# Patient Record
Sex: FEMALE | Race: WHITE | NOT HISPANIC OR LATINO | Employment: FULL TIME | URBAN - METROPOLITAN AREA
[De-identification: names, ages, dates, MRNs, and addresses within clinical notes are randomized per-mention and may not be internally consistent; named-entity substitution may affect disease eponyms.]

---

## 2017-02-14 ENCOUNTER — HOSPITAL ENCOUNTER (EMERGENCY)
Facility: MEDICAL CENTER | Age: 37
End: 2017-02-14
Attending: EMERGENCY MEDICINE
Payer: COMMERCIAL

## 2017-02-14 ENCOUNTER — APPOINTMENT (OUTPATIENT)
Dept: RADIOLOGY | Facility: MEDICAL CENTER | Age: 37
End: 2017-02-14
Attending: EMERGENCY MEDICINE
Payer: COMMERCIAL

## 2017-02-14 VITALS
OXYGEN SATURATION: 95 % | HEIGHT: 68 IN | DIASTOLIC BLOOD PRESSURE: 96 MMHG | RESPIRATION RATE: 18 BRPM | HEART RATE: 79 BPM | SYSTOLIC BLOOD PRESSURE: 149 MMHG | BODY MASS INDEX: 36.05 KG/M2 | WEIGHT: 237.88 LBS | TEMPERATURE: 98.5 F
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
## 2017-02-14 DIAGNOSIS — S16.1XXA CERVICAL STRAIN, INITIAL ENCOUNTER: ICD-10-CM

## 2017-02-14 PROCEDURE — 99284 EMERGENCY DEPT VISIT MOD MDM: CPT

## 2017-02-14 PROCEDURE — 72125 CT NECK SPINE W/O DYE: CPT

## 2017-02-14 RX ORDER — LORATADINE 10 MG/1
10 TABLET ORAL EVERY EVENING
COMMUNITY
End: 2019-10-01

## 2017-02-14 RX ORDER — ACETAMINOPHEN 500 MG
1000 TABLET ORAL EVERY 6 HOURS PRN
Status: SHIPPED | COMMUNITY
End: 2021-11-08

## 2017-02-14 RX ORDER — HYDROCODONE BITARTRATE AND ACETAMINOPHEN 5; 325 MG/1; MG/1
1 TABLET ORAL EVERY 4 HOURS PRN
Qty: 12 TAB | Refills: 0 | Status: SHIPPED | OUTPATIENT
Start: 2017-02-14 | End: 2018-05-22

## 2017-02-14 RX ORDER — IBUPROFEN 800 MG/1
800 TABLET ORAL EVERY 8 HOURS PRN
Qty: 30 TAB | Refills: 0 | Status: SHIPPED | OUTPATIENT
Start: 2017-02-14 | End: 2019-10-01

## 2017-02-14 ASSESSMENT — PAIN SCALES - GENERAL: PAINLEVEL_OUTOF10: 6

## 2017-02-14 NOTE — ED AVS SNAPSHOT
VILOOP Access Code: [email or web address]  Expires: 3/16/2017  4:02 PM    VILOOP  A secure, online tool to manage your health information     [employer] VILOOP® is a secure, online tool that connects you to your personalized health information from the privacy of your home -- day or night - making it very easy for you to manage your healthcare. Once the activation process is completed, you can even access your medical information using the VILOOP toña, which is available for free in the Apple Toña store or Google Play store.     VILOOP provides the following levels of access (as shown below):   My Chart Features   [facility] Primary Care Doctor [facility]  Specialists [facility]  Urgent  Care Non-[facility]  Primary Care  Doctor   Email your healthcare team securely and privately 24/7 X X X X   Manage appointments: schedule your next appointment; view details of past/upcoming appointments X      Request prescription refills. X      View recent personal medical records, including lab and immunizations X X X X   View health record, including health history, allergies, medications X X X X   Read reports about your outpatient visits, procedures, consult and ER notes X X X X   See your discharge summary, which is a recap of your hospital and/or ER visit that includes your diagnosis, lab results, and care plan. X X       How to register for VILOOP:  1. Go to  [email or web address].  2. Click on the Sign Up Now box, which takes you to the New Member Sign Up page. You will need to provide the following information:  a. Enter your VILOOP Access Code exactly as it appears at the top of this page. (You will not need to use this code after you’ve completed the sign-up process. If you do not sign up before the expiration date, you must request a new code.)   b. Enter your date of birth.   c. Enter your home email address.   d. Click Submit, and follow the next screen’s instructions.  3. Create a VILOOP ID. This will be your VILOOP  login ID and cannot be changed, so think of one that is secure and easy to remember.  4. Create a [employer] Live password. You can change your password at any time.  5. Enter your Password Reset Question and Answer. This can be used at a later time if you forget your password.   6. Enter your e-mail address. This allows you to receive e-mail notifications when new information is available in Bantam Live.  7. Click Sign Up. You can now view your health information.    For assistance activating your Bant[employer] Live account, call [phone]
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
## 2017-02-14 NOTE — ED NOTES
Med rec completed per pt at bedside  No prescription medications currently  Allergies reviewed - NKDA  No ABX in last 30 days
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
## 2017-02-14 NOTE — ED AVS SNAPSHOT
2/14/2017          Princess Machado  [address] [address] [facility] [address] [facility] [facility] [address]    Dear Princess:    [facility] wants to ensure your discharge home is safe and you or your loved ones have had all your questions answered regarding your care after you leave the hospital.    You may receive a telephone call within two days of your discharge.  This call is to make certain you understand your discharge instructions as well as ensure we provided you with the best care possible during your stay with us.     The call will only last approximately 3-5 minutes and will be done by a nurse.    Once again, we want to ensure your discharge home is safe and that you have a clear understanding of any next steps in your care.  If you have any questions or concerns, please do not hesitate to contact us, we are here for you.  Thank you for choosing [facility] for your healthcare needs.    Sincerely,    Steven Lopez    [facility]

## 2017-02-14 NOTE — ED AVS SNAPSHOT
Home Care Instructions                                                                                                                Princess Machado   MRN: [id number]    Department:  [facility], Emergency Dept   Date of Visit:  2/14/2017            [facility], Emergency Dept    [address]    [place] [address]    Phone:  [phone]      You were seen by     Guy G Gansert, M.D.      Your Diagnosis Was     Cervical strain, initial encounter     S16.1XXA       Follow-up Information     1. Follow up with Dennise CAMARILLO M.D..    Specialty:  Internal Medicine    Contact information    [address]  [address]  [place] [address] [phone]        Medication Information     Review all of your home medications and newly ordered medications with your primary doctor and/or pharmacist as soon as possible. Follow medication instructions as directed by your doctor and/or pharmacist.     Please keep your complete medication list with you and share with your physician. Update the information when medications are discontinued, doses are changed, or new medications (including over-the-counter products) are added; and carry medication information at all times in the event of emergency situations.               Medication List      START taking these medications        Instructions    hydrocodone-acetaminophen 5-325 MG Tabs per tablet   Commonly known as:  NORCO    Take 1 Tab by mouth every four hours as needed (pain).   Dose:  1 Tab       ibuprofen 800 MG Tabs   Commonly known as:  MOTRIN    Take 1 Tab by mouth every 8 hours as needed (pain).   Dose:  800 mg         ASK your doctor about these medications        Instructions    acetaminophen 500 MG Tabs   Commonly known as:  TYLENOL    Take 1,000 mg by mouth every 6 hours as needed for Mild Pain or Moderate Pain.   Dose:  1000 mg       loratadine 10 MG Tabs   Commonly known as:  CLARITIN    Take 10 mg by mouth every  evening.   Dose:  10 mg       prenatal vit/fe fumarate/fa 27-0.8 MG Tabs    Take 1 Tab by mouth every evening.   Dose:  1 Tab               Procedures and tests performed during your visit     CT-CSPINE WITHOUT PLUS RECONS        Discharge Instructions       Cryotherapy  Cryotherapy is when you put ice on your injury. Ice helps lessen pain and puffiness (swelling) after an injury. Ice works the best when you start using it in the first 24 to 48 hours after an injury.  HOME CARE  · Put a dry or damp towel between the ice pack and your skin.  · You may press gently on the ice pack.  · Leave the ice on for no more than 10 to 20 minutes at a time.  · Check your skin after 5 minutes to make sure your skin is okay.  · Rest at least 20 minutes between ice pack uses.  · Stop using ice when your skin loses feeling (numbness).  · Do not use ice on someone who cannot tell you when it hurts. This includes small children and people with memory problems (dementia).  GET HELP RIGHT AWAY IF:  · You have white spots on your skin.  · Your skin turns blue or pale.  · Your skin feels waxy or hard.  · Your puffiness gets worse.  MAKE SURE YOU:   · Understand these instructions.  · Will watch your condition.  · Will get help right away if you are not doing well or get worse.     This information is not intended to replace advice given to you by your health care provider. Make sure you discuss any questions you have with your health care provider.     Document Released: 06/05/2009 Document Revised: 03/11/2013 Document Reviewed: 08/09/2012  [employer] Interactive Patient Education ©2016 [employer]    Cervical Sprain  A cervical sprain is when the tissues (ligaments) that hold the neck bones in place stretch or tear.  HOME CARE   · Put ice on the injured area.  ¨ Put ice in a plastic bag.  ¨ Place a towel between your skin and the bag.  ¨ Leave the ice on for 15-20 minutes, 3-4 times a day.  · You may have been given a collar to wear. This  collar keeps your neck from moving while you heal.  ¨ Do not take the collar off unless told by your doctor.  ¨ If you have long hair, keep it outside of the collar.  ¨ Ask your doctor before changing the position of your collar. You may need to change its position over time to make it more comfortable.  ¨ If you are allowed to take off the collar for cleaning or bathing, follow your doctor's instructions on how to do it safely.  ¨ Keep your collar clean by wiping it with mild soap and water. Dry it completely. If the collar has removable pads, remove them every 1-2 days to hand wash them with soap and water. Allow them to air dry. They should be dry before you wear them in the collar.  ¨ Do not drive while wearing the collar.  · Only take medicine as told by your doctor.  · Keep all doctor visits as told.  · Keep all physical therapy visits as told.  · Adjust your work station so that you have good posture while you work.  · Avoid positions and activities that make your problems worse.  · Warm up and stretch before being active.  GET HELP IF:  · Your pain is not controlled with medicine.  · You cannot take less pain medicine over time as planned.  · Your activity level does not improve as expected.  GET HELP RIGHT AWAY IF:   · You are bleeding.  · Your stomach is upset.  · You have an allergic reaction to your medicine.  · You develop new problems that you cannot explain.  · You lose feeling (become numb) or you cannot move any part of your body (paralysis).  · You have tingling or weakness in any part of your body.  · Your symptoms get worse. Symptoms include:  ¨ Pain, soreness, stiffness, puffiness (swelling), or a burning feeling in your neck.  ¨ Pain when your neck is touched.  ¨ Shoulder or upper back pain.  ¨ Limited ability to move your neck.  ¨ Headache.  ¨ Dizziness.  ¨ Your hands or arms feel week, lose feeling, or tingle.  ¨ Muscle spasms.  ¨ Difficulty swallowing or chewing.  MAKE SURE YOU:    · Understand these instructions.  · Will watch your condition.  · Will get help right away if you are not doing well or get worse.     This information is not intended to replace advice given to you by your health care provider. Make sure you discuss any questions you have with your health care provider.     Document Released: 06/05/2009 Document Revised: 08/20/2014 Document Reviewed: 06/25/2014  [employer] Interactive Patient Education ©2016 [employer]            Patient Information     Patient Information    Following emergency treatment: all patient requiring follow-up care must return either to a private physician or a clinic if your condition worsens before you are able to obtain further medical attention, please return to the emergency room.     Billing Information    At [facility], we work to make the billing process streamlined for our patients.  Our Representatives are here to answer any questions you may have regarding your hospital bill.  If you have insurance coverage and have supplied your insurance information to us, we will submit a claim to your insurer on your behalf.  Should you have any questions regarding your bill, we can be reached online or by phone as follows:  Online: You are able pay your bills online or live chat with our representatives about any billing questions you may have. We are here to help Monday - Friday from 8:00am to 7:30pm and 9:00am - 12:00pm on Saturdays.  Please visit [email or web address]  for more information.   Phone:  [phone] or [phone]    Please note that your emergency physician, surgeon, pathologist, radiologist, anesthesiologist, and other specialists are not employed by [facility] and will therefore bill separately for their services.  Please contact them directly for any questions concerning their bills at the numbers below:     Emergency Physician Services:  [phone]  [facility]:   [phone]  [facility]:  [phone]  [facility]:  [phone]    1. Your final bill may vary from the amount quoted upon discharge if all procedures are not complete at that time, or if your doctor has additional procedures of which we are not aware. You will receive an additional bill if you return to the Emergency Department at [facility] for suture removal regardless of the facility of which the sutures were placed.     2. Please arrange for settlement of this account at the emergency registration.    3. All self-pay accounts are due in full at the time of treatment.  If you are unable to meet this obligation then payment is expected within 4-5 days.     4. If you have had radiology studies (CT, X-ray, Ultrasound, MRI), you have received a preliminary result during your emergency department visit. Please contact the radiology department [phone] to receive a copy of your final result. Please discuss the Final result with your primary physician or with the follow up physician provided.     Crisis Hotline:  [place] Crisis Hotline:  1-350-LZIQMQR or 1-207.263.9044  [place] Crisis Hotline:    1-356.557.9448 or 788-460-3055         ED Discharge Follow Up Questions    1. In order to provide you with very good care, we would like to follow up with a phone call in the next few days.  May we have your permission to contact you?     YES /  NO    2. What is the best phone number to call you? (       )_____-__________    3. What is the best time to call you?      Morning  /  Afternoon  /  Evening                   Patient Signature:  ____________________________________________________________    Date:  ____________________________________________________________

## 2017-02-14 NOTE — DISCHARGE INSTRUCTIONS
Cryotherapy  Cryotherapy is when you put ice on your injury. Ice helps lessen pain and puffiness (swelling) after an injury. Ice works the best when you start using it in the first 24 to 48 hours after an injury.  HOME CARE  · Put a dry or damp towel between the ice pack and your skin.  · You may press gently on the ice pack.  · Leave the ice on for no more than 10 to 20 minutes at a time.  · Check your skin after 5 minutes to make sure your skin is okay.  · Rest at least 20 minutes between ice pack uses.  · Stop using ice when your skin loses feeling (numbness).  · Do not use ice on someone who cannot tell you when it hurts. This includes small children and people with memory problems (dementia).  GET HELP RIGHT AWAY IF:  · You have white spots on your skin.  · Your skin turns blue or pale.  · Your skin feels waxy or hard.  · Your puffiness gets worse.  MAKE SURE YOU:   · Understand these instructions.  · Will watch your condition.  · Will get help right away if you are not doing well or get worse.     This information is not intended to replace advice given to you by your health care provider. Make sure you discuss any questions you have with your health care provider.     Document Released: 06/05/2009 Document Revised: 03/11/2013 Document Reviewed: 08/09/2012  [employer] Interactive Patient Education ©2016 [employer]    Cervical Sprain  A cervical sprain is when the tissues (ligaments) that hold the neck bones in place stretch or tear.  HOME CARE   · Put ice on the injured area.  ¨ Put ice in a plastic bag.  ¨ Place a towel between your skin and the bag.  ¨ Leave the ice on for 15-20 minutes, 3-4 times a day.  · You may have been given a collar to wear. This collar keeps your neck from moving while you heal.  ¨ Do not take the collar off unless told by your doctor.  ¨ If you have long hair, keep it outside of the collar.  ¨ Ask your doctor before changing the position of your collar. You may need to change its  position over time to make it more comfortable.  ¨ If you are allowed to take off the collar for cleaning or bathing, follow your doctor's instructions on how to do it safely.  ¨ Keep your collar clean by wiping it with mild soap and water. Dry it completely. If the collar has removable pads, remove them every 1-2 days to hand wash them with soap and water. Allow them to air dry. They should be dry before you wear them in the collar.  ¨ Do not drive while wearing the collar.  · Only take medicine as told by your doctor.  · Keep all doctor visits as told.  · Keep all physical therapy visits as told.  · Adjust your work station so that you have good posture while you work.  · Avoid positions and activities that make your problems worse.  · Warm up and stretch before being active.  GET HELP IF:  · Your pain is not controlled with medicine.  · You cannot take less pain medicine over time as planned.  · Your activity level does not improve as expected.  GET HELP RIGHT AWAY IF:   · You are bleeding.  · Your stomach is upset.  · You have an allergic reaction to your medicine.  · You develop new problems that you cannot explain.  · You lose feeling (become numb) or you cannot move any part of your body (paralysis).  · You have tingling or weakness in any part of your body.  · Your symptoms get worse. Symptoms include:  ¨ Pain, soreness, stiffness, puffiness (swelling), or a burning feeling in your neck.  ¨ Pain when your neck is touched.  ¨ Shoulder or upper back pain.  ¨ Limited ability to move your neck.  ¨ Headache.  ¨ Dizziness.  ¨ Your hands or arms feel week, lose feeling, or tingle.  ¨ Muscle spasms.  ¨ Difficulty swallowing or chewing.  MAKE SURE YOU:   · Understand these instructions.  · Will watch your condition.  · Will get help right away if you are not doing well or get worse.     This information is not intended to replace advice given to you by your health care provider. Make sure you discuss any questions  you have with your health care provider.     Document Released: 06/05/2009 Document Revised: 08/20/2014 Document Reviewed: 06/25/2014  [employer] Interactive Patient Education ©2016 [employer] Inc.
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
## 2017-04-12 ENCOUNTER — OFFICE VISIT (OUTPATIENT)
Dept: CARDIOLOGY | Facility: MEDICAL CENTER | Age: 37
End: 2017-04-12
Payer: COMMERCIAL

## 2017-04-12 VITALS
OXYGEN SATURATION: 95 % | WEIGHT: 233 LBS | DIASTOLIC BLOOD PRESSURE: 80 MMHG | HEART RATE: 95 BPM | HEIGHT: 68 IN | SYSTOLIC BLOOD PRESSURE: 110 MMHG | BODY MASS INDEX: 35.31 KG/M2

## 2017-04-12 DIAGNOSIS — E66.9 OBESITY (BMI 30.0-34.9): ICD-10-CM

## 2017-04-12 DIAGNOSIS — I10 ESSENTIAL HYPERTENSION: ICD-10-CM

## 2017-04-12 DIAGNOSIS — Q24.9 CARDIAC ABNORMALITY: ICD-10-CM

## 2017-04-12 DIAGNOSIS — I77.89 ASCENDING AORTA ENLARGEMENT (HCC): ICD-10-CM

## 2017-04-12 LAB — EKG IMPRESSION: NORMAL

## 2017-04-12 PROCEDURE — 93000 ELECTROCARDIOGRAM COMPLETE: CPT | Performed by: INTERNAL MEDICINE

## 2017-04-12 PROCEDURE — 99204 OFFICE O/P NEW MOD 45 MIN: CPT | Performed by: INTERNAL MEDICINE

## 2017-04-12 RX ORDER — SUMATRIPTAN 100 MG/1
TABLET, FILM COATED ORAL
Refills: 11 | COMMUNITY
Start: 2017-03-03 | End: 2018-08-24

## 2017-04-12 NOTE — PROGRESS NOTES
Cardiology Consult Note:    Marlene Limon  Date & Time note created:    4/12/2017   8:18 AM       Patient ID:  Name:             Princess Machado   YOB: 1980  Age:                 36 y.o.  female   MRN:               [id number]                                                             Chief Complaint:   Chief Complaint   Patient presents with   • New Patient             History of Present Illness:   Princess Machado has recent recheck of her echo on enlarged ascending aorta; Due to insurance issue and personal concern; Consulting us for assessment and prognosis;   She has first echo in 3/2010 showing asc aorta 4.1 cm and repeated recently 1/2017 still showing the same measurement after 2 pregnancies and vaginal delivery, 2013, 2015;       Review of Systems:     Constitutional: Denies fevers, Denies weight changes  Eyes: Denies changes in vision, no eye pain  Ears/Nose/Throat/Mouth: Denies nasal congestion or sore throat   Cardiovascular: Denies chest pain or palpitations   Respiratory: Denies shortness of breath , Denies cough  Gastrointestinal/Hepatic: Denies abdominal pain, nausea, vomiting, diarrhea, constipation or GI bleeding   Genitourinary: Denies bladder dysfunction, dysuria or frequency  Musculoskeletal/Rheum: Denies  joint pain and swelling   Skin/Breast: Denies rash, denies breast lumps or discharge  Neurological: Denies headache, confusion, memory loss or focal weakness/parasthesias  Psychiatric: denies mood disorder   Endocrine: denies hx of diabetes or thyroid dysfunction  Heme/Oncology/Lymph Nodes: Denies enlarged lymph nodes, denies bruising or known bleeding disorder  Allergic/Immunologic: Denies hx of allergies      All other systems were reviewed and are negative (AMA/CMS criteria)    Past Medical History:   Past Medical History   Diagnosis Date   • ASTHMA      seasonal allergy related   • Hypertension          Past Surgical History:  Past Surgical History   Procedure  Laterality Date   • Dilation and curettage         [facility] Medications:    Current outpatient prescriptions:   •  loratadine (CLARITIN) 10 MG Tab, Take 10 mg by mouth every evening., Disp: , Rfl:   •  prenatal vit/fe fumarate/fa (PRENATAL S) 27-0.8 MG TABS, Take 1 Tab by mouth every evening., Disp: , Rfl:   •  sumatriptan (IMITREX) 100 MG tablet, , Disp: , Rfl: 11  •  acetaminophen (TYLENOL) 500 MG Tab, Take 1,000 mg by mouth every 6 hours as needed for Mild Pain or Moderate Pain., Disp: , Rfl:   •  hydrocodone-acetaminophen (NORCO) 5-325 MG Tab per tablet, Take 1 Tab by mouth every four hours as needed (pain)., Disp: 12 Tab, Rfl: 0  •  ibuprofen (MOTRIN) 800 MG Tab, Take 1 Tab by mouth every 8 hours as needed (pain)., Disp: 30 Tab, Rfl: 0    Current Outpatient Medications:  Current Outpatient Prescriptions   Medication Sig Dispense Refill   • loratadine (CLARITIN) 10 MG Tab Take 10 mg by mouth every evening.     • prenatal vit/fe fumarate/fa (PRENATAL S) 27-0.8 MG TABS Take 1 Tab by mouth every evening.     • sumatriptan (IMITREX) 100 MG tablet   11   • acetaminophen (TYLENOL) 500 MG Tab Take 1,000 mg by mouth every 6 hours as needed for Mild Pain or Moderate Pain.     • hydrocodone-acetaminophen (NORCO) 5-325 MG Tab per tablet Take 1 Tab by mouth every four hours as needed (pain). 12 Tab 0   • ibuprofen (MOTRIN) 800 MG Tab Take 1 Tab by mouth every 8 hours as needed (pain). 30 Tab 0     No current facility-administered medications for this visit.         Medication Allergy/Sensitivities:  No Known Allergies    Family History:  History reviewed. No pertinent family history.    Social History:  Social History     Social History   • Marital Status:      Spouse Name: N/A   • Number of Children: N/A   • Years of Education: N/A     Occupational History   • Not on file.     Social History Main Topics   • Smoking status: Never Smoker    • Smokeless tobacco: Never Used   • Alcohol Use: No   • Drug Use: No   •  "Sexual Activity: Not on file     Other Topics Concern   • Not on file     Social History Narrative         Physical Exam:  Vitals  Weight/BMI: Body mass index is 35.44 kg/(m^2).  Blood pressure 110/80, pulse 95, height 1.727 m (5' 7.99\"), weight 105.688 kg (233 lb), SpO2 95 %.  Filed Vitals:    04/12/17 0801   BP: 110/80   Pulse: 95   Height: 1.727 m (5' 7.99\")   Weight: 105.688 kg (233 lb)   SpO2: 95%     Oxygen Therapy:  Pulse Oximetry: 95 %  General Appearance:  Obese;  Well developed, Well nourished, No acute distress, Non-toxic appearance.   HENT:  Normocephalic, Atraumatic, Oropharynx moist mucous membranes, Dentition: Mallampati 4 OP, Nose normal.    Eyes:  PERRLA, EOMI, Conjunctiva normal, No discharge.  Neck:  Normal range of motion, No cervical tenderness, Supple, No stridor, no JVD.  No thyromegaly.  No carotid bruit.  Cardiovascular:  Normal heart rate, Normal rhythm,  S1, S2, no S3,  S4; No gallops; No murmurs, No rubs, .   Extremitites with intact distal pulses, no cyanosis, clubbing or edema.  No heaves, thrills, HJR;  Peripheral pulses: carotid 2+, brachial 2+, radial 2+, ulnar 2+, femoral 2+, popliteal 2+, PT 2+, DP 2+;  Lungs:  Respiratory effort is normal. Normal breath sounds, breath sounds clear to auscultation bilaterally,  no rales, no rhonchi, no wheezing.   Abdomen: Bowel sounds normal, Soft, No tenderness, No guarding, No rebound, No masses, No hepatosplenomegaly.  Skin: Warm, Dry, No erythema, No rash, no induration or crepitus.  Neurologic: Alert & oriented x 3, Normal motor function, Normal sensory function, No focal deficits noted, cranial nerves II through XII are normal,  normal gait.  Psychiatric: Affect normal, Judgment normal, Mood normal.      Data Review:     Records reviewed and summarized in current documentation    Lab Data Review:  Lab Results   Component Value Date/Time    SODIUM 131* 05/22/2013 07:00 PM    POTASSIUM 4.1 05/22/2013 07:00 PM    CHLORIDE 103 05/22/2013 07:00 " PM    CO2 18* 05/22/2013 07:00 PM    GLUCOSE 88 05/22/2013 07:00 PM    BUN 11 05/22/2013 07:00 PM    CREATININE 0.59 05/22/2013 07:00 PM      No results found for: PROTHROMBTM, INR   Lab Results   Component Value Date/Time    WBC 17.6* 05/25/2013 12:17 PM    RBC 2.77* 05/25/2013 12:17 PM    HEMOGLOBIN 8.8* 05/25/2013 12:17 PM    HEMATOCRIT 26.2* 05/25/2013 12:17 PM    MCV 94.5 05/25/2013 12:17 PM    MCH 31.8 05/25/2013 12:17 PM    MCHC 33.7 05/25/2013 12:17 PM    MPV 8.6 05/25/2013 12:17 PM    NEUTROPHILS-POLYS 73.4* 05/22/2013 07:00 PM    LYMPHOCYTES 17.9* 05/22/2013 07:00 PM    MONOCYTES 7.8 05/22/2013 07:00 PM    EOSINOPHILS 0.7 05/22/2013 07:00 PM    BASOPHILS 0.2 05/22/2013 07:00 PM        Imaging/Procedures Review:    To my review shows:Echo's    EKG To my review shows: SR    Assessment and Plan.   36 y.o. female has stable ascending aorta enlargement with long discussion of the condition , follow ups, and type of exercise, diet; She is working on weight loss with healthy diet;    1. Cardiac abnormality  From Echo, more max vascular, aorta  - EKG    2. Ascending aorta enlargement (CMS-HCC)  See above, stable    3. Essential hypertension  Controlled     4. Obesity (BMI 30.0-34.9)  Discussed risks and educated about the subject related matters        1. Cardiac abnormality  EKG   2. Ascending aorta enlargement (CMS-HCC)     3. Essential hypertension     4. Obesity (BMI 30.0-34.9)

## 2017-04-12 NOTE — MR AVS SNAPSHOT
"        Princess Machado   2017 8:00 AM   Office Visit   MRN: [id number]    Department:  [facility]   Dept Phone:  [phone]    Description:  Female : 1980   Provider:  Marlene Limon MD,[facility]           Reason for Visit     New Patient           Allergies as of 2017     No Known Allergies      You were diagnosed with     Cardiac abnormality   [250736]       Ascending aorta enlargement (CMS-HCC)   [227140]       Essential hypertension   [6167752]       Obesity (BMI 30.0-34.9)   [742581]         Vital Signs     Blood Pressure Pulse Height Weight Body Mass Index Oxygen Saturation    110/80 mmHg 95 1.727 m (5' 7.99\") 105.688 kg (233 lb) 35.44 kg/m2 95%    Smoking Status                   Never Smoker            Basic Information     Date Of Birth Sex Race Ethnicity Preferred Language    1980 Female White Non- English      Your appointments     Oct 13, 2017  7:45 AM   FOLLOW UP with [facility] ([facility]    [address]  [address]  [facility] [id number] [phone]              Problem List              ICD-10-CM Priority Class Noted - Resolved    Indication for care in labor or delivery O75.9   2013 - Present      Health Maintenance        Date Due Completion Dates    IMM DTaP/Tdap/Td Vaccine (1 - Tdap) 1998 ---    PAP SMEAR 2000 ---            Results       Current Immunizations     MMR Vaccine 2013  9:30 PM      Below and/or attached are the medications your provider expects you to take. Review all of your home medications and newly ordered medications with your provider and/or pharmacist. Follow medication instructions as directed by your provider and/or pharmacist. Please keep your medication list with you and share with your provider. Update the information when medications are discontinued, doses are changed, or new medications (including over-the-counter products) " are added; and carry medication information at all times in the event of emergency situations     Allergies:  No Known Allergies          Medications  Valid as of: April 12, 2017 -  8:38 AM    Generic Name Brand Name Tablet Size Instructions for use    Acetaminophen (Tab) TYLENOL 500 MG Take 1,000 mg by mouth every 6 hours as needed for Mild Pain or Moderate Pain.        Hydrocodone-Acetaminophen (Tab) NORCO 5-325 MG Take 1 Tab by mouth every four hours as needed (pain).        Ibuprofen (Tab) MOTRIN 800 MG Take 1 Tab by mouth every 8 hours as needed (pain).        Loratadine (Tab) CLARITIN 10 MG Take 10 mg by mouth every evening.        Prenatal Vit-Fe Fumarate-FA (Tab) PRENATAL S 27-0.8 MG Take 1 Tab by mouth every evening.        SUMAtriptan Succinate (Tab) IMITREX 100 MG         .                 Medicines prescribed today were sent to:     None      Medication refill instructions:       If your prescription bottle indicates you have medication refills left, it is not necessary to call your provider’s office. Please contact your pharmacy and they will refill your medication.    If your prescription bottle indicates you do not have any refills left, you may request refills at any time through one of the following ways: The online [employer] system (except Urgent Care), by calling your provider’s office, or by asking your pharmacy to contact your provider’s office with a refill request. Medication refills are processed only during regular business hours and may not be available until the next business day. Your provider may request additional information or to have a follow-up visit with you prior to refilling your medication.   *Please Note: Medication refills are assigned a new Rx number when refilled electronically. Your pharmacy may indicate that no refills were authorized even though a new prescription for the same medication is available at the pharmacy. Please request the medicine by name with the pharmacy  before contacting your provider for a refill.           MyChart Access Code: Activation code not generated  Current MyChart Status: Active

## 2017-04-12 NOTE — Clinical Note
[facility]   [address]   [address]  [place] [address]  Phone: [phone]  Fax: [phone]              Princess Machado  1980    Encounter Date: 4/12/2017    Dennise CAMARILLO M.D.    Thank you for the referral. I had the pleasure of seeing Princess Machado today in cardiology clinic. I've attached my visit note below. If you have any questions please feel free to give me a call anytime.      Marlene Limon MD, PhD, [facility]  Cardiology and Lipidology  [facility]                                                                PROGRESS NOTE:  Cardiology Consult Note:    Marlene Limon  Date & Time note created:    4/12/2017   8:18 AM       Patient ID:  Name:             Princess Machado   YOB: 1980  Age:                 36 y.o.  female   MRN:               [id number]                                                             Chief Complaint:   Chief Complaint   Patient presents with   • New Patient             History of Present Illness:   Princess Machado has recent recheck of her echo on enlarged ascending aorta; Due to insurance issue and personal concern; Consulting us for assessment and prognosis;   She has first echo in 3/2010 showing asc aorta 4.1 cm and repeated recently 1/2017 still showing the same measurement after 2 pregnancies and vaginal delivery, 2013, 2015;       Review of Systems:     Constitutional: Denies fevers, Denies weight changes  Eyes: Denies changes in vision, no eye pain  Ears/Nose/Throat/Mouth: Denies nasal congestion or sore throat   Cardiovascular: Denies chest pain or palpitations   Respiratory: Denies shortness of breath , Denies cough  Gastrointestinal/Hepatic: Denies abdominal pain, nausea, vomiting, diarrhea, constipation or GI bleeding   Genitourinary: Denies bladder dysfunction, dysuria or frequency  Musculoskeletal/Rheum: Denies   joint pain and swelling   Skin/Breast: Denies rash, denies breast lumps or discharge  Neurological: Denies headache, confusion, memory loss or focal weakness/parasthesias  Psychiatric: denies mood disorder   Endocrine: denies hx of diabetes or thyroid dysfunction  Heme/Oncology/Lymph Nodes: Denies enlarged lymph nodes, denies bruising or known bleeding disorder  Allergic/Immunologic: Denies hx of allergies      All other systems were reviewed and are negative (AMA/CMS criteria)    Past Medical History:   Past Medical History   Diagnosis Date   • ASTHMA      seasonal allergy related   • Hypertension          Past Surgical History:  Past Surgical History   Procedure Laterality Date   • Dilation and curettage         [facility]ospital Medications:    Current outpatient prescriptions:   •  loratadine (CLARITIN) 10 MG Tab, Take 10 mg by mouth every evening., Disp: , Rfl:   •  prenatal vit/fe fumarate/fa (PRENATAL S) 27-0.8 MG TABS, Take 1 Tab by mouth every evening., Disp: , Rfl:   •  sumatriptan (IMITREX) 100 MG tablet, , Disp: , Rfl: 11  •  acetaminophen (TYLENOL) 500 MG Tab, Take 1,000 mg by mouth every 6 hours as needed for Mild Pain or Moderate Pain., Disp: , Rfl:   •  hydrocodone-acetaminophen (NORCO) 5-325 MG Tab per tablet, Take 1 Tab by mouth every four hours as needed (pain)., Disp: 12 Tab, Rfl: 0  •  ibuprofen (MOTRIN) 800 MG Tab, Take 1 Tab by mouth every 8 hours as needed (pain)., Disp: 30 Tab, Rfl: 0    Current Outpatient Medications:  Current Outpatient Prescriptions   Medication Sig Dispense Refill   • loratadine (CLARITIN) 10 MG Tab Take 10 mg by mouth every evening.     • prenatal vit/fe fumarate/fa (PRENATAL S) 27-0.8 MG TABS Take 1 Tab by mouth every evening.     • sumatriptan (IMITREX) 100 MG tablet   11   • acetaminophen (TYLENOL) 500 MG Tab Take 1,000 mg by mouth every 6 hours as needed for Mild Pain or Moderate Pain.     • hydrocodone-acetaminophen (NORCO) 5-325 MG Tab per tablet Take 1 Tab by mouth every  "four hours as needed (pain). 12 Tab 0   • ibuprofen (MOTRIN) 800 MG Tab Take 1 Tab by mouth every 8 hours as needed (pain). 30 Tab 0     No current facility-administered medications for this visit.         Medication Allergy/Sensitivities:  No Known Allergies    Family History:  History reviewed. No pertinent family history.    Social History:  Social History     Social History   • Marital Status:      Spouse Name: N/A   • Number of Children: N/A   • Years of Education: N/A     Occupational History   • Not on file.     Social History Main Topics   • Smoking status: Never Smoker    • Smokeless tobacco: Never Used   • Alcohol Use: No   • Drug Use: No   • Sexual Activity: Not on file     Other Topics Concern   • Not on file     Social History Narrative         Physical Exam:  Vitals  Weight/BMI: Body mass index is 35.44 kg/(m^2).  Blood pressure 110/80, pulse 95, height 1.727 m (5' 7.99\"), weight 105.688 kg (233 lb), SpO2 95 %.  Filed Vitals:    04/12/17 0801   BP: 110/80   Pulse: 95   Height: 1.727 m (5' 7.99\")   Weight: 105.688 kg (233 lb)   SpO2: 95%     Oxygen Therapy:  Pulse Oximetry: 95 %  General Appearance:  Obese;  Well developed, Well nourished, No acute distress, Non-toxic appearance.   HENT:  Normocephalic, Atraumatic, Oropharynx moist mucous membranes, Dentition: Mallampati 4 OP, Nose normal.    Eyes:  PERRLA, EOMI, Conjunctiva normal, No discharge.  Neck:  Normal range of motion, No cervical tenderness, Supple, No stridor, no JVD.  No thyromegaly.  No carotid bruit.  Cardiovascular:  Normal heart rate, Normal rhythm,  S1, S2, no S3,  S4; No gallops; No murmurs, No rubs, .   Extremitites with intact distal pulses, no cyanosis, clubbing or edema.  No heaves, thrills, HJR;  Peripheral pulses: carotid 2+, brachial 2+, radial 2+, ulnar 2+, femoral 2+, popliteal 2+, PT 2+, DP 2+;  Lungs:  Respiratory effort is normal. Normal breath sounds, breath sounds clear to auscultation bilaterally,  no rales, no " rhonchi, no wheezing.   Abdomen: Bowel sounds normal, Soft, No tenderness, No guarding, No rebound, No masses, No hepatosplenomegaly.  Skin: Warm, Dry, No erythema, No rash, no induration or crepitus.  Neurologic: Alert & oriented x 3, Normal motor function, Normal sensory function, No focal deficits noted, cranial nerves II through XII are normal,  normal gait.  Psychiatric: Affect normal, Judgment normal, Mood normal.      Data Review:     Records reviewed and summarized in current documentation    Lab Data Review:  Lab Results   Component Value Date/Time    SODIUM 131* 05/22/2013 07:00 PM    POTASSIUM 4.1 05/22/2013 07:00 PM    CHLORIDE 103 05/22/2013 07:00 PM    CO2 18* 05/22/2013 07:00 PM    GLUCOSE 88 05/22/2013 07:00 PM    BUN 11 05/22/2013 07:00 PM    CREATININE 0.59 05/22/2013 07:00 PM      No results found for: PROTHROMBTM, INR   Lab Results   Component Value Date/Time    WBC 17.6* 05/25/2013 12:17 PM    RBC 2.77* 05/25/2013 12:17 PM    HEMOGLOBIN 8.8* 05/25/2013 12:17 PM    HEMATOCRIT 26.2* 05/25/2013 12:17 PM    MCV 94.5 05/25/2013 12:17 PM    MCH 31.8 05/25/2013 12:17 PM    MCHC 33.7 05/25/2013 12:17 PM    MPV 8.6 05/25/2013 12:17 PM    NEUTROPHILS-POLYS 73.4* 05/22/2013 07:00 PM    LYMPHOCYTES 17.9* 05/22/2013 07:00 PM    MONOCYTES 7.8 05/22/2013 07:00 PM    EOSINOPHILS 0.7 05/22/2013 07:00 PM    BASOPHILS 0.2 05/22/2013 07:00 PM        Imaging/Procedures Review:    To my review shows:Echo's    EKG To my review shows: SR    Assessment and Plan.   36 y.o. female has stable ascending aorta enlargement with long discussion of the condition , follow ups, and type of exercise, diet; She is working on weight loss with healthy diet;    1. Cardiac abnormality  From Echo, more max vascular, aorta  - EKG    2. Ascending aorta enlargement (CMS-HCC)  See above, stable    3. Essential hypertension  Controlled     4. Obesity (BMI 30.0-34.9)  Discussed risks and educated about the subject related matters        1.  Cardiac abnormality  EKG   2. Ascending aorta enlargement (CMS-HCC)     3. Essential hypertension     4. Obesity (BMI 30.0-34.9)           Dennise CAMARILLO M.D.  [address]  [address]  [place] [address]  VIA Facsimile: [phone]

## 2017-10-07 ENCOUNTER — HOSPITAL ENCOUNTER (EMERGENCY)
Facility: MEDICAL CENTER | Age: 37
End: 2017-10-07
Attending: EMERGENCY MEDICINE
Payer: COMMERCIAL

## 2017-10-07 VITALS
HEART RATE: 76 BPM | WEIGHT: 236.33 LBS | DIASTOLIC BLOOD PRESSURE: 110 MMHG | OXYGEN SATURATION: 95 % | RESPIRATION RATE: 18 BRPM | BODY MASS INDEX: 35.82 KG/M2 | HEIGHT: 68 IN | SYSTOLIC BLOOD PRESSURE: 173 MMHG | TEMPERATURE: 98.9 F

## 2017-10-07 DIAGNOSIS — G43.909 MIGRAINE WITHOUT STATUS MIGRAINOSUS, NOT INTRACTABLE, UNSPECIFIED MIGRAINE TYPE: ICD-10-CM

## 2017-10-07 LAB
ANION GAP SERPL CALC-SCNC: 9 MMOL/L (ref 0–11.9)
BASOPHILS # BLD AUTO: 0.6 % (ref 0–1.8)
BASOPHILS # BLD: 0.06 K/UL (ref 0–0.12)
BUN SERPL-MCNC: 10 MG/DL (ref 8–22)
CALCIUM SERPL-MCNC: 9.8 MG/DL (ref 8.4–10.2)
CHLORIDE SERPL-SCNC: 106 MMOL/L (ref 96–112)
CO2 SERPL-SCNC: 23 MMOL/L (ref 20–33)
CREAT SERPL-MCNC: 0.71 MG/DL (ref 0.5–1.4)
EOSINOPHIL # BLD AUTO: 0.04 K/UL (ref 0–0.51)
EOSINOPHIL NFR BLD: 0.4 % (ref 0–6.9)
ERYTHROCYTE [DISTWIDTH] IN BLOOD BY AUTOMATED COUNT: 35.1 FL (ref 35.9–50)
GFR SERPL CREATININE-BSD FRML MDRD: >60 ML/MIN/1.73 M 2
GLUCOSE SERPL-MCNC: 119 MG/DL (ref 65–99)
HCT VFR BLD AUTO: 42.6 % (ref 37–47)
HGB BLD-MCNC: 15.4 G/DL (ref 12–16)
IMM GRANULOCYTES # BLD AUTO: 0.02 K/UL (ref 0–0.11)
IMM GRANULOCYTES NFR BLD AUTO: 0.2 % (ref 0–0.9)
LYMPHOCYTES # BLD AUTO: 1.76 K/UL (ref 1–4.8)
LYMPHOCYTES NFR BLD: 16.5 % (ref 22–41)
MCH RBC QN AUTO: 29.7 PG (ref 27–33)
MCHC RBC AUTO-ENTMCNC: 36.2 G/DL (ref 33.6–35)
MCV RBC AUTO: 82.2 FL (ref 81.4–97.8)
MONOCYTES # BLD AUTO: 0.47 K/UL (ref 0–0.85)
MONOCYTES NFR BLD AUTO: 4.4 % (ref 0–13.4)
NEUTROPHILS # BLD AUTO: 8.3 K/UL (ref 2–7.15)
NEUTROPHILS NFR BLD: 77.9 % (ref 44–72)
NRBC # BLD AUTO: 0 K/UL
NRBC BLD AUTO-RTO: 0 /100 WBC
PLATELET # BLD AUTO: 280 K/UL (ref 164–446)
PMV BLD AUTO: 9.8 FL (ref 9–12.9)
POTASSIUM SERPL-SCNC: 3.6 MMOL/L (ref 3.6–5.5)
RBC # BLD AUTO: 5.18 M/UL (ref 4.2–5.4)
SODIUM SERPL-SCNC: 138 MMOL/L (ref 135–145)
WBC # BLD AUTO: 10.7 K/UL (ref 4.8–10.8)

## 2017-10-07 PROCEDURE — 700111 HCHG RX REV CODE 636 W/ 250 OVERRIDE (IP): Performed by: EMERGENCY MEDICINE

## 2017-10-07 PROCEDURE — A9270 NON-COVERED ITEM OR SERVICE: HCPCS | Performed by: EMERGENCY MEDICINE

## 2017-10-07 PROCEDURE — 99284 EMERGENCY DEPT VISIT MOD MDM: CPT

## 2017-10-07 PROCEDURE — 80048 BASIC METABOLIC PNL TOTAL CA: CPT

## 2017-10-07 PROCEDURE — 700105 HCHG RX REV CODE 258: Performed by: EMERGENCY MEDICINE

## 2017-10-07 PROCEDURE — 700102 HCHG RX REV CODE 250 W/ 637 OVERRIDE(OP): Performed by: EMERGENCY MEDICINE

## 2017-10-07 PROCEDURE — 85025 COMPLETE CBC W/AUTO DIFF WBC: CPT

## 2017-10-07 PROCEDURE — 96374 THER/PROPH/DIAG INJ IV PUSH: CPT

## 2017-10-07 PROCEDURE — 96375 TX/PRO/DX INJ NEW DRUG ADDON: CPT

## 2017-10-07 RX ORDER — BUTALBITAL, ASPIRIN AND CAFFEINE 50; 325; 40 MG/1; MG/1; MG/1
1 TABLET ORAL EVERY 6 HOURS PRN
Qty: 30 TAB | Refills: 0 | Status: SHIPPED | OUTPATIENT
Start: 2017-10-07 | End: 2018-08-24

## 2017-10-07 RX ORDER — ONDANSETRON 2 MG/ML
4 INJECTION INTRAMUSCULAR; INTRAVENOUS ONCE
Status: COMPLETED | OUTPATIENT
Start: 2017-10-07 | End: 2017-10-07

## 2017-10-07 RX ORDER — HYDROCODONE BITARTRATE AND ACETAMINOPHEN 5; 325 MG/1; MG/1
1 TABLET ORAL ONCE
Status: COMPLETED | OUTPATIENT
Start: 2017-10-07 | End: 2017-10-07

## 2017-10-07 RX ORDER — ONDANSETRON 4 MG/1
4 TABLET, ORALLY DISINTEGRATING ORAL EVERY 8 HOURS PRN
Qty: 20 TAB | Refills: 1 | Status: SHIPPED | OUTPATIENT
Start: 2017-10-07 | End: 2018-08-24

## 2017-10-07 RX ORDER — KETOROLAC TROMETHAMINE 30 MG/ML
30 INJECTION, SOLUTION INTRAMUSCULAR; INTRAVENOUS ONCE
Status: COMPLETED | OUTPATIENT
Start: 2017-10-07 | End: 2017-10-07

## 2017-10-07 RX ORDER — SODIUM CHLORIDE 9 MG/ML
1000 INJECTION, SOLUTION INTRAVENOUS CONTINUOUS
Status: DISPENSED | OUTPATIENT
Start: 2017-10-07 | End: 2017-10-07

## 2017-10-07 RX ORDER — MORPHINE SULFATE 4 MG/ML
4 INJECTION, SOLUTION INTRAMUSCULAR; INTRAVENOUS ONCE
Status: COMPLETED | OUTPATIENT
Start: 2017-10-07 | End: 2017-10-07

## 2017-10-07 RX ADMIN — SODIUM CHLORIDE 1000 ML: 9 INJECTION, SOLUTION INTRAVENOUS at 12:23

## 2017-10-07 RX ADMIN — ONDANSETRON 4 MG: 2 INJECTION INTRAMUSCULAR; INTRAVENOUS at 12:24

## 2017-10-07 RX ADMIN — HYDROCODONE BITARTRATE AND ACETAMINOPHEN 1 TABLET: 5; 325 TABLET ORAL at 14:31

## 2017-10-07 RX ADMIN — KETOROLAC TROMETHAMINE 30 MG: 30 INJECTION, SOLUTION INTRAMUSCULAR at 12:24

## 2017-10-07 RX ADMIN — MORPHINE SULFATE 4 MG: 4 INJECTION INTRAVENOUS at 12:24

## 2017-10-07 ASSESSMENT — PAIN SCALES - GENERAL: PAINLEVEL_OUTOF10: 8

## 2017-10-07 NOTE — ED NOTES
Assumed care of pt-states improvement in s/s, h/a 3/10. Vs as charted, call light in reach, family at bedside. Placed up for re eval

## 2017-10-07 NOTE — ED NOTES
Dc instructions and prescriptions reviewed. Pt to f/u with neurology, return for worsening s/s. Aware of not being able to drive due to meds recvd in er

## 2017-10-07 NOTE — ED NOTES
Pt c/o migraine and N/V x4 days. Pt states has been having more lately and states imitrex is not working.

## 2017-10-07 NOTE — ED NOTES
ERP at bedside. Pt agrees with plan of care discussed by ERP. AIDET acknowledged with patient. IV established. Blood sent to lab. Medicinal interventions carried out per ERP orders. Georgerlakeshia in low position, side rail up for pt safety. Call light within reach. Will continue to monitor.

## 2017-10-08 NOTE — ED PROVIDER NOTES
"ED Provider Note    CHIEF COMPLAINT  Chief Complaint   Patient presents with   • Migraine   • N/V       HPI  Princess Machado is a 37 y.o. female who presents to the emergency today with migraine headache. Patient states onset last 24 hours she states that this is usual migraine with photosensitivity and nausea one episode of vomiting. Headache is frontal ring to the back of her head currently rated at a 7/10. She has tried her usual medications of Imitrex without success and she's been having more frequent migraines. Had a CT scan done several months ago which was negative. No fever chills or changes to bowel or bladder or other complaints at this time.    REVIEW OF SYSTEMS  See HPI for further details. All other systems are negative.     PAST MEDICAL HISTORY  Past Medical History:   Diagnosis Date   • ASTHMA     seasonal allergy related   • Hypertension    • Migraines        FAMILY HISTORY  [unfilled]    SOCIAL HISTORY  Social History     Social History   • Marital status:      Spouse name: N/A   • Number of children: N/A   • Years of education: N/A     Social History Main Topics   • Smoking status: Never Smoker   • Smokeless tobacco: Never Used   • Alcohol use No   • Drug use: No   • Sexual activity: Not on file     Other Topics Concern   • Not on file     Social History Narrative   • No narrative on file       SURGICAL HISTORY  Past Surgical History:   Procedure Laterality Date   • DILATION AND CURETTAGE         CURRENT MEDICATIONS  Home Medications    **Home medications have not yet been reviewed for this encounter**         ALLERGIES  No Known Allergies    PHYSICAL EXAM  VITAL SIGNS: BP (!) 173/110   Pulse 76   Temp 37.2 °C (98.9 °F)   Resp 18   Ht 1.727 m (5' 8\")   Wt 107.2 kg (236 lb 5.3 oz)   LMP 10/05/2017   SpO2 95%   BMI 35.93 kg/m²  Room air O2: 96    Constitutional: Well developed, Well nourished, No acute distress, Non-toxic appearance.   HENT: Normocephalic, Atraumatic, Bilateral " external ears normal, Oropharynx moist, No oral exudates, Nose normal.   Eyes: PERRLA, EOMI, Conjunctiva normal, No discharge.   Neck: Normal range of motion, No tenderness, Supple, No stridor.   Lymphatic: No lymphadenopathy noted.   Cardiovascular: Normal heart rate, Normal rhythm, No murmurs, No rubs, No gallops.   Thorax & Lungs: Normal breath sounds, No respiratory distress, No wheezing, No chest tenderness.   Abdomen: Bowel sounds normal, Soft, No tenderness, No masses, No pulsatile masses.   Skin: Warm, Dry, No erythema, No rash.   Back: No tenderness, No CVA tenderness.   Extremities: Intact distal pulses, No edema, No tenderness, No cyanosis, No clubbing.   Musculoskeletal: Good range of motion in all major joints. No tenderness to palpation or major deformities noted.   Neurologic: Alert & oriented x 3, Normal motor function, Normal sensory function, No focal deficits noted.   Psychiatric: Affect normal, Judgment normal, Mood normal.     COURSE & MEDICAL DECISION MAKING  Pertinent Labs & Imaging studies reviewed. (See chart for details)  IV status she was given IV fluids, Toradol, morphine and Zofran with resolution of her symptoms. On reexamination she was feeling much improved. Neurology appointment was set up for her through ER . Patient is placed on fiorinal and Zofran. The patient and her family verbalized understanding instructions need for follow-up she is discharged in stable asymptomatic and improved condition to home.    FINAL IMPRESSION  1. Acute migraine cephalgia  2.   3.         Electronically signed by: Jose Alfredo Sauceda, 10/7/2017 5:57 PM

## 2017-10-24 ENCOUNTER — OFFICE VISIT (OUTPATIENT)
Dept: CARDIOLOGY | Facility: MEDICAL CENTER | Age: 37
End: 2017-10-24
Payer: COMMERCIAL

## 2017-10-24 ENCOUNTER — HOSPITAL ENCOUNTER (OUTPATIENT)
Dept: LAB | Facility: MEDICAL CENTER | Age: 37
End: 2017-10-24
Attending: INTERNAL MEDICINE
Payer: COMMERCIAL

## 2017-10-24 VITALS
SYSTOLIC BLOOD PRESSURE: 100 MMHG | HEIGHT: 68 IN | BODY MASS INDEX: 35.61 KG/M2 | WEIGHT: 235 LBS | HEART RATE: 60 BPM | OXYGEN SATURATION: 95 % | DIASTOLIC BLOOD PRESSURE: 82 MMHG

## 2017-10-24 DIAGNOSIS — R71.8 RBC MICROCYTOSIS: ICD-10-CM

## 2017-10-24 DIAGNOSIS — R73.9 HYPERGLYCEMIA: ICD-10-CM

## 2017-10-24 DIAGNOSIS — E88.810 METABOLIC SYNDROME X: ICD-10-CM

## 2017-10-24 DIAGNOSIS — I77.89 ASCENDING AORTA ENLARGEMENT (HCC): ICD-10-CM

## 2017-10-24 DIAGNOSIS — I10 ESSENTIAL HYPERTENSION: ICD-10-CM

## 2017-10-24 DIAGNOSIS — E66.9 OBESITY (BMI 30.0-34.9): ICD-10-CM

## 2017-10-24 LAB
EST. AVERAGE GLUCOSE BLD GHB EST-MCNC: 108 MG/DL
FERRITIN SERPL-MCNC: 87.4 NG/ML (ref 10–291)
HBA1C MFR BLD: 5.4 % (ref 0–5.6)

## 2017-10-24 PROCEDURE — 83540 ASSAY OF IRON: CPT

## 2017-10-24 PROCEDURE — 36415 COLL VENOUS BLD VENIPUNCTURE: CPT

## 2017-10-24 PROCEDURE — 83036 HEMOGLOBIN GLYCOSYLATED A1C: CPT

## 2017-10-24 PROCEDURE — 82728 ASSAY OF FERRITIN: CPT

## 2017-10-24 PROCEDURE — 83550 IRON BINDING TEST: CPT

## 2017-10-24 PROCEDURE — 99214 OFFICE O/P EST MOD 30 MIN: CPT | Performed by: INTERNAL MEDICINE

## 2017-10-24 RX ORDER — METOPROLOL TARTRATE 50 MG/1
TABLET, FILM COATED ORAL
COMMUNITY
Start: 2017-10-10 | End: 2018-08-24

## 2017-10-24 NOTE — PROGRESS NOTES
No chief complaint on file.    HTN.     This patient is an established female who is here today to discuss:  Has migraine and recently added BB with better control of BP and HA  Lab review    Patient Active Problem List    Diagnosis Date Noted   • Indication for care in labor or delivery 05/22/2013       Past Medical History:   Diagnosis Date   • ASTHMA     seasonal allergy related   • Hypertension    • Migraines      Past Surgical History:   Procedure Laterality Date   • DILATION AND CURETTAGE       Social History     Social History   • Marital status:      Spouse name: N/A   • Number of children: N/A   • Years of education: N/A     Social History Main Topics   • Smoking status: Never Smoker   • Smokeless tobacco: Never Used   • Alcohol use No   • Drug use: No   • Sexual activity: Not on file     Other Topics Concern   • Not on file     Social History Narrative   • No narrative on file     Family History   Problem Relation Age of Onset   • Hypertension Mother    • Heart Disease Father    • Hypertension Father        Current Outpatient Prescriptions   Medication Sig Dispense Refill   • metoprolol (LOPRESSOR) 50 MG Tab      • ondansetron (ZOFRAN ODT) 4 MG TABLET DISPERSIBLE Take 1 Tab by mouth every 8 hours as needed for Nausea/Vomiting. 20 Tab 1   • sumatriptan (IMITREX) 100 MG tablet   11   • loratadine (CLARITIN) 10 MG Tab Take 10 mg by mouth every evening.     • asa/caf/butalbital (FIORINAL) -40 MG Tab Take 1 Tab by mouth every 6 hours as needed (headache). 30 Tab 0   • acetaminophen (TYLENOL) 500 MG Tab Take 1,000 mg by mouth every 6 hours as needed for Mild Pain or Moderate Pain.     • hydrocodone-acetaminophen (NORCO) 5-325 MG Tab per tablet Take 1 Tab by mouth every four hours as needed (pain). 12 Tab 0   • ibuprofen (MOTRIN) 800 MG Tab Take 1 Tab by mouth every 8 hours as needed (pain). 30 Tab 0   • prenatal vit/fe fumarate/fa (PRENATAL S) 27-0.8 MG TABS Take 1 Tab by mouth every evening.    "    No current facility-administered medications for this visit.      Review of patient's allergies indicates no known allergies.    Review of Systems:     Constitutional: Denies fevers, Denies weight changes  Eyes: Denies changes in vision, no eye pain  Ears/Nose/Throat/Mouth: Denies nasal congestion or sore throat   Cardiovascular: Denies chest pain or palpitations   Respiratory: Denies shortness of breath , Denies cough  Gastrointestinal/Hepatic: Denies abdominal pain, nausea, vomiting, diarrhea, constipation or GI bleeding   Genitourinary: Denies bladder dysfunction, dysuria or frequency  Musculoskeletal/Rheum: Denies  joint pain and swelling   Skin/Breast: Denies rash, denies breast lumps or discharge  Neurological: Denies headache, confusion, memory loss or focal weakness/parasthesias  Psychiatric: denies mood disorder   Endocrine: denies hx of diabetes or thyroid dysfunction  Heme/Oncology/Lymph Nodes: Denies enlarged lymph nodes, denies brusing or known bleeding disorder  Allergic/Immunologic: Denies hx of allergies      All other systems were reviewed and are negative (AMA/CMS criteria)      Blood pressure 100/82, pulse 60, height 1.727 m (5' 8\"), weight 106.6 kg (235 lb), last menstrual period 10/05/2017, SpO2 95 %.  General Appearance:  Obese;  Well developed, Well nourished, No acute distress, Non-toxic appearance.   HENT:  Normocephalic, Atraumatic, Oropharynx moist mucous membranes, Dentition: Mallampati 4 OP, Nose normal.    Eyes:  PERRLA, EOMI, Conjunctiva normal, No discharge.  Neck:  Normal range of motion, No cervical tenderness, Supple, No stridor, no JVD.  No thyromegaly.  No carotid bruit.  Cardiovascular:  Normal heart rate, Normal rhythm,  S1, S2, no S3,  S4; No gallops; No murmurs, No rubs, .   Extremitites with intact distal pulses, no cyanosis, clubbing or edema.  No heaves, thrills, HJR;  Peripheral pulses: carotid 2+, brachial 2+, radial 2+, ulnar 2+, femoral 2+, popliteal 2+, PT 2+, DP " 2+;  Lungs:  Respiratory effort is normal. Normal breath sounds, breath sounds clear to auscultation bilaterally,  no rales, no rhonchi, no wheezing.   Abdomen: Bowel sounds normal, Soft, No tenderness, No guarding, No rebound, No masses, No hepatosplenomegaly.  Skin: Warm, Dry, No erythema, No rash, no induration or crepitus.  Neurologic: Alert & oriented x 3, Normal motor function, Normal sensory function, No focal deficits noted, cranial nerves II through XII are normal,  normal gait.  Psychiatric: Affect normal, Judgment normal, Mood normal.    Results for CHAPINCITO TAYLOR (MRN [id number]) as of 10/24/2017 08:01   Ref. Range 10/7/2017 12:20   WBC Latest Ref Range: 4.8 - 10.8 K/uL 10.7   RBC Latest Ref Range: 4.20 - 5.40 M/uL 5.18   Hemoglobin Latest Ref Range: 12.0 - 16.0 g/dL 15.4   Hematocrit Latest Ref Range: 37.0 - 47.0 % 42.6   MCV Latest Ref Range: 81.4 - 97.8 fL 82.2   MCH Latest Ref Range: 27.0 - 33.0 pg 29.7   MCHC Latest Ref Range: 33.6 - 35.0 g/dL 36.2 (H)   RDW Latest Ref Range: 35.9 - 50.0 fL 35.1 (L)   Platelet Count Latest Ref Range: 164 - 446 K/uL 280   MPV Latest Ref Range: 9.0 - 12.9 fL 9.8   Neutrophils-Polys Latest Ref Range: 44.00 - 72.00 % 77.90 (H)   Neutrophils (Absolute) Latest Ref Range: 2.00 - 7.15 K/uL 8.30 (H)   Lymphocytes Latest Ref Range: 22.00 - 41.00 % 16.50 (L)   Lymphs (Absolute) Latest Ref Range: 1.00 - 4.80 K/uL 1.76   Monocytes Latest Ref Range: 0.00 - 13.40 % 4.40   Monos (Absolute) Latest Ref Range: 0.00 - 0.85 K/uL 0.47   Eosinophils Latest Ref Range: 0.00 - 6.90 % 0.40   Eos (Absolute) Latest Ref Range: 0.00 - 0.51 K/uL 0.04   Basophils Latest Ref Range: 0.00 - 1.80 % 0.60   Baso (Absolute) Latest Ref Range: 0.00 - 0.12 K/uL 0.06   Immature Granulocytes Latest Ref Range: 0.00 - 0.90 % 0.20   Immature Granulocytes (abs) Latest Ref Range: 0.00 - 0.11 K/uL 0.02   Nucleated RBC Latest Units: /100 WBC 0.00   NRBC (Absolute) Latest Units: K/uL 0.00   Sodium Latest Ref  Range: 135 - 145 mmol/L 138   Potassium Latest Ref Range: 3.6 - 5.5 mmol/L 3.6   Chloride Latest Ref Range: 96 - 112 mmol/L 106   Co2 Latest Ref Range: 20 - 33 mmol/L 23   Anion Gap Latest Ref Range: 0.0 - 11.9  9.0   Glucose Latest Ref Range: 65 - 99 mg/dL 119 (H)   Bun Latest Ref Range: 8 - 22 mg/dL 10   Creatinine Latest Ref Range: 0.50 - 1.40 mg/dL 0.71   GFR If  Latest Ref Range: >60 mL/min/1.73 m 2 >60   GFR If Non  Latest Ref Range: >60 mL/min/1.73 m 2 >60   Calcium Latest Ref Range: 8.4 - 10.2 mg/dL 9.8   , , HDL 38,       Assessment and Plan.   37 y.o. female has likely Iron def and Insulin resistance, MetSyn-X; Discussed and further lab ordered to follow with PCP;     1. Essential hypertension  Better controlled    2. Obesity (BMI 30.0-34.9)  stable    3. Ascending aorta enlargement (CMS-HCC)  4/2017 4.1 cm per Echo    4. RBC microcytosis    - IRON/TOTAL IRON BIND  - FERRITIN; Future    5. Hyperglycemia    - HEMOGLOBIN A1C    6. Metabolic syndrome X  Discussed risks and educated about the subject related matters    - HEMOGLOBIN A1C      Return to clinic in  6  months    1. Essential hypertension     2. Obesity (BMI 30.0-34.9)     3. Ascending aorta enlargement (CMS-HCC)     4. RBC microcytosis  IRON/TOTAL IRON BIND    FERRITIN   5. Hyperglycemia  HEMOGLOBIN A1C   6. Metabolic syndrome X  HEMOGLOBIN A1C

## 2017-10-24 NOTE — LETTER
[facility]   [address]   [address]  [place] [address]  Phone: [phone]  Fax: [phone]              Princess Machado  1980    Encounter Date: 10/24/2017    Kaz Dalton M.D.    Thank you for the referral. I had the pleasure of seeing Princess Machado today in cardiology clinic. I've attached my visit note below. If you have any questions please feel free to give me a call anytime.      Marlene Limon MD, PhD, [facility]  Cardiology and Lipidology  [facility]                                                                    PROGRESS NOTE:  No chief complaint on file.    HTN.     This patient is an established female who is here today to discuss:  Has migraine and recently added BB with better control of BP and HA  Lab review    Patient Active Problem List    Diagnosis Date Noted   • Indication for care in labor or delivery 05/22/2013       Past Medical History:   Diagnosis Date   • ASTHMA     seasonal allergy related   • Hypertension    • Migraines      Past Surgical History:   Procedure Laterality Date   • DILATION AND CURETTAGE       Social History     Social History   • Marital status:      Spouse name: N/A   • Number of children: N/A   • Years of education: N/A     Social History Main Topics   • Smoking status: Never Smoker   • Smokeless tobacco: Never Used   • Alcohol use No   • Drug use: No   • Sexual activity: Not on file     Other Topics Concern   • Not on file     Social History Narrative   • No narrative on file     Family History   Problem Relation Age of Onset   • Hypertension Mother    • Heart Disease Father    • Hypertension Father        Current Outpatient Prescriptions   Medication Sig Dispense Refill   • metoprolol (LOPRESSOR) 50 MG Tab      • ondansetron (ZOFRAN ODT) 4 MG TABLET DISPERSIBLE Take 1 Tab by mouth every 8 hours as needed for Nausea/Vomiting. 20 Tab 1   •  "sumatriptan (IMITREX) 100 MG tablet   11   • loratadine (CLARITIN) 10 MG Tab Take 10 mg by mouth every evening.     • asa/caf/butalbital (FIORINAL) -40 MG Tab Take 1 Tab by mouth every 6 hours as needed (headache). 30 Tab 0   • acetaminophen (TYLENOL) 500 MG Tab Take 1,000 mg by mouth every 6 hours as needed for Mild Pain or Moderate Pain.     • hydrocodone-acetaminophen (NORCO) 5-325 MG Tab per tablet Take 1 Tab by mouth every four hours as needed (pain). 12 Tab 0   • ibuprofen (MOTRIN) 800 MG Tab Take 1 Tab by mouth every 8 hours as needed (pain). 30 Tab 0   • prenatal vit/fe fumarate/fa (PRENATAL S) 27-0.8 MG TABS Take 1 Tab by mouth every evening.       No current facility-administered medications for this visit.      Review of patient's allergies indicates no known allergies.    Review of Systems:     Constitutional: Denies fevers, Denies weight changes  Eyes: Denies changes in vision, no eye pain  Ears/Nose/Throat/Mouth: Denies nasal congestion or sore throat   Cardiovascular: Denies chest pain or palpitations   Respiratory: Denies shortness of breath , Denies cough  Gastrointestinal/Hepatic: Denies abdominal pain, nausea, vomiting, diarrhea, constipation or GI bleeding   Genitourinary: Denies bladder dysfunction, dysuria or frequency  Musculoskeletal/Rheum: Denies  joint pain and swelling   Skin/Breast: Denies rash, denies breast lumps or discharge  Neurological: Denies headache, confusion, memory loss or focal weakness/parasthesias  Psychiatric: denies mood disorder   Endocrine: denies hx of diabetes or thyroid dysfunction  Heme/Oncology/Lymph Nodes: Denies enlarged lymph nodes, denies brusing or known bleeding disorder  Allergic/Immunologic: Denies hx of allergies      All other systems were reviewed and are negative (AMA/CMS criteria)      Blood pressure 100/82, pulse 60, height 1.727 m (5' 8\"), weight 106.6 kg (235 lb), last menstrual period 10/05/2017, SpO2 95 %.  General Appearance:  Obese;  Well " developed, Well nourished, No acute distress, Non-toxic appearance.   HENT:  Normocephalic, Atraumatic, Oropharynx moist mucous membranes, Dentition: Mallampati 4 OP, Nose normal.    Eyes:  PERRLA, EOMI, Conjunctiva normal, No discharge.  Neck:  Normal range of motion, No cervical tenderness, Supple, No stridor, no JVD.  No thyromegaly.  No carotid bruit.  Cardiovascular:  Normal heart rate, Normal rhythm,  S1, S2, no S3,  S4; No gallops; No murmurs, No rubs, .   Extremitites with intact distal pulses, no cyanosis, clubbing or edema.  No heaves, thrills, HJR;  Peripheral pulses: carotid 2+, brachial 2+, radial 2+, ulnar 2+, femoral 2+, popliteal 2+, PT 2+, DP 2+;  Lungs:  Respiratory effort is normal. Normal breath sounds, breath sounds clear to auscultation bilaterally,  no rales, no rhonchi, no wheezing.   Abdomen: Bowel sounds normal, Soft, No tenderness, No guarding, No rebound, No masses, No hepatosplenomegaly.  Skin: Warm, Dry, No erythema, No rash, no induration or crepitus.  Neurologic: Alert & oriented x 3, Normal motor function, Normal sensory function, No focal deficits noted, cranial nerves II through XII are normal,  normal gait.  Psychiatric: Affect normal, Judgment normal, Mood normal.    Results for CHAPINCITO TAYLOR (MRN [id number]) as of 10/24/2017 08:01   Ref. Range 10/7/2017 12:20   WBC Latest Ref Range: 4.8 - 10.8 K/uL 10.7   RBC Latest Ref Range: 4.20 - 5.40 M/uL 5.18   Hemoglobin Latest Ref Range: 12.0 - 16.0 g/dL 15.4   Hematocrit Latest Ref Range: 37.0 - 47.0 % 42.6   MCV Latest Ref Range: 81.4 - 97.8 fL 82.2   MCH Latest Ref Range: 27.0 - 33.0 pg 29.7   MCHC Latest Ref Range: 33.6 - 35.0 g/dL 36.2 (H)   RDW Latest Ref Range: 35.9 - 50.0 fL 35.1 (L)   Platelet Count Latest Ref Range: 164 - 446 K/uL 280   MPV Latest Ref Range: 9.0 - 12.9 fL 9.8   Neutrophils-Polys Latest Ref Range: 44.00 - 72.00 % 77.90 (H)   Neutrophils (Absolute) Latest Ref Range: 2.00 - 7.15 K/uL 8.30 (H)   Lymphocytes  Latest Ref Range: 22.00 - 41.00 % 16.50 (L)   Lymphs (Absolute) Latest Ref Range: 1.00 - 4.80 K/uL 1.76   Monocytes Latest Ref Range: 0.00 - 13.40 % 4.40   Monos (Absolute) Latest Ref Range: 0.00 - 0.85 K/uL 0.47   Eosinophils Latest Ref Range: 0.00 - 6.90 % 0.40   Eos (Absolute) Latest Ref Range: 0.00 - 0.51 K/uL 0.04   Basophils Latest Ref Range: 0.00 - 1.80 % 0.60   Baso (Absolute) Latest Ref Range: 0.00 - 0.12 K/uL 0.06   Immature Granulocytes Latest Ref Range: 0.00 - 0.90 % 0.20   Immature Granulocytes (abs) Latest Ref Range: 0.00 - 0.11 K/uL 0.02   Nucleated RBC Latest Units: /100 WBC 0.00   NRBC (Absolute) Latest Units: K/uL 0.00   Sodium Latest Ref Range: 135 - 145 mmol/L 138   Potassium Latest Ref Range: 3.6 - 5.5 mmol/L 3.6   Chloride Latest Ref Range: 96 - 112 mmol/L 106   Co2 Latest Ref Range: 20 - 33 mmol/L 23   Anion Gap Latest Ref Range: 0.0 - 11.9  9.0   Glucose Latest Ref Range: 65 - 99 mg/dL 119 (H)   Bun Latest Ref Range: 8 - 22 mg/dL 10   Creatinine Latest Ref Range: 0.50 - 1.40 mg/dL 0.71   GFR If  Latest Ref Range: >60 mL/min/1.73 m 2 >60   GFR If Non  Latest Ref Range: >60 mL/min/1.73 m 2 >60   Calcium Latest Ref Range: 8.4 - 10.2 mg/dL 9.8   , , HDL 38,       Assessment and Plan.   37 y.o. female has likely Iron def and Insulin resistance, MetSyn-X; Discussed and further lab ordered to follow with PCP;     1. Essential hypertension  Better controlled    2. Obesity (BMI 30.0-34.9)  stable    3. Ascending aorta enlargement (CMS-HCC)  4/2017 4.1 cm per Echo    4. RBC microcytosis    - IRON/TOTAL IRON BIND  - FERRITIN; Future    5. Hyperglycemia    - HEMOGLOBIN A1C    6. Metabolic syndrome X  Discussed risks and educated about the subject related matters    - HEMOGLOBIN A1C      Return to clinic in  6  months    1. Essential hypertension     2. Obesity (BMI 30.0-34.9)     3. Ascending aorta enlargement (CMS-HCC)     4. RBC microcytosis  IRON/TOTAL IRON  BIND    FERRITIN   5. Hyperglycemia  HEMOGLOBIN A1C   6. Metabolic syndrome X  HEMOGLOBIN A1C         [address]  [address] [address]  VIA Facsimile: [phone]

## 2017-10-25 LAB
IRON SATN MFR SERPL: 23 % (ref 15–55)
IRON SERPL-MCNC: 82 UG/DL (ref 40–170)
TIBC SERPL-MCNC: 358 UG/DL (ref 250–450)

## 2018-01-30 ENCOUNTER — APPOINTMENT (RX ONLY)
Dept: URBAN - METROPOLITAN AREA CLINIC 4 | Facility: CLINIC | Age: 38
Setting detail: DERMATOLOGY
End: 2018-01-30

## 2018-01-30 DIAGNOSIS — D22 MELANOCYTIC NEVI: ICD-10-CM

## 2018-01-30 DIAGNOSIS — L73.9 FOLLICULAR DISORDER, UNSPECIFIED: ICD-10-CM

## 2018-01-30 DIAGNOSIS — L57.8 OTHER SKIN CHANGES DUE TO CHRONIC EXPOSURE TO NONIONIZING RADIATION: ICD-10-CM

## 2018-01-30 DIAGNOSIS — L81.4 OTHER MELANIN HYPERPIGMENTATION: ICD-10-CM

## 2018-01-30 DIAGNOSIS — L663 OTHER SPECIFIED DISEASES OF HAIR AND HAIR FOLLICLES: ICD-10-CM

## 2018-01-30 DIAGNOSIS — L82.1 OTHER SEBORRHEIC KERATOSIS: ICD-10-CM

## 2018-01-30 DIAGNOSIS — D18.0 HEMANGIOMA: ICD-10-CM

## 2018-01-30 DIAGNOSIS — L738 OTHER SPECIFIED DISEASES OF HAIR AND HAIR FOLLICLES: ICD-10-CM

## 2018-01-30 PROBLEM — D48.5 NEOPLASM OF UNCERTAIN BEHAVIOR OF SKIN: Status: ACTIVE | Noted: 2018-01-30

## 2018-01-30 PROBLEM — L02.92 FURUNCLE, UNSPECIFIED: Status: ACTIVE | Noted: 2018-01-30

## 2018-01-30 PROBLEM — D18.01 HEMANGIOMA OF SKIN AND SUBCUTANEOUS TISSUE: Status: ACTIVE | Noted: 2018-01-30

## 2018-01-30 PROBLEM — D22.5 MELANOCYTIC NEVI OF TRUNK: Status: ACTIVE | Noted: 2018-01-30

## 2018-01-30 PROCEDURE — 99202 OFFICE O/P NEW SF 15 MIN: CPT | Mod: 25

## 2018-01-30 PROCEDURE — ? COUNSELING

## 2018-01-30 PROCEDURE — 11100: CPT

## 2018-01-30 PROCEDURE — ? BIOPSY BY SHAVE METHOD

## 2018-01-30 ASSESSMENT — LOCATION DETAILED DESCRIPTION DERM
LOCATION DETAILED: RIGHT CENTRAL MALAR CHEEK
LOCATION DETAILED: RIGHT PROXIMAL DORSAL FOREARM
LOCATION DETAILED: LEFT MEDIAL SUPERIOR CHEST
LOCATION DETAILED: LEFT ANTERIOR DISTAL THIGH
LOCATION DETAILED: RIGHT ANTERIOR PROXIMAL UPPER ARM
LOCATION DETAILED: LEFT INFERIOR LATERAL NECK
LOCATION DETAILED: RIGHT ANTERIOR DISTAL THIGH
LOCATION DETAILED: LEFT ANTERIOR PROXIMAL UPPER ARM
LOCATION DETAILED: RIGHT INFERIOR UPPER BACK
LOCATION DETAILED: RIGHT SUPERIOR MEDIAL UPPER BACK
LOCATION DETAILED: RIGHT MID-UPPER BACK
LOCATION DETAILED: LEFT INFERIOR CENTRAL MALAR CHEEK
LOCATION DETAILED: LEFT PROXIMAL DORSAL FOREARM

## 2018-01-30 ASSESSMENT — LOCATION ZONE DERM
LOCATION ZONE: FACE
LOCATION ZONE: LEG
LOCATION ZONE: ARM
LOCATION ZONE: TRUNK
LOCATION ZONE: NECK

## 2018-01-30 ASSESSMENT — LOCATION SIMPLE DESCRIPTION DERM
LOCATION SIMPLE: LEFT CHEEK
LOCATION SIMPLE: RIGHT FOREARM
LOCATION SIMPLE: LEFT UPPER ARM
LOCATION SIMPLE: RIGHT UPPER ARM
LOCATION SIMPLE: LEFT THIGH
LOCATION SIMPLE: RIGHT THIGH
LOCATION SIMPLE: RIGHT CHEEK
LOCATION SIMPLE: CHEST
LOCATION SIMPLE: LEFT ANTERIOR NECK
LOCATION SIMPLE: RIGHT UPPER BACK
LOCATION SIMPLE: LEFT FOREARM

## 2018-01-30 NOTE — PROCEDURE: BIOPSY BY SHAVE METHOD
Electrodesiccation And Curettage Text: The wound bed was treated with electrodesiccation and curettage after the biopsy was performed.
Anesthesia Type: 1% lidocaine with epinephrine
Biopsy Type: H and E
Size Of Lesion In Cm: 0
Wound Care: Bacitracin
Billing Type: Third-Party Bill
Silver Nitrate Text: The wound bed was treated with silver nitrate after the biopsy was performed.
Render Post-Care Instructions In Note?: no
Curettage Text: The wound bed was treated with curettage after the biopsy was performed.
Dressing: bandage
Cryotherapy Text: The wound bed was treated with cryotherapy after the biopsy was performed.
Type Of Destruction Used: Electrodesiccation
Detail Level: Detailed
Post-Care Instructions: I reviewed with the patient in detail post-care instructions. Patient is to keep the biopsy site dry overnight, and then apply bacitracin twice daily until healed. Patient may apply hydrogen peroxide soaks to remove any crusting.
Consent: Written consent was obtained and risks were reviewed including but not limited to scarring, infection, bleeding, scabbing, incomplete removal, nerve damage and allergy to anesthesia.
Anesthesia Volume In Cc: 0.5
Lab: [id number]
Hemostasis: Drysol
Lab Facility: 
Electrodesiccation Text: The wound bed was treated with electrodesiccation after the biopsy was performed.
Biopsy Method: Dermablade
Notification Instructions: Patient will be notified of biopsy results. However, patient instructed to call the office if not contacted within 2 weeks.

## 2018-04-19 ENCOUNTER — HOSPITAL ENCOUNTER (OUTPATIENT)
Dept: CARDIOLOGY | Facility: MEDICAL CENTER | Age: 38
End: 2018-04-19
Attending: INTERNAL MEDICINE
Payer: COMMERCIAL

## 2018-04-19 DIAGNOSIS — I77.89 ASCENDING AORTA ENLARGEMENT (HCC): ICD-10-CM

## 2018-04-19 LAB
LV EJECT FRACT  99904: 65
LV EJECT FRACT MOD 2C 99903: 77.06
LV EJECT FRACT MOD 4C 99902: 71.94
LV EJECT FRACT MOD BP 99901: 74.1

## 2018-04-19 PROCEDURE — 93306 TTE W/DOPPLER COMPLETE: CPT | Mod: 26 | Performed by: INTERNAL MEDICINE

## 2018-04-19 PROCEDURE — 93306 TTE W/DOPPLER COMPLETE: CPT

## 2018-04-25 ENCOUNTER — HOSPITAL ENCOUNTER (OUTPATIENT)
Dept: RADIOLOGY | Facility: MEDICAL CENTER | Age: 38
End: 2018-04-25
Attending: OBSTETRICS & GYNECOLOGY
Payer: COMMERCIAL

## 2018-04-25 DIAGNOSIS — Z01.419 PAP SMEAR, LOW-RISK: ICD-10-CM

## 2018-04-25 DIAGNOSIS — R92.2 DENSE BREASTS: ICD-10-CM

## 2018-04-25 DIAGNOSIS — R92.30 DENSE BREASTS: ICD-10-CM

## 2018-04-25 DIAGNOSIS — N63.0 BREAST MASS: ICD-10-CM

## 2018-04-25 PROCEDURE — 76642 ULTRASOUND BREAST LIMITED: CPT | Mod: RT

## 2018-04-25 PROCEDURE — G0279 TOMOSYNTHESIS, MAMMO: HCPCS

## 2018-05-22 ENCOUNTER — OFFICE VISIT (OUTPATIENT)
Dept: MEDICAL GROUP | Facility: MEDICAL CENTER | Age: 38
End: 2018-05-22
Payer: COMMERCIAL

## 2018-05-22 VITALS
OXYGEN SATURATION: 93 % | HEART RATE: 70 BPM | SYSTOLIC BLOOD PRESSURE: 110 MMHG | DIASTOLIC BLOOD PRESSURE: 82 MMHG | BODY MASS INDEX: 37.62 KG/M2 | WEIGHT: 248.24 LBS | TEMPERATURE: 97.2 F | HEIGHT: 68 IN

## 2018-05-22 DIAGNOSIS — E78.5 DYSLIPIDEMIA: ICD-10-CM

## 2018-05-22 DIAGNOSIS — I77.810 DILATATION OF THORACIC AORTA (HCC): ICD-10-CM

## 2018-05-22 DIAGNOSIS — E66.9 OBESITY (BMI 30-39.9): ICD-10-CM

## 2018-05-22 DIAGNOSIS — Z00.00 HEALTH CARE MAINTENANCE: ICD-10-CM

## 2018-05-22 DIAGNOSIS — E88.810 METABOLIC SYNDROME X: ICD-10-CM

## 2018-05-22 DIAGNOSIS — Z76.89 ENCOUNTER TO ESTABLISH CARE: ICD-10-CM

## 2018-05-22 DIAGNOSIS — R87.619 ABNORMAL CERVICAL PAPANICOLAOU SMEAR, UNSPECIFIED ABNORMAL PAP FINDING: ICD-10-CM

## 2018-05-22 DIAGNOSIS — I77.89 ASCENDING AORTA ENLARGEMENT (HCC): ICD-10-CM

## 2018-05-22 DIAGNOSIS — I10 ESSENTIAL HYPERTENSION: ICD-10-CM

## 2018-05-22 DIAGNOSIS — B00.9 HSV INFECTION: ICD-10-CM

## 2018-05-22 PROCEDURE — 99205 OFFICE O/P NEW HI 60 MIN: CPT | Performed by: INTERNAL MEDICINE

## 2018-05-22 RX ORDER — TOPIRAMATE 50 MG/1
TABLET, FILM COATED ORAL
Qty: 30 TAB | Refills: 2 | Status: SHIPPED | OUTPATIENT
Start: 2018-05-22 | End: 2018-10-18 | Stop reason: SDUPTHER

## 2018-05-22 RX ORDER — ELETRIPTAN HYDROBROMIDE 20 MG/1
TABLET, FILM COATED ORAL
Refills: 2 | COMMUNITY
Start: 2018-05-16 | End: 2018-08-24 | Stop reason: SDUPTHER

## 2018-05-22 RX ORDER — ONABOTULINUMTOXINA 200 [USP'U]/1
INJECTION, POWDER, LYOPHILIZED, FOR SOLUTION INTRADERMAL; INTRAMUSCULAR
COMMUNITY
Start: 2018-04-30

## 2018-05-22 RX ORDER — CHLORAL HYDRATE 500 MG
1000 CAPSULE ORAL
COMMUNITY
End: 2020-07-20 | Stop reason: SDUPTHER

## 2018-05-22 RX ORDER — TOPIRAMATE 25 MG/1
TABLET ORAL
Refills: 2 | COMMUNITY
Start: 2018-05-02 | End: 2018-05-22 | Stop reason: SDUPTHER

## 2018-05-22 ASSESSMENT — PATIENT HEALTH QUESTIONNAIRE - PHQ9: CLINICAL INTERPRETATION OF PHQ2 SCORE: 0

## 2018-05-22 NOTE — PROGRESS NOTES
CHIEF COMPLAINT  Chief Complaint   Patient presents with   • Establish Care     NP   • Migraine     HPI  Patient is a 38 y.o. female patient who presents today for the following     MIGRAINE  Worsening.   Onset: at the age of 10-15 y/o  The patient reports  frontal headaches, described as: severe in severity , dull, throbbing with nausea, vomiting, photophobia, sonophobia and auras described as photopsias, visual scintillations and blurry vision, without radiation.  Usual frequency is > 10/month  Aura is present for the last 6 months.  Headaches are relieved by: metoprolol 50 mg QD; topiramate 25 mg QD; rescue eletriptan 20 mg prn, zofran. Botox Q 3 months  Known triggers include: periods, stress, sleep deprivation.  Current stressors include: none.  Previous treatment and prevention include: sumatriptan    Pertinent negatives:  Denies difficulty with speech or swallowing, facial numbness or tingling, focal weakness. Denies sore throat or cough, fever, sinus congestion, ear pain, tooth clenching or grinding.    Family Hx: headaches of unknown type in mother  Prior imaging: yes    OBESITY, Body mass index is 37.74 kg/m².  Onset: since early 20'  Diet: regular  Exercise: limited   No temperature intolerance. No change in hair/skin quality, BMs.   No HTN, buffalo hump, purple striae, flushing.  FH of obesity: mother    Abnormal PAP, HSV infection  HSV infection: dg in 2017  Recently found abnormal Pap smear in 3/2018.   Pending: Colposcopy and LEEP.  She has GYN.    HYPERTENSION  Meds: Metoprolol 50 mg daily; taking as prescribed.   Denies:  -  headaches, vision problems, tinnitus.                 -  chest pain/pressure, palpitations, irregular heart beats, exertional, dyspnea, peripheral edema.      - medication side effect: unusual fatigue, slow heartbeat, foot/leg swelling, cough.  Low salt diet: No  Diet: Regular  Exercise: < 4 d/w  BMI: Body mass index is 37.74 kg/m².  FH of HTN: Parents    Metabolic syndrome  The  patient has obesity, hypertension, dyslipidemia.   Pending labs.    DYSLIPIDEMIA  The patient is the patient had abnormal lipid panel  Diet /Exercise/BMI:  As above  FH: Father    Ascending aorta dilation   Dg: at the age of 30 y/o  Denies chest pain, palpitations, exertional SOB or chest pain.   Denies syncope.    The last echocardiography was in 4/2018, 1 month ago, revealed:  Normal aortic root for body surface area. aortic root measuring 3.8 cm,   ascending aorta diameter is  4.0 cm.  Reviewed EKG from 4/12/17:  SINUS RHYTHM   BORDERLINE T ABNORMALITIES, ANTERIOR LEADS   She has been followed up by cardiology.     Reviewed PMH, PSH, FH, SH, ALL, HCM/IMM, updated  Reviewed MEDS, updated    Patient Active Problem List    Diagnosis Date Noted   • Chronic migraine 05/22/2018   • Indication for care in labor or delivery 05/22/2013     CURRENT MEDICATIONS  Current Outpatient Prescriptions   Medication Sig Dispense Refill   • eletriptan (RELPAX) 20 MG Tab TK 1 T PO AOS OF MIGRAINE AS NEEDED. MAY REPEAT IN 2 H. DO NOT EXCEED 2 T IN 24 H  2   • topiramate (TOPAMAX) 25 MG Tab TK 1 T PO QD  2   • Omega-3 Fatty Acids (FISH OIL) 1000 MG Cap capsule Take 1,000 mg by mouth 3 times a day, with meals.     • Cholecalciferol (VITAMIN D3) 5000 units Cap Take 1 Cap by mouth every day.     • Coenzyme Q10 (CO Q-10 MAXIMUM STRENGTH) 400 MG Cap Take  by mouth.     • Cetirizine HCl (WAL-ZYR) 10 MG Cap Take  by mouth.     • metoprolol (LOPRESSOR) 50 MG Tab      • ondansetron (ZOFRAN ODT) 4 MG TABLET DISPERSIBLE Take 1 Tab by mouth every 8 hours as needed for Nausea/Vomiting. 20 Tab 1   • BOTOX 200 units Recon Soln      • asa/caf/butalbital (FIORINAL) -40 MG Tab Take 1 Tab by mouth every 6 hours as needed (headache). 30 Tab 0   • sumatriptan (IMITREX) 100 MG tablet   11   • loratadine (CLARITIN) 10 MG Tab Take 10 mg by mouth every evening.     • acetaminophen (TYLENOL) 500 MG Tab Take 1,000 mg by mouth every 6 hours as needed for  "Mild Pain or Moderate Pain.     • ibuprofen (MOTRIN) 800 MG Tab Take 1 Tab by mouth every 8 hours as needed (pain). 30 Tab 0   • prenatal vit/fe fumarate/fa (PRENATAL S) 27-0.8 MG TABS Take 1 Tab by mouth every evening.       No current facility-administered medications for this visit.      ALLERGIES  Allergies: Patient has no known allergies.  PAST MEDICAL HISTORY  Past Medical History:   Diagnosis Date   • Abnormal Pap smear of cervix    • ASTHMA     seasonal allergy related   • HPV in female    • Hypertension    • Migraines      SURGICAL HISTORY  She  has a past surgical history that includes dilation and curettage.  SOCIAL HISTORY  Social History   Substance Use Topics   • Smoking status: Never Smoker   • Smokeless tobacco: Never Used   • Alcohol use No     Social History     Social History Narrative   • No narrative on file     FAMILY HISTORY  Family History   Problem Relation Age of Onset   • Hypertension Mother    • Heart Disease Father    • Hypertension Father      Family Status   Relation Status   • Mother    • Father        ROS   Constitutional: Negative for fever, chills.  HENT: Negative for congestion, sore throat.  Eyes: Negative for blurred vision.   Respiratory: Negative for cough, shortness of breath.  Cardiovascular: Negative for chest pain, palpitations. And per HPI.  Gastrointestinal: Negative for heartburn, nausea, abdominal pain.   Genitourinary: Negative for dysuria. And per HPI.  Musculoskeletal: Negative for significant myalgias, back pain and joint pain.   Skin: Negative for rash and itching.   Neuro: Negative for dizziness, weakness and as above.   Endo/Heme/Allergies: Does not bruise/bleed easily.   Psychiatric/Behavioral: Negative for depression, anxiety    PHYSICAL EXAM   Blood pressure 110/82, pulse 70, temperature 36.2 °C (97.2 °F), height 1.727 m (5' 8\"), weight 112.6 kg (248 lb 3.8 oz), SpO2 93 %. Body mass index is 37.74 kg/m².  General:  NAD, well appearing  HEENT:   NC/AT, PERRLA, " EOMI, TMs are clear. Oropharyngeal mucosa is pink,  without lesions;  no cervical / supraclavicular  lymphadenopathy, no thyromegaly.    Cardiovascular: RRR.   No m/r/g. No carotid bruits .      Lungs:   CTAB, no w/r/r, no respiratory distress.  Abdomen: Soft, NT/ND + BS; unable to palpate liver/spleen due to obesity .  Extremities:  2+ DP and radial pulses bilaterally.  No c/c/e.   Skin:  Warm, dry.  No erythema. No rash.   Neurologic: Alert & oriented x 3.  No focal deficits.  Psychiatric:  Affect normal, mood normal, judgment normal.    LABS     Labs are reviewed and discussed with a patient  No results found for: CHOLSTRLTOT, LDL, HDL, TRIGLYCERIDE    Lab Results   Component Value Date/Time    SODIUM 138 10/07/2017 12:20 PM    POTASSIUM 3.6 10/07/2017 12:20 PM    CHLORIDE 106 10/07/2017 12:20 PM    CO2 23 10/07/2017 12:20 PM    GLUCOSE 119 (H) 10/07/2017 12:20 PM    BUN 10 10/07/2017 12:20 PM    CREATININE 0.71 10/07/2017 12:20 PM     Lab Results   Component Value Date/Time    ALKPHOSPHAT 117 (H) 05/22/2013 07:00 PM    ASTSGOT 18 05/22/2013 07:00 PM    ALTSGPT 18 05/22/2013 07:00 PM    TBILIRUBIN 0.4 05/22/2013 07:00 PM      Lab Results   Component Value Date/Time    HBA1C 5.4 10/24/2017 08:51 AM     Lab Results   Component Value Date/Time    WBC 10.7 10/07/2017 12:20 PM    RBC 5.18 10/07/2017 12:20 PM    HEMOGLOBIN 15.4 10/07/2017 12:20 PM    HEMATOCRIT 42.6 10/07/2017 12:20 PM    MCV 82.2 10/07/2017 12:20 PM    MCH 29.7 10/07/2017 12:20 PM    MCHC 36.2 (H) 10/07/2017 12:20 PM    MPV 9.8 10/07/2017 12:20 PM    NEUTSPOLYS 77.90 (H) 10/07/2017 12:20 PM    LYMPHOCYTES 16.50 (L) 10/07/2017 12:20 PM    MONOCYTES 4.40 10/07/2017 12:20 PM    EOSINOPHILS 0.40 10/07/2017 12:20 PM    BASOPHILS 0.60 10/07/2017 12:20 PM      IMAGING     Reviewed echocardiography from 4/19/18:  No prior study is available for comparison.   Normal left ventricular size and systolic function.  Left ventricular ejection fraction is visually  estimated to be 65%.  Normal diastolic function.  Normal inferior vena cava size and inspiratory collapse.  Normal aortic root for body surface area. aortic root measuring 3.8 cm,   ascending aorta diameter is  4.0 cm.    ASSESMENT AND PLAN        1. Chronic migraine  Uncontrolled.   She will increase topiramate from 25 mg to 50 mg daily.  - REFERRAL TO NEUROLOGY  - topiramate (TOPAMAX) 50 MG tablet; TK 1 T PO QD  Dispense: 30 Tab; Refill: 2    2. Obesity (BMI 30-39.9)  Appropriate counseling given    3. Abnormal cervical Papanicolaou smear, unspecified abnormal pap finding  4. HSV infection  She will have colposcopy and LEEP procedure within 2 weeks.  Advised to continue GYN follow-up.    5. Dilatation of thoracic aorta (HCC)  6. Ascending aorta enlargement (HCC)  Mild dilation, continue cardiology follow-up    7. Essential hypertension  Controlled, continue current treatment and monitoring blood pressure at home  - COMP METABOLIC PANEL; Future    8. Metabolic syndrome X  Advised low calorie diet, daily exercise, weight control    9. Dyslipidemia  No medications, advise a low calorie diet, daily exercise, weight control  - COMP METABOLIC PANEL; Future  - LIPID PROFILE; Future    10. Health care maintenance  11. Encounter to establish care  Reviewed PMH, PSH, FH, SH, ALL, MEDS, HCM/IMM.   Advised healthy habits, diet, exercise.  Release form for old records: signed    Counseling:   - Smoking:  Nonsmoker    Followup: within 6 months    All questions are answered.    Level N5, high complexity, comprehensive    Please note that this dictation was created using voice recognition software, and that there might be errors of cassy and possibly content.

## 2018-05-25 ENCOUNTER — HOSPITAL ENCOUNTER (OUTPATIENT)
Dept: RADIOLOGY | Facility: MEDICAL CENTER | Age: 38
End: 2018-05-25
Attending: OBSTETRICS & GYNECOLOGY
Payer: COMMERCIAL

## 2018-05-25 DIAGNOSIS — M62.08 DIASTASIS RECTI: ICD-10-CM

## 2018-05-25 PROCEDURE — 76705 ECHO EXAM OF ABDOMEN: CPT

## 2018-07-10 DIAGNOSIS — I10 ESSENTIAL HYPERTENSION: ICD-10-CM

## 2018-08-03 LAB
ALBUMIN SERPL-MCNC: 4.6 G/DL (ref 3.5–5.5)
ALBUMIN/GLOB SERPL: 1.8 {RATIO} (ref 1.2–2.2)
ALP SERPL-CCNC: 42 IU/L (ref 39–117)
ALT SERPL-CCNC: 22 IU/L (ref 0–32)
AST SERPL-CCNC: 18 IU/L (ref 0–40)
BILIRUB SERPL-MCNC: 0.6 MG/DL (ref 0–1.2)
BUN SERPL-MCNC: 13 MG/DL (ref 6–20)
BUN/CREAT SERPL: 15 (ref 9–23)
CALCIUM SERPL-MCNC: 9.5 MG/DL (ref 8.7–10.2)
CHLORIDE SERPL-SCNC: 105 MMOL/L (ref 96–106)
CHOLEST SERPL-MCNC: 207 MG/DL (ref 100–199)
CO2 SERPL-SCNC: 16 MMOL/L (ref 20–29)
CREAT SERPL-MCNC: 0.86 MG/DL (ref 0.57–1)
GLOBULIN SER CALC-MCNC: 2.5 G/DL (ref 1.5–4.5)
GLUCOSE SERPL-MCNC: 94 MG/DL (ref 65–99)
HDLC SERPL-MCNC: 39 MG/DL
IF AFRICAN AMERICAN  100797: 98 ML/MIN/1.73
IF NON AFRICAN AMER 100791: 85 ML/MIN/1.73
LABORATORY COMMENT REPORT: ABNORMAL
LDLC SERPL CALC-MCNC: 143 MG/DL (ref 0–99)
POTASSIUM SERPL-SCNC: 4.7 MMOL/L (ref 3.5–5.2)
PROT SERPL-MCNC: 7.1 G/DL (ref 6–8.5)
SODIUM SERPL-SCNC: 139 MMOL/L (ref 134–144)
TRIGL SERPL-MCNC: 125 MG/DL (ref 0–149)
VLDLC SERPL CALC-MCNC: 25 MG/DL (ref 5–40)

## 2018-08-21 RX ORDER — CEPHALEXIN 500 MG/1
CAPSULE ORAL
Refills: 0 | COMMUNITY
Start: 2018-06-24 | End: 2019-04-16

## 2018-08-21 RX ORDER — TOPIRAMATE 25 MG/1
TABLET ORAL
Refills: 2 | COMMUNITY
Start: 2018-05-27 | End: 2018-08-24 | Stop reason: SDUPTHER

## 2018-08-24 ENCOUNTER — OFFICE VISIT (OUTPATIENT)
Dept: MEDICAL GROUP | Facility: MEDICAL CENTER | Age: 38
End: 2018-08-24
Payer: COMMERCIAL

## 2018-08-24 VITALS
WEIGHT: 225.31 LBS | HEIGHT: 68 IN | HEART RATE: 62 BPM | TEMPERATURE: 98.8 F | OXYGEN SATURATION: 97 % | BODY MASS INDEX: 34.15 KG/M2 | DIASTOLIC BLOOD PRESSURE: 74 MMHG | SYSTOLIC BLOOD PRESSURE: 122 MMHG

## 2018-08-24 DIAGNOSIS — I77.89 ASCENDING AORTA ENLARGEMENT (HCC): ICD-10-CM

## 2018-08-24 DIAGNOSIS — E78.5 DYSLIPIDEMIA: ICD-10-CM

## 2018-08-24 DIAGNOSIS — E66.9 OBESITY (BMI 30.0-34.9): ICD-10-CM

## 2018-08-24 DIAGNOSIS — E88.810 METABOLIC SYNDROME: ICD-10-CM

## 2018-08-24 DIAGNOSIS — I10 ESSENTIAL HYPERTENSION: ICD-10-CM

## 2018-08-24 DIAGNOSIS — Z00.00 HEALTH CARE MAINTENANCE: ICD-10-CM

## 2018-08-24 DIAGNOSIS — R87.619 ABNORMAL CERVICAL PAPANICOLAOU SMEAR, UNSPECIFIED ABNORMAL PAP FINDING: ICD-10-CM

## 2018-08-24 PROCEDURE — 99214 OFFICE O/P EST MOD 30 MIN: CPT | Performed by: INTERNAL MEDICINE

## 2018-08-24 RX ORDER — ONDANSETRON 4 MG/1
4 TABLET, ORALLY DISINTEGRATING ORAL EVERY 8 HOURS PRN
Qty: 20 TAB | Refills: 1 | Status: SHIPPED | OUTPATIENT
Start: 2018-08-24 | End: 2019-04-16 | Stop reason: SDUPTHER

## 2018-08-24 RX ORDER — TOPIRAMATE 50 MG/1
TABLET, FILM COATED ORAL
Qty: 90 TAB | Refills: 3 | Status: SHIPPED | OUTPATIENT
Start: 2018-08-24 | End: 2019-10-01

## 2018-08-24 RX ORDER — ELETRIPTAN HYDROBROMIDE 20 MG/1
TABLET, FILM COATED ORAL
Qty: 10 TAB | Refills: 2 | Status: SHIPPED | OUTPATIENT
Start: 2018-08-24

## 2018-08-24 NOTE — PROGRESS NOTES
CHIEF COMPLAINT  Chief Complaint   Patient presents with   • Results     Lab results   [place] FIELD  Patient is a 38 y.o. female patient who presents today for the following       HYPERTENSION  Meds: Metoprolol 50 mg daily; taking as prescribed.   Denies: - headaches, vision problems, tinnitus.  - chest pain/pressure, palpitations, irregular heart beats, exertional, dyspnea, peripheral edema.              - medication side effect: unusual fatigue, slow heartbeat, foot/leg swelling, cough.  Low salt diet: No  Diet: Regular  Exercise: < 4 d/w  BMI: Body mass index is 37.74 kg/m².  FH of HTN: Parents    Ascending aorta dilation   Dg: at the age of 28 y/o  Denies chest pain, palpitations, exertional SOB or chest pain.   Denies syncope.   The last echocardiography was in 4/2018, 1 month ago, revealed:  Normal aortic root for body surface area. aortic root measuring 3.8 cm,   ascending aorta diameter is  4.0 cm.  DYSLIPIDEMIA  The patient is the patient had abnormal lipid panel  Diet /Exercise/BMI: As above  FH: Father    OBESITY, BMI 34, was 37  Onset: since early 20'  Diet: regular  Exercise: limited   No temperature intolerance. No change in hair/skin quality, BMs.   No HTN, buffalo hump, purple striae, flushing.  FH of obesity: mother    Metabolic syndrome  The patient has obesity, hypertension, dyslipidemia.   Pending labs.    MIGRAINE  Stable and controlled.   Onset: at the age of 10-15 y/o  The patient reports frontal headaches, described as: severe in severity , dull, throbbing with nausea, vomiting, photophobia, sonophobia and auras described as photopsias, visual scintillations and blurry vision, without radiation.  Usual frequency is > 10/month  Aura is present for the last 6 months.  Headaches are relieved by: metoprolol 50 mg QD; topiramate 25 mg QD; rescue eletriptan 20 mg prn, zofran. Botox Q 3 months  Known triggers include: periods, stress, sleep deprivation.  Current stressors include: none.  Previous  treatment and prevention include: sumatriptan     Pertinent negatives:  Denies difficulty with speech or swallowing, facial numbness or tingling, focal weakness. Denies sore throat or cough, fever, sinus congestion, ear pain, tooth clenching or grinding.     Family Hx: headaches of unknown type in mother  Prior imaging: yes     Abnormal PAP, HSV infection  HSV infection: dg in 2017  Last Pap smear: in 3/2018.   She has been followed up by GYN.     Reviewed PMH, PSH, FH, SH, ALL, HCM/IMM, no changes  Reviewed MEDS, no changes    Patient Active Problem List    Diagnosis Date Noted   • Essential hypertension 05/22/2018   • Chronic migraine 05/22/2018   • Ascending aorta enlargement (HCC) 05/22/2018   • Dyslipidemia 05/22/2018     CURRENT MEDICATIONS  Current Outpatient Prescriptions   Medication Sig Dispense Refill   • topiramate (TOPAMAX) 50 MG tablet TK 1 T PO QD 90 Tab 3   • eletriptan (RELPAX) 20 MG Tab TK 1 T PO AOS OF MIGRAINE AS NEEDED. MAY REPEAT IN 2 H. DO NOT EXCEED 2 T IN 24 H 10 Tab 2   • ondansetron (ZOFRAN ODT) 4 MG TABLET DISPERSIBLE Take 1 Tab by mouth every 8 hours as needed. 20 Tab 1   • metoprolol (LOPRESSOR) 25 MG Tab Take 1 Tab by mouth 2 times a day. 180 Tab 0   • Omega-3 Fatty Acids (FISH OIL) 1000 MG Cap capsule Take 1,000 mg by mouth 3 times a day, with meals.     • Cholecalciferol (VITAMIN D3) 5000 units Cap Take 1 Cap by mouth every day.     • Coenzyme Q10 (CO Q-10 MAXIMUM STRENGTH) 400 MG Cap Take  by mouth.     • topiramate (TOPAMAX) 50 MG tablet TK 1 T PO QD 30 Tab 2   • loratadine (CLARITIN) 10 MG Tab Take 10 mg by mouth every evening.     • prenatal vit/fe fumarate/fa (PRENATAL S) 27-0.8 MG TABS Take 1 Tab by mouth every evening.     • cephALEXin (KEFLEX) 500 MG Cap   0   • BOTOX 200 units Recon Soln      • Cetirizine HCl (WAL-ZYR) 10 MG Cap Take  by mouth.     • acetaminophen (TYLENOL) 500 MG Tab Take 1,000 mg by mouth every 6 hours as needed for Mild Pain or Moderate Pain.     •  "ibuprofen (MOTRIN) 800 MG Tab Take 1 Tab by mouth every 8 hours as needed (pain). 30 Tab 0     No current facility-administered medications for this visit.      ALLERGIES  Allergies: Patient has no known allergies.  PAST MEDICAL HISTORY  Past Medical History:   Diagnosis Date   • Abnormal Pap smear of cervix    • ASTHMA     seasonal allergy related   • HPV in female    • Hypertension    • Migraines      SURGICAL HISTORY  She  has a past surgical history that includes dilation and curettage.  SOCIAL HISTORY  Social History   Substance Use Topics   • Smoking status: Never Smoker   • Smokeless tobacco: Never Used   • Alcohol use No     Social History     Social History Narrative   • No narrative on file     FAMILY HISTORY  Family History   Problem Relation Age of Onset   • Hypertension Mother    • Heart Disease Father    • Hypertension Father    • Hyperlipidemia Father      Family Status   Relation Status   • Mo Alive   • Fa Alive       ROS   Constitutional: Negative for fever, chills.  HENT: Negative for congestion, sore throat.  Eyes: Negative for blurred vision.   Respiratory: Negative for cough, shortness of breath.  Cardiovascular: Negative for chest pain, palpitations. And per HPI.  Gastrointestinal: Negative for heartburn, nausea, abdominal pain.   Genitourinary: Negative for dysuria. And per HPI.  Musculoskeletal: Negative for significant myalgias, back pain and joint pain.   Skin: Negative for rash and itching.   Neuro: Negative for dizziness, weakness and as above.   Endo/Heme/Allergies: Does not bruise/bleed easily.   Psychiatric/Behavioral: Negative for depression, anxiety    PHYSICAL EXAM   Blood pressure 122/74, pulse 62, temperature 37.1 °C (98.8 °F), height 1.727 m (5' 8\"), weight 102.2 kg (225 lb 5 oz), SpO2 97 %. Body mass index is 34.26 kg/m².  General:  NAD, well appearing  HEENT:   NC/AT, PERRLA, EOMI, TMs are clear. Oropharyngeal mucosa is pink,  without lesions;  no cervical / supraclavicular  " lymphadenopathy, no thyromegaly.    Cardiovascular: RRR.   No m/r/g. No carotid bruits .      Lungs:   CTAB, no w/r/r, no respiratory distress.  Abdomen: Soft, NT/ND + BS; unable to palpate liver/spleen due to obesity.  Extremities:  2+ DP and radial pulses bilaterally.  No c/c/e.   Skin:  Warm, dry.  No erythema. No rash.   Neurologic: Alert & oriented x 3.  No focal deficits.  Psychiatric:  Affect normal, mood normal, judgment normal.    LABS     Labs are reviewed and discussed with a patient  Lab Results   Component Value Date/Time    CHOLSTRLTOT 207 (H) 08/02/2018 09:46 AM     (H) 08/02/2018 09:46 AM    HDL 39 (L) 08/02/2018 09:46 AM    TRIGLYCERIDE 125 08/02/2018 09:46 AM       Lab Results   Component Value Date/Time    SODIUM 139 08/02/2018 09:46 AM    SODIUM 138 10/07/2017 12:20 PM    POTASSIUM 4.7 08/02/2018 09:46 AM    POTASSIUM 3.6 10/07/2017 12:20 PM    CHLORIDE 105 08/02/2018 09:46 AM    CHLORIDE 106 10/07/2017 12:20 PM    CO2 16 (L) 08/02/2018 09:46 AM    CO2 23 10/07/2017 12:20 PM    GLUCOSE 94 08/02/2018 09:46 AM    GLUCOSE 119 (H) 10/07/2017 12:20 PM    BUN 13 08/02/2018 09:46 AM    BUN 10 10/07/2017 12:20 PM    CREATININE 0.86 08/02/2018 09:46 AM    CREATININE 0.71 10/07/2017 12:20 PM    BUNCREATRAT 15 08/02/2018 09:46 AM     Lab Results   Component Value Date/Time    ALKPHOSPHAT 42 08/02/2018 09:46 AM    ALKPHOSPHAT 117 (H) 05/22/2013 07:00 PM    ASTSGOT 18 08/02/2018 09:46 AM    ASTSGOT 18 05/22/2013 07:00 PM    ALTSGPT 22 08/02/2018 09:46 AM    ALTSGPT 18 05/22/2013 07:00 PM    TBILIRUBIN 0.6 08/02/2018 09:46 AM    TBILIRUBIN 0.4 05/22/2013 07:00 PM      Lab Results   Component Value Date/Time    HBA1C 5.4 10/24/2017 08:51 AM     No results found for: TSH  No results found for: FREET4    Lab Results   Component Value Date/Time    WBC 10.7 10/07/2017 12:20 PM    RBC 5.18 10/07/2017 12:20 PM    HEMOGLOBIN 15.4 10/07/2017 12:20 PM    HEMATOCRIT 42.6 10/07/2017 12:20 PM    MCV 82.2 10/07/2017  12:20 PM    MCH 29.7 10/07/2017 12:20 PM    MCHC 36.2 (H) 10/07/2017 12:20 PM    MPV 9.8 10/07/2017 12:20 PM    NEUTSPOLYS 77.90 (H) 10/07/2017 12:20 PM    LYMPHOCYTES 16.50 (L) 10/07/2017 12:20 PM    MONOCYTES 4.40 10/07/2017 12:20 PM    EOSINOPHILS 0.40 10/07/2017 12:20 PM    BASOPHILS 0.60 10/07/2017 12:20 PM        IMAGING     None    ASSESMENT AND PLAN        1. Essential hypertension  Controlled, continue current treatment    2. Ascending aorta enlargement (HCC)  Blood pressure is controlled.  Continue cardiology follow-up    3. Dyslipidemia  Controlled, pending labs, continue current treatment  - COMP METABOLIC PANEL; Future  - LIPID PROFILE; Future    4. Obesity (BMI 30.0-34.9)  Appropriate counseling given    5. Metabolic syndrome  As above    6. Chronic migraine  Controlled, continue current treatment  - topiramate (TOPAMAX) 50 MG tablet; TK 1 T PO QD  Dispense: 90 Tab; Refill: 3  - eletriptan (RELPAX) 20 MG Tab; TK 1 T PO AOS OF MIGRAINE AS NEEDED. MAY REPEAT IN 2 H. DO NOT EXCEED 2 T IN 24 H  Dispense: 10 Tab; Refill: 2  - ondansetron (ZOFRAN ODT) 4 MG TABLET DISPERSIBLE; Take 1 Tab by mouth every 8 hours as needed.  Dispense: 20 Tab; Refill: 1    7. Abnormal cervical Papanicolaou smear, unspecified abnormal pap finding  Continue GYN follow-up    8. Health care maintenance  Up-to-date    Counseling:   - Smoking:  Nonsmoker    Followup: Return in about 6 months (around 2/24/2019) for Short.    All questions are answered.    Please note that this dictation was created using voice recognition software, and that there might be errors of cassy and possibly content.

## 2018-10-13 ENCOUNTER — HOSPITAL ENCOUNTER (EMERGENCY)
Facility: MEDICAL CENTER | Age: 38
End: 2018-10-13
Attending: EMERGENCY MEDICINE
Payer: COMMERCIAL

## 2018-10-13 ENCOUNTER — APPOINTMENT (OUTPATIENT)
Dept: RADIOLOGY | Facility: MEDICAL CENTER | Age: 38
End: 2018-10-13
Attending: EMERGENCY MEDICINE
Payer: COMMERCIAL

## 2018-10-13 VITALS
SYSTOLIC BLOOD PRESSURE: 130 MMHG | RESPIRATION RATE: 17 BRPM | TEMPERATURE: 97.7 F | HEART RATE: 90 BPM | DIASTOLIC BLOOD PRESSURE: 86 MMHG | HEIGHT: 68 IN | WEIGHT: 215.61 LBS | OXYGEN SATURATION: 99 % | BODY MASS INDEX: 32.68 KG/M2

## 2018-10-13 DIAGNOSIS — S90.222A SUBUNGUAL HEMATOMA OF SECOND TOE OF LEFT FOOT, INITIAL ENCOUNTER: ICD-10-CM

## 2018-10-13 PROCEDURE — 90715 TDAP VACCINE 7 YRS/> IM: CPT | Performed by: EMERGENCY MEDICINE

## 2018-10-13 PROCEDURE — 11740 EVACUATION SUBUNGUAL HMTMA: CPT

## 2018-10-13 PROCEDURE — 700111 HCHG RX REV CODE 636 W/ 250 OVERRIDE (IP): Performed by: EMERGENCY MEDICINE

## 2018-10-13 PROCEDURE — 73630 X-RAY EXAM OF FOOT: CPT | Mod: LT

## 2018-10-13 PROCEDURE — 90471 IMMUNIZATION ADMIN: CPT

## 2018-10-13 PROCEDURE — 99283 EMERGENCY DEPT VISIT LOW MDM: CPT

## 2018-10-13 RX ORDER — BUPIVACAINE HYDROCHLORIDE 2.5 MG/ML
10 INJECTION, SOLUTION EPIDURAL; INFILTRATION; INTRACAUDAL ONCE
Status: COMPLETED | OUTPATIENT
Start: 2018-10-13 | End: 2018-10-13

## 2018-10-13 RX ADMIN — CLOSTRIDIUM TETANI TOXOID ANTIGEN (FORMALDEHYDE INACTIVATED), CORYNEBACTERIUM DIPHTHERIAE TOXOID ANTIGEN (FORMALDEHYDE INACTIVATED), BORDETELLA PERTUSSIS TOXOID ANTIGEN (GLUTARALDEHYDE INACTIVATED), BORDETELLA PERTUSSIS FILAMENTOUS HEMAGGLUTININ ANTIGEN (FORMALDEHYDE INACTIVATED), BORDETELLA PERTUSSIS PERTACTIN ANTIGEN, AND BORDETELLA PERTUSSIS FIMBRIAE 2/3 ANTIGEN 0.5 ML: 5; 2; 2.5; 5; 3; 5 INJECTION, SUSPENSION INTRAMUSCULAR at 17:25

## 2018-10-13 RX ADMIN — BUPIVACAINE HYDROCHLORIDE 10 ML: 2.5 INJECTION, SOLUTION EPIDURAL; INFILTRATION; INTRACAUDAL; PERINEURAL at 17:28

## 2018-10-13 ASSESSMENT — PAIN SCALES - GENERAL: PAINLEVEL_OUTOF10: 3
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
## 2018-10-14 NOTE — ED NOTES
Reviewed discharge instructions w/ pt, verbalized understanding to information provided including return precautions, denied questions/concerns.  Pt ambulated from ED.
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
## 2018-10-18 RX ORDER — TOPIRAMATE 50 MG/1
TABLET, FILM COATED ORAL
Qty: 30 TAB | Refills: 2 | Status: SHIPPED | OUTPATIENT
Start: 2018-10-18 | End: 2019-04-16

## 2018-10-29 ENCOUNTER — OFFICE VISIT (OUTPATIENT)
Dept: MEDICAL GROUP | Facility: MEDICAL CENTER | Age: 38
End: 2018-10-29
Payer: COMMERCIAL

## 2018-10-29 VITALS
RESPIRATION RATE: 14 BRPM | SYSTOLIC BLOOD PRESSURE: 118 MMHG | DIASTOLIC BLOOD PRESSURE: 72 MMHG | HEART RATE: 80 BPM | BODY MASS INDEX: 32.38 KG/M2 | OXYGEN SATURATION: 96 % | HEIGHT: 68 IN | WEIGHT: 213.63 LBS | TEMPERATURE: 98 F

## 2018-10-29 DIAGNOSIS — L03.039 INFECTION OF TOENAIL: ICD-10-CM

## 2018-10-29 DIAGNOSIS — Z00.00 HEALTH CARE MAINTENANCE: ICD-10-CM

## 2018-10-29 DIAGNOSIS — S99.922A INJURY OF TOE ON LEFT FOOT, INITIAL ENCOUNTER: ICD-10-CM

## 2018-10-29 PROCEDURE — 99214 OFFICE O/P EST MOD 30 MIN: CPT | Performed by: INTERNAL MEDICINE

## 2018-10-29 RX ORDER — CLINDAMYCIN HYDROCHLORIDE 300 MG/1
300 CAPSULE ORAL 3 TIMES DAILY
Qty: 30 CAP | Refills: 0 | Status: SHIPPED | OUTPATIENT
Start: 2018-10-29 | End: 2019-04-16
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
## 2019-03-08 LAB
ALBUMIN SERPL-MCNC: 4.4 G/DL (ref 3.5–5.5)
ALBUMIN/GLOB SERPL: 1.7 {RATIO} (ref 1.2–2.2)
ALP SERPL-CCNC: 41 IU/L (ref 39–117)
ALT SERPL-CCNC: 25 IU/L (ref 0–32)
AST SERPL-CCNC: 14 IU/L (ref 0–40)
BILIRUB SERPL-MCNC: 0.5 MG/DL (ref 0–1.2)
BUN SERPL-MCNC: 13 MG/DL (ref 6–20)
BUN/CREAT SERPL: 15 (ref 9–23)
CALCIUM SERPL-MCNC: 10 MG/DL (ref 8.7–10.2)
CHLORIDE SERPL-SCNC: 106 MMOL/L (ref 96–106)
CHOLEST SERPL-MCNC: 241 MG/DL (ref 100–199)
CO2 SERPL-SCNC: 19 MMOL/L (ref 20–29)
CREAT SERPL-MCNC: 0.87 MG/DL (ref 0.57–1)
GLOBULIN SER CALC-MCNC: 2.6 G/DL (ref 1.5–4.5)
GLUCOSE SERPL-MCNC: 88 MG/DL (ref 65–99)
HDLC SERPL-MCNC: 48 MG/DL
LABORATORY COMMENT REPORT: ABNORMAL
LDLC SERPL CALC-MCNC: 163 MG/DL (ref 0–99)
POTASSIUM SERPL-SCNC: 4.8 MMOL/L (ref 3.5–5.2)
PROT SERPL-MCNC: 7 G/DL (ref 6–8.5)
SODIUM SERPL-SCNC: 139 MMOL/L (ref 134–144)
TRIGL SERPL-MCNC: 148 MG/DL (ref 0–149)
VLDLC SERPL CALC-MCNC: 30 MG/DL (ref 5–40)

## 2019-04-16 ENCOUNTER — OFFICE VISIT (OUTPATIENT)
Dept: MEDICAL GROUP | Facility: MEDICAL CENTER | Age: 39
End: 2019-04-16
Payer: COMMERCIAL

## 2019-04-16 VITALS
HEART RATE: 79 BPM | DIASTOLIC BLOOD PRESSURE: 72 MMHG | SYSTOLIC BLOOD PRESSURE: 102 MMHG | HEIGHT: 68 IN | TEMPERATURE: 97.4 F | OXYGEN SATURATION: 96 % | WEIGHT: 195.11 LBS | BODY MASS INDEX: 29.57 KG/M2

## 2019-04-16 DIAGNOSIS — E78.5 DYSLIPIDEMIA: ICD-10-CM

## 2019-04-16 DIAGNOSIS — H92.02 EARACHE ON LEFT: ICD-10-CM

## 2019-04-16 DIAGNOSIS — I77.89 ASCENDING AORTA ENLARGEMENT (HCC): ICD-10-CM

## 2019-04-16 DIAGNOSIS — Z00.00 HEALTH CARE MAINTENANCE: ICD-10-CM

## 2019-04-16 DIAGNOSIS — I10 ESSENTIAL HYPERTENSION: ICD-10-CM

## 2019-04-16 PROCEDURE — 99214 OFFICE O/P EST MOD 30 MIN: CPT | Performed by: INTERNAL MEDICINE

## 2019-04-16 RX ORDER — ONDANSETRON 4 MG/1
4 TABLET, ORALLY DISINTEGRATING ORAL EVERY 8 HOURS PRN
Qty: 20 TAB | Refills: 1 | Status: SHIPPED | OUTPATIENT
Start: 2019-04-16 | End: 2019-10-17

## 2019-04-16 NOTE — PROGRESS NOTES
CHIEF COMPLAINT  Chief Complaint   Patient presents with   • Results   HTN    HPI  Patient is a 38 y.o. female patient who presents today for the following     Hypertension  Meds: Metoprolol 50 mg daily; stopped taking.   Denies: - headaches, vision problems, tinnitus.  - chest pain/pressure, palpitations, irregular heart beats, exertional, dyspnea, peripheral edema.  - medication side effect: unusual fatigue, slow heartbeat, foot/leg swelling, cough.  Low salt diet: No  Diet: Regular  Exercise: < 4 d/w  BMI: 29  FH of HTN: Parents     Ascending aorta dilation   Dg: at the age of 28 y/o  Denies chest pain, palpitations, exertional SOB or chest pain.   Denies syncope.   The last echocardiography was in 4/2018, 1 month ago, revealed:  Normal aortic root for body surface area. aortic root measuring 3.8 cm,   ascending aorta diameter is  4.0 cm.    Dyslipidemia  The patient  had abnormal lipid panel, no meds.  Diet /Exercise/BMI: As above  FH: Father     MIGRAINE  Stable and controlled;  - needs zofran refill.    Onset: at the age of 10-15 y/o  The patient reports frontal headaches, described as: severe in severity; dull, throbbing with nausea, vomiting, photophobia, sonophobia and auras described as photopsias, visual scintillations and blurry vision, without radiation.  Usual frequency is > 10/month  Aura is present for the last 6 months.  Headaches are relieved by:  topiramate 25 mg QD; rescue eletriptan 20 mg prn, zofran. Botox Q 3 months  Known triggers include: periods, stress, sleep deprivation.  Current stressors include: none.  Previous treatment and prevention include: sumatriptan     Pertinent negatives:  Denies difficulty with speech or swallowing, facial numbness or tingling, focal weakness. Denies sore throat or cough, fever, sinus congestion, ear pain, tooth clenching or grinding.     Family Hx: headaches of unknown type in mother  Prior imaging: yes     Left earache  Complains of left ear aches for more  than 1 week, persistent.  Denies:  -Fever, chill  -Nasal congestion  -Sore throat/swollen glands  -Headaches  -Decreased hearing, balance problems,tinnitus.    Reviewed PMH, PSH, FH, SH, ALL, HCM/IMM, no changes  Reviewed MEDS, no changes    Patient Active Problem List    Diagnosis Date Noted   • Health care maintenance 08/24/2018   • Abnormal cervical Papanicolaou smear 08/24/2018   • Essential hypertension 05/22/2018   • Chronic migraine 05/22/2018   • Ascending aorta enlargement (HCC) 05/22/2018   • Dyslipidemia 05/22/2018     CURRENT MEDICATIONS  Current Outpatient Prescriptions   Medication Sig Dispense Refill   • topiramate (TOPAMAX) 50 MG tablet TK 1 T PO QD 90 Tab 3   • eletriptan (RELPAX) 20 MG Tab TK 1 T PO AOS OF MIGRAINE AS NEEDED. MAY REPEAT IN 2 H. DO NOT EXCEED 2 T IN 24 H 10 Tab 2   • ondansetron (ZOFRAN ODT) 4 MG TABLET DISPERSIBLE Take 1 Tab by mouth every 8 hours as needed. 20 Tab 1   • metoprolol (LOPRESSOR) 25 MG Tab Take 1 Tab by mouth 2 times a day. 180 Tab 0   • Omega-3 Fatty Acids (FISH OIL) 1000 MG Cap capsule Take 1,000 mg by mouth 3 times a day, with meals.     • Cholecalciferol (VITAMIN D3) 5000 units Cap Take 1 Cap by mouth every day.     • Coenzyme Q10 (CO Q-10 MAXIMUM STRENGTH) 400 MG Cap Take  by mouth.     • Cetirizine HCl (WAL-ZYR) 10 MG Cap Take  by mouth.     • loratadine (CLARITIN) 10 MG Tab Take 10 mg by mouth every evening.     • acetaminophen (TYLENOL) 500 MG Tab Take 1,000 mg by mouth every 6 hours as needed for Mild Pain or Moderate Pain.     • ibuprofen (MOTRIN) 800 MG Tab Take 1 Tab by mouth every 8 hours as needed (pain). 30 Tab 0   • prenatal vit/fe fumarate/fa (PRENATAL S) 27-0.8 MG TABS Take 1 Tab by mouth every evening.     • BOTOX 200 units Recon Soln        No current facility-administered medications for this visit.      ALLERGIES  Allergies: Patient has no known allergies.  PAST MEDICAL HISTORY  Past Medical History:   Diagnosis Date   • Abnormal Pap smear of  "cervix    • ASTHMA     seasonal allergy related   • HPV in female    • Hypertension    • Migraines      SURGICAL HISTORY  She  has a past surgical history that includes dilation and curettage.  SOCIAL HISTORY  Social History   Substance Use Topics   • Smoking status: Never Smoker   • Smokeless tobacco: Never Used   • Alcohol use No     Social History     Social History Narrative   • No narrative on file     FAMILY HISTORY  Family History   Problem Relation Age of Onset   • Hypertension Mother    • Heart Disease Father    • Hypertension Father    • Hyperlipidemia Father      Family Status   Relation Status   • Mo Alive   • Fa Alive     ROS   Constitutional: Negative for fever, chills.  HENT: Negative for congestion, sore throat. And per HPI.  Eyes: Negative for blurred vision.   Respiratory: Negative for shortness of breath.  Cardiovascular: Negative for chest pain, palpitations. And per HPI.  Gastrointestinal: Negative for heartburn, abdominal pain.   Genitourinary: Negative for dysuria.  Musculoskeletal: Negative for significantback and joint pain.   Skin: Negative for rash.   Neuro: As above.   Endo/Heme/Allergies: Does not bruise/bleed easily.   Psychiatric/Behavioral: Negative for depression.    PHYSICAL EXAM   /72 (BP Location: Left arm, Patient Position: Sitting, BP Cuff Size: Adult)   Pulse 79   Temp 36.3 °C (97.4 °F) (Temporal)   Ht 1.725 m (5' 7.9\")   Wt 88.5 kg (195 lb 1.7 oz)   SpO2 96%  Body mass index is 29.75 kg/m².  General:  NAD, well appearing  HEENT:   NC/AT, PERRLA, EOMI, TMs are clear. Oropharyngeal mucosa is pink,  without lesions;  no cervical / supraclavicular  lymphadenopathy, no thyromegaly.    Cardiovascular: RRR.   No m/r/g. No carotid bruits .      Lungs:   CTAB, no w/r/r, no respiratory distress.  Abdomen: Soft, NT/ND + BS; no suprapubic tenderness; no hepatosplenomegaly.  Extremities:  2+ DP and radial pulses bilaterally.  No c/c/e.   Skin:  Warm, dry.  No erythema. No rash. "   Neurologic: Alert & oriented x 3. CN II-XII grossly intact.  No focal deficits.  Psychiatric:  Affect normal, mood normal, judgment normal.    LABS     Labs are reviewed and discussed with a patient  Lab Results   Component Value Date/Time    CHOLSTRLTOT 241 (H) 03/07/2019 08:16 AM     (H) 03/07/2019 08:16 AM    HDL 48 03/07/2019 08:16 AM    TRIGLYCERIDE 148 03/07/2019 08:16 AM       Lab Results   Component Value Date/Time    SODIUM 139 03/07/2019 08:16 AM    SODIUM 138 10/07/2017 12:20 PM    POTASSIUM 4.8 03/07/2019 08:16 AM    POTASSIUM 3.6 10/07/2017 12:20 PM    CHLORIDE 106 03/07/2019 08:16 AM    CHLORIDE 106 10/07/2017 12:20 PM    CO2 19 (L) 03/07/2019 08:16 AM    CO2 23 10/07/2017 12:20 PM    GLUCOSE 88 03/07/2019 08:16 AM    GLUCOSE 119 (H) 10/07/2017 12:20 PM    BUN 13 03/07/2019 08:16 AM    BUN 10 10/07/2017 12:20 PM    CREATININE 0.87 03/07/2019 08:16 AM    CREATININE 0.71 10/07/2017 12:20 PM    BUNCREATRAT 15 03/07/2019 08:16 AM     Lab Results   Component Value Date/Time    ALKPHOSPHAT 41 03/07/2019 08:16 AM    ALKPHOSPHAT 117 (H) 05/22/2013 07:00 PM    ASTSGOT 14 03/07/2019 08:16 AM    ASTSGOT 18 05/22/2013 07:00 PM    ALTSGPT 25 03/07/2019 08:16 AM    ALTSGPT 18 05/22/2013 07:00 PM    TBILIRUBIN 0.5 03/07/2019 08:16 AM    TBILIRUBIN 0.4 05/22/2013 07:00 PM      Lab Results   Component Value Date/Time    HBA1C 5.4 10/24/2017 08:51 AM     Lab Results   Component Value Date/Time    WBC 10.7 10/07/2017 12:20 PM    RBC 5.18 10/07/2017 12:20 PM    HEMOGLOBIN 15.4 10/07/2017 12:20 PM    HEMATOCRIT 42.6 10/07/2017 12:20 PM    MCV 82.2 10/07/2017 12:20 PM    MCH 29.7 10/07/2017 12:20 PM    MCHC 36.2 (H) 10/07/2017 12:20 PM    MPV 9.8 10/07/2017 12:20 PM    NEUTSPOLYS 77.90 (H) 10/07/2017 12:20 PM    LYMPHOCYTES 16.50 (L) 10/07/2017 12:20 PM    MONOCYTES 4.40 10/07/2017 12:20 PM    EOSINOPHILS 0.40 10/07/2017 12:20 PM    BASOPHILS 0.60 10/07/2017 12:20 PM      IMAGING     Reviewed and discussed  echocardiography from 4/19/18:  No prior study is available for comparison.   Normal left ventricular size and systolic function.  Left ventricular ejection fraction is visually estimated to be 65%.  Normal diastolic function.  Normal inferior vena cava size and inspiratory collapse.  Normal aortic root for body surface area. aortic root measuring 3.8 cm,   ascending aorta diameter is  4.0 cm.    ASSESMENT AND PLAN        1. Essential hypertension  Resolved with weight loss, patient has not been taking metoprolol anymore  - Comp Metabolic Panel; Future    2. Dyslipidemia  Improved with weight loss, still persistent/mild.  Advised to continue current lifestyle  - Comp Metabolic Panel; Future  - Lipid Profile; Future    3. Ascending aorta enlargement (HCC)  Need cardiology follow-up  - REFERRAL TO CARDIOLOGY    4. Chronic migraine  Controlled, continue current treatment and neurology follow-up, refill:  - ondansetron (ZOFRAN ODT) 4 MG TABLET DISPERSIBLE; Take 1 Tab by mouth every 8 hours as needed.  Dispense: 20 Tab; Refill: 1    5. Earache on left  Requested referral, exam was benign;   Discussed about possible causes.  - REFERRAL TO ENT    6. Health care maintenance  UTD    Counseling:   - Smoking:  Nonsmoker    Followup: Return in about 4 months (around 8/16/2019), or if symptoms worsen or fail to improve.    All questions are answered.    Please note that this dictation was created using voice recognition software, and that there might be errors of cassy and possibly content.

## 2019-05-07 ENCOUNTER — APPOINTMENT (RX ONLY)
Dept: URBAN - METROPOLITAN AREA CLINIC 4 | Facility: CLINIC | Age: 39
Setting detail: DERMATOLOGY
End: 2019-05-07

## 2019-05-07 DIAGNOSIS — D18.0 HEMANGIOMA: ICD-10-CM

## 2019-05-07 DIAGNOSIS — D22 MELANOCYTIC NEVI: ICD-10-CM

## 2019-05-07 DIAGNOSIS — L70.8 OTHER ACNE: ICD-10-CM

## 2019-05-07 DIAGNOSIS — L81.4 OTHER MELANIN HYPERPIGMENTATION: ICD-10-CM

## 2019-05-07 DIAGNOSIS — L82.1 OTHER SEBORRHEIC KERATOSIS: ICD-10-CM

## 2019-05-07 DIAGNOSIS — L81.0 POSTINFLAMMATORY HYPERPIGMENTATION: ICD-10-CM

## 2019-05-07 DIAGNOSIS — L57.8 OTHER SKIN CHANGES DUE TO CHRONIC EXPOSURE TO NONIONIZING RADIATION: ICD-10-CM

## 2019-05-07 PROBLEM — D18.01 HEMANGIOMA OF SKIN AND SUBCUTANEOUS TISSUE: Status: ACTIVE | Noted: 2019-05-07

## 2019-05-07 PROBLEM — D22.5 MELANOCYTIC NEVI OF TRUNK: Status: ACTIVE | Noted: 2019-05-07

## 2019-05-07 PROCEDURE — ? COUNSELING

## 2019-05-07 PROCEDURE — 99213 OFFICE O/P EST LOW 20 MIN: CPT

## 2019-05-07 PROCEDURE — ? PRESCRIPTION

## 2019-05-07 PROCEDURE — ? OBSERVATION

## 2019-05-07 RX ORDER — ADAPALENE AND BENZOYL PEROXIDE 3; 25 MG/G; MG/G
GEL TOPICAL
Qty: 1 | Refills: 3 | Status: ERX | COMMUNITY
Start: 2019-05-07

## 2019-05-07 RX ADMIN — ADAPALENE AND BENZOYL PEROXIDE: 3; 25 GEL TOPICAL at 00:00

## 2019-05-07 ASSESSMENT — LOCATION DETAILED DESCRIPTION DERM
LOCATION DETAILED: RIGHT CENTRAL MALAR CHEEK
LOCATION DETAILED: LEFT SUPERIOR MEDIAL UPPER BACK
LOCATION DETAILED: LEFT PROXIMAL PRETIBIAL REGION
LOCATION DETAILED: RIGHT ANTERIOR DISTAL THIGH
LOCATION DETAILED: LEFT MEDIAL SUPERIOR CHEST
LOCATION DETAILED: LEFT ANTERIOR PROXIMAL UPPER ARM
LOCATION DETAILED: LEFT ANTERIOR DISTAL THIGH
LOCATION DETAILED: RIGHT INFERIOR UPPER BACK
LOCATION DETAILED: RIGHT SUPERIOR MEDIAL UPPER BACK
LOCATION DETAILED: LEFT CENTRAL MALAR CHEEK
LOCATION DETAILED: RIGHT INFERIOR CENTRAL MALAR CHEEK
LOCATION DETAILED: LEFT PROXIMAL DORSAL FOREARM
LOCATION DETAILED: RIGHT ANTERIOR PROXIMAL UPPER ARM
LOCATION DETAILED: LEFT INFERIOR CENTRAL MALAR CHEEK
LOCATION DETAILED: RIGHT MID-UPPER BACK
LOCATION DETAILED: RIGHT PROXIMAL DORSAL FOREARM

## 2019-05-07 ASSESSMENT — LOCATION ZONE DERM
LOCATION ZONE: LEG
LOCATION ZONE: TRUNK
LOCATION ZONE: FACE
LOCATION ZONE: ARM

## 2019-05-07 ASSESSMENT — LOCATION SIMPLE DESCRIPTION DERM
LOCATION SIMPLE: CHEST
LOCATION SIMPLE: LEFT FOREARM
LOCATION SIMPLE: RIGHT UPPER ARM
LOCATION SIMPLE: RIGHT THIGH
LOCATION SIMPLE: LEFT PRETIBIAL REGION
LOCATION SIMPLE: LEFT CHEEK
LOCATION SIMPLE: RIGHT CHEEK
LOCATION SIMPLE: LEFT THIGH
LOCATION SIMPLE: RIGHT UPPER BACK
LOCATION SIMPLE: LEFT UPPER ARM
LOCATION SIMPLE: LEFT UPPER BACK
LOCATION SIMPLE: RIGHT FOREARM

## 2019-06-24 ENCOUNTER — HOSPITAL ENCOUNTER (OUTPATIENT)
Dept: RADIOLOGY | Facility: MEDICAL CENTER | Age: 39
End: 2019-06-24
Attending: INTERNAL MEDICINE
Payer: COMMERCIAL

## 2019-06-24 DIAGNOSIS — Z12.31 VISIT FOR SCREENING MAMMOGRAM: ICD-10-CM

## 2019-06-24 PROCEDURE — 77063 BREAST TOMOSYNTHESIS BI: CPT

## 2019-10-01 ENCOUNTER — OFFICE VISIT (OUTPATIENT)
Dept: CARDIOLOGY | Facility: MEDICAL CENTER | Age: 39
End: 2019-10-01
Payer: COMMERCIAL

## 2019-10-01 VITALS
WEIGHT: 205 LBS | OXYGEN SATURATION: 96 % | BODY MASS INDEX: 32.18 KG/M2 | HEIGHT: 67 IN | HEART RATE: 80 BPM | DIASTOLIC BLOOD PRESSURE: 90 MMHG | SYSTOLIC BLOOD PRESSURE: 132 MMHG

## 2019-10-01 DIAGNOSIS — E78.00 PURE HYPERCHOLESTEROLEMIA: ICD-10-CM

## 2019-10-01 DIAGNOSIS — R73.9 HYPERGLYCEMIA: ICD-10-CM

## 2019-10-01 DIAGNOSIS — E66.9 OBESITY (BMI 30.0-34.9): ICD-10-CM

## 2019-10-01 DIAGNOSIS — I77.89 ASCENDING AORTA ENLARGEMENT (HCC): ICD-10-CM

## 2019-10-01 DIAGNOSIS — E88.810 METABOLIC SYNDROME X: ICD-10-CM

## 2019-10-01 DIAGNOSIS — R71.8 RBC MICROCYTOSIS: ICD-10-CM

## 2019-10-01 DIAGNOSIS — I10 ESSENTIAL HYPERTENSION: ICD-10-CM

## 2019-10-01 PROCEDURE — 93000 ELECTROCARDIOGRAM COMPLETE: CPT | Performed by: INTERNAL MEDICINE

## 2019-10-01 PROCEDURE — 99214 OFFICE O/P EST MOD 30 MIN: CPT | Performed by: INTERNAL MEDICINE

## 2019-10-01 RX ORDER — METOPROLOL SUCCINATE 25 MG/1
12.5 TABLET, EXTENDED RELEASE ORAL DAILY
Qty: 45 TAB | Refills: 3 | Status: SHIPPED | OUTPATIENT
Start: 2019-10-01 | End: 2019-10-17 | Stop reason: SDUPTHER

## 2019-10-01 NOTE — LETTER
[facility]   [address]   [address]  [place] [address]  Phone: [phone]  Fax: [phone]              Princess Machado  1980    Encounter Date: 10/1/2019    Meghann Lockhart M.D.          PROGRESS NOTE:  Subjective:   Chief Complaint:   Chief Complaint   Patient presents with   • HTN (Controlled)       Princess Machado is a 39 y.o. female who is referred by Dr. Asim Conley for dilated ascending aorta.    Has high LDL cholesterol, has been on keto.  Has hypertension, blood pressure elevated, no medications..    Has migraine headaches, better after weight loss.  Asthma.    She is not limited by chest pain, pressure or tightness.   No significant dyspnea on exertion, orthopnea or lower extremity swelling.   No significant palpitations, dizziness, or presyncope/syncope.   No symptoms of leg claudication.   No stroke/TIA like symptoms.    Relocated to [place] 2009, was seeing another PCP who discovered enlarged aorta.  Had two babies with vaginal delivery without complications.    Mother found to have ascending aorta, had heart cath after abnormal stress but no coronary disease.    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    Kids are 4, 6.    DATA REVIEWED by me:  ECG 10/1/2019  Sinus, rate 64, nonspecific T wave changes    Echo 4/19/2018  Normal left ventricular size and systolic function.  Left ventricular ejection fraction is visually estimated to be 65%.  Normal diastolic function.  Normal inferior vena cava size and inspiratory collapse.  Normal aortic root for body surface area. aortic root measuring 3.8 cm,   ascending aorta diameter is  4.0 cm.    Most recent labs:     3/7/2019 sodium 139, potassium 4.8, creatinine 0.87, LFTs normal, , HDL 48, triglycerides 148, total cholesterol 241    Past Medical History:   Diagnosis Date   • Abnormal Pap smear of cervix    • ASTHMA     seasonal allergy related   • HPV in female    • Hypertension    • Migraines      Past Surgical History:   Procedure Laterality Date   • DILATION AND CURETTAGE       Family History   Problem Relation Age of Onset   • Hypertension Mother    • Heart Disease Mother         Dilated ascending aorta   • Heart Disease Father    • Hypertension Father    • Hyperlipidemia Father    • Other Father         Amyloidosis, mostly GI and muscle, 77     Social History     Socioeconomic History   • Marital status:      Spouse name: Not on file   • Number of children: Not on file   • Years of education: Not on file   • Highest education level: Not on file   Occupational History   • Not on file   Social Needs   • Financial resource strain: Not on file   • Food insecurity:     Worry: Not on file     Inability: Not on file   • Transportation needs:     Medical: Not on file     Non-medical: Not on file   Tobacco Use   • Smoking status: Never Smoker   • Smokeless tobacco: Never Used   Substance and Sexual Activity   • Alcohol use: No   • Drug use: No   • Sexual activity: Not on file   Lifestyle   • Physical activity:     Days per week: Not on file     Minutes per session: Not on file   • Stress: Not on file   Relationships   • Social connections:     Talks on phone: Not on file     Gets together: Not on file     Attends [employer] service: Not on file     Active member of club or organization: Not on file     Attends meetings of clubs or organizations: Not on file     Relationship status: Not on file   • Intimate partner violence:     Fear of current or ex partner: Not on file     Emotionally abused: Not on file     Physically abused: Not on file     Forced sexual activity: Not on file   Other Topics Concern   • Not on file   Social History Narrative   • Not on file     No Known Allergies    Current Outpatient Medications   Medication Sig Dispense Refill   • metoprolol SR (TOPROL XL) 25 MG TABLET SR 24 HR Take 0.5 Tabs by mouth every  "day. 45 Tab 3   • ondansetron (ZOFRAN ODT) 4 MG TABLET DISPERSIBLE Take 1 Tab by mouth every 8 hours as needed. 20 Tab 1   • eletriptan (RELPAX) 20 MG Tab TK 1 T PO AOS OF MIGRAINE AS NEEDED. MAY REPEAT IN 2 H. DO NOT EXCEED 2 T IN 24 H 10 Tab 2   • BOTOX 200 units Recon Soln      • Omega-3 Fatty Acids (FISH OIL) 1000 MG Cap capsule Take 1,000 mg by mouth 3 times a day, with meals.     • Cholecalciferol (VITAMIN D3) 5000 units Cap Take 1 Cap by mouth every day.     • Coenzyme Q10 (CO Q-10 MAXIMUM STRENGTH) 400 MG Cap Take  by mouth.     • Cetirizine HCl (WAL-ZYR) 10 MG Cap Take  by mouth.     • acetaminophen (TYLENOL) 500 MG Tab Take 1,000 mg by mouth every 6 hours as needed for Mild Pain or Moderate Pain.       No current facility-administered medications for this visit.        ROS  All others systems reviewed and negative.     Objective:     /90 (BP Location: Left arm, Patient Position: Sitting, BP Cuff Size: Adult)   Pulse 80   Ht 1.702 m (5' 7\")   Wt 93 kg (205 lb)   SpO2 96%  Body mass index is 32.11 kg/m².    Physical Exam   General: No acute distress. Well nourished.  HEENT: EOM grossly intact, no scleral icterus, no pharyngeal erythema.   Neck:  No JVD, no bruits, trachea midline  CVS: RRR. Normal S1, S2. No M/R/G. No LE edema.  2+ radial pulses, 2+ PT pulses  Resp: CTAB. No wheezing or crackles/rhonchi. Normal respiratory effort.  Abdomen: Soft, NT, no kandis hepatomegaly.  MSK/Ext: No clubbing or cyanosis.  Skin: Warm and dry, no rashes.  Neurological: CN III-XII grossly intact. No focal deficits.   Psych: A&O x 3, appropriate affect, good judgement      Assessment:     1. Ascending aorta enlargement (HCC)  EKG    CT-THORACIC AORTA EVALUATION    Basic Metabolic Panel   2. Essential hypertension     3. Hyperglycemia     4. Metabolic syndrome X     5. Obesity (BMI 30.0-34.9)     6. RBC microcytosis     7. Pure hypercholesterolemia         Medical Decision Making:  Today's Assessment / Status / Plan: "     -We reviewed echo images together today  -CTA of the entire aorta  -Repeat imaging 1 year, then probably every 3 years  -Root and AV are normal  -Goal BP under 130/80, ideally closer to 120/80 with BB therapy  -Lifestyle modification for LDL, she was on keto diet  -10-year risk of atherosclerotic cardiovascular disease 1.17%.  -ECG is not concerning.    Written instructions given today:    Goal blood pressure is ideally under 120/80, at least under 130/80.  Salt=sodium=sea salt, guidelines say stay under 2,500 mg daily but I ask for under 4,000 mg daily.  Get salt smart, start looking at labels, count it up.  Salt is hidden in everything, salad dressing, cheese, most canned food.    Metoprolol succinate 12.5 mg, cut a 25 mg pill in half, once daily    CT scan of the entire aorta, if the dilation is confined to the ascending aorta alone, we can use just echo going forward.    Non fasting blood work    -You should always hear results of testing within 5 days with my interpretation, if you do not, send a [email or web address] message or call the office: [phone].    No follow-ups on file.    It is my pleasure to participate in the care of Ms. Machado.  Please do not hesitate to contact me with questions or concerns.    Meghann Lockahrt MD, [facility]  Cardiologist [facility]    Please note that this dictation was created using voice recognition software. I have made every reasonable attempt to correct obvious errors, but it is possible there are errors of grammar and possibly content that I did not discover before finalizing the note.      Asim Barboza M.D.  [address]  [address]  [place] [address]  VIA In Basket

## 2019-10-01 NOTE — PROGRESS NOTES
Subjective:   Chief Complaint:   Chief Complaint   Patient presents with   • HTN (Controlled)       Princess Machado is a 39 y.o. female who is referred by Dr. Asim Conley for dilated ascending aorta.    Has high LDL cholesterol, has been on keto.  Has hypertension, blood pressure elevated, no medications..    Has migraine headaches, better after weight loss.  Asthma.    She is not limited by chest pain, pressure or tightness.   No significant dyspnea on exertion, orthopnea or lower extremity swelling.   No significant palpitations, dizziness, or presyncope/syncope.   No symptoms of leg claudication.   No stroke/TIA like symptoms.    Relocated to [place] 2009, was seeing another PCP who discovered enlarged aorta.  Had two babies with vaginal delivery without complications.    Mother found to have ascending aorta, had heart cath after abnormal stress but no coronary disease.    Moved from [facility], they have a [employer] business with her brother.    Kids are 4, 6.    DATA REVIEWED by me:  ECG 10/1/2019  Sinus, rate 64, nonspecific T wave changes    Echo 4/19/2018  Normal left ventricular size and systolic function.  Left ventricular ejection fraction is visually estimated to be 65%.  Normal diastolic function.  Normal inferior vena cava size and inspiratory collapse.  Normal aortic root for body surface area. aortic root measuring 3.8 cm,   ascending aorta diameter is  4.0 cm.    Most recent labs:     3/7/2019 sodium 139, potassium 4.8, creatinine 0.87, LFTs normal, , HDL 48, triglycerides 148, total cholesterol 241    Past Medical History:   Diagnosis Date   • Abnormal Pap smear of cervix    • ASTHMA     seasonal allergy related   • HPV in female    • Hypertension    • Migraines      Past Surgical History:   Procedure Laterality Date   • DILATION AND CURETTAGE       Family History   Problem Relation Age of Onset   • Hypertension Mother    • Heart Disease Mother         Dilated ascending aorta    • Heart Disease Father    • Hypertension Father    • Hyperlipidemia Father    • Other Father         Amyloidosis, mostly GI and muscle, 77     Social History     Socioeconomic History   • Marital status:      Spouse name: Not on file   • Number of children: Not on file   • Years of education: Not on file   • Highest education level: Not on file   Occupational History   • Not on file   Social Needs   • Financial resource strain: Not on file   • Food insecurity:     Worry: Not on file     Inability: Not on file   • Transportation needs:     Medical: Not on file     Non-medical: Not on file   Tobacco Use   • Smoking status: Never Smoker   • Smokeless tobacco: Never Used   Substance and Sexual Activity   • Alcohol use: No   • Drug use: No   • Sexual activity: Not on file   Lifestyle   • Physical activity:     Days per week: Not on file     Minutes per session: Not on file   • Stress: Not on file   Relationships   • Social connections:     Talks on phone: Not on file     Gets together: Not on file     Attends Jainism service: Not on file     Active member of club or organization: Not on file     Attends meetings of clubs or organizations: Not on file     Relationship status: Not on file   • Intimate partner violence:     Fear of current or ex partner: Not on file     Emotionally abused: Not on file     Physically abused: Not on file     Forced sexual activity: Not on file   Other Topics Concern   • Not on file   Social History Narrative   • Not on file     No Known Allergies    Current Outpatient Medications   Medication Sig Dispense Refill   • metoprolol SR (TOPROL XL) 25 MG TABLET SR 24 HR Take 0.5 Tabs by mouth every day. 45 Tab 3   • ondansetron (ZOFRAN ODT) 4 MG TABLET DISPERSIBLE Take 1 Tab by mouth every 8 hours as needed. 20 Tab 1   • eletriptan (RELPAX) 20 MG Tab TK 1 T PO AOS OF MIGRAINE AS NEEDED. MAY REPEAT IN 2 H. DO NOT EXCEED 2 T IN 24 H 10 Tab 2   • BOTOX 200 units Recon Soln      • Omega-3  "Fatty Acids (FISH OIL) 1000 MG Cap capsule Take 1,000 mg by mouth 3 times a day, with meals.     • Cholecalciferol (VITAMIN D3) 5000 units Cap Take 1 Cap by mouth every day.     • Coenzyme Q10 (CO Q-10 MAXIMUM STRENGTH) 400 MG Cap Take  by mouth.     • Cetirizine HCl (WAL-ZYR) 10 MG Cap Take  by mouth.     • acetaminophen (TYLENOL) 500 MG Tab Take 1,000 mg by mouth every 6 hours as needed for Mild Pain or Moderate Pain.       No current facility-administered medications for this visit.        ROS  All others systems reviewed and negative.     Objective:     /90 (BP Location: Left arm, Patient Position: Sitting, BP Cuff Size: Adult)   Pulse 80   Ht 1.702 m (5' 7\")   Wt 93 kg (205 lb)   SpO2 96%  Body mass index is 32.11 kg/m².    Physical Exam   General: No acute distress. Well nourished.  HEENT: EOM grossly intact, no scleral icterus, no pharyngeal erythema.   Neck:  No JVD, no bruits, trachea midline  CVS: RRR. Normal S1, S2. No M/R/G. No LE edema.  2+ radial pulses, 2+ PT pulses  Resp: CTAB. No wheezing or crackles/rhonchi. Normal respiratory effort.  Abdomen: Soft, NT, no kandis hepatomegaly.  MSK/Ext: No clubbing or cyanosis.  Skin: Warm and dry, no rashes.  Neurological: CN III-XII grossly intact. No focal deficits.   Psych: A&O x 3, appropriate affect, good judgement      Assessment:     1. Ascending aorta enlargement (HCC)  EKG    CT-THORACIC AORTA EVALUATION    Basic Metabolic Panel   2. Essential hypertension     3. Hyperglycemia     4. Metabolic syndrome X     5. Obesity (BMI 30.0-34.9)     6. RBC microcytosis     7. Pure hypercholesterolemia         Medical Decision Making:  Today's Assessment / Status / Plan:     -We reviewed echo images together today  -CTA of the entire aorta  -Repeat imaging 1 year, then probably every 3 years  -Root and AV are normal  -Goal BP under 130/80, ideally closer to 120/80 with BB therapy  -Lifestyle modification for LDL, she was on keto diet  -10-year risk of " atherosclerotic cardiovascular disease 1.17%.  -ECG is not concerning.    Written instructions given today:    Goal blood pressure is ideally under 120/80, at least under 130/80.  Salt=sodium=sea salt, guidelines say stay under 2,500 mg daily but I ask for under 4,000 mg daily.  Get salt smart, start looking at labels, count it up.  Salt is hidden in everything, salad dressing, cheese, most canned food.    Metoprolol succinate 12.5 mg, cut a 25 mg pill in half, once daily    CT scan of the entire aorta, if the dilation is confined to the ascending aorta alone, we can use just echo going forward.    Non fasting blood work    -You should always hear results of testing within 5 days with my interpretation, if you do not, send a [employer] message or call the office: [phone].    Return in about 1 year (around 10/1/2020).    It is my pleasure to participate in the care of Ms. Machado.  Please do not hesitate to contact me with questions or concerns.    Meghann Lockhart MD, [facility]  Cardiologist [facility]    Please note that this dictation was created using voice recognition software. I have made every reasonable attempt to correct obvious errors, but it is possible there are errors of grammar and possibly content that I did not discover before finalizing the note.

## 2019-10-01 NOTE — PATIENT INSTRUCTIONS
Goal blood pressure is ideally under 120/80, at least under 130/80.  Salt=sodium=sea salt, guidelines say stay under 2,500 mg daily but I ask for under 4,000 mg daily.  Get salt smart, start looking at labels, count it up.  Salt is hidden in everything, salad dressing, cheese, most canned food.    Metoprolol succinate 12.5 mg, cut a 25 mg pill in half, once daily    CT scan of the entire aorta, if the dilation is confined to the ascending aorta alone, we can use just echo going forward.    Non fasting blood work    -You should always hear results of testing within 5 days with my interpretation, if you do not, send a [employer] message or call the office: [phone].

## 2019-10-02 LAB — EKG IMPRESSION: NORMAL

## 2019-10-11 LAB
ALBUMIN SERPL-MCNC: 4.6 G/DL (ref 3.5–5.5)
ALBUMIN/GLOB SERPL: 1.8 {RATIO} (ref 1.2–2.2)
ALP SERPL-CCNC: 41 IU/L (ref 39–117)
ALT SERPL-CCNC: 15 IU/L (ref 0–32)
AST SERPL-CCNC: 14 IU/L (ref 0–40)
BILIRUB SERPL-MCNC: 0.6 MG/DL (ref 0–1.2)
BUN SERPL-MCNC: 16 MG/DL (ref 6–24)
BUN/CREAT SERPL: 19 (ref 9–23)
CALCIUM SERPL-MCNC: 9.7 MG/DL (ref 8.7–10.2)
CHLORIDE SERPL-SCNC: 104 MMOL/L (ref 96–106)
CHOLEST SERPL-MCNC: 234 MG/DL (ref 100–199)
CO2 SERPL-SCNC: 19 MMOL/L (ref 20–29)
CREAT SERPL-MCNC: 0.84 MG/DL (ref 0.57–1)
GLOBULIN SER CALC-MCNC: 2.6 G/DL (ref 1.5–4.5)
GLUCOSE SERPL-MCNC: 80 MG/DL (ref 65–99)
HDLC SERPL-MCNC: 54 MG/DL
LABORATORY COMMENT REPORT: ABNORMAL
LDLC SERPL CALC-MCNC: 142 MG/DL (ref 0–99)
POTASSIUM SERPL-SCNC: 4.6 MMOL/L (ref 3.5–5.2)
PROT SERPL-MCNC: 7.2 G/DL (ref 6–8.5)
SODIUM SERPL-SCNC: 139 MMOL/L (ref 134–144)
TRIGL SERPL-MCNC: 191 MG/DL (ref 0–149)
VLDLC SERPL CALC-MCNC: 38 MG/DL (ref 5–40)

## 2019-10-17 ENCOUNTER — OFFICE VISIT (OUTPATIENT)
Dept: MEDICAL GROUP | Facility: MEDICAL CENTER | Age: 39
End: 2019-10-17
Payer: COMMERCIAL

## 2019-10-17 VITALS
HEIGHT: 68 IN | DIASTOLIC BLOOD PRESSURE: 62 MMHG | HEART RATE: 64 BPM | SYSTOLIC BLOOD PRESSURE: 110 MMHG | OXYGEN SATURATION: 96 % | TEMPERATURE: 97.6 F | BODY MASS INDEX: 31.27 KG/M2 | WEIGHT: 206.35 LBS

## 2019-10-17 DIAGNOSIS — I10 ESSENTIAL HYPERTENSION: ICD-10-CM

## 2019-10-17 DIAGNOSIS — Z00.00 ANNUAL PHYSICAL EXAM: ICD-10-CM

## 2019-10-17 DIAGNOSIS — R87.619 ABNORMAL CERVICAL PAPANICOLAOU SMEAR, UNSPECIFIED ABNORMAL PAP FINDING: ICD-10-CM

## 2019-10-17 DIAGNOSIS — E66.9 OBESITY (BMI 30.0-34.9): ICD-10-CM

## 2019-10-17 DIAGNOSIS — E78.5 DYSLIPIDEMIA: ICD-10-CM

## 2019-10-17 DIAGNOSIS — E55.9 HYPOVITAMINOSIS D: ICD-10-CM

## 2019-10-17 DIAGNOSIS — Z00.00 HEALTH CARE MAINTENANCE: ICD-10-CM

## 2019-10-17 DIAGNOSIS — I77.89 ASCENDING AORTA ENLARGEMENT (HCC): ICD-10-CM

## 2019-10-17 PROCEDURE — 99396 PREV VISIT EST AGE 40-64: CPT | Performed by: INTERNAL MEDICINE

## 2019-10-17 RX ORDER — METOPROLOL SUCCINATE 25 MG/1
12.5 TABLET, EXTENDED RELEASE ORAL DAILY
Qty: 45 TAB | Refills: 3 | Status: SHIPPED | OUTPATIENT
Start: 2019-10-17 | End: 2020-04-24 | Stop reason: SDUPTHER

## 2019-10-17 ASSESSMENT — PATIENT HEALTH QUESTIONNAIRE - PHQ9: CLINICAL INTERPRETATION OF PHQ2 SCORE: 0

## 2019-10-17 NOTE — PROGRESS NOTES
CHIEF COMPLAINT  Chief Complaint   Patient presents with   • Follow-Up     6 month   • Results     Lab   10/10   HTN    HPI  Princess Machado is a 39 y.o. female who presents today for annual and the following     Pt has GYN provider: yes    Recommendations:  Regular exercise at least 4 days a week  Diet: advised balanced diet  Dental exam at least 1-2 times per year  Sunscreen use: advised    Immunizations:  TdaP: 10/2018  Influenza: 9/2019    GYN  Previous PAP:   2018, abnormal     - GYN follow-up every 6 months  Abnormal PAP: no  Last Mammography: 6/2019    Menses every month with bleeding for 5 days  No excess cramping or bleeding.   Takes OTC analgesics for cramping  Contraception: None    Hypertension  Meds: Metoprolol 12.5 mg daily; stopped taking.   Denies: - headaches, vision problems, tinnitus.  - chest pain/pressure, palpitations, irregular heart beats, exertional, dyspnea, peripheral edema.  - medication side effect: unusual fatigue, slow heartbeat, foot/leg swelling, cough.  Low salt diet: No  Diet: Regular  Exercise: < 4 d/w  BMI: 31  FH of HTN: Parents     Ascending aorta dilation   Dg: at the age of 30 y/o  Denies chest pain, palpitations, exertional SOB or chest pain.   Denies syncope.   The last echocardiography, 4/19/2018  No prior study is available for comparison.   Normal left ventricular size and systolic function.  Left ventricular ejection fraction is visually estimated to be 65%.  Normal diastolic function.  Normal inferior vena cava size and inspiratory collapse.  Normal aortic root for body surface area. aortic root measuring 3.8 cm,   ascending aorta diameter is  4.0 cm.      Dyslipidemia  The patient had abnormal lipid panel, no meds.  Diet /Exercise/BMI: As above  FH: Father     MIGRAINE  Stable and controlled;  - needs zofran refill.     Onset: at the age of 10-15 y/o  The patient reports frontal headaches, described as: severe in severity; dull, throbbing with nausea, vomiting,  photophobia, sonophobia and auras described as photopsias, visual scintillations and blurry vision, without radiation.  Usual frequency is > 10/month  Aura is present for the last 6 months.  Headaches are relieved by: topiramate 25 mg QD; rescue eletriptan 20 mg prn, zofran. Botox Q 3 months  Known triggers include: periods, stress, sleep deprivation.  Current stressors include: none.  Previous treatment and prevention include: sumatriptan     Pertinent negatives:  Denies difficulty with speech or swallowing, facial numbness or tingling, focal weakness. Denies sore throat or cough, fever, sinus congestion, ear pain, tooth clenching or grinding.     Family Hx: headaches of unknown type in mother  Prior imaging: yes     Hypovitaminosis D  The patient had low vitamin D level.  Vitamin D supplement: OTC.    OBESITY, Body mass index is 31.38 kg/m².  Diet: As above  Exercise: As above  No temperature intolerance. No change in hair/skin quality, BMs.   No HTN, buffalo hump, purple striae, flushing.  FH of obesity: yes    Reviewed PMH, PSH, FH, SH, ALL, HCM/IMM, no changes  Reviewed MEDS, no changes    Patient Active Problem List    Diagnosis Date Noted   • Health care maintenance 08/24/2018   • Abnormal cervical Papanicolaou smear 08/24/2018   • Essential hypertension 05/22/2018   • Chronic migraine 05/22/2018   • Ascending aorta enlargement (HCC) 05/22/2018   • Dyslipidemia 05/22/2018     CURRENT MEDICATIONS  Current Outpatient Medications   Medication Sig Dispense Refill   • Probiotic Product (PROBIOTIC-10) Cap Take  by mouth.     • metoprolol SR (TOPROL XL) 25 MG TABLET SR 24 HR Take 0.5 Tabs by mouth every day. 45 Tab 3   • ondansetron (ZOFRAN ODT) 4 MG TABLET DISPERSIBLE Take 1 Tab by mouth every 8 hours as needed. 20 Tab 1   • eletriptan (RELPAX) 20 MG Tab TK 1 T PO AOS OF MIGRAINE AS NEEDED. MAY REPEAT IN 2 H. DO NOT EXCEED 2 T IN 24 H 10 Tab 2   • BOTOX 200 units Recon Soln      • Omega-3 Fatty Acids (FISH OIL)  1000 MG Cap capsule Take 1,000 mg by mouth 3 times a day, with meals.     • Cholecalciferol (VITAMIN D3) 5000 units Cap Take 1 Cap by mouth every day.     • Coenzyme Q10 (CO Q-10 MAXIMUM STRENGTH) 400 MG Cap Take  by mouth.     • Cetirizine HCl (WAL-ZYR) 10 MG Cap Take  by mouth.     • acetaminophen (TYLENOL) 500 MG Tab Take 1,000 mg by mouth every 6 hours as needed for Mild Pain or Moderate Pain.       No current facility-administered medications for this visit.      ALLERGIES  Allergies: Patient has no known allergies.  PAST MEDICAL HISTORY  Past Medical History:   Diagnosis Date   • Abnormal Pap smear of cervix    • ASTHMA     seasonal allergy related   • HPV in female    • Hypertension    • Migraines      SURGICAL HISTORY  She  has a past surgical history that includes dilation and curettage.  SOCIAL HISTORY  Social History     Tobacco Use   • Smoking status: Never Smoker   • Smokeless tobacco: Never Used   Substance Use Topics   • Alcohol use: No   • Drug use: No     Social History     Social History Narrative   • Not on file     FAMILY HISTORY  Family History   Problem Relation Age of Onset   • Hypertension Mother    • Heart Disease Mother         Dilated ascending aorta   • Heart Disease Father    • Hypertension Father    • Hyperlipidemia Father    • Other Father         Amyloidosis, mostly GI and muscle, 77     Family Status   Relation Name Status   • Mo  Alive   • Fa     • Sis Half sister dad's side Alive   • Bro  Alive     ROS   Constitutional: Negative for fever, chills, fatigue.  HENT: Negative for congestion, sore throat.  Eyes: Negative for vision problems.   Respiratory: Negative for cough, shortness of breath.  Cardiovascular: Negative for chest pain, palpitations.   Gastrointestinal: Negative for heartburn, nausea, abdominal pain.   Genitourinary: Negative for dysuria.  Musculoskeletal: Negative for significant myalgia, back and joint pain.   Skin: Negative for rash.   Neuro: Negative for  "dizziness, weakness and headaches.   Endo/Heme/Allergies: Does not bruise/bleed easily.   Psychiatric/Behavioral: Negative for depression.    PHYSICAL EXAM   Blood Pressure 110/62   Pulse 64   Temperature 36.4 °C (97.6 °F) (Temporal)   Height 1.727 m (5' 8\")   Weight 93.6 kg (206 lb 5.6 oz)   Oxygen Saturation 96%  Body mass index is 31.38 kg/m².  General:  NAD, well appearing  HEENT:   NC/AT, PERRLA, EOMI, TMs are clear. Oropharyngeal mucosa is pink,  without lesions;  no cervical / supraclavicular  lymphadenopathy, no thyromegaly.    Cardiovascular: RRR.   No m/r/g.       Lungs:   CTAB, no w/r/r, no respiratory distress.  Abdomen: Soft, NT/ND; unable to palpate liver/spleen due to WT..  Extremities:  2+ DP and radial pulses bilaterally.  No c/c/e.   Skin:  Warm, dry.  No erythema. No rash.   Neurologic: Alert & oriented x 3. CN II-XII grossly intact. No focal deficits.  Psychiatric:  Affect normal, mood normal, judgment normal.    Labs     Labs are reviewed and discussed with a patient  Lab Results   Component Value Date/Time    CHOLSTRLTOT 234 (H) 10/10/2019 04:51 AM     (H) 10/10/2019 04:51 AM    HDL 54 10/10/2019 04:51 AM    TRIGLYCERIDE 191 (H) 10/10/2019 04:51 AM       Lab Results   Component Value Date/Time    SODIUM 139 10/10/2019 04:51 AM    SODIUM 138 10/07/2017 12:20 PM    POTASSIUM 4.6 10/10/2019 04:51 AM    POTASSIUM 3.6 10/07/2017 12:20 PM    CHLORIDE 104 10/10/2019 04:51 AM    CHLORIDE 106 10/07/2017 12:20 PM    CO2 19 (L) 10/10/2019 04:51 AM    CO2 23 10/07/2017 12:20 PM    GLUCOSE 80 10/10/2019 04:51 AM    GLUCOSE 119 (H) 10/07/2017 12:20 PM    BUN 16 10/10/2019 04:51 AM    BUN 10 10/07/2017 12:20 PM    CREATININE 0.84 10/10/2019 04:51 AM    CREATININE 0.71 10/07/2017 12:20 PM    BUNCREATRAT 19 10/10/2019 04:51 AM     Lab Results   Component Value Date/Time    ALKPHOSPHAT 41 10/10/2019 04:51 AM    ALKPHOSPHAT 117 (H) 05/22/2013 07:00 PM    ASTSGOT 14 10/10/2019 04:51 AM    ASTSGOT 18 " 05/22/2013 07:00 PM    ALTSGPT 15 10/10/2019 04:51 AM    ALTSGPT 18 05/22/2013 07:00 PM    TBILIRUBIN 0.6 10/10/2019 04:51 AM    TBILIRUBIN 0.4 05/22/2013 07:00 PM      Lab Results   Component Value Date/Time    HBA1C 5.4 10/24/2017 08:51 AM     No results found for: TSH  No results found for: FREET4    Lab Results   Component Value Date/Time    WBC 10.7 10/07/2017 12:20 PM    RBC 5.18 10/07/2017 12:20 PM    HEMOGLOBIN 15.4 10/07/2017 12:20 PM    HEMATOCRIT 42.6 10/07/2017 12:20 PM    MCV 82.2 10/07/2017 12:20 PM    MCH 29.7 10/07/2017 12:20 PM    MCHC 36.2 (H) 10/07/2017 12:20 PM    MPV 9.8 10/07/2017 12:20 PM    NEUTSPOLYS 77.90 (H) 10/07/2017 12:20 PM    LYMPHOCYTES 16.50 (L) 10/07/2017 12:20 PM    MONOCYTES 4.40 10/07/2017 12:20 PM    EOSINOPHILS 0.40 10/07/2017 12:20 PM    BASOPHILS 0.60 10/07/2017 12:20 PM      Imaging     Reviewed and discussed:    The last echocardiography, 4/19/2018  No prior study is available for comparison.   Normal left ventricular size and systolic function.  Left ventricular ejection fraction is visually estimated to be 65%.  Normal diastolic function.  Normal inferior vena cava size and inspiratory collapse.  Normal aortic root for body surface area. aortic root measuring 3.8 cm,   ascending aorta diameter is  4.0 cm.     Assessment and Plan     Princess Machado is a 39 y.o. female    1. Annual physical exam  Reviewed PMH, PSH, FH, SH, ALL, MEDS, HCM/IMM.   Advised healthy habits, diet, exercise.    2. Health care maintenance  Per HPI    3. Abnormal cervical Papanicolaou smear, unspecified abnormal pap finding  Continue GYN follow-up    4. Essential hypertension  Controlled, continue current treatment  - Comp Metabolic Panel; Future    5. Ascending aorta enlargement (HCC)  Stable, continue cardiology follow-up    6. Dyslipidemia    -Decided to continue with diet, daily exercise, weight control discussed treatment options;Patient is 39-year-old, d- Comp Metabolic Panel; Future  -  Lipid Profile; Future    7. Chronic migraine  Controlled, continue current treatment    8. Hypovitaminosis D  Advised 2000 units of vitamin D daily  - VITAMIN D,25 HYDROXY; Future    9. Obesity (BMI 30.0-34.9)  - OBESITY COUNSELING (No Charge): Patient identified as having weight management issue.  Appropriate orders and counseling given.    Counseling:   - Smoking:  Nonsmoker    Followup: in 6 months and as needed    All questions are answered.    Please note that this dictation was created using voice recognition software, and that there might be errors of cassy and possibly content.

## 2019-10-18 ENCOUNTER — HOSPITAL ENCOUNTER (OUTPATIENT)
Dept: RADIOLOGY | Facility: MEDICAL CENTER | Age: 39
End: 2019-10-18
Attending: INTERNAL MEDICINE
Payer: COMMERCIAL

## 2019-10-18 DIAGNOSIS — I77.89 ASCENDING AORTA ENLARGEMENT (HCC): ICD-10-CM

## 2019-10-18 PROCEDURE — 71275 CT ANGIOGRAPHY CHEST: CPT

## 2020-04-24 DIAGNOSIS — I10 ESSENTIAL HYPERTENSION: Primary | ICD-10-CM

## 2020-04-24 RX ORDER — METOPROLOL SUCCINATE 25 MG/1
12.5 TABLET, EXTENDED RELEASE ORAL DAILY
Qty: 45 TAB | Refills: 3 | Status: SHIPPED | OUTPATIENT
Start: 2020-04-24 | End: 2021-01-18 | Stop reason: SDUPTHER

## 2020-06-06 LAB
25(OH)D3+25(OH)D2 SERPL-MCNC: 36.5 NG/ML (ref 30–100)
ALBUMIN SERPL-MCNC: 4.5 G/DL (ref 3.8–4.8)
ALBUMIN/GLOB SERPL: 1.9 {RATIO} (ref 1.2–2.2)
ALP SERPL-CCNC: 40 IU/L (ref 39–117)
ALT SERPL-CCNC: 16 IU/L (ref 0–32)
AST SERPL-CCNC: 14 IU/L (ref 0–40)
BILIRUB SERPL-MCNC: 0.4 MG/DL (ref 0–1.2)
BUN SERPL-MCNC: 14 MG/DL (ref 6–24)
BUN/CREAT SERPL: 16 (ref 9–23)
CALCIUM SERPL-MCNC: 9.4 MG/DL (ref 8.7–10.2)
CHLORIDE SERPL-SCNC: 103 MMOL/L (ref 96–106)
CHOLEST SERPL-MCNC: 217 MG/DL (ref 100–199)
CO2 SERPL-SCNC: 19 MMOL/L (ref 20–29)
CREAT SERPL-MCNC: 0.86 MG/DL (ref 0.57–1)
GLOBULIN SER CALC-MCNC: 2.4 G/DL (ref 1.5–4.5)
GLUCOSE SERPL-MCNC: 94 MG/DL (ref 65–99)
HDLC SERPL-MCNC: 46 MG/DL
LABORATORY COMMENT REPORT: ABNORMAL
LDLC SERPL CALC-MCNC: 141 MG/DL (ref 0–99)
POTASSIUM SERPL-SCNC: 4.3 MMOL/L (ref 3.5–5.2)
PROT SERPL-MCNC: 6.9 G/DL (ref 6–8.5)
SODIUM SERPL-SCNC: 136 MMOL/L (ref 134–144)
TRIGL SERPL-MCNC: 149 MG/DL (ref 0–149)
VLDLC SERPL CALC-MCNC: 30 MG/DL (ref 5–40)

## 2020-07-07 ENCOUNTER — HOSPITAL ENCOUNTER (OUTPATIENT)
Dept: RADIOLOGY | Facility: MEDICAL CENTER | Age: 40
End: 2020-07-07
Attending: INTERNAL MEDICINE
Payer: COMMERCIAL

## 2020-07-07 DIAGNOSIS — Z12.31 VISIT FOR SCREENING MAMMOGRAM: ICD-10-CM

## 2020-07-07 PROCEDURE — 77067 SCR MAMMO BI INCL CAD: CPT

## 2020-07-09 ENCOUNTER — APPOINTMENT (RX ONLY)
Dept: URBAN - METROPOLITAN AREA CLINIC 4 | Facility: CLINIC | Age: 40
Setting detail: DERMATOLOGY
End: 2020-07-09

## 2020-07-09 DIAGNOSIS — L81.4 OTHER MELANIN HYPERPIGMENTATION: ICD-10-CM

## 2020-07-09 DIAGNOSIS — L82.1 OTHER SEBORRHEIC KERATOSIS: ICD-10-CM

## 2020-07-09 DIAGNOSIS — L72.8 OTHER FOLLICULAR CYSTS OF THE SKIN AND SUBCUTANEOUS TISSUE: ICD-10-CM

## 2020-07-09 DIAGNOSIS — D22 MELANOCYTIC NEVI: ICD-10-CM

## 2020-07-09 DIAGNOSIS — D18.0 HEMANGIOMA: ICD-10-CM

## 2020-07-09 DIAGNOSIS — L57.8 OTHER SKIN CHANGES DUE TO CHRONIC EXPOSURE TO NONIONIZING RADIATION: ICD-10-CM

## 2020-07-09 PROBLEM — D22.5 MELANOCYTIC NEVI OF TRUNK: Status: ACTIVE | Noted: 2020-07-09

## 2020-07-09 PROBLEM — D18.01 HEMANGIOMA OF SKIN AND SUBCUTANEOUS TISSUE: Status: ACTIVE | Noted: 2020-07-09

## 2020-07-09 PROCEDURE — 99213 OFFICE O/P EST LOW 20 MIN: CPT

## 2020-07-09 PROCEDURE — ? COUNSELING

## 2020-07-09 PROCEDURE — ? ADDITIONAL NOTES

## 2020-07-09 PROCEDURE — ? PRESCRIPTION

## 2020-07-09 RX ORDER — CEPHALEXIN 500 MG/1
TABLET ORAL
Qty: 30 | Refills: 0 | Status: ERX | COMMUNITY
Start: 2020-07-09

## 2020-07-09 RX ADMIN — CEPHALEXIN: 500 TABLET ORAL at 00:00

## 2020-07-09 ASSESSMENT — LOCATION ZONE DERM
LOCATION ZONE: TRUNK
LOCATION ZONE: HAND
LOCATION ZONE: ARM
LOCATION ZONE: FACE
LOCATION ZONE: LEG

## 2020-07-09 ASSESSMENT — LOCATION SIMPLE DESCRIPTION DERM
LOCATION SIMPLE: LEFT UPPER BACK
LOCATION SIMPLE: CHEST
LOCATION SIMPLE: RIGHT CHEEK
LOCATION SIMPLE: LEFT POPLITEAL SKIN
LOCATION SIMPLE: RIGHT HAND
LOCATION SIMPLE: LEFT FOREARM
LOCATION SIMPLE: RIGHT FOREARM
LOCATION SIMPLE: RIGHT UPPER BACK
LOCATION SIMPLE: LEFT CHEEK
LOCATION SIMPLE: LEFT HAND

## 2020-07-09 ASSESSMENT — LOCATION DETAILED DESCRIPTION DERM
LOCATION DETAILED: RIGHT INFERIOR CENTRAL MALAR CHEEK
LOCATION DETAILED: LEFT SUPERIOR MEDIAL UPPER BACK
LOCATION DETAILED: LEFT RADIAL DORSAL HAND
LOCATION DETAILED: LEFT MEDIAL SUPERIOR CHEST
LOCATION DETAILED: RIGHT SUPERIOR MEDIAL UPPER BACK
LOCATION DETAILED: RIGHT MID-UPPER BACK
LOCATION DETAILED: RIGHT PROXIMAL DORSAL FOREARM
LOCATION DETAILED: LEFT POPLITEAL SKIN
LOCATION DETAILED: LEFT DISTAL DORSAL FOREARM
LOCATION DETAILED: LEFT INFERIOR CENTRAL MALAR CHEEK
LOCATION DETAILED: LEFT SUPERIOR UPPER BACK
LOCATION DETAILED: RIGHT RADIAL DORSAL HAND

## 2020-07-09 NOTE — HPI: SKIN LESION (LIPOMA)
How Severe Is Your Lipoma?: mild
Is This A New Presentation, Or A Follow-Up?: Skin Lesion
Additional History: Patient went to Urgent care 1 week ago and was given a 10 day course of Keflex. Patient states swelling went down.

## 2020-07-09 NOTE — PROCEDURE: ADDITIONAL NOTES
Additional Notes: Discussed treatment options in detail with patient, such as Excision or Ultrasound.
Detail Level: Simple

## 2020-07-20 ENCOUNTER — OFFICE VISIT (OUTPATIENT)
Dept: MEDICAL GROUP | Age: 40
End: 2020-07-20
Payer: COMMERCIAL

## 2020-07-20 VITALS
OXYGEN SATURATION: 97 % | HEIGHT: 68 IN | BODY MASS INDEX: 33.65 KG/M2 | SYSTOLIC BLOOD PRESSURE: 102 MMHG | HEART RATE: 71 BPM | WEIGHT: 222 LBS | DIASTOLIC BLOOD PRESSURE: 78 MMHG | TEMPERATURE: 98.1 F

## 2020-07-20 DIAGNOSIS — E55.9 HYPOVITAMINOSIS D: ICD-10-CM

## 2020-07-20 DIAGNOSIS — Z00.00 HEALTH CARE MAINTENANCE: ICD-10-CM

## 2020-07-20 DIAGNOSIS — E66.9 OBESITY (BMI 30.0-34.9): ICD-10-CM

## 2020-07-20 DIAGNOSIS — E78.5 DYSLIPIDEMIA: ICD-10-CM

## 2020-07-20 DIAGNOSIS — I10 ESSENTIAL HYPERTENSION: ICD-10-CM

## 2020-07-20 DIAGNOSIS — R91.8 LUNG NODULES: ICD-10-CM

## 2020-07-20 DIAGNOSIS — R53.83 FATIGUE, UNSPECIFIED TYPE: ICD-10-CM

## 2020-07-20 DIAGNOSIS — I77.89 ASCENDING AORTA ENLARGEMENT (HCC): ICD-10-CM

## 2020-07-20 PROBLEM — E66.811 OBESITY (BMI 30.0-34.9): Status: ACTIVE | Noted: 2020-07-20

## 2020-07-20 PROCEDURE — 99214 OFFICE O/P EST MOD 30 MIN: CPT | Performed by: INTERNAL MEDICINE

## 2020-07-20 RX ORDER — CHLORAL HYDRATE 500 MG
1000 CAPSULE ORAL 2 TIMES DAILY
Qty: 180 CAP | Refills: 1 | Status: SHIPPED | OUTPATIENT
Start: 2020-07-20 | End: 2022-04-26

## 2020-07-20 ASSESSMENT — PATIENT HEALTH QUESTIONNAIRE - PHQ9: CLINICAL INTERPRETATION OF PHQ2 SCORE: 0

## 2020-07-20 NOTE — PROGRESS NOTES
CHIEF COMPLAINT  HTN    HPI  Princess Machado is a 40 y.o. female who presents today for the following     Hypertension  Meds: Metoprolol 12.5 mg daily; stopped taking.   Denies: - headaches, vision problems, tinnitus.  - chest pain/pressure, palpitations, irregular heart beats, exertional, dyspnea, peripheral edema.  - medication side effect: unusual fatigue, slow heartbeat, foot/leg swelling, cough.  Low salt diet: No  Diet: Regular  Exercise: < 4 d/w  BMI: 33  FH of HTN: Parents     Ascending aorta dilation   Dg: at the age of 30 y/o  Denies chest pain, palpitations, exertional SOB or chest pain.   Denies syncope.     CTA chest, 10/18/19  Dilatation of the ascending aorta up to 4.5 cm.  No aortic dissection. No mediastinal hematoma.  Multiple pulmonary nodules measuring up to 5.1 mm in diameter.  No airspace consolidation or pleural effusions.    The last echocardiography, 4/19/2018  No prior study is available for comparison.   Normal left ventricular size and systolic function.  Left ventricular ejection fraction is visually estimated to be 65%.  Normal diastolic function.  Normal inferior vena cava size and inspiratory collapse.  Normal aortic root for body surface area. aortic root measuring 3.8 cm,   ascending aorta diameter is  4.0 cm.        Dyslipidemia  The patient had abnormal lipid panel, no meds.  Diet /Exercise/BMI: As above  FH: Father     Migraine  Stable and controlled;  - needs zofran refill.     Onset: at the age of 10-15 y/o  The patient reports frontal headaches, described as: severe in severity; dull, throbbing with nausea, vomiting, photophobia, sonophobia and auras described as photopsias, visual scintillations and blurry vision, without radiation.  Usual frequency is > 10/month  Aura is present for the last 6 months.  Headaches are relieved by: topiramate 25 mg QD; rescue eletriptan 20 mg prn, zofran. Botox Q 3 months  Known triggers include: periods, stress, sleep  deprivation.  Current stressors include: none.  Previous treatment and prevention include: sumatriptan     Pertinent negatives:  Denies difficulty with speech or swallowing, facial numbness or tingling, focal weakness. Denies sore throat or cough, fever, sinus congestion, ear pain, tooth clenching or grinding.     Family Hx: headaches of unknown type in mother  Prior imaging: yes      Hypovitaminosis D, fatigue  The patient had low vitamin D level.  Vitamin D supplement: OTC, 5000 U WD.    Lung nodules  Incidental finding on CTA chest in 1-/2019, as above.  Smoking H: never.     BMI 33, was 31  Onset: since childhood  Diet: As above  Exercise: As above  No temperature intolerance. No change in hair/skin quality, BMs.   No HTN, buffalo hump, purple striae, flushing.  FH of obesity: mother     Reviewed PMH, PSH, FH, SH, ALL, HCM/IMM, no changes  Reviewed MEDS, no changes    Patient Active Problem List    Diagnosis Date Noted   • Health care maintenance 08/24/2018   • Abnormal cervical Papanicolaou smear 08/24/2018   • Essential hypertension 05/22/2018   • Chronic migraine 05/22/2018   • Ascending aorta enlargement (HCC) 05/22/2018   • Dyslipidemia 05/22/2018     CURRENT MEDICATIONS  Current Outpatient Medications   Medication Sig Dispense Refill   • metoprolol SR (TOPROL XL) 25 MG TABLET SR 24 HR Take 0.5 Tabs by mouth every day. 45 Tab 3   • Probiotic Product (PROBIOTIC-10) Cap Take  by mouth.     • eletriptan (RELPAX) 20 MG Tab TK 1 T PO AOS OF MIGRAINE AS NEEDED. MAY REPEAT IN 2 H. DO NOT EXCEED 2 T IN 24 H 10 Tab 2   • BOTOX 200 units Recon Soln      • Omega-3 Fatty Acids (FISH OIL) 1000 MG Cap capsule Take 1,000 mg by mouth 3 times a day, with meals.     • Cholecalciferol (VITAMIN D3) 5000 units Cap Take 1 Cap by mouth every day.     • Coenzyme Q10 (CO Q-10 MAXIMUM STRENGTH) 400 MG Cap Take  by mouth.     • Cetirizine HCl (WAL-ZYR) 10 MG Cap Take  by mouth.     • acetaminophen (TYLENOL) 500 MG Tab Take 1,000 mg  "by mouth every 6 hours as needed for Mild Pain or Moderate Pain.       No current facility-administered medications for this visit.      ALLERGIES  Allergies: Patient has no known allergies.  PAST MEDICAL HISTORY  Past Medical History:   Diagnosis Date   • Abnormal Pap smear of cervix    • ASTHMA     seasonal allergy related   • HPV in female    • Hypertension    • Migraines      SURGICAL HISTORY  She  has a past surgical history that includes dilation and curettage.  SOCIAL HISTORY  Social History     Tobacco Use   • Smoking status: Never Smoker   • Smokeless tobacco: Never Used   Substance Use Topics   • Alcohol use: No   • Drug use: No     Social History     Social History Narrative   • Not on file     FAMILY HISTORY  Family History   Problem Relation Age of Onset   • Hypertension Mother    • Heart Disease Mother         Dilated ascending aorta   • Heart Disease Father    • Hypertension Father    • Hyperlipidemia Father    • Other Father         Amyloidosis, mostly GI and muscle, 77     Family Status   Relation Name Status   • Mo  Alive   • Fa     • Sis Half sister dad's side Alive   • Bro  Alive     ROS   Constitutional: Negative for fever, chills, fatigue.  HENT: Negative for congestion, sore throat.  Eyes: Negative for vision problems.   Respiratory: Negative for cough, shortness of breath.  Cardiovascular: Negative for chest pain, palpitations.   Gastrointestinal: Negative for heartburn, nausea, abdominal pain.   Genitourinary: Negative for dysuria.  Musculoskeletal: Negative for significant myalgia, back and joint pain.   Skin: Negative for rash.   Neuro: Negative for dizziness, weakness and headaches.   Endo/Heme/Allergies: Does not bruise/bleed easily.   Psychiatric/Behavioral: Negative for depression.    PHYSICAL EXAM   Blood Pressure 102/78 (BP Location: Right arm, Patient Position: Sitting, BP Cuff Size: Adult)   Pulse 71   Temperature 36.7 °C (98.1 °F) (Temporal)   Height 1.727 m (5' 8\")   " Weight 100.7 kg (222 lb)   Oxygen Saturation 97%   Body Mass Index 33.75 kg/m²   General:  NAD, well appearing  HEENT:   NC/AT, PERRLA, EOMI.  Cardiovascular: unlabored breathing, no peripheral cyanosis or swelling.     Lungs:   no respiratory distress.  Abdomen: non- distended.  Extremities:  No LE swelling.  Skin:  Warm, dry.  No erythema. No rash.   Neurologic: Alert & oriented x 3. CN II-XII grossly intact. No focal deficits.  Psychiatric:  Affect normal, mood normal, judgment normal.    Labs     Labs are reviewed and discussed with a patient  Lab Results   Component Value Date/Time    CHOLSTRLTOT 217 (H) 06/05/2020 05:20 AM     (H) 06/05/2020 05:20 AM    HDL 46 06/05/2020 05:20 AM    TRIGLYCERIDE 149 06/05/2020 05:20 AM       Lab Results   Component Value Date/Time    SODIUM 136 06/05/2020 05:20 AM    SODIUM 138 10/07/2017 12:20 PM    POTASSIUM 4.3 06/05/2020 05:20 AM    POTASSIUM 3.6 10/07/2017 12:20 PM    CHLORIDE 103 06/05/2020 05:20 AM    CHLORIDE 106 10/07/2017 12:20 PM    CO2 19 (L) 06/05/2020 05:20 AM    CO2 23 10/07/2017 12:20 PM    GLUCOSE 94 06/05/2020 05:20 AM    GLUCOSE 119 (H) 10/07/2017 12:20 PM    BUN 14 06/05/2020 05:20 AM    BUN 10 10/07/2017 12:20 PM    CREATININE 0.86 06/05/2020 05:20 AM    CREATININE 0.71 10/07/2017 12:20 PM    BUNCREATRAT 16 06/05/2020 05:20 AM     Lab Results   Component Value Date/Time    ALKPHOSPHAT 40 06/05/2020 05:20 AM    ALKPHOSPHAT 117 (H) 05/22/2013 07:00 PM    ASTSGOT 14 06/05/2020 05:20 AM    ASTSGOT 18 05/22/2013 07:00 PM    ALTSGPT 16 06/05/2020 05:20 AM    ALTSGPT 18 05/22/2013 07:00 PM    TBILIRUBIN 0.4 06/05/2020 05:20 AM    TBILIRUBIN 0.4 05/22/2013 07:00 PM      Lab Results   Component Value Date/Time    HBA1C 5.4 10/24/2017 08:51 AM     No results found for: TSH  No results found for: FREET4    Lab Results   Component Value Date/Time    WBC 10.7 10/07/2017 12:20 PM    RBC 5.18 10/07/2017 12:20 PM    HEMOGLOBIN 15.4 10/07/2017 12:20 PM     HEMATOCRIT 42.6 10/07/2017 12:20 PM    MCV 82.2 10/07/2017 12:20 PM    MCH 29.7 10/07/2017 12:20 PM    MCHC 36.2 (H) 10/07/2017 12:20 PM    MPV 9.8 10/07/2017 12:20 PM    NEUTSPOLYS 77.90 (H) 10/07/2017 12:20 PM    LYMPHOCYTES 16.50 (L) 10/07/2017 12:20 PM    MONOCYTES 4.40 10/07/2017 12:20 PM    EOSINOPHILS 0.40 10/07/2017 12:20 PM    BASOPHILS 0.60 10/07/2017 12:20 PM      Imaging     Reviewed and discussed per HPI    Assessment and Plan     Princess Machado is a 40 y.o. female    1. Essential hypertension  Controlled, continue with current treatment.  - Comp Metabolic Panel; Future    2. Ascending aorta enlargement (HCC)  Reviewed     3. Dyslipidemia  Borderline, discussed treatment options, patient prefers low-calorie diet, daily exercise, weight control;  -She is also interested in omega-3, refilled and may add niacin, OTC  - Comp Metabolic Panel; Future  - Lipid Profile; Future    4. Chronic migraine  Controlled, continue current treatment    5. Hypovitaminosis D  Advised to use vitamin D, 5000 units every other day  - VITAMIN D,25 HYDROXY; Future    6. Fatigue, unspecified type  Follow-up labs  - CBC WITH DIFFERENTIAL; Future  - TSH WITH REFLEX TO FT4; Future    7. Lung nodules  - REFERRAL TO PULMONARY AND SLEEP MEDICINE Lung Nodule Clinic    8. Obesity (BMI 30.0-34.9)  - OBESITY COUNSELING (No Charge): Patient identified as having weight management issue.  Appropriate orders and counseling given.    9. Health care maintenance  UTD    Counseling:   - Smoking:  Nonsmoker    Followup: In 6 months and as needed    All questions are answered.    Please note that this dictation was created using voice recognition software, and that there might be errors of cassy and possibly content.

## 2020-08-24 ENCOUNTER — APPOINTMENT (RX ONLY)
Dept: URBAN - METROPOLITAN AREA CLINIC 4 | Facility: CLINIC | Age: 40
Setting detail: DERMATOLOGY
End: 2020-08-24

## 2020-08-24 DIAGNOSIS — L72.8 OTHER FOLLICULAR CYSTS OF THE SKIN AND SUBCUTANEOUS TISSUE: ICD-10-CM | Status: RESOLVED

## 2020-08-24 PROCEDURE — ? COUNSELING

## 2020-08-24 PROCEDURE — 99212 OFFICE O/P EST SF 10 MIN: CPT

## 2020-08-24 ASSESSMENT — LOCATION DETAILED DESCRIPTION DERM: LOCATION DETAILED: LEFT POPLITEAL SKIN

## 2020-08-24 ASSESSMENT — LOCATION SIMPLE DESCRIPTION DERM: LOCATION SIMPLE: LEFT POPLITEAL SKIN

## 2020-08-24 ASSESSMENT — LOCATION ZONE DERM: LOCATION ZONE: LEG

## 2020-09-23 ENCOUNTER — APPOINTMENT (OUTPATIENT)
Dept: PULMONOLOGY | Facility: HOSPICE | Age: 40
End: 2020-09-23
Payer: COMMERCIAL

## 2020-10-02 ENCOUNTER — OFFICE VISIT (OUTPATIENT)
Dept: CARDIOLOGY | Facility: MEDICAL CENTER | Age: 40
End: 2020-10-02
Payer: COMMERCIAL

## 2020-10-02 VITALS
DIASTOLIC BLOOD PRESSURE: 80 MMHG | SYSTOLIC BLOOD PRESSURE: 110 MMHG | HEIGHT: 68 IN | OXYGEN SATURATION: 97 % | HEART RATE: 70 BPM | WEIGHT: 210 LBS | BODY MASS INDEX: 31.83 KG/M2

## 2020-10-02 DIAGNOSIS — I10 ESSENTIAL HYPERTENSION: ICD-10-CM

## 2020-10-02 DIAGNOSIS — E88.810 METABOLIC SYNDROME X: ICD-10-CM

## 2020-10-02 DIAGNOSIS — Z82.49 FAMILY HISTORY OF THORACIC AORTIC ANEURYSM: ICD-10-CM

## 2020-10-02 DIAGNOSIS — E66.9 OBESITY (BMI 30.0-34.9): ICD-10-CM

## 2020-10-02 DIAGNOSIS — E78.00 PURE HYPERCHOLESTEROLEMIA: ICD-10-CM

## 2020-10-02 DIAGNOSIS — I71.20 THORACIC AORTIC ANEURYSM WITHOUT RUPTURE (HCC): ICD-10-CM

## 2020-10-02 DIAGNOSIS — I77.89 ASCENDING AORTA ENLARGEMENT (HCC): ICD-10-CM

## 2020-10-02 PROCEDURE — 99214 OFFICE O/P EST MOD 30 MIN: CPT | Performed by: INTERNAL MEDICINE

## 2020-10-02 NOTE — LETTER
[facility]   [address]   [address]  [place] [address]  Phone: [phone]  Fax: [phone]              Princess Machado  1980    Encounter Date: 10/2/2020    Meghann Lockhart M.D.          PROGRESS NOTE:  Subjective:   Chief Complaint:   Chief Complaint   Patient presents with   • HTN (Controlled)       Princess Machado is a 40 y.o. female who returns for thoracic aortic aneurysm, hypertension and hyperlipidemia.    Relocated to [place] 2009, was seeing another PCP who discovered enlarged aorta.    Echocardiogram 2018 measured at 4 cm.  CT scan October 2019 measured 4.5 cm.    Has high LDL cholesterol, has been on keto. LDL is 141, has small particle lipids also.  Probably from her mother.    Has hypertension, BP at goal, tolerating very low dose metoprolol, does not bother her asthma.  128/88 to 124/ 89, 102/78.    Has migraine headaches, better after weight loss.  Asthma.  Had incidental pulmonary nodules, going to see Pulmonary.    She is not limited by chest pain, pressure or tightness.   No significant dyspnea on exertion, orthopnea or lower extremity swelling.   No significant palpitations, dizziness, or presyncope/syncope.   No symptoms of leg claudication.   No stroke/TIA like symptoms.    Had two babies with vaginal delivery without complications.    Mother found to have ascending aorta, had heart cath after abnormal stress but no coronary disease.    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  Working on germ cleaning for park bathrooms.  Father passed away, lots to take care of.    Kids are 5, 7, back in school.   stays at home with kids.  They just got a puppy.  Going to [place] for Thanksgiving.    DATA REVIEWED by me:  ECG 10/1/2019  Sinus, rate 64, nonspecific T wave changes    Echo 4/19/2018  Normal left ventricular size and systolic function.  Left ventricular ejection  fraction is visually estimated to be 65%.  Normal diastolic function.  Normal inferior vena cava size and inspiratory collapse.  Normal aortic root for body surface area. aortic root measuring 3.8 cm,   ascending aorta diameter is  4.0 cm.    CT scan 10/29/2019  Dilatation of the ascending aorta up to 4.5 cm.  No aortic dissection. No mediastinal hematoma.  Multiple pulmonary nodules measuring up to 5.1 mm in diameter.  No airspace consolidation or pleural effusions.       Most recent labs:     Lab Results   Component Value Date/Time    HEMOGLOBIN 15.4 10/07/2017 12:20 PM    HEMATOCRIT 42.6 10/07/2017 12:20 PM    MCV 82.2 10/07/2017 12:20 PM      Lab Results   Component Value Date/Time    SODIUM 136 06/05/2020 05:20 AM    SODIUM 138 10/07/2017 12:20 PM    POTASSIUM 4.3 06/05/2020 05:20 AM    POTASSIUM 3.6 10/07/2017 12:20 PM    CHLORIDE 103 06/05/2020 05:20 AM    CHLORIDE 106 10/07/2017 12:20 PM    CO2 19 (L) 06/05/2020 05:20 AM    CO2 23 10/07/2017 12:20 PM    GLUCOSE 94 06/05/2020 05:20 AM    GLUCOSE 119 (H) 10/07/2017 12:20 PM    BUN 14 06/05/2020 05:20 AM    BUN 10 10/07/2017 12:20 PM    CREATININE 0.86 06/05/2020 05:20 AM    CREATININE 0.71 10/07/2017 12:20 PM      Lab Results   Component Value Date/Time    ASTSGOT 14 06/05/2020 05:20 AM    ASTSGOT 18 05/22/2013 07:00 PM    ALTSGPT 16 06/05/2020 05:20 AM    ALTSGPT 18 05/22/2013 07:00 PM    ALBUMIN 4.5 06/05/2020 05:20 AM    ALBUMIN 3.5 05/22/2013 07:00 PM      Lab Results   Component Value Date/Time    CHOLSTRLTOT 217 (H) 06/05/2020 05:20 AM     (H) 06/05/2020 05:20 AM    HDL 46 06/05/2020 05:20 AM    TRIGLYCERIDE 149 06/05/2020 05:20 AM       3/7/2019 sodium 139, potassium 4.8, creatinine 0.87, LFTs normal, , HDL 48, triglycerides 148, total cholesterol 241    Past Medical History:   Diagnosis Date   • Abnormal Pap smear of cervix    • ASTHMA     seasonal allergy related   • HPV in female    • Hypertension    • Migraines      Past Surgical  History:   Procedure Laterality Date   • DILATION AND CURETTAGE       Family History   Problem Relation Age of Onset   • Hypertension Mother    • Heart Disease Mother         Dilated ascending aorta   • Heart Disease Father    • Hypertension Father    • Hyperlipidemia Father    • Other Father         Amyloidosis, mostly GI and muscle, 78     Social History     Socioeconomic History   • Marital status:      Spouse name: Not on file   • Number of children: Not on file   • Years of education: Not on file   • Highest education level: Not on file   Occupational History   • Not on file   Social Needs   • Financial resource strain: Not on file   • Food insecurity     Worry: Not on file     Inability: Not on file   • Transportation needs     Medical: Not on file     Non-medical: Not on file   Tobacco Use   • Smoking status: Never Smoker   • Smokeless tobacco: Never Used   Substance and Sexual Activity   • Alcohol use: No   • Drug use: No   • Sexual activity: Not on file   Lifestyle   • Physical activity     Days per week: Not on file     Minutes per session: Not on file   • Stress: Not on file   Relationships   • Social connections     Talks on phone: Not on file     Gets together: Not on file     Attends Sikh service: Not on file     Active member of club or organization: Not on file     Attends meetings of clubs or organizations: Not on file     Relationship status: Not on file   • Intimate partner violence     Fear of current or ex partner: Not on file     Emotionally abused: Not on file     Physically abused: Not on file     Forced sexual activity: Not on file   Other Topics Concern   • Not on file   Social History Narrative   • Not on file     No Known Allergies    Current Outpatient Medications   Medication Sig Dispense Refill   • Omega-3 Fatty Acids (FISH OIL) 1000 MG Cap capsule Take 1 Cap by mouth 2 Times a Day. 180 Cap 1   • metoprolol SR (TOPROL XL) 25 MG TABLET SR 24 HR Take 0.5 Tabs by mouth every  "day. 45 Tab 3   • Probiotic Product (PROBIOTIC-10) Cap Take  by mouth.     • eletriptan (RELPAX) 20 MG Tab TK 1 T PO AOS OF MIGRAINE AS NEEDED. MAY REPEAT IN 2 H. DO NOT EXCEED 2 T IN 24 H 10 Tab 2   • BOTOX 200 units Recon Soln      • Cholecalciferol (VITAMIN D3) 5000 units Cap Take 1 Cap by mouth every day.     • Coenzyme Q10 (CO Q-10 MAXIMUM STRENGTH) 400 MG Cap Take  by mouth.     • Cetirizine HCl (WAL-ZYR) 10 MG Cap Take  by mouth.     • acetaminophen (TYLENOL) 500 MG Tab Take 1,000 mg by mouth every 6 hours as needed for Mild Pain or Moderate Pain.       No current facility-administered medications for this visit.        ROS    All others systems reviewed and negative.     Objective:     /80 (BP Location: Left arm, Patient Position: Sitting)   Pulse 70   Ht 1.727 m (5' 8\")   Wt 95.3 kg (210 lb)   SpO2 97%  Body mass index is 31.93 kg/m².    Physical Exam   General: No acute distress. Well nourished.  HEENT: EOM grossly intact, no scleral icterus, no pharyngeal erythema.   Neck:  No JVD, no bruits, trachea midline  CVS: RRR. Normal S1, S2. No M/R/G. No LE edema.  2+ radial pulses, 2+ PT pulses  Resp: CTAB. No wheezing or crackles/rhonchi. Normal respiratory effort.  Abdomen: Soft, NT, no kandis hepatomegaly.  MSK/Ext: No clubbing or cyanosis.  Skin: Warm and dry, no rashes.  Neurological: CN III-XII grossly intact. No focal deficits.   Psych: A&O x 3, appropriate affect, good judgement    Physical exam performed today and unchanged, except what is noted, compared to 10-1-19      Assessment:     1. Thoracic aortic aneurysm without rupture (HCC)  Basic Metabolic Panel   2. Essential hypertension     3. Metabolic syndrome X     4. Pure hypercholesterolemia     5. Obesity (BMI 30.0-34.9)         Medical Decision Making:  Today's Assessment / Status / Plan:     -Repeat imaging this year, then see how it is progressing, repeat CT 1-2 years after  -Root and AV are normal  -Goal BP under 130/80, ideally closer " to 120/80 with BB therapy  -Lifestyle modification for LDL, she was on keto diet and also I suspect partially familial  -10-year risk of atherosclerotic cardiovascular disease 0.75%.  -Doing well on metoprolol despite asthma.  -ECG is not concerning.  -We discussed lifestyle already, avoiding straining and heavy lifting.  -RTC 1 year    Written instructions given today:    -Non fasting blood work within 30 days of your CT scan.    -I will send you a MyChart as soon as I see the CT results    -You should always hear results of testing within 5 days with my interpretation, if you do not, send a [employer] message or call the office: [phone].      Return in about 1 year (around 10/2/2021).    It is my pleasure to participate in the care of Ms. Machado.  Please do not hesitate to contact me with questions or concerns.    Meghann Lockhart MD, [facility]  Cardiologist [facility]  Please note that this dictation was created using voice recognition software. I have made every reasonable attempt to correct obvious errors, but it is possible there are errors of grammar and possibly content that I did not discover before finalizing the note.        Asim Barboza M.D.  [address] [place] [address] [place] [address]  Via In Basket

## 2020-10-02 NOTE — PROGRESS NOTES
Subjective:   Chief Complaint:   Chief Complaint   Patient presents with   • HTN (Controlled)       Princess Machado is a 40 y.o. female who returns for thoracic aortic aneurysm, hypertension and hyperlipidemia.    Relocated to [place] 2009, was seeing another PCP who discovered enlarged aorta.    Echocardiogram 2018 measured at 4 cm.  CT scan October 2019 measured 4.5 cm.    Has high LDL cholesterol, has been on keto. LDL is 141, has small particle lipids also.  Probably from her mother.    Has hypertension, BP at goal, tolerating very low dose metoprolol, does not bother her asthma.  128/88 to 124/ 89, 102/78.    Has migraine headaches, better after weight loss.  Asthma.  Had incidental pulmonary nodules, going to see Pulmonary.    She is not limited by chest pain, pressure or tightness.   No significant dyspnea on exertion, orthopnea or lower extremity swelling.   No significant palpitations, dizziness, or presyncope/syncope.   No symptoms of leg claudication.   No stroke/TIA like symptoms.    Had two babies with vaginal delivery without complications.    Mother found to have ascending aorta, had heart cath after abnormal stress but no coronary disease.    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  Working on germ cleaning for park bathrooms.  Father passed away, lots to take care of.    Kids are 5, 7, back in school.   stays at home with kids.  They just got a puppy.  Going to [place] for Thanksgiving.    DATA REVIEWED by me:  ECG 10/1/2019  Sinus, rate 64, nonspecific T wave changes    Echo 4/19/2018  Normal left ventricular size and systolic function.  Left ventricular ejection fraction is visually estimated to be 65%.  Normal diastolic function.  Normal inferior vena cava size and inspiratory collapse.  Normal aortic root for body surface area. aortic root measuring 3.8 cm,   ascending aorta diameter is  4.0 cm.    CT scan 10/29/2019  Dilatation of the ascending  aorta up to 4.5 cm.  No aortic dissection. No mediastinal hematoma.  Multiple pulmonary nodules measuring up to 5.1 mm in diameter.  No airspace consolidation or pleural effusions.       Most recent labs:     Lab Results   Component Value Date/Time    HEMOGLOBIN 15.4 10/07/2017 12:20 PM    HEMATOCRIT 42.6 10/07/2017 12:20 PM    MCV 82.2 10/07/2017 12:20 PM      Lab Results   Component Value Date/Time    SODIUM 136 06/05/2020 05:20 AM    SODIUM 138 10/07/2017 12:20 PM    POTASSIUM 4.3 06/05/2020 05:20 AM    POTASSIUM 3.6 10/07/2017 12:20 PM    CHLORIDE 103 06/05/2020 05:20 AM    CHLORIDE 106 10/07/2017 12:20 PM    CO2 19 (L) 06/05/2020 05:20 AM    CO2 23 10/07/2017 12:20 PM    GLUCOSE 94 06/05/2020 05:20 AM    GLUCOSE 119 (H) 10/07/2017 12:20 PM    BUN 14 06/05/2020 05:20 AM    BUN 10 10/07/2017 12:20 PM    CREATININE 0.86 06/05/2020 05:20 AM    CREATININE 0.71 10/07/2017 12:20 PM      Lab Results   Component Value Date/Time    ASTSGOT 14 06/05/2020 05:20 AM    ASTSGOT 18 05/22/2013 07:00 PM    ALTSGPT 16 06/05/2020 05:20 AM    ALTSGPT 18 05/22/2013 07:00 PM    ALBUMIN 4.5 06/05/2020 05:20 AM    ALBUMIN 3.5 05/22/2013 07:00 PM      Lab Results   Component Value Date/Time    CHOLSTRLTOT 217 (H) 06/05/2020 05:20 AM     (H) 06/05/2020 05:20 AM    HDL 46 06/05/2020 05:20 AM    TRIGLYCERIDE 149 06/05/2020 05:20 AM       3/7/2019 sodium 139, potassium 4.8, creatinine 0.87, LFTs normal, , HDL 48, triglycerides 148, total cholesterol 241    Past Medical History:   Diagnosis Date   • Abnormal Pap smear of cervix    • ASTHMA     seasonal allergy related   • HPV in female    • Hypertension    • Migraines      Past Surgical History:   Procedure Laterality Date   • DILATION AND CURETTAGE       Family History   Problem Relation Age of Onset   • Hypertension Mother    • Heart Disease Mother         Dilated ascending aorta   • Heart Disease Father    • Hypertension Father    • Hyperlipidemia Father    • Other Father          Amyloidosis, mostly GI and muscle, 77     Social History     Socioeconomic History   • Marital status:      Spouse name: Not on file   • Number of children: Not on file   • Years of education: Not on file   • Highest education level: Not on file   Occupational History   • Not on file   Social Needs   • Financial resource strain: Not on file   • Food insecurity     Worry: Not on file     Inability: Not on file   • Transportation needs     Medical: Not on file     Non-medical: Not on file   Tobacco Use   • Smoking status: Never Smoker   • Smokeless tobacco: Never Used   Substance and Sexual Activity   • Alcohol use: No   • Drug use: No   • Sexual activity: Not on file   Lifestyle   • Physical activity     Days per week: Not on file     Minutes per session: Not on file   • Stress: Not on file   Relationships   • Social connections     Talks on phone: Not on file     Gets together: Not on file     Attends Orthodox service: Not on file     Active member of club or organization: Not on file     Attends meetings of clubs or organizations: Not on file     Relationship status: Not on file   • Intimate partner violence     Fear of current or ex partner: Not on file     Emotionally abused: Not on file     Physically abused: Not on file     Forced sexual activity: Not on file   Other Topics Concern   • Not on file   Social History Narrative   • Not on file     No Known Allergies    Current Outpatient Medications   Medication Sig Dispense Refill   • Omega-3 Fatty Acids (FISH OIL) 1000 MG Cap capsule Take 1 Cap by mouth 2 Times a Day. 180 Cap 1   • metoprolol SR (TOPROL XL) 25 MG TABLET SR 24 HR Take 0.5 Tabs by mouth every day. 45 Tab 3   • Probiotic Product (PROBIOTIC-10) Cap Take  by mouth.     • eletriptan (RELPAX) 20 MG Tab TK 1 T PO AOS OF MIGRAINE AS NEEDED. MAY REPEAT IN 2 H. DO NOT EXCEED 2 T IN 24 H 10 Tab 2   • BOTOX 200 units Recon Soln      • Cholecalciferol (VITAMIN D3) 5000 units Cap Take 1 Cap by  "mouth every day.     • Coenzyme Q10 (CO Q-10 MAXIMUM STRENGTH) 400 MG Cap Take  by mouth.     • Cetirizine HCl (WAL-ZYR) 10 MG Cap Take  by mouth.     • acetaminophen (TYLENOL) 500 MG Tab Take 1,000 mg by mouth every 6 hours as needed for Mild Pain or Moderate Pain.       No current facility-administered medications for this visit.        ROS    All others systems reviewed and negative.     Objective:     /80 (BP Location: Left arm, Patient Position: Sitting)   Pulse 70   Ht 1.727 m (5' 8\")   Wt 95.3 kg (210 lb)   SpO2 97%  Body mass index is 31.93 kg/m².    Physical Exam   General: No acute distress. Well nourished.  HEENT: EOM grossly intact, no scleral icterus, no pharyngeal erythema.   Neck:  No JVD, no bruits, trachea midline  CVS: RRR. Normal S1, S2. No M/R/G. No LE edema.  2+ radial pulses, 2+ PT pulses  Resp: CTAB. No wheezing or crackles/rhonchi. Normal respiratory effort.  Abdomen: Soft, NT, no kandis hepatomegaly.  MSK/Ext: No clubbing or cyanosis.  Skin: Warm and dry, no rashes.  Neurological: CN III-XII grossly intact. No focal deficits.   Psych: A&O x 3, appropriate affect, good judgement    Physical exam performed today and unchanged, except what is noted, compared to 10-1-19      Assessment:     1. Thoracic aortic aneurysm without rupture (HCC)  Basic Metabolic Panel   2. Essential hypertension     3. Metabolic syndrome X     4. Pure hypercholesterolemia     5. Obesity (BMI 30.0-34.9)     6. Family history of thoracic aortic aneurysm         Medical Decision Making:  Today's Assessment / Status / Plan:     -Repeat imaging this year, then see how it is progressing, repeat CT 1-2 years after  -Root and AV are normal  -Goal BP under 130/80, ideally closer to 120/80 with BB therapy  -Lifestyle modification for LDL, she was on keto diet and also I suspect partially familial  -10-year risk of atherosclerotic cardiovascular disease 0.75%.  -Doing well on metoprolol despite asthma.  -ECG is not " concerning.  -We discussed lifestyle already, avoiding straining and heavy lifting.  -Brother will get screened, eventually her children when they are grown.  -RTC 1 year    Written instructions given today:    -Non fasting blood work within 30 days of your CT scan.    -I will send you a MyChart as soon as I see the CT results    -You should always hear results of testing within 5 days with my interpretation, if you do not, send a MyChart message or call the office: [phone].      Return in about 1 year (around 10/2/2021).    It is my pleasure to participate in the care of Ms. Machado.  Please do not hesitate to contact me with questions or concerns.    Meghann Lockhart MD, [facility]  Cardiologist [facility]    Please note that this dictation was created using voice recognition software. I have made every reasonable attempt to correct obvious errors, but it is possible there are errors of grammar and possibly content that I did not discover before finalizing the note.

## 2020-10-02 NOTE — PATIENT INSTRUCTIONS
-Non fasting blood work within 30 days of your CT scan.    -I will send you a MyChart as soon as I see the CT results    -You should always hear results of testing within 5 days with my interpretation, if you do not, send a RoomClip message or call the office: [phone].

## 2020-10-13 ENCOUNTER — APPOINTMENT (OUTPATIENT)
Dept: PULMONOLOGY | Facility: HOSPICE | Age: 40
End: 2020-10-13
Payer: COMMERCIAL

## 2020-11-09 DIAGNOSIS — I71.20 THORACIC AORTIC ANEURYSM WITHOUT RUPTURE (HCC): ICD-10-CM

## 2020-11-23 ENCOUNTER — OFFICE VISIT (OUTPATIENT)
Dept: SLEEP MEDICINE | Facility: MEDICAL CENTER | Age: 40
End: 2020-11-23
Payer: COMMERCIAL

## 2020-11-23 VITALS
TEMPERATURE: 98.2 F | HEIGHT: 68 IN | HEART RATE: 79 BPM | OXYGEN SATURATION: 99 % | WEIGHT: 220 LBS | SYSTOLIC BLOOD PRESSURE: 112 MMHG | RESPIRATION RATE: 16 BRPM | DIASTOLIC BLOOD PRESSURE: 62 MMHG | BODY MASS INDEX: 33.34 KG/M2

## 2020-11-23 DIAGNOSIS — E66.9 CLASS 1 OBESITY WITH BODY MASS INDEX (BMI) OF 33.0 TO 33.9 IN ADULT, UNSPECIFIED OBESITY TYPE, UNSPECIFIED WHETHER SERIOUS COMORBIDITY PRESENT: ICD-10-CM

## 2020-11-23 DIAGNOSIS — R91.8 PULMONARY NODULES: ICD-10-CM

## 2020-11-23 PROCEDURE — 99204 OFFICE O/P NEW MOD 45 MIN: CPT | Performed by: INTERNAL MEDICINE

## 2020-11-23 ASSESSMENT — ENCOUNTER SYMPTOMS
DIARRHEA: 0
WEAKNESS: 0
EYE DISCHARGE: 0
BLURRED VISION: 0
PND: 0
SHORTNESS OF BREATH: 0
SORE THROAT: 0
MYALGIAS: 0
STRIDOR: 0
HEARTBURN: 0
EYE PAIN: 0
DOUBLE VISION: 0
EYE REDNESS: 0
DIZZINESS: 0
BACK PAIN: 0
SPUTUM PRODUCTION: 0
DIAPHORESIS: 0
CONSTIPATION: 0
PHOTOPHOBIA: 0
FALLS: 0
CLAUDICATION: 0
PALPITATIONS: 0
SPEECH CHANGE: 0
FEVER: 0
CHILLS: 0
TREMORS: 0
DEPRESSION: 0
COUGH: 0
FOCAL WEAKNESS: 0
SINUS PAIN: 0
HEMOPTYSIS: 0
NECK PAIN: 0
VOMITING: 0
WEIGHT LOSS: 0
ABDOMINAL PAIN: 0
NAUSEA: 0
HEADACHES: 0
WHEEZING: 0
ORTHOPNEA: 0

## 2020-11-23 NOTE — PROGRESS NOTES
Chief Complaint   Patient presents with   • Establish Care     7/20/2020 CAROL ANN Conley MD DX lung nodule    • Results     Ct CTA Chest 10/18/19       HPI: This patient is a 40 y.o. female presenting for evaluation of pulmonary nodules.  The patient's past medical history is significant for hypertension and migraine headaches.  No surgical history.  She is a lifelong non-smoker.  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  She does travel to [place] and has done so recently but no international travel.  Family history is notable for a father with fairly severe asthma who recently passed in March of this year from amyloidosis.  The patient herself has a thoracic aortic aneurysm which is being monitored with CT angiogram most recently in October of last year.  Incidentally she was noted to have bilateral pulmonary nodules ranging in 1 mm to 5 mm in size.  No parenchymal lung disease or lymphadenopathy.  She was referred to us for follow-up.  The patient does have some mild seasonal allergies as well as a cat allergy for which she has been prescribed short acting bronchodilators in the past and did use a nebulizer when she was younger for episodes of bronchitis.  She has not suffered bronchitis in the past several years.  She denies shortness of breath, cough, wheezing.  No chest pain.  No fevers, chills, night sweats, weight loss.    Past Medical History:   Diagnosis Date   • Abnormal Pap smear of cervix    • ASTHMA     seasonal allergy related   • HPV in female    • Hypertension    • Migraines        Social History     Socioeconomic History   • Marital status:      Spouse name: Not on file   • Number of children: Not on file   • Years of education: Not on file   • Highest education level: Not on file   Occupational History   • Not on file   Social Needs   • Financial resource strain: Not on file   • Food insecurity     Worry: Not on file     Inability: Not on  file   • Transportation needs     Medical: Not on file     Non-medical: Not on file   Tobacco Use   • Smoking status: Never Smoker   • Smokeless tobacco: Never Used   Substance and Sexual Activity   • Alcohol use: No   • Drug use: No   • Sexual activity: Not on file   Lifestyle   • Physical activity     Days per week: Not on file     Minutes per session: Not on file   • Stress: Not on file   Relationships   • Social connections     Talks on phone: Not on file     Gets together: Not on file     Attends Shinto service: Not on file     Active member of club or organization: Not on file     Attends meetings of clubs or organizations: Not on file     Relationship status: Not on file   • Intimate partner violence     Fear of current or ex partner: Not on file     Emotionally abused: Not on file     Physically abused: Not on file     Forced sexual activity: Not on file   Other Topics Concern   • Not on file   Social History Narrative   • Not on file       Family History   Problem Relation Age of Onset   • Hypertension Mother    • Heart Disease Mother         Dilated ascending aorta   • Heart Disease Father    • Hypertension Father    • Hyperlipidemia Father    • Other Father         Amyloidosis, mostly GI and muscle, 77       Current Outpatient Medications on File Prior to Visit   Medication Sig Dispense Refill   • Omega-3 Fatty Acids (FISH OIL) 1000 MG Cap capsule Take 1 Cap by mouth 2 Times a Day. 180 Cap 1   • metoprolol SR (TOPROL XL) 25 MG TABLET SR 24 HR Take 0.5 Tabs by mouth every day. 45 Tab 3   • Probiotic Product (PROBIOTIC-10) Cap Take  by mouth.     • eletriptan (RELPAX) 20 MG Tab TK 1 T PO AOS OF MIGRAINE AS NEEDED. MAY REPEAT IN 2 H. DO NOT EXCEED 2 T IN 24 H 10 Tab 2   • BOTOX 200 units Recon Soln      • Cholecalciferol (VITAMIN D3) 5000 units Cap Take 1 Cap by mouth every day.     • Coenzyme Q10 (CO Q-10 MAXIMUM STRENGTH) 400 MG Cap Take  by mouth.     • Cetirizine HCl (WAL-ZYR) 10 MG Cap Take  by  "mouth.     • acetaminophen (TYLENOL) 500 MG Tab Take 1,000 mg by mouth every 6 hours as needed for Mild Pain or Moderate Pain.       No current facility-administered medications on file prior to visit.        Allergies: Patient has no known allergies.    ROS:   Review of Systems   Constitutional: Negative for chills, diaphoresis, fever, malaise/fatigue and weight loss.   HENT: Negative for congestion, ear discharge, ear pain, hearing loss, nosebleeds, sinus pain, sore throat and tinnitus.    Eyes: Negative for blurred vision, double vision, photophobia, pain, discharge and redness.   Respiratory: Negative for cough, hemoptysis, sputum production, shortness of breath, wheezing and stridor.    Cardiovascular: Negative for chest pain, palpitations, orthopnea, claudication, leg swelling and PND.   Gastrointestinal: Negative for abdominal pain, constipation, diarrhea, heartburn, nausea and vomiting.   Genitourinary: Negative for dysuria and urgency.   Musculoskeletal: Negative for back pain, falls, joint pain, myalgias and neck pain.   Skin: Negative for itching and rash.   Neurological: Negative for dizziness, tremors, speech change, focal weakness, weakness and headaches.   Endo/Heme/Allergies: Negative for environmental allergies.   Psychiatric/Behavioral: Negative for depression.       /62 (BP Location: Right arm, Patient Position: Sitting, BP Cuff Size: Large adult)   Pulse 79   Temp 36.8 °C (98.2 °F) (Temporal)   Resp 16   Ht 1.727 m (5' 8\")   Wt 99.8 kg (220 lb)   SpO2 99%     Physical Exam:  Physical Exam  Vitals signs reviewed.   Constitutional:       General: She is not in acute distress.     Appearance: Normal appearance. She is well-developed. She is obese.   HENT:      Head: Normocephalic and atraumatic.      Right Ear: External ear normal.      Left Ear: External ear normal.      Nose: Nose normal. No congestion.      Mouth/Throat:      Mouth: Mucous membranes are moist.      Pharynx: Oropharynx " is clear. No oropharyngeal exudate.   Eyes:      General: No scleral icterus.     Extraocular Movements: Extraocular movements intact.      Conjunctiva/sclera: Conjunctivae normal.      Pupils: Pupils are equal, round, and reactive to light.   Neck:      Musculoskeletal: Normal range of motion and neck supple.      Vascular: No JVD.      Trachea: No tracheal deviation.   Cardiovascular:      Rate and Rhythm: Normal rate and regular rhythm.      Heart sounds: Normal heart sounds. No murmur. No friction rub. No gallop.    Pulmonary:      Effort: Pulmonary effort is normal. No accessory muscle usage or respiratory distress.      Breath sounds: Normal breath sounds. No wheezing or rales.   Abdominal:      General: There is no distension.      Palpations: Abdomen is soft.      Tenderness: There is no abdominal tenderness.   Musculoskeletal: Normal range of motion.         General: No tenderness or deformity.      Right lower leg: No edema.      Left lower leg: No edema.   Lymphadenopathy:      Cervical: No cervical adenopathy.   Skin:     General: Skin is warm and dry.      Findings: No rash.      Nails: There is no clubbing.     Neurological:      Mental Status: She is alert and oriented to person, place, and time.      Cranial Nerves: No cranial nerve deficit.      Gait: Gait normal.   Psychiatric:         Mood and Affect: Mood normal.         Behavior: Behavior normal.         PFTs as reviewed by me personally: none    Imaging as reviewed by me personally: as per hPI    Assessment:  1. Pulmonary nodules     2. Class 1 obesity with body mass index (BMI) of 33.0 to 33.9 in adult, unspecified obesity type, unspecified whether serious comorbidity present         Plan:  1.  Patient with multiple subcentimeter pulmonary nodules noted roughly 1 year ago.  She has follow-up imaging scheduled for December.  She is asymptomatic and low risk.  If nodules have resolved, no further follow-up will be indicated.  If stable, I would  recommend surveillance imaging for 2 years.  If increased in size further testing will depend on change.  2.  This does put patient at risk for obesity related pulmonary complications.  I encouraged healthy lifestyle habits.  Return in about 1 year (around 11/23/2021) for pulmonary nodules.

## 2020-12-01 ENCOUNTER — HOSPITAL ENCOUNTER (OUTPATIENT)
Dept: RADIOLOGY | Facility: MEDICAL CENTER | Age: 40
End: 2020-12-01
Attending: INTERNAL MEDICINE
Payer: COMMERCIAL

## 2020-12-01 DIAGNOSIS — I71.20 THORACIC AORTIC ANEURYSM WITHOUT RUPTURE (HCC): ICD-10-CM

## 2020-12-01 DIAGNOSIS — I77.89 ASCENDING AORTA ENLARGEMENT (HCC): ICD-10-CM

## 2020-12-01 PROCEDURE — 700117 HCHG RX CONTRAST REV CODE 255: Performed by: INTERNAL MEDICINE

## 2020-12-01 PROCEDURE — 71275 CT ANGIOGRAPHY CHEST: CPT

## 2020-12-01 RX ADMIN — IOHEXOL 100 ML: 350 INJECTION, SOLUTION INTRAVENOUS at 08:50

## 2021-01-18 ENCOUNTER — TELEMEDICINE (OUTPATIENT)
Dept: MEDICAL GROUP | Age: 41
End: 2021-01-18
Payer: COMMERCIAL

## 2021-01-18 VITALS — TEMPERATURE: 99 F | BODY MASS INDEX: 32.58 KG/M2 | OXYGEN SATURATION: 97 % | HEIGHT: 68 IN | WEIGHT: 215 LBS

## 2021-01-18 DIAGNOSIS — I10 ESSENTIAL HYPERTENSION: ICD-10-CM

## 2021-01-18 DIAGNOSIS — J45.21 EXACERBATION OF INTERMITTENT ASTHMA, UNSPECIFIED ASTHMA SEVERITY: ICD-10-CM

## 2021-01-18 DIAGNOSIS — U07.1 COVID-19 VIRUS INFECTION: ICD-10-CM

## 2021-01-18 PROCEDURE — 99214 OFFICE O/P EST MOD 30 MIN: CPT | Mod: 95,CR,CS | Performed by: INTERNAL MEDICINE

## 2021-01-18 RX ORDER — ALBUTEROL SULFATE 90 UG/1
2 AEROSOL, METERED RESPIRATORY (INHALATION) EVERY 6 HOURS PRN
Qty: 8.5 G | Refills: 3 | Status: SHIPPED | OUTPATIENT
Start: 2021-01-18

## 2021-01-18 RX ORDER — METOPROLOL SUCCINATE 25 MG/1
12.5 TABLET, EXTENDED RELEASE ORAL DAILY
Qty: 45 TAB | Refills: 4 | Status: SHIPPED | OUTPATIENT
Start: 2021-01-18 | End: 2021-04-20 | Stop reason: SDUPTHER

## 2021-01-18 RX ORDER — PREDNISONE 10 MG/1
TABLET ORAL
Qty: 15 TAB | Refills: 0 | Status: SHIPPED | OUTPATIENT
Start: 2021-01-18 | End: 2021-10-20

## 2021-01-18 ASSESSMENT — PATIENT HEALTH QUESTIONNAIRE - PHQ9: CLINICAL INTERPRETATION OF PHQ2 SCORE: 0

## 2021-01-18 NOTE — PROGRESS NOTES
Telemedicine Visit: Established Patient     This Remote Face to Face encounter was conducted via Zoom. Given the importance of social distancing and other strategies recommended to reduce the risk of COVID-19 transmission, I am providing medical care to this patient via audio/video visit in place of an in person visit at the request of the patient. Verbal consent to telehealth, risks, benefits, and consequences were discussed. Patient retains the right to withdraw at any time. All existing confidentiality protections apply. The patient has access to all transmitted medical information. No dissemination of any patient images or information to other entities without further written consent.    CHIEF COMPLAINT     Chief Complaint   Patient presents with   • Other     tested positive for Covid, needs steriod for asthma     HPI  Princess Machado is a 40 y.o. female who presents today for the following     Covid infection, asthma exacerbation  New problems    D/15/2020in [place]  Sx:  · Cough  · Wheezing, chest tightness    Patient has not have other covid sx:  · Fever, chills  · Fatigue  · Muscle or body aches  · Headache  · New loss of taste or smell  · Congestion or runny nose  · Sore throat  · Nausea or vomiting  · Diarrhea, abdominal cramps.    Hypertension  Meds: Metoprolol 12.5 mg daily; stopped taking.   Denies: - headaches, vision problems, tinnitus.  - chest pain/pressure, palpitations, irregular heart beats, exertional, dyspnea, peripheral edema.  - medication side effect: unusual fatigue, slow heartbeat, foot/leg swelling, cough.  Low salt diet: No  Diet: Regular  Exercise: < 4 d/w  BMI: 32  FH of HTN: Parents     Reviewed PMH, PSH, FH, SH, ALL, HCM/IMM, no changes  Reviewed MEDS, no changes    Patient Active Problem List    Diagnosis Date Noted   • Lung nodules 2020   • Hypovitaminosis D 2020   • Obesity (BMI 30.0-34.9) 2020   • Health care maintenance 2018   • Abnormal  cervical Papanicolaou smear 08/24/2018   • Essential hypertension 05/22/2018   • Chronic migraine 05/22/2018   • Ascending aorta enlargement (HCC) 05/22/2018   • Dyslipidemia 05/22/2018     CURRENT MEDICATIONS  Current Outpatient Medications   Medication Sig Dispense Refill   • Omega-3 Fatty Acids (FISH OIL) 1000 MG Cap capsule Take 1 Cap by mouth 2 Times a Day. 180 Cap 1   • metoprolol SR (TOPROL XL) 25 MG TABLET SR 24 HR Take 0.5 Tabs by mouth every day. 45 Tab 3   • Probiotic Product (PROBIOTIC-10) Cap Take  by mouth.     • eletriptan (RELPAX) 20 MG Tab TK 1 T PO AOS OF MIGRAINE AS NEEDED. MAY REPEAT IN 2 H. DO NOT EXCEED 2 T IN 24 H 10 Tab 2   • BOTOX 200 units Recon Soln      • Cholecalciferol (VITAMIN D3) 5000 units Cap Take 1 Cap by mouth every day.     • Coenzyme Q10 (CO Q-10 MAXIMUM STRENGTH) 400 MG Cap Take  by mouth.     • Cetirizine HCl (WAL-ZYR) 10 MG Cap Take  by mouth.     • acetaminophen (TYLENOL) 500 MG Tab Take 1,000 mg by mouth every 6 hours as needed for Mild Pain or Moderate Pain.       No current facility-administered medications for this visit.      ALLERGIES  Allergies: Patient has no known allergies.  PAST MEDICAL HISTORY  Past Medical History:   Diagnosis Date   • Abnormal Pap smear of cervix    • ASTHMA     seasonal allergy related   • HPV in female    • Hypertension    • Migraines      SURGICAL HISTORY  She  has a past surgical history that includes dilation and curettage.  SOCIAL HISTORY  Social History     Tobacco Use   • Smoking status: Never Smoker   • Smokeless tobacco: Never Used   Substance Use Topics   • Alcohol use: No   • Drug use: No     Social History     Social History Narrative   • Not on file     FAMILY HISTORY  Family History   Problem Relation Age of Onset   • Hypertension Mother    • Heart Disease Mother         Dilated ascending aorta   • Heart Disease Father    • Hypertension Father    • Hyperlipidemia Father    • Other Father         Amyloidosis, mostly GI and muscle,  "77     Family Status   Relation Name Status   • Mo  Alive   • Fa     • Sis Half sister dad's side Alive   • Bro  Alive     ROS   Constitutional: Negative for fever, chills.  HENT: Negative for congestion, sore throat.  Eyes: Negative for vision problems.   Respiratory: As above.  Cardiovascular: Negative for chest pain, palpitations.   Gastrointestinal: Negative for heartburn, nausea, abdominal pain.   Musculoskeletal: Negative for significant myalgia, back and joint pain.   Skin: Negative for rash.   Neuro: Negative for dizziness, weakness and headaches.   Endo/Heme/Allergies: Does not bruise/bleed easily.   Psychiatric/Behavioral: Negative for depression.    Objective   Vitals obtained by patient:  Temperature 37.2 °C (99 °F) (Oral)   Height 1.727 m (5' 8\")   Weight 97.5 kg (215 lb)   Oxygen Saturation 97%   Body Mass Index 32.69 kg/m²   Physical Exam:  Constitutional: Alert, no distress, well-groomed.  Skin: No rash in visible areas.  Eye: Round. Conjunctiva clear, lids normal.  ENMT: Lips pink without lesions, good dentition. Phonation normal.  Neck: No visible masses or thyromegaly. Moves freely without pain.  CV: no peripheral cyanosis, tachycardia.  Respiratory: Unlabored respiratory effort, no cough or audible wheezing.  Psych: Alert and oriented x3, normal affect and mood.     Labs     Labs are reviewed and discussed with a patient  Lab Results   Component Value Date/Time    CHOLSTRLTOT 217 (H) 2020 05:20 AM     (H) 2020 05:20 AM    HDL 46 2020 05:20 AM    TRIGLYCERIDE 149 2020 05:20 AM       Lab Results   Component Value Date/Time    SODIUM 136 2020 05:20 AM    SODIUM 138 10/07/2017 12:20 PM    POTASSIUM 4.3 2020 05:20 AM    POTASSIUM 3.6 10/07/2017 12:20 PM    CHLORIDE 103 2020 05:20 AM    CHLORIDE 106 10/07/2017 12:20 PM    CO2 19 (L) 2020 05:20 AM    CO2 23 10/07/2017 12:20 PM    GLUCOSE 94 2020 05:20 AM    GLUCOSE 119 (H) " 10/07/2017 12:20 PM    BUN 14 06/05/2020 05:20 AM    BUN 10 10/07/2017 12:20 PM    CREATININE 0.86 06/05/2020 05:20 AM    CREATININE 0.71 10/07/2017 12:20 PM    BUNCREATRAT 16 06/05/2020 05:20 AM     Lab Results   Component Value Date/Time    ALKPHOSPHAT 40 06/05/2020 05:20 AM    ALKPHOSPHAT 117 (H) 05/22/2013 07:00 PM    ASTSGOT 14 06/05/2020 05:20 AM    ASTSGOT 18 05/22/2013 07:00 PM    ALTSGPT 16 06/05/2020 05:20 AM    ALTSGPT 18 05/22/2013 07:00 PM    TBILIRUBIN 0.4 06/05/2020 05:20 AM    TBILIRUBIN 0.4 05/22/2013 07:00 PM      Lab Results   Component Value Date/Time    HBA1C 5.4 10/24/2017 08:51 AM     No results found for: TSH  No results found for: FREET4    Lab Results   Component Value Date/Time    WBC 10.7 10/07/2017 12:20 PM    RBC 5.18 10/07/2017 12:20 PM    HEMOGLOBIN 15.4 10/07/2017 12:20 PM    HEMATOCRIT 42.6 10/07/2017 12:20 PM    MCV 82.2 10/07/2017 12:20 PM    MCH 29.7 10/07/2017 12:20 PM    MCHC 36.2 (H) 10/07/2017 12:20 PM    MPV 9.8 10/07/2017 12:20 PM    NEUTSPOLYS 77.90 (H) 10/07/2017 12:20 PM    LYMPHOCYTES 16.50 (L) 10/07/2017 12:20 PM    MONOCYTES 4.40 10/07/2017 12:20 PM    EOSINOPHILS 0.40 10/07/2017 12:20 PM    BASOPHILS 0.60 10/07/2017 12:20 PM      Imaging      None    Assessment and Plan     Princess Machado is a 40 y.o. female    1. COVID-19 virus infection  2. Exacerbation of intermittent asthma, unspecified asthma severity  Continue self quarantine  - predniSONE (DELTASONE) 10 MG Tab; Take 5 tabs po today then 4 po in am then 3 po in am then 2 po in am then 1 po in am then stop.  Dispense: 15 Tab; Refill: 0  - albuterol 108 (90 Base) MCG/ACT Aero Soln inhalation aerosol; Inhale 2 Puffs every 6 hours as needed for Shortness of Breath.  Dispense: 8.5 g; Refill: 3    3. Essential hypertension  Controlled, continue with current treatment.  - metoprolol SR (TOPROL XL) 25 MG TABLET SR 24 HR; Take 0.5 Tabs by mouth every day.  Dispense: 45 Tab; Refill: 4    Follow-up: prn

## 2021-01-26 ENCOUNTER — OFFICE VISIT (OUTPATIENT)
Dept: URGENT CARE | Facility: CLINIC | Age: 41
End: 2021-01-26
Payer: COMMERCIAL

## 2021-01-26 ENCOUNTER — APPOINTMENT (OUTPATIENT)
Dept: RADIOLOGY | Facility: IMAGING CENTER | Age: 41
End: 2021-01-26
Attending: NURSE PRACTITIONER
Payer: COMMERCIAL

## 2021-01-26 VITALS
SYSTOLIC BLOOD PRESSURE: 126 MMHG | HEIGHT: 68 IN | RESPIRATION RATE: 16 BRPM | DIASTOLIC BLOOD PRESSURE: 88 MMHG | BODY MASS INDEX: 32.58 KG/M2 | WEIGHT: 215 LBS | OXYGEN SATURATION: 95 % | TEMPERATURE: 97.1 F | HEART RATE: 74 BPM

## 2021-01-26 DIAGNOSIS — R06.2 WHEEZING: ICD-10-CM

## 2021-01-26 DIAGNOSIS — U07.1 COVID-19 VIRUS INFECTION: ICD-10-CM

## 2021-01-26 DIAGNOSIS — R05.9 COUGH: ICD-10-CM

## 2021-01-26 DIAGNOSIS — J18.9 PNEUMONIA OF LEFT LOWER LOBE DUE TO INFECTIOUS ORGANISM: Primary | ICD-10-CM

## 2021-01-26 DIAGNOSIS — J45.20 MILD INTERMITTENT ASTHMA WITHOUT COMPLICATION: ICD-10-CM

## 2021-01-26 PROCEDURE — 71046 X-RAY EXAM CHEST 2 VIEWS: CPT | Mod: TC,FY | Performed by: NURSE PRACTITIONER

## 2021-01-26 PROCEDURE — 99214 OFFICE O/P EST MOD 30 MIN: CPT | Mod: 25 | Performed by: NURSE PRACTITIONER

## 2021-01-26 PROCEDURE — 94640 AIRWAY INHALATION TREATMENT: CPT | Performed by: NURSE PRACTITIONER

## 2021-01-26 RX ORDER — ALBUTEROL SULFATE 90 UG/1
2 AEROSOL, METERED RESPIRATORY (INHALATION) ONCE
Status: COMPLETED | OUTPATIENT
Start: 2021-01-26 | End: 2021-01-26

## 2021-01-26 RX ORDER — AZITHROMYCIN 250 MG/1
TABLET, FILM COATED ORAL
Qty: 6 TAB | Refills: 0 | Status: SHIPPED | OUTPATIENT
Start: 2021-01-26 | End: 2021-10-20

## 2021-01-26 RX ORDER — BENZONATATE 100 MG/1
CAPSULE ORAL
Qty: 40 CAP | Refills: 0 | Status: SHIPPED | OUTPATIENT
Start: 2021-01-26 | End: 2021-10-20

## 2021-01-26 RX ADMIN — ALBUTEROL SULFATE 2 PUFF: 90 AEROSOL, METERED RESPIRATORY (INHALATION) at 11:16

## 2021-01-26 ASSESSMENT — ENCOUNTER SYMPTOMS
FEVER: 0
BACK PAIN: 0
WHEEZING: 1
COUGH: 1
SORE THROAT: 0
SHORTNESS OF BREATH: 1
SENSORY CHANGE: 0

## 2021-01-26 ASSESSMENT — LIFESTYLE VARIABLES: SUBSTANCE_ABUSE: 0

## 2021-01-26 NOTE — PROGRESS NOTES
Princess Machado is a 40 y.o. female who presents for Cough (COVID positive on 1/15) and Shortness of Breath      HPI this new problem.  princess is a 40-year-old female presents with cough.  She tested positive for Covid infection on January 15.  She has finished her quarantine and has no longer had fevers.  She feels short of breath and her cough is persistent.  Is starting to become more productive.  She is feeling fatigued.  She has underlying asthma and has been using her nebulized albuterol and her MDI albuterol more often than she normally does.  She has been using it 2-3 times a day.  This does help alleviate her discomfort and her feeling of shortness of breath.  She called her primary care provider to ask if she needed a inhaled steroid for an asthma flareup and was told she needed to be seen in urgent care.  No other aggravating or alleviating factors.    Review of Systems   Constitutional: Positive for malaise/fatigue. Negative for fever.   HENT: Negative for sore throat.    Respiratory: Positive for cough, shortness of breath and wheezing.    Cardiovascular: Negative for chest pain.   Musculoskeletal: Negative for back pain.   Neurological: Negative for sensory change.   Endo/Heme/Allergies: Negative for environmental allergies.   Psychiatric/Behavioral: Negative for substance abuse.       No Known Allergies  Past Medical History:   Diagnosis Date   • Abnormal Pap smear of cervix    • ASTHMA     seasonal allergy related   • HPV in female    • Hypertension    • Migraines      Past Surgical History:   Procedure Laterality Date   • DILATION AND CURETTAGE       Family History   Problem Relation Age of Onset   • Hypertension Mother    • Heart Disease Mother         Dilated ascending aorta   • Heart Disease Father    • Hypertension Father    • Hyperlipidemia Father    • Other Father         Amyloidosis, mostly GI and muscle, 77     Social History     Tobacco Use   • Smoking status: Never Smoker   •  "Smokeless tobacco: Never Used   Substance Use Topics   • Alcohol use: No     Patient Active Problem List   Diagnosis   • Essential hypertension   • Chronic migraine   • Ascending aorta enlargement (HCC)   • Dyslipidemia   • Health care maintenance   • Abnormal cervical Papanicolaou smear   • Lung nodules   • Hypovitaminosis D   • Obesity (BMI 30.0-34.9)     Current Outpatient Medications on File Prior to Visit   Medication Sig Dispense Refill   • metoprolol SR (TOPROL XL) 25 MG TABLET SR 24 HR Take 0.5 Tabs by mouth every day. 45 Tab 4   • eletriptan (RELPAX) 20 MG Tab TK 1 T PO AOS OF MIGRAINE AS NEEDED. MAY REPEAT IN 2 H. DO NOT EXCEED 2 T IN 24 H 10 Tab 2   • predniSONE (DELTASONE) 10 MG Tab Take 5 tabs po today then 4 po in am then 3 po in am then 2 po in am then 1 po in am then stop. 15 Tab 0   • albuterol 108 (90 Base) MCG/ACT Aero Soln inhalation aerosol Inhale 2 Puffs every 6 hours as needed for Shortness of Breath. 8.5 g 3   • Omega-3 Fatty Acids (FISH OIL) 1000 MG Cap capsule Take 1 Cap by mouth 2 Times a Day. 180 Cap 1   • Probiotic Product (PROBIOTIC-10) Cap Take  by mouth.     • BOTOX 200 units Recon Soln      • Cholecalciferol (VITAMIN D3) 5000 units Cap Take 1 Cap by mouth every day.     • Coenzyme Q10 (CO Q-10 MAXIMUM STRENGTH) 400 MG Cap Take  by mouth.     • Cetirizine HCl (WAL-ZYR) 10 MG Cap Take  by mouth.     • acetaminophen (TYLENOL) 500 MG Tab Take 1,000 mg by mouth every 6 hours as needed for Mild Pain or Moderate Pain.       No current facility-administered medications on file prior to visit.           Objective:     /88   Pulse 74   Temp 36.2 °C (97.1 °F) (Temporal)   Resp 16   Ht 1.727 m (5' 8\")   Wt 97.5 kg (215 lb)   LMP 01/19/2021 (Exact Date)   SpO2 95%   Breastfeeding No   BMI 32.69 kg/m²     Physical Exam  Vitals signs and nursing note reviewed.   Constitutional:       General: She is not in acute distress.     Appearance: Normal appearance. She is well-developed. She " is not ill-appearing or toxic-appearing.   HENT:      Head: Normocephalic.      Right Ear: Hearing, ear canal and external ear normal. No middle ear effusion. Tympanic membrane is injected. Tympanic membrane is not erythematous.      Left Ear: Hearing, ear canal and external ear normal.  No middle ear effusion. Tympanic membrane is injected. Tympanic membrane is not erythematous.      Nose: Rhinorrhea present. No mucosal edema.      Right Sinus: No maxillary sinus tenderness or frontal sinus tenderness.      Left Sinus: No maxillary sinus tenderness or frontal sinus tenderness.      Mouth/Throat:      Pharynx: Uvula midline. Posterior oropharyngeal erythema present. No oropharyngeal exudate.      Tonsils: No tonsillar abscesses.   Eyes:      Pupils: Pupils are equal, round, and reactive to light.   Neck:      Musculoskeletal: Full passive range of motion without pain, normal range of motion and neck supple.      Trachea: Trachea normal.   Cardiovascular:      Rate and Rhythm: Normal rate and regular rhythm.      Chest Wall: PMI is not displaced.   Pulmonary:      Effort: Pulmonary effort is normal. No respiratory distress.      Breath sounds: Examination of the left-lower field reveals rhonchi. Decreased breath sounds and rhonchi present. No wheezing or rales.   Abdominal:      Palpations: Abdomen is soft.      Tenderness: There is no abdominal tenderness.   Musculoskeletal: Normal range of motion.   Lymphadenopathy:      Cervical: No cervical adenopathy.      Upper Body:      Right upper body: No supraclavicular adenopathy.      Left upper body: No supraclavicular adenopathy.   Skin:     General: Skin is warm and dry.   Neurological:      Mental Status: She is alert and oriented to person, place, and time.      Gait: Gait normal.   Psychiatric:         Speech: Speech normal.         Behavior: Behavior normal.     Albuterol 2 puffs administered with AeroChamber in urgent care by provider.  Patient has not had any  albuterol today.    Assessment /Associated Orders:      1. Cough  DX-CHEST-2 VIEWS    benzonatate (TESSALON) 100 MG Cap   2. Pneumonia of left lower lobe due to infectious organism  azithromycin (ZITHROMAX) 250 MG Tab   3. COVID-19 virus infection     4. Wheezing  albuterol inhaler 2 Puff   5. Mild intermittent asthma without complication         Medical Decision Making:      VSS at today's acute urgent care visit.   She is appropriate for outpatient management.  I have dispensed AeroChamber for her to use given her instructions on how to use her home albuterol with using the AeroChamber.  Azithromycin pack for community-acquired pneumonia.  This does not look like Covid pneumonia as it is localized in the left lower lung.  Resume all her previous medications as they were prescribed.  Discussed management options (risks,benefits, and alternatives to treatment). Pt expresses understanding and the treatment plan was agreed upon. Questions were encouraged and answered to pt's satisfaction.   Advised the patient to follow-up with the primary care physician for recheck, reevaluation, and consideration of further management if necessary.   Return to urgent care prn if new or worsening sx or if there is no improvement in condition prn. Red flags discussed and indications to immediately call 911 or present to the Emergency Department.     I personally reviewed prior external notes and test results pertinent to today's visit.  I have independently reviewed and interpreted all diagnostics ordered during this urgent care acute visit.   Time spent evaluating this patient was at least 30 minutes and includes preparing for visit, counseling/education, exam and evaluation, obtaining history, independent interpretation and ordering lab/test/procedures.      Please note that this dictation was created using voice recognition software. I have made a reasonable attempt to correct obvious errors, but I expect that there are errors of  grammar and possibly content that I did not discover before finalizing the note.    This note was electronically signed by Cassidy BEST, Urgent Care

## 2021-04-20 ENCOUNTER — TELEPHONE (OUTPATIENT)
Dept: CARDIOLOGY | Facility: MEDICAL CENTER | Age: 41
End: 2021-04-20

## 2021-04-20 DIAGNOSIS — I10 ESSENTIAL HYPERTENSION: ICD-10-CM

## 2021-04-20 RX ORDER — METOPROLOL SUCCINATE 25 MG/1
12.5 TABLET, EXTENDED RELEASE ORAL DAILY
Qty: 45 TABLET | Refills: 3 | Status: SHIPPED | OUTPATIENT
Start: 2021-04-20 | End: 2022-04-07

## 2021-04-20 NOTE — TELEPHONE ENCOUNTER
CHRIS Mills from [facility] is calling to request Rx metoprolol SR (TOPROL XL) 25 MG TABLET SR 24 [facility] is switching pharmacies due to insurance. They are requesting a 90 day supply. Please call if you have any further questions at [phone]      Thank you

## 2021-08-03 ENCOUNTER — HOSPITAL ENCOUNTER (OUTPATIENT)
Dept: RADIOLOGY | Facility: MEDICAL CENTER | Age: 41
End: 2021-08-03
Attending: INTERNAL MEDICINE
Payer: COMMERCIAL

## 2021-08-03 DIAGNOSIS — Z12.31 VISIT FOR SCREENING MAMMOGRAM: ICD-10-CM

## 2021-08-03 PROCEDURE — 77063 BREAST TOMOSYNTHESIS BI: CPT

## 2021-08-10 ENCOUNTER — HOSPITAL ENCOUNTER (OUTPATIENT)
Dept: RADIOLOGY | Facility: MEDICAL CENTER | Age: 41
End: 2021-08-10
Attending: INTERNAL MEDICINE
Payer: COMMERCIAL

## 2021-08-10 DIAGNOSIS — R92.8 ABNORMAL MAMMOGRAM: ICD-10-CM

## 2021-08-10 PROCEDURE — 76642 ULTRASOUND BREAST LIMITED: CPT | Mod: LT

## 2021-10-14 ENCOUNTER — APPOINTMENT (RX ONLY)
Dept: URBAN - METROPOLITAN AREA CLINIC 4 | Facility: CLINIC | Age: 41
Setting detail: DERMATOLOGY
End: 2021-10-14

## 2021-10-14 DIAGNOSIS — D485 NEOPLASM OF UNCERTAIN BEHAVIOR OF SKIN: ICD-10-CM

## 2021-10-14 DIAGNOSIS — Z79.899 OTHER LONG TERM (CURRENT) DRUG THERAPY: ICD-10-CM

## 2021-10-14 DIAGNOSIS — L70.0 ACNE VULGARIS: ICD-10-CM | Status: INADEQUATELY CONTROLLED

## 2021-10-14 PROBLEM — D48.5 NEOPLASM OF UNCERTAIN BEHAVIOR OF SKIN: Status: ACTIVE | Noted: 2021-10-14

## 2021-10-14 PROCEDURE — ? BIOPSY BY SHAVE METHOD

## 2021-10-14 PROCEDURE — ? COUNSELING

## 2021-10-14 PROCEDURE — ? ISOTRETINOIN INITIATION

## 2021-10-14 PROCEDURE — 99214 OFFICE O/P EST MOD 30 MIN: CPT | Mod: 25

## 2021-10-14 PROCEDURE — ? ORDER TESTS

## 2021-10-14 PROCEDURE — ? DIAGNOSIS COMMENT

## 2021-10-14 PROCEDURE — ? URINE PREGNANCY TEST

## 2021-10-14 PROCEDURE — 81025 URINE PREGNANCY TEST: CPT

## 2021-10-14 PROCEDURE — 11102 TANGNTL BX SKIN SINGLE LES: CPT

## 2021-10-14 ASSESSMENT — LOCATION ZONE DERM
LOCATION ZONE: TRUNK
LOCATION ZONE: FACE
LOCATION ZONE: NECK

## 2021-10-14 ASSESSMENT — LOCATION DETAILED DESCRIPTION DERM
LOCATION DETAILED: RIGHT MEDIAL FOREHEAD
LOCATION DETAILED: RIGHT INFERIOR LATERAL NECK
LOCATION DETAILED: SUPERIOR THORACIC SPINE

## 2021-10-14 ASSESSMENT — LOCATION SIMPLE DESCRIPTION DERM
LOCATION SIMPLE: UPPER BACK
LOCATION SIMPLE: RIGHT FOREHEAD
LOCATION SIMPLE: RIGHT ANTERIOR NECK

## 2021-10-14 NOTE — PROCEDURE: URINE PREGNANCY TEST
Urine Pregnancy Test Result: negative
Lot # (Optional): [id number]
Performed By: IDALIA Parikh
Detail Level: None
Expiration Date (Optional): 02/28/2023

## 2021-10-14 NOTE — PROCEDURE: COUNSELING
Doxycycline Pregnancy And Lactation Text: This medication is Pregnancy Category D and not consider safe during pregnancy. It is also excreted in breast milk but is considered safe for shorter treatment courses.
Tetracycline Counseling: Patient counseled regarding possible photosensitivity and increased risk for sunburn.  Patient instructed to avoid sunlight, if possible.  When exposed to sunlight, patients should wear protective clothing, sunglasses, and sunscreen.  The patient was instructed to call the office immediately if the following severe adverse effects occur:  hearing changes, easy bruising/bleeding, severe headache, or vision changes.  The patient verbalized understanding of the proper use and possible adverse effects of tetracycline.  All of the patient's questions and concerns were addressed. Patient understands to avoid pregnancy while on therapy due to potential birth defects.
Birth Control Pills Counseling: Birth Control Pill Counseling: I discussed with the patient the potential side effects of OCPs including but not limited to increased risk of stroke, heart attack, thrombophlebitis, deep venous thrombosis, hepatic adenomas, breast changes, GI upset, headaches, and depression.  The patient verbalized understanding of the proper use and possible adverse effects of OCPs. All of the patient's questions and concerns were addressed.
Sarecycline Counseling: Patient advised regarding possible photosensitivity and discoloration of the teeth, skin, lips, tongue and gums.  Patient instructed to avoid sunlight, if possible.  When exposed to sunlight, patients should wear protective clothing, sunglasses, and sunscreen.  The patient was instructed to call the office immediately if the following severe adverse effects occur:  hearing changes, easy bruising/bleeding, severe headache, or vision changes.  The patient verbalized understanding of the proper use and possible adverse effects of sarecycline.  All of the patient's questions and concerns were addressed.
Bactrim Pregnancy And Lactation Text: This medication is Pregnancy Category D and is known to cause fetal risk.  It is also excreted in breast milk.
Topical Retinoid counseling:  Patient advised to apply a pea-sized amount only at bedtime and wait 30 minutes after washing their face before applying.  If too drying, patient may add a non-comedogenic moisturizer. The patient verbalized understanding of the proper use and possible adverse effects of retinoids.  All of the patient's questions and concerns were addressed.
Include Pregnancy/Lactation Warning?: No
Isotretinoin Pregnancy And Lactation Text: This medication is Pregnancy Category X and is considered extremely dangerous during pregnancy. It is unknown if it is excreted in breast milk.
Topical Sulfur Applications Pregnancy And Lactation Text: This medication is Pregnancy Category C and has an unknown safety profile during pregnancy. It is unknown if this topical medication is excreted in breast milk.
Sarecycline Pregnancy And Lactation Text: This medication is Pregnancy Category D and not consider safe during pregnancy. It is also excreted in breast milk.
Tazorac Counseling:  Patient advised that medication is irritating and drying.  Patient may need to apply sparingly and wash off after an hour before eventually leaving it on overnight.  The patient verbalized understanding of the proper use and possible adverse effects of tazorac.  All of the patient's questions and concerns were addressed.
Topical Clindamycin Counseling: Patient counseled that this medication may cause skin irritation or allergic reactions.  In the event of skin irritation, the patient was advised to reduce the amount of the drug applied or use it less frequently.   The patient verbalized understanding of the proper use and possible adverse effects of clindamycin.  All of the patient's questions and concerns were addressed.
High Dose Vitamin A Pregnancy And Lactation Text: High dose vitamin A therapy is contraindicated during pregnancy and breast feeding.
Azithromycin Counseling:  I discussed with the patient the risks of azithromycin including but not limited to GI upset, allergic reaction, drug rash, diarrhea, and yeast infections.
Topical Retinoid Pregnancy And Lactation Text: This medication is Pregnancy Category C. It is unknown if this medication is excreted in breast milk.
High Dose Vitamin A Counseling: Side effects reviewed, pt to contact office should one occur.
Erythromycin Counseling:  I discussed with the patient the risks of erythromycin including but not limited to GI upset, allergic reaction, drug rash, diarrhea, increase in liver enzymes, and yeast infections.
Dapsone Pregnancy And Lactation Text: This medication is Pregnancy Category C and is not considered safe during pregnancy or breast feeding.
Tazorac Pregnancy And Lactation Text: This medication is not safe during pregnancy. It is unknown if this medication is excreted in breast milk.
Topical Clindamycin Pregnancy And Lactation Text: This medication is Pregnancy Category B and is considered safe during pregnancy. It is unknown if it is excreted in breast milk.
Detail Level: Zone
Dapsone Counseling: I discussed with the patient the risks of dapsone including but not limited to hemolytic anemia, agranulocytosis, rashes, methemoglobinemia, kidney failure, peripheral neuropathy, headaches, GI upset, and liver toxicity.  Patients who start dapsone require monitoring including baseline LFTs and weekly CBCs for the first month, then every month thereafter.  The patient verbalized understanding of the proper use and possible adverse effects of dapsone.  All of the patient's questions and concerns were addressed.
Minocycline Counseling: Patient advised regarding possible photosensitivity and discoloration of the teeth, skin, lips, tongue and gums.  Patient instructed to avoid sunlight, if possible.  When exposed to sunlight, patients should wear protective clothing, sunglasses, and sunscreen.  The patient was instructed to call the office immediately if the following severe adverse effects occur:  hearing changes, easy bruising/bleeding, severe headache, or vision changes.  The patient verbalized understanding of the proper use and possible adverse effects of minocycline.  All of the patient's questions and concerns were addressed.
Benzoyl Peroxide Counseling: Patient counseled that medicine may cause skin irritation and bleach clothing.  In the event of skin irritation, the patient was advised to reduce the amount of the drug applied or use it less frequently.   The patient verbalized understanding of the proper use and possible adverse effects of benzoyl peroxide.  All of the patient's questions and concerns were addressed.
Erythromycin Pregnancy And Lactation Text: This medication is Pregnancy Category B and is considered safe during pregnancy. It is also excreted in breast milk.
Birth Control Pills Pregnancy And Lactation Text: This medication should be avoided if pregnant and for the first 30 days post-partum.
Spironolactone Counseling: Patient advised regarding risks of diarrhea, abdominal pain, hyperkalemia, birth defects (for female patients), liver toxicity and renal toxicity. The patient may need blood work to monitor liver and kidney function and potassium levels while on therapy. The patient verbalized understanding of the proper use and possible adverse effects of spironolactone.  All of the patient's questions and concerns were addressed.
Azithromycin Pregnancy And Lactation Text: This medication is considered safe during pregnancy and is also secreted in breast milk.
Spironolactone Pregnancy And Lactation Text: This medication can cause feminization of the male fetus and should be avoided during pregnancy. The active metabolite is also found in breast milk.
Topical Sulfur Applications Counseling: Topical Sulfur Counseling: Patient counseled that this medication may cause skin irritation or allergic reactions.  In the event of skin irritation, the patient was advised to reduce the amount of the drug applied or use it less frequently.   The patient verbalized understanding of the proper use and possible adverse effects of topical sulfur application.  All of the patient's questions and concerns were addressed.
Isotretinoin Counseling: Patient should get monthly blood tests, not donate blood, not drive at night if vision affected, not share medication, and not undergo elective surgery for 6 months after tx completed. Side effects reviewed, pt to contact office should one occur.
Doxycycline Counseling:  Patient counseled regarding possible photosensitivity and increased risk for sunburn.  Patient instructed to avoid sunlight, if possible.  When exposed to sunlight, patients should wear protective clothing, sunglasses, and sunscreen.  The patient was instructed to call the office immediately if the following severe adverse effects occur:  hearing changes, easy bruising/bleeding, severe headache, or vision changes.  The patient verbalized understanding of the proper use and possible adverse effects of doxycycline.  All of the patient's questions and concerns were addressed.
Benzoyl Peroxide Pregnancy And Lactation Text: This medication is Pregnancy Category C. It is unknown if benzoyl peroxide is excreted in breast milk.
Bactrim Counseling:  I discussed with the patient the risks of sulfa antibiotics including but not limited to GI upset, allergic reaction, drug rash, diarrhea, dizziness, photosensitivity, and yeast infections.  Rarely, more serious reactions can occur including but not limited to aplastic anemia, agranulocytosis, methemoglobinemia, blood dyscrasias, liver or kidney failure, lung infiltrates or desquamative/blistering drug rashes.

## 2021-10-14 NOTE — PROCEDURE: ISOTRETINOIN INITIATION
Patient Reported Weight (Optional - Include Units): 220 pounds
Detail Level: Zone
Anticipated Starting Dosage (Optional): 30mg Daily
Ipledge Number (Optional): [id number]

## 2021-10-14 NOTE — PROCEDURE: ORDER TESTS
Expected Date Of Service: 10/14/2021
Billing Type: Third-Party Bill
Performing Laboratory: [id number]
Bill For Surgical Tray: no
Lab Facility: [id number]

## 2021-10-20 DIAGNOSIS — E78.5 DYSLIPIDEMIA: ICD-10-CM

## 2021-10-20 DIAGNOSIS — R53.83 FATIGUE, UNSPECIFIED TYPE: ICD-10-CM

## 2021-10-20 DIAGNOSIS — E55.9 HYPOVITAMINOSIS D: ICD-10-CM

## 2021-11-08 ENCOUNTER — HOSPITAL ENCOUNTER (EMERGENCY)
Facility: MEDICAL CENTER | Age: 41
End: 2021-11-08
Attending: EMERGENCY MEDICINE
Payer: COMMERCIAL

## 2021-11-08 ENCOUNTER — APPOINTMENT (OUTPATIENT)
Dept: RADIOLOGY | Facility: MEDICAL CENTER | Age: 41
End: 2021-11-08
Attending: EMERGENCY MEDICINE
Payer: COMMERCIAL

## 2021-11-08 ENCOUNTER — TELEPHONE (OUTPATIENT)
Dept: CARDIOLOGY | Facility: MEDICAL CENTER | Age: 41
End: 2021-11-08

## 2021-11-08 VITALS
SYSTOLIC BLOOD PRESSURE: 118 MMHG | WEIGHT: 218.92 LBS | BODY MASS INDEX: 33.18 KG/M2 | OXYGEN SATURATION: 97 % | HEIGHT: 68 IN | TEMPERATURE: 98.1 F | RESPIRATION RATE: 30 BRPM | HEART RATE: 91 BPM | DIASTOLIC BLOOD PRESSURE: 84 MMHG

## 2021-11-08 DIAGNOSIS — R55 SYNCOPE AND COLLAPSE: ICD-10-CM

## 2021-11-08 LAB
ANION GAP SERPL CALC-SCNC: 14 MMOL/L (ref 7–16)
BASOPHILS # BLD AUTO: 0.5 % (ref 0–1.8)
BASOPHILS # BLD: 0.06 K/UL (ref 0–0.12)
BUN SERPL-MCNC: 14 MG/DL (ref 8–22)
CALCIUM SERPL-MCNC: 10.1 MG/DL (ref 8.4–10.2)
CHLORIDE SERPL-SCNC: 102 MMOL/L (ref 96–112)
CO2 SERPL-SCNC: 22 MMOL/L (ref 20–33)
CREAT SERPL-MCNC: 0.64 MG/DL (ref 0.5–1.4)
EKG IMPRESSION: NORMAL
EOSINOPHIL # BLD AUTO: 0.02 K/UL (ref 0–0.51)
EOSINOPHIL NFR BLD: 0.2 % (ref 0–6.9)
ERYTHROCYTE [DISTWIDTH] IN BLOOD BY AUTOMATED COUNT: 38.5 FL (ref 35.9–50)
GLUCOSE SERPL-MCNC: 109 MG/DL (ref 65–99)
HCG SERPL QL: NEGATIVE
HCT VFR BLD AUTO: 44.2 % (ref 37–47)
HGB BLD-MCNC: 15.3 G/DL (ref 12–16)
IMM GRANULOCYTES # BLD AUTO: 0.06 K/UL (ref 0–0.11)
IMM GRANULOCYTES NFR BLD AUTO: 0.5 % (ref 0–0.9)
LYMPHOCYTES # BLD AUTO: 1.69 K/UL (ref 1–4.8)
LYMPHOCYTES NFR BLD: 12.8 % (ref 22–41)
MCH RBC QN AUTO: 30.5 PG (ref 27–33)
MCHC RBC AUTO-ENTMCNC: 34.6 G/DL (ref 33.6–35)
MCV RBC AUTO: 88.2 FL (ref 81.4–97.8)
MONOCYTES # BLD AUTO: 0.83 K/UL (ref 0–0.85)
MONOCYTES NFR BLD AUTO: 6.3 % (ref 0–13.4)
NEUTROPHILS # BLD AUTO: 10.52 K/UL (ref 2–7.15)
NEUTROPHILS NFR BLD: 79.7 % (ref 44–72)
NRBC # BLD AUTO: 0 K/UL
NRBC BLD-RTO: 0 /100 WBC
PLATELET # BLD AUTO: 223 K/UL (ref 164–446)
PMV BLD AUTO: 9.1 FL (ref 9–12.9)
POTASSIUM SERPL-SCNC: 4.3 MMOL/L (ref 3.6–5.5)
RBC # BLD AUTO: 5.01 M/UL (ref 4.2–5.4)
SODIUM SERPL-SCNC: 138 MMOL/L (ref 135–145)
TROPONIN T SERPL-MCNC: <6 NG/L (ref 6–19)
TROPONIN T SERPL-MCNC: <6 NG/L (ref 6–19)
WBC # BLD AUTO: 13.2 K/UL (ref 4.8–10.8)

## 2021-11-08 PROCEDURE — 99284 EMERGENCY DEPT VISIT MOD MDM: CPT

## 2021-11-08 PROCEDURE — 85025 COMPLETE CBC W/AUTO DIFF WBC: CPT

## 2021-11-08 PROCEDURE — 36415 COLL VENOUS BLD VENIPUNCTURE: CPT

## 2021-11-08 PROCEDURE — 93005 ELECTROCARDIOGRAM TRACING: CPT | Performed by: EMERGENCY MEDICINE

## 2021-11-08 PROCEDURE — 700102 HCHG RX REV CODE 250 W/ 637 OVERRIDE(OP): Performed by: EMERGENCY MEDICINE

## 2021-11-08 PROCEDURE — 99285 EMERGENCY DEPT VISIT HI MDM: CPT | Performed by: INTERNAL MEDICINE

## 2021-11-08 PROCEDURE — 84484 ASSAY OF TROPONIN QUANT: CPT

## 2021-11-08 PROCEDURE — A9270 NON-COVERED ITEM OR SERVICE: HCPCS | Performed by: EMERGENCY MEDICINE

## 2021-11-08 PROCEDURE — 84703 CHORIONIC GONADOTROPIN ASSAY: CPT

## 2021-11-08 PROCEDURE — 71045 X-RAY EXAM CHEST 1 VIEW: CPT

## 2021-11-08 PROCEDURE — 80048 BASIC METABOLIC PNL TOTAL CA: CPT

## 2021-11-08 PROCEDURE — 93005 ELECTROCARDIOGRAM TRACING: CPT

## 2021-11-08 RX ORDER — SUMATRIPTAN 25 MG/1
50 TABLET, FILM COATED ORAL ONCE
Status: COMPLETED | OUTPATIENT
Start: 2021-11-08 | End: 2021-11-08

## 2021-11-08 RX ORDER — SENNOSIDES 8.6 MG
1300 CAPSULE ORAL EVERY 6 HOURS PRN
Status: SHIPPED | COMMUNITY
End: 2022-01-17

## 2021-11-08 NOTE — CONSULTS
Cardiology Initial Consult Note    Date of note:    11/8/2021      Consulting Physician: Darrion Dow M.D    Name:   Princess Machado   YOB: 1980  Age:   41 y.o.  female   MRN:   [id number]      Reason for Consultation: Syncope    HPI:  Princess Machado is a 41 y.o. female with a history of ascending aortic aneurysm and hypertension who presented to the hospital today with chief complaint of syncope.    Patient states that she has been feeling under the weather for the past few days.  Went to the bathroom this morning and had overwhelming feelings of passing out.  Called her  in the bathroom and laid her head against his stomach and pressed out for approximately 15 to 20 seconds, per .  Upon regaining consciousness, patient had no postictal symptoms and no seizure-like activity was noted by her .  Reports history of passing out on 3 separate occasions.  Once when she was in high school after standing for prolonged period of time and second when she was being changed by nurses post surgery.      Had prodromal symptoms with diaphoresis, feeling clammy, tunneled vision which are similar to her prior episodes.  Denies any episodes of chest pain, pressure, upper back pain.  Checked her blood pressure after with systolic in the 80s.  Is currently feeling tired but is otherwise feeling back to her normal self.    EKG obtained on admission which is personally interpreted by me and shows sinus rhythm    Labs show mild leukocytosis with WBC 13.2.  Troponin is negative x2.    ROS  All others reviewed and negative except for pertinent positives mentioned in HPI.      Past Medical History:   Diagnosis Date   • Abnormal Pap smear of cervix    • ASTHMA     seasonal allergy related   • HPV in female    • Hypertension    • Migraines        Past Surgical History:   Procedure Laterality Date   • DILATION AND CURETTAGE           (Not in a hospital admission)    No current  facility-administered medications for this encounter.     Current Outpatient Medications   Medication Sig Dispense Refill   • acetaminophen (TYLENOL) 650 MG CR tablet Take 1,300 mg by mouth every 6 hours as needed for Moderate Pain.     • CETIRIZINE HCL PO Take 1 Tablet by mouth every evening.     • Coenzyme Q10 (CO Q 10 PO) Take 1 Capsule by mouth every evening.     • Pseudoephedrine-guaiFENesin (MUCINEX D PO) Take 1 Tablet by mouth 2 times a day as needed (For cough).     • metoprolol SR (TOPROL XL) 25 MG TABLET SR 24 HR Take 0.5 Tablets by mouth every day. (Patient taking differently: Take 12.5 mg by mouth every evening.) 45 tablet 3   • albuterol 108 (90 Base) MCG/ACT Aero Soln inhalation aerosol Inhale 2 Puffs every 6 hours as needed for Shortness of Breath. 8.5 g 3   • Omega-3 Fatty Acids (FISH OIL) 1000 MG Cap capsule Take 1 Cap by mouth 2 Times a Day. 180 Cap 1   • eletriptan (RELPAX) 20 MG Tab TK 1 T PO AOS OF MIGRAINE AS NEEDED. MAY REPEAT IN 2 H. DO NOT EXCEED 2 T IN 24 H (Patient taking differently: Take 20 mg by mouth one time as needed for Migraine. TK 1 T PO AOS OF MIGRAINE AS NEEDED. MAY REPEAT IN 2 H. DO NOT EXCEED 2 T IN 24 H) 10 Tab 2   • BOTOX 200 units Recon Soln every 3 months. For migraines     • Cholecalciferol (VITAMIN D3) 5000 units Cap Take 5,000 Units by mouth every evening.           No Known Allergies      Family History   Problem Relation Age of Onset   • Hypertension Mother    • Heart Disease Mother         Dilated ascending aorta   • Heart Disease Father    • Hypertension Father    • Hyperlipidemia Father    • Other Father         Amyloidosis, mostly GI and muscle, 78         Social History     Socioeconomic History   • Marital status:      Spouse name: Not on file   • Number of children: Not on file   • Years of education: Not on file   • Highest education level: Not on file   Occupational History   • Not on file   Tobacco Use   • Smoking status: Never Smoker   • Smokeless  "tobacco: Never Used   Vaping Use   • Vaping Use: Never used   Substance and Sexual Activity   • Alcohol use: No   • Drug use: No   • Sexual activity: Not on file   Other Topics Concern   • Not on file   Social History Narrative   • Not on file     Social Determinants of Health     Financial Resource Strain:    • Difficulty of Paying Living Expenses: Not on file   Food Insecurity:    • Worried About Running Out of Food in the Last Year: Not on file   • Ran Out of Food in the Last Year: Not on file   Transportation Needs:    • Lack of Transportation (Medical): Not on file   • Lack of Transportation (Non-Medical): Not on file   Physical Activity:    • Days of Exercise per Week: Not on file   • Minutes of Exercise per Session: Not on file   Stress:    • Feeling of Stress : Not on file   Social Connections:    • Frequency of Communication with Friends and Family: Not on file   • Frequency of Social Gatherings with Friends and Family: Not on file   • Attends Buddhist Services: Not on file   • Active Member of Clubs or Organizations: Not on file   • Attends Club or Organization Meetings: Not on file   • Marital Status: Not on file   Intimate Partner Violence:    • Fear of Current or Ex-Partner: Not on file   • Emotionally Abused: Not on file   • Physically Abused: Not on file   • Sexually Abused: Not on file   Housing Stability:    • Unable to Pay for Housing in the Last Year: Not on file   • Number of Places Lived in the Last Year: Not on file   • Unstable Housing in the Last Year: Not on file         No intake or output data in the 24 hours ending 11/08/21 1548     Physical Exam  Body mass index is 33.29 kg/m².  /84   Pulse 91   Temp 36.7 °C (98.1 °F) (Temporal)   Resp (!) 30   Ht 1.727 m (5' 8\")   Wt 99.3 kg (218 lb 14.7 oz)   SpO2 97%   Vitals:    11/08/21 1147 11/08/21 1216 11/08/21 1246 11/08/21 1316   BP: 119/78 101/73 112/77 118/84   Pulse: 94 86 86 91   Resp: (!) 22 19 16 (!) 30   Temp:    36.7 °C " (98.1 °F)   TempSrc:    Temporal   SpO2: 95% 95% 94% 97%   Weight:       Height:         Oxygen Therapy:  Pulse Oximetry: 97 %, O2 Delivery Device: None - Room Air    General: Well appearing and in no apparent distress  Eyes: nl conjunctiva  ENT: OP clear  Neck: JVP not elevated,  no carotid bruits  Lungs: normal respiratory effort, CTAB  Heart: RRR, no murmurs, no rubs or gallops, no edema bilateral lower extremities. No LV/RV heave on cardiac palpatation. 2+ bilateral radial pulses.  2+ bilateral dp pulses.   Abdomen: soft, non tender, non distended, no masses, normal bowel sounds.  No HSM.  Extremities/MSK: no clubbing, no cyanosis  Neurological: No focal sensory deficits  Psychiatric: Appropriate affect, A/O x 3  Skin: Warm extremities      Labs (personally reviewed and notable for):   Lab Results   Component Value Date/Time    SODIUM 138 11/08/2021 09:40 AM    POTASSIUM 4.3 11/08/2021 09:40 AM    CHLORIDE 102 11/08/2021 09:40 AM    CO2 22 11/08/2021 09:40 AM    GLUCOSE 109 (H) 11/08/2021 09:40 AM    BUN 14 11/08/2021 09:40 AM    CREATININE 0.64 11/08/2021 09:40 AM    BUNCREATRAT 16 06/05/2020 05:20 AM      Lab Results   Component Value Date/Time    WBC 13.2 (H) 11/08/2021 09:40 AM    RBC 5.01 11/08/2021 09:40 AM    HEMOGLOBIN 15.3 11/08/2021 09:40 AM    HEMATOCRIT 44.2 11/08/2021 09:40 AM    MCV 88.2 11/08/2021 09:40 AM    MCH 30.5 11/08/2021 09:40 AM    MCHC 34.6 11/08/2021 09:40 AM    MPV 9.1 11/08/2021 09:40 AM    NEUTSPOLYS 79.70 (H) 11/08/2021 09:40 AM    LYMPHOCYTES 12.80 (L) 11/08/2021 09:40 AM    MONOCYTES 6.30 11/08/2021 09:40 AM    EOSINOPHILS 0.20 11/08/2021 09:40 AM    BASOPHILS 0.50 11/08/2021 09:40 AM      Lab Results   Component Value Date/Time    CHOLSTRLTOT 217 (H) 06/05/2020 05:20 AM     (H) 06/05/2020 05:20 AM    HDL 46 06/05/2020 05:20 AM    TRIGLYCERIDE 149 06/05/2020 05:20 AM       Lab Results   Component Value Date/Time    TROPONINT <6 11/08/2021 1148       Cardiac Imaging and  Procedures Review:    EKG personal interpretation is sinus rhythm    Echo dated 2018: My personal interpretation is normal LVEF with dilated ascending aorta      Radiology test Review:  DX-CHEST-PORTABLE (1 VIEW)   Final Result      Negative single view of the chest.            Impression and Medical Decision Making:  #Syncope likely vasovagal  #Ascending aorta aneurysm 4.5 cm  #Essential hypertension  #Dyslipidemia    Recommendations:  --After discussion with the patient, her syncopal event is consistent with vasovagal episode.  Denies any episodes of palpitations prior to passing out.  Discussed undergoing cardiac event monitor if she has recurrent episodes of syncope as an outpatient.   --Recommend obtaining transthoracic echocardiogram to evaluate underlying cardiac structure and function and rule out structural or valvular heart abnormality.  --For ascending aorta aneurysm, underwent CT aorta in 2020 which showed stable aneurysm at 4.5 cm.  Can be reassessed as an outpatient.  --Continue metoprolol XL 12.5 mg daily.  --Patient is stable from a cardiovascular perspective to be discharged home and follow-up with cardiology as an outpatient.    Recommendations discussed with Dr. Dow.    Thank you for allowing me to participate in the care of this patient.  Please contact me with any questions.      Mykel Abdi M.D.  Cardiologist, [facility] [place]   [phone]    This note was generated using voice recognition software which has a small chance of producing errors of grammar and possibly content. I have made every reasonable attempt to find and correct any obvious errors, but expect that some may not be found prior to finalization of this note.

## 2021-11-08 NOTE — ED NOTES
"Assumed care of pt-pt rounding, repeat troponin drawn per order. Pt in no apparent distress though states 7/10 \"migraine pain\". erp aware of same, orders recvd. Pt states  to drive home  "

## 2021-11-08 NOTE — ED PROVIDER NOTES
ED Provider Note    CHIEF COMPLAINT  Chief Complaint   Patient presents with   • Flu Like Symptoms   • Syncope       HPI  Princess Machado is a 41 y.o. female who presents with a chief complaint of cough and syncope.  Patient has had a dry cough for the past 2 days with some nasal congestion and drainage.  She is not had any fevers.  She took Mucinex D this morning.  She felt as though she had to stool when she awoke, ambulated to the bathroom and sat down on the toilet, and while she was straining to have a bowel movement felt as though she was going to lose consciousness.  She has passed out 3 other times in her life, once when she was overheated, second time when she locked her legs during choir practice, and a third time after surgery when she stood up too quickly.  She felt clammy and nauseated.  She called to her son to have her  come to help her.  He came into the bathroom and she leaned her head on his stomach, closed her eyes, and lost consciousness for about 30 seconds.  When she awoke her  helped her ambulate back to the bed and she was diaphoretic with significant nausea.  She took her blood pressure and noted it was 82 systolic.  She has not had any fevers, chest pain, shortness of breath, abdominal pain, vomiting, diarrhea.  She contacted her cardiologist, Dr. Lockhart, who recommended that she come to the ER for an evaluation.  She sees a cardiologist for dilated aorta.  She is currently back to baseline.    ACS risk factors: No history of MI, HLD, DM, tobacco use, amphetamine/cocaine use, obesity, family history of CAD prior to age 60.  Does have a history of hypertension.    ACS features: Not exertional, no shortness of breath, significant diaphoresis    PE risk factors: No history of DVT/PE, pleuritic component/chest pain, hemoptysis, unilateral leg swelling/pain, recent travel/immobilization, recent surgery, exogenous hormone use    Aortic dissection features: Syncope, no chest  "pain and this obviously does not radiate to the back, no ripping/tearing pain    No IVDA, fevers although she does have recent illness, not positional    REVIEW OF SYSTEMS  See HPI for further details.  Cough.  Syncope.  Nausea.  Diaphoresis.  All other systems are negative.     PAST MEDICAL HISTORY   has a past medical history of Abnormal Pap smear of cervix, ASTHMA, HPV in female, Hypertension, and Migraines.    SOCIAL HISTORY  Social History     Tobacco Use   • Smoking status: Never Smoker   • Smokeless tobacco: Never Used   Vaping Use   • Vaping Use: Never used   Substance and Sexual Activity   • Alcohol use: No   • Drug use: No   • Sexual activity: Not on file       SURGICAL HISTORY   has a past surgical history that includes dilation and curettage.    CURRENT MEDICATIONS  Home Medications    **Home medications have not yet been reviewed for this encounter**         ALLERGIES  No Known Allergies    PHYSICAL EXAM  VITAL SIGNS: /96   Pulse 91   Temp 36.9 °C (98.5 °F) (Temporal)   Resp 18   Ht 1.727 m (5' 8\")   Wt 99.3 kg (218 lb 14.7 oz)   SpO2 96%   BMI 33.29 kg/m²    Pulse ox interpretation: I interpret this pulse ox as normal.  Constitutional: Alert in no apparent distress.  HENT: No signs of trauma, Bilateral external ears normal, Nose normal.  Moist mucous membranes.  Eyes: Pupils are equal and reactive, Conjunctiva normal, Non-icteric.   Neck: Normal range of motion, No tenderness, Supple, No stridor.   Lymphatic: No lymphadenopathy noted.   Cardiovascular: Regular rate and rhythm, no murmurs. Pulses symmetrical.  Thorax & Lungs: Normal breath sounds, No respiratory distress, No wheezing, No chest tenderness.   Abdomen: Bowel sounds normal, Soft, No tenderness, No masses, No pulsatile masses. No peritoneal signs.  Skin: Warm, Dry, No erythema, No rash.   Back: No alignment.  Extremities: Intact distal pulses, No edema, No cyanosis.  Musculoskeletal: No major deformities noted.   Neurologic: " Alert, Normal motor function, Normal sensory function, No focal deficits noted.   Psychiatric: Affect normal, Judgment normal, Mood normal.     DIAGNOSTIC STUDIES / PROCEDURES    LABS  Results for orders placed or performed during the hospital encounter of 11/08/21   CBC WITH DIFFERENTIAL   Result Value Ref Range    WBC 13.2 (H) 4.8 - 10.8 K/uL    RBC 5.01 4.20 - 5.40 M/uL    Hemoglobin 15.3 12.0 - 16.0 g/dL    Hematocrit 44.2 37.0 - 47.0 %    MCV 88.2 81.4 - 97.8 fL    MCH 30.5 27.0 - 33.0 pg    MCHC 34.6 33.6 - 35.0 g/dL    RDW 38.5 35.9 - 50.0 fL    Platelet Count 223 164 - 446 K/uL    MPV 9.1 9.0 - 12.9 fL    Neutrophils-Polys 79.70 (H) 44.00 - 72.00 %    Lymphocytes 12.80 (L) 22.00 - 41.00 %    Monocytes 6.30 0.00 - 13.40 %    Eosinophils 0.20 0.00 - 6.90 %    Basophils 0.50 0.00 - 1.80 %    Immature Granulocytes 0.50 0.00 - 0.90 %    Nucleated RBC 0.00 /100 WBC    Neutrophils (Absolute) 10.52 (H) 2.00 - 7.15 K/uL    Lymphs (Absolute) 1.69 1.00 - 4.80 K/uL    Monos (Absolute) 0.83 0.00 - 0.85 K/uL    Eos (Absolute) 0.02 0.00 - 0.51 K/uL    Baso (Absolute) 0.06 0.00 - 0.12 K/uL    Immature Granulocytes (abs) 0.06 0.00 - 0.11 K/uL    NRBC (Absolute) 0.00 K/uL   Basic Metabolic Panel   Result Value Ref Range    Sodium 138 135 - 145 mmol/L    Potassium 4.3 3.6 - 5.5 mmol/L    Chloride 102 96 - 112 mmol/L    Co2 22 20 - 33 mmol/L    Glucose 109 (H) 65 - 99 mg/dL    Bun 14 8 - 22 mg/dL    Creatinine 0.64 0.50 - 1.40 mg/dL    Calcium 10.1 8.4 - 10.2 mg/dL    Anion Gap 14.0 7.0 - 16.0   TROPONIN   Result Value Ref Range    Troponin T <6 6 - 19 ng/L   TROPONIN   Result Value Ref Range    Troponin T <6 6 - 19 ng/L   BETA-HCG QUALITATIVE SERUM   Result Value Ref Range    Beta-Hcg Qualitative Serum Negative Negative   ESTIMATED GFR   Result Value Ref Range    GFR If African American >60 >60 mL/min/1.73 m 2    GFR If Non African American >60 >60 mL/min/1.73 m 2   EKG   Result Value Ref Range    [facility]  [facility] Emergency Dept.    Test Date:  2021  Pt Name:    CHAPINCITO TAYLOR             Department: [facility]  MRN:        [id number]                      Room:  Gender:     Female                       Technician: [id number]  :        [id number]                   Requested By:ER TRIAGE PROTOCOL  Order #:    [id number]                    Reading MD: Darrion Dow MD    Measurements  Intervals                                Axis  Rate:       81                           P:          28  TN:         143                          QRS:        21  QRSD:       84                           T:          44  QT:         399  QTc:        464    Interpretive Statements  Sinus rhythm  Borderline T abnormalities, anterior leads  Compared to ECG 10/01/2019 08:02:19  No significant changes  Electronically Signed On 2021 9:57:53 PST by Darrion Dow MD       RADIOLOGY  DX-CHEST-PORTABLE (1 VIEW)   Final Result      Negative single view of the chest.          COURSE & MEDICAL DECISION MAKING  Pertinent Labs & Imaging studies reviewed. (See chart for details)  Records obtained and reviewed: Patient underwent CTA chest most recently 2020 at which time there was noted to be dilation of the ascending aorta with a maximum diameter of 4.5 cm.  There is no evidence of dissection and visualized abdominal aorta was unremarkable.  She did have some pulmonary nodules that she is aware of and for which she has has follow-up scheduled.    This is a 41-year-old female with a history of COVID-19 diagnosed in 2021, not vaccinated, and dilated a sending aorta is seen on CT imaging approximately 1 year ago who is here with cold and cough symptoms as well as syncopal episode earlier today.  Differential diagnosis includes, but is not limited to, seizure, micturition/defecation syncope, vasovagal syncope, cardiac arrhythmia, hypoglycemia, electrolyte abnormality, PE.  Arrives afebrile with normal vital signs.  Appears well-hydrated  and nontoxic.  Her exam is normal.  She is back to baseline.  Orthostatics are negative.  Her EKG does have T wave abnormalities in the anterior leads but these are unchanged from prior.    CBC demonstrates mild leukocytosis of unclear significance.  I do suspect this is more likely to be a stress response.  Metabolic panel demonstrates mild hyperglycemia to 109.  Troponin x2 and hCG are both negative.  Chest x-ray is unremarkable.    HEART score: 2    I spoke with our on-call cardiologist, Dr. Abdi, who evaluated the patient in person in the emergency department.  She feels that the patient can be discharged home with an outpatient echocardiogram.    Patient was reevaluated at bedside.  Very low suspicion for PE, aortic dissection even given the patient's history of aortic dilatation.  Patient remains asymptomatic.  She is safe for discharge with close outpatient management.  She will contact her cardiologist and primary care physician this week for recheck.  Discharged in good and stable condition with strict return precautions.    The patient will return for worsening symptoms and is stable at the time of discharge. The patient verbalizes understanding and will comply.    FINAL IMPRESSION  1. Syncope and collapse           Electronically signed by: Darrion Dow M.D., 11/8/2021 9:22 AM

## 2021-11-08 NOTE — ED TRIAGE NOTES
"Chief Complaint   Patient presents with   • Flu Like Symptoms   • Syncope      \" cold symptoms\" since last thursday, has one syncopal event his morning with hypotension.     Has this patient been vaccinated for COVID yes        "

## 2021-11-08 NOTE — ED NOTES
"Cardiology to bedside-pt refused er med, states \"she will wait and take home \". erp aware of same  " OB F/U  Denies VB, LOF  +FM  Phone#: [phone]  Pharmacy Confirmed.  C/O: Tooth pain, Irregual UC's   TDap given to patient. R Deltoid. Screening checklist reviewed with patient. VIS given, verified by ERIC CHEN   Flu vaccine offered and declined   Rhogam administered  BTL: declined   Kick count given w/ instructions   3hr gtt lab slip given to patient

## 2021-11-08 NOTE — ED NOTES
Med rec updated and complete  Allergies reviewed  Pt reports no antibiotics in the last 30 days.    No current facility-administered medications on file prior to encounter.     Current Outpatient Medications on File Prior to Encounter   Medication Sig Dispense Refill   • acetaminophen (TYLENOL) 650 MG CR tablet Take 1,300 mg by mouth every 6 hours as needed for Moderate Pain.     • CETIRIZINE HCL PO Take 1 Tablet by mouth every evening.     • Coenzyme Q10 (CO Q 10 PO) Take 1 Capsule by mouth every evening.     • Pseudoephedrine-guaiFENesin (MUCINEX D PO) Take 1 Tablet by mouth 2 times a day as needed (For cough).     • metoprolol SR (TOPROL XL) 25 MG TABLET SR 24 HR Take 0.5 Tablets by mouth every day. (Patient taking differently: Take 12.5 mg by mouth every evening.) 45 tablet 3   • albuterol 108 (90 Base) MCG/ACT Aero Soln inhalation aerosol Inhale 2 Puffs every 6 hours as needed for Shortness of Breath. 8.5 g 3   • Omega-3 Fatty Acids (FISH OIL) 1000 MG Cap capsule Take 1 Cap by mouth 2 Times a Day. 180 Cap 1   • eletriptan (RELPAX) 20 MG Tab TK 1 T PO AOS OF MIGRAINE AS NEEDED. MAY REPEAT IN 2 H. DO NOT EXCEED 2 T IN 24 H (Patient taking differently: Take 20 mg by mouth one time as needed for Migraine. TK 1 T PO AOS OF MIGRAINE AS NEEDED. MAY REPEAT IN 2 H. DO NOT EXCEED 2 T IN 24 H) 10 Tab 2   • BOTOX 200 units Recon Soln every 3 months. For migraines     • Cholecalciferol (VITAMIN D3) 5000 units Cap Take 5,000 Units by mouth every evening.

## 2021-11-08 NOTE — DISCHARGE INSTRUCTIONS
You were seen in the ER for loss of consciousness that I suspect is due to something called vasovagal syncope that we discussed in the ER.  Your labs and imaging did not reveal an acute abnormality and you are safe to go home.  You were evaluated also by our cardiologist who concurs that this was likely a vasovagal episode.  She will arrange for an outpatient echocardiogram.  Please contact Dr. Lockhart for further work-up and management.  Follow-up with your primary care physician and return immediately to the ER with new or worsening symptoms.  Good luck, I hope you feel better soon!

## 2021-11-08 NOTE — TELEPHONE ENCOUNTER
Returned pt's call. She is currently at the ER. Asked her to call our schedulers once she is released to make f/u appt w/ us as it has been over a year since she has been seen. Pt did state her BP seemed to be a little better now.

## 2021-11-09 ENCOUNTER — HOSPITAL ENCOUNTER (OUTPATIENT)
Dept: LAB | Facility: MEDICAL CENTER | Age: 41
End: 2021-11-09
Attending: DERMATOLOGY
Payer: COMMERCIAL

## 2021-11-09 ENCOUNTER — HOSPITAL ENCOUNTER (OUTPATIENT)
Dept: LAB | Facility: MEDICAL CENTER | Age: 41
End: 2021-11-09
Attending: INTERNAL MEDICINE
Payer: COMMERCIAL

## 2021-11-09 DIAGNOSIS — E55.9 HYPOVITAMINOSIS D: ICD-10-CM

## 2021-11-09 DIAGNOSIS — E78.5 DYSLIPIDEMIA: ICD-10-CM

## 2021-11-09 DIAGNOSIS — R53.83 FATIGUE, UNSPECIFIED TYPE: ICD-10-CM

## 2021-11-09 LAB
ALBUMIN SERPL BCP-MCNC: 4.8 G/DL (ref 3.2–4.9)
ALBUMIN/GLOB SERPL: 1.4 G/DL
ALP SERPL-CCNC: 50 U/L (ref 30–99)
ALT SERPL-CCNC: 16 U/L (ref 2–50)
ANION GAP SERPL CALC-SCNC: 11 MMOL/L (ref 7–16)
AST SERPL-CCNC: 9 U/L (ref 12–45)
BASOPHILS # BLD AUTO: 0.5 % (ref 0–1.8)
BASOPHILS # BLD: 0.05 K/UL (ref 0–0.12)
BILIRUB SERPL-MCNC: 0.4 MG/DL (ref 0.1–1.5)
BUN SERPL-MCNC: 16 MG/DL (ref 8–22)
CALCIUM SERPL-MCNC: 9.6 MG/DL (ref 8.5–10.5)
CHLORIDE SERPL-SCNC: 103 MMOL/L (ref 96–112)
CHOLEST SERPL-MCNC: 193 MG/DL (ref 100–199)
CO2 SERPL-SCNC: 24 MMOL/L (ref 20–33)
CREAT SERPL-MCNC: 0.69 MG/DL (ref 0.5–1.4)
EOSINOPHIL # BLD AUTO: 0.09 K/UL (ref 0–0.51)
EOSINOPHIL NFR BLD: 0.8 % (ref 0–6.9)
ERYTHROCYTE [DISTWIDTH] IN BLOOD BY AUTOMATED COUNT: 40.9 FL (ref 35.9–50)
GLOBULIN SER CALC-MCNC: 3.4 G/DL (ref 1.9–3.5)
GLUCOSE SERPL-MCNC: 101 MG/DL (ref 65–99)
HCT VFR BLD AUTO: 43.9 % (ref 37–47)
HDLC SERPL-MCNC: 50 MG/DL
HGB BLD-MCNC: 14.8 G/DL (ref 12–16)
IMM GRANULOCYTES # BLD AUTO: 0.04 K/UL (ref 0–0.11)
IMM GRANULOCYTES NFR BLD AUTO: 0.4 % (ref 0–0.9)
LDLC SERPL CALC-MCNC: 120 MG/DL
LYMPHOCYTES # BLD AUTO: 1.95 K/UL (ref 1–4.8)
LYMPHOCYTES NFR BLD: 18 % (ref 22–41)
MCH RBC QN AUTO: 30.6 PG (ref 27–33)
MCHC RBC AUTO-ENTMCNC: 33.7 G/DL (ref 33.6–35)
MCV RBC AUTO: 90.9 FL (ref 81.4–97.8)
MONOCYTES # BLD AUTO: 0.77 K/UL (ref 0–0.85)
MONOCYTES NFR BLD AUTO: 7.1 % (ref 0–13.4)
NEUTROPHILS # BLD AUTO: 7.92 K/UL (ref 2–7.15)
NEUTROPHILS NFR BLD: 73.2 % (ref 44–72)
NRBC # BLD AUTO: 0 K/UL
NRBC BLD-RTO: 0 /100 WBC
PLATELET # BLD AUTO: 230 K/UL (ref 164–446)
PMV BLD AUTO: 10 FL (ref 9–12.9)
POTASSIUM SERPL-SCNC: 4.4 MMOL/L (ref 3.6–5.5)
PROT SERPL-MCNC: 8.2 G/DL (ref 6–8.2)
RBC # BLD AUTO: 4.83 M/UL (ref 4.2–5.4)
SODIUM SERPL-SCNC: 138 MMOL/L (ref 135–145)
TRIGL SERPL-MCNC: 114 MG/DL (ref 0–149)
TSH SERPL DL<=0.005 MIU/L-ACNC: 0.93 UIU/ML (ref 0.38–5.33)
WBC # BLD AUTO: 10.8 K/UL (ref 4.8–10.8)

## 2021-11-09 PROCEDURE — 36415 COLL VENOUS BLD VENIPUNCTURE: CPT

## 2021-11-09 PROCEDURE — 85025 COMPLETE CBC W/AUTO DIFF WBC: CPT

## 2021-11-09 PROCEDURE — 84443 ASSAY THYROID STIM HORMONE: CPT

## 2021-11-09 PROCEDURE — 80061 LIPID PANEL: CPT

## 2021-11-09 PROCEDURE — 80053 COMPREHEN METABOLIC PANEL: CPT

## 2021-11-09 PROCEDURE — 82306 VITAMIN D 25 HYDROXY: CPT

## 2021-11-10 ENCOUNTER — PATIENT MESSAGE (OUTPATIENT)
Dept: CARDIOLOGY | Facility: MEDICAL CENTER | Age: 41
End: 2021-11-10

## 2021-11-10 DIAGNOSIS — I10 ESSENTIAL HYPERTENSION: ICD-10-CM

## 2021-11-10 DIAGNOSIS — Z82.49 FAMILY HISTORY OF THORACIC AORTIC ANEURYSM: ICD-10-CM

## 2021-11-10 DIAGNOSIS — I71.019 DISSECTION OF THORACIC AORTA (HCC): ICD-10-CM

## 2021-11-10 DIAGNOSIS — E66.9 OBESITY (BMI 30.0-34.9): ICD-10-CM

## 2021-11-10 DIAGNOSIS — E78.00 PURE HYPERCHOLESTEROLEMIA: ICD-10-CM

## 2021-11-10 LAB — 25(OH)D3 SERPL-MCNC: 65 NG/ML (ref 30–80)

## 2021-11-10 NOTE — PROGRESS NOTES
Yes, please order echocardiogram for syncope and collapse.    Please order CTA of the chest for thoracic aortic aneurysm.    Thank you

## 2021-11-22 ENCOUNTER — APPOINTMENT (OUTPATIENT)
Dept: SLEEP MEDICINE | Facility: MEDICAL CENTER | Age: 41
End: 2021-11-22
Payer: COMMERCIAL

## 2021-11-23 ENCOUNTER — APPOINTMENT (RX ONLY)
Dept: URBAN - METROPOLITAN AREA CLINIC 6 | Facility: CLINIC | Age: 41
Setting detail: DERMATOLOGY
End: 2021-11-23

## 2021-11-23 DIAGNOSIS — Z79.899 OTHER LONG TERM (CURRENT) DRUG THERAPY: ICD-10-CM

## 2021-11-23 DIAGNOSIS — L70.0 ACNE VULGARIS: ICD-10-CM

## 2021-11-23 PROCEDURE — ? PRESCRIPTION

## 2021-11-23 PROCEDURE — 99214 OFFICE O/P EST MOD 30 MIN: CPT

## 2021-11-23 PROCEDURE — ? URINE PREGNANCY TEST

## 2021-11-23 PROCEDURE — 81025 URINE PREGNANCY TEST: CPT

## 2021-11-23 PROCEDURE — ? ORDER TESTS

## 2021-11-23 PROCEDURE — ? DIAGNOSIS COMMENT

## 2021-11-23 PROCEDURE — ? COUNSELING

## 2021-11-23 RX ORDER — ISOTRETINOIN 40 MG/1
1 CAPSULE, LIQUID FILLED ORAL
Qty: 30 | Refills: 0 | Status: ERX | COMMUNITY
Start: 2021-11-23

## 2021-11-23 RX ADMIN — ISOTRETINOIN 1: 40 CAPSULE, LIQUID FILLED ORAL at 00:00

## 2021-11-23 ASSESSMENT — LOCATION ZONE DERM
LOCATION ZONE: FACE
LOCATION ZONE: TRUNK

## 2021-11-23 ASSESSMENT — LOCATION DETAILED DESCRIPTION DERM
LOCATION DETAILED: RIGHT MEDIAL FOREHEAD
LOCATION DETAILED: SUPERIOR THORACIC SPINE

## 2021-11-23 ASSESSMENT — LOCATION SIMPLE DESCRIPTION DERM
LOCATION SIMPLE: UPPER BACK
LOCATION SIMPLE: RIGHT FOREHEAD

## 2021-11-23 NOTE — PROCEDURE: URINE PREGNANCY TEST
Urine Pregnancy Test Result: negative
Lot # (Optional): [id number]
Performed By: IDALIA Parikh
Detail Level: None
Expiration Date (Optional): 02/28/2023

## 2021-11-23 NOTE — PROCEDURE: ORDER TESTS
Performing Laboratory: 0
Billing Type: Third-Party Bill
Expected Date Of Service: 11/23/2021
Bill For Surgical Tray: no

## 2021-11-24 ENCOUNTER — TELEMEDICINE (OUTPATIENT)
Dept: MEDICAL GROUP | Age: 41
End: 2021-11-24
Payer: COMMERCIAL

## 2021-11-24 VITALS
SYSTOLIC BLOOD PRESSURE: 120 MMHG | WEIGHT: 218 LBS | DIASTOLIC BLOOD PRESSURE: 80 MMHG | HEIGHT: 68 IN | BODY MASS INDEX: 33.04 KG/M2

## 2021-11-24 DIAGNOSIS — I10 ESSENTIAL HYPERTENSION: ICD-10-CM

## 2021-11-24 DIAGNOSIS — I77.89 ASCENDING AORTA ENLARGEMENT (HCC): ICD-10-CM

## 2021-11-24 DIAGNOSIS — E55.9 HYPOVITAMINOSIS D: ICD-10-CM

## 2021-11-24 DIAGNOSIS — E78.5 DYSLIPIDEMIA: ICD-10-CM

## 2021-11-24 PROCEDURE — 99214 OFFICE O/P EST MOD 30 MIN: CPT | Mod: 95 | Performed by: INTERNAL MEDICINE

## 2021-11-24 ASSESSMENT — FIBROSIS 4 INDEX: FIB4 SCORE: 0.41

## 2021-11-24 NOTE — PROGRESS NOTES
Telemedicine Visit: Established Patient     This Remote Face to Face encounter was conducted via Zoom. Given the importance of social distancing and other strategies recommended to reduce the risk of COVID-19 transmission, I am providing medical care to this patient via audio/video visit in place of an in person visit at the request of the patient. Verbal consent to telehealth, risks, benefits, and consequences were discussed. Patient retains the right to withdraw at any time. All existing confidentiality protections apply. The patient has access to all transmitted medical information. No dissemination of any patient images or information to other entities without further written consent.    CHIEF COMPLAINT     Chief Complaint   Patient presents with   • Lab Results     HPI  Princess Machado is a 41 y.o. female who presents today for the following     Hypertension  Meds: Metoprolol 12.5 mg daily; stopped taking.   Denies: - headaches, vision problems, tinnitus.  - chest pain/pressure, palpitations, irregular heart beats, exertional, dyspnea, peripheral edema.  - medication side effect: unusual fatigue, slow heartbeat, foot/leg swelling, cough.  Low salt diet: No  Diet: Regular  Exercise: < 4 d/w  BMI: 33  FH of HTN: Parents     Ascending aorta dilation   Dg: at the age of 30 y/o  Denies chest pain, palpitations, exertional SOB or chest pain.   Denies syncope.      CTA chest, 10/18/19  Dilatation of the ascending aorta up to 4.5 cm.  No aortic dissection. No mediastinal hematoma.  Multiple pulmonary nodules measuring up to 5.1 mm in diameter.  No airspace consolidation or pleural effusions.     Echocardiography, 4/19/2018  No prior study is available for comparison.   Normal left ventricular size and systolic function.  Left ventricular ejection fraction is visually estimated to be 65%.  Normal diastolic function.  Normal inferior vena cava size and inspiratory collapse.  Normal aortic root for body surface  area. aortic root measuring 3.8 cm, ascending aorta diameter is  4.0 cm.     F/u by cardiology.     Dyslipidemia  The patient had abnormal lipid panel, on omega-3 1000 mg twice daily.  Diet /Exercise/BMI: As above  FH: Father     Migraine  Stable and controlled;  - needs zofran refill.     Onset: at the age of 10-15 y/o  The patient reports frontal headaches, described as: severe in severity; dull, throbbing with nausea, vomiting, photophobia, sonophobia and auras described as photopsias, visual scintillations and blurry vision, without radiation.  Usual frequency is > 10/month  Aura is present for the last 6 months.  Headaches are relieved by: topiramate 25 mg QD; rescue eletriptan 20 mg prn, zofran. Botox Q 3 months  Known triggers include: periods, stress, sleep deprivation.  Current stressors include: none.  Previous treatment and prevention include: sumatriptan     Pertinent negatives:  Denies difficulty with speech or swallowing, facial numbness or tingling, focal weakness. Denies sore throat or cough, fever, sinus congestion, ear pain, tooth clenching or grinding.     Family Hx: headaches of unknown type in mother  Prior imaging: yes      Hypovitaminosis D  The patient had low vitamin D level, improved.  Vitamin D supplement: OTC, 5000 U WD.    Reviewed PMH, PSH, FH, SH, ALL, HCM/IMM, no changes  Reviewed MEDS, no changes    Patient Active Problem List    Diagnosis Date Noted   • Lung nodules 07/20/2020   • Hypovitaminosis D 07/20/2020   • Obesity (BMI 30.0-34.9) 07/20/2020   • Health care maintenance 08/24/2018   • Abnormal cervical Papanicolaou smear 08/24/2018   • Essential hypertension 05/22/2018   • Chronic migraine 05/22/2018   • Ascending aorta enlargement (HCC) 05/22/2018   • Dyslipidemia 05/22/2018     CURRENT MEDICATIONS  Current Outpatient Medications   Medication Sig Dispense Refill   • acetaminophen (TYLENOL) 650 MG CR tablet Take 1,300 mg by mouth every 6 hours as needed for Moderate Pain.     •  CETIRIZINE HCL PO Take 1 Tablet by mouth every evening.     • Coenzyme Q10 (CO Q 10 PO) Take 1 Capsule by mouth every evening.     • Pseudoephedrine-guaiFENesin (MUCINEX D PO) Take 1 Tablet by mouth 2 times a day as needed (For cough).     • metoprolol SR (TOPROL XL) 25 MG TABLET SR 24 HR Take 0.5 Tablets by mouth every day. (Patient taking differently: Take 12.5 mg by mouth every evening.) 45 tablet 3   • albuterol 108 (90 Base) MCG/ACT Aero Soln inhalation aerosol Inhale 2 Puffs every 6 hours as needed for Shortness of Breath. 8.5 g 3   • eletriptan (RELPAX) 20 MG Tab TK 1 T PO AOS OF MIGRAINE AS NEEDED. MAY REPEAT IN 2 H. DO NOT EXCEED 2 T IN 24 H (Patient taking differently: Take 20 mg by mouth one time as needed for Migraine. TK 1 T PO AOS OF MIGRAINE AS NEEDED. MAY REPEAT IN 2 H. DO NOT EXCEED 2 T IN 24 H) 10 Tab 2   • BOTOX 200 units Recon Soln every 3 months. For migraines     • Cholecalciferol (VITAMIN D3) 5000 units Cap Take 5,000 Units by mouth every evening.     • Omega-3 Fatty Acids (FISH OIL) 1000 MG Cap capsule Take 1 Cap by mouth 2 Times a Day. 180 Cap 1     No current facility-administered medications for this visit.     ALLERGIES  Allergies: Patient has no known allergies.  PAST MEDICAL HISTORY  Past Medical History:   Diagnosis Date   • Abnormal Pap smear of cervix    • ASTHMA     seasonal allergy related   • HPV in female    • Hypertension    • Migraines      SURGICAL HISTORY  She  has a past surgical history that includes dilation and curettage.  SOCIAL HISTORY  Social History     Tobacco Use   • Smoking status: Never Smoker   • Smokeless tobacco: Never Used   Vaping Use   • Vaping Use: Never used   Substance Use Topics   • Alcohol use: No   • Drug use: No     Social History     Social History Narrative   • Not on file     FAMILY HISTORY  Family History   Problem Relation Age of Onset   • Hypertension Mother    • Heart Disease Mother         Dilated ascending aorta   • Heart Disease Father    •  "Hypertension Father    • Hyperlipidemia Father    • Other Father         Amyloidosis, mostly GI and muscle, 78     Family Status   Relation Name Status   • Mo  Alive   • Fa     • Sis Half sister dad's side Alive   • Bro  Alive     ROS   Constitutional: Negative for fever, chills, fatigue.  HENT: Negative for congestion, sore throat.  Eyes: Negative for vision problems.   Respiratory: Negative for cough, shortness of breath.  Cardiovascular: Negative for chest pain, palpitations.   Gastrointestinal: Negative for heartburn, nausea, abdominal pain.   Genitourinary: Negative for dysuria.  Musculoskeletal: Negative for significant myalgia, back and joint pain.   Skin: Negative for rash.   Neuro: Negative for dizziness, weakness and headaches.   Endo/Heme/Allergies: Does not bruise/bleed easily.   Psychiatric/Behavioral: Negative for depression.    Objective   Vitals obtained by patient:  Blood Pressure 120/80   Height 1.727 m (5' 8\")   Weight 98.9 kg (218 lb)   Body Mass Index 33.15 kg/m²   Physical Exam:  Constitutional: Alert, no distress, well-groomed.  Skin: No rash in visible areas.  Eye: Round. Conjunctiva clear, lids normal.  ENMT: Lips pink without lesions, good dentition. Phonation normal.  Neck: No visible masses or thyromegaly. Moves freely without pain.  CV: no peripheral cyanosis, tachycardia.  Respiratory: Unlabored respiratory effort, no cough or audible wheezing.  Psych: Alert and oriented x3, normal affect and mood.     Labs     Labs are reviewed and discussed with a patient  Lab Results   Component Value Date/Time    CHOLSTRLTOT 193 2021 07:48 AM     (H) 2021 07:48 AM    HDL 50 2021 07:48 AM    TRIGLYCERIDE 114 2021 07:48 AM       Lab Results   Component Value Date/Time    SODIUM 138 2021 07:48 AM    POTASSIUM 4.4 2021 07:48 AM    CHLORIDE 103 2021 07:48 AM    CO2 24 2021 07:48 AM    GLUCOSE 101 (H) 2021 07:48 AM    BUN 16 " 11/09/2021 07:48 AM    CREATININE 0.69 11/09/2021 07:48 AM    BUNCREATRAT 16 06/05/2020 05:20 AM     Lab Results   Component Value Date/Time    ALKPHOSPHAT 50 11/09/2021 07:48 AM    ASTSGOT 9 (L) 11/09/2021 07:48 AM    ALTSGPT 16 11/09/2021 07:48 AM    TBILIRUBIN 0.4 11/09/2021 07:48 AM      Lab Results   Component Value Date/Time    HBA1C 5.4 10/24/2017 08:51 AM     No results found for: TSH  No results found for: FREET4    Lab Results   Component Value Date/Time    WBC 10.8 11/09/2021 07:48 AM    RBC 4.83 11/09/2021 07:48 AM    HEMOGLOBIN 14.8 11/09/2021 07:48 AM    HEMATOCRIT 43.9 11/09/2021 07:48 AM    MCV 90.9 11/09/2021 07:48 AM    MCH 30.6 11/09/2021 07:48 AM    MCHC 33.7 11/09/2021 07:48 AM    MPV 10.0 11/09/2021 07:48 AM    NEUTSPOLYS 73.20 (H) 11/09/2021 07:48 AM    LYMPHOCYTES 18.00 (L) 11/09/2021 07:48 AM    MONOCYTES 7.10 11/09/2021 07:48 AM    EOSINOPHILS 0.80 11/09/2021 07:48 AM    BASOPHILS 0.50 11/09/2021 07:48 AM      Imaging      Reviewed and discussed per HPI    Assessment and Plan     Princess Machado is a 41 y.o. female    1. Essential hypertension  - Controlled, continue with current management, cardiology follow-up    2. Ascending aorta enlargement (HCC)  As above    3. Dyslipidemia  Improved, continue current treatment, follow-up labs    4. Hypovitaminosis D  Improved, continue current treatment, follow-up labs    Follow-up: In 6 months and as needed

## 2021-12-17 ENCOUNTER — HOSPITAL ENCOUNTER (OUTPATIENT)
Dept: LAB | Facility: MEDICAL CENTER | Age: 41
End: 2021-12-17
Attending: DERMATOLOGY
Payer: COMMERCIAL

## 2021-12-17 LAB
ALBUMIN SERPL BCP-MCNC: 4.3 G/DL (ref 3.2–4.9)
ALBUMIN/GLOB SERPL: 1.6 G/DL
ALP SERPL-CCNC: 52 U/L (ref 30–99)
ALT SERPL-CCNC: 27 U/L (ref 2–50)
ANION GAP SERPL CALC-SCNC: 11 MMOL/L (ref 7–16)
AST SERPL-CCNC: 20 U/L (ref 12–45)
BILIRUB SERPL-MCNC: 0.4 MG/DL (ref 0.1–1.5)
BUN SERPL-MCNC: 13 MG/DL (ref 8–22)
CALCIUM SERPL-MCNC: 9.2 MG/DL (ref 8.5–10.5)
CHLORIDE SERPL-SCNC: 105 MMOL/L (ref 96–112)
CHOLEST SERPL-MCNC: 248 MG/DL (ref 100–199)
CO2 SERPL-SCNC: 23 MMOL/L (ref 20–33)
CREAT SERPL-MCNC: 0.62 MG/DL (ref 0.5–1.4)
FASTING STATUS PATIENT QL REPORTED: NORMAL
GLOBULIN SER CALC-MCNC: 2.7 G/DL (ref 1.9–3.5)
GLUCOSE SERPL-MCNC: 97 MG/DL (ref 65–99)
HDLC SERPL-MCNC: 39 MG/DL
LDLC SERPL CALC-MCNC: 147 MG/DL
POTASSIUM SERPL-SCNC: 4.2 MMOL/L (ref 3.6–5.5)
PROT SERPL-MCNC: 7 G/DL (ref 6–8.2)
SODIUM SERPL-SCNC: 139 MMOL/L (ref 135–145)
TRIGL SERPL-MCNC: 312 MG/DL (ref 0–149)

## 2021-12-17 PROCEDURE — 80061 LIPID PANEL: CPT

## 2021-12-17 PROCEDURE — 36415 COLL VENOUS BLD VENIPUNCTURE: CPT

## 2021-12-17 PROCEDURE — 80053 COMPREHEN METABOLIC PANEL: CPT

## 2021-12-23 ENCOUNTER — APPOINTMENT (RX ONLY)
Dept: URBAN - METROPOLITAN AREA CLINIC 6 | Facility: CLINIC | Age: 41
Setting detail: DERMATOLOGY
End: 2021-12-23

## 2021-12-23 DIAGNOSIS — L70.0 ACNE VULGARIS: ICD-10-CM

## 2021-12-23 PROCEDURE — 81025 URINE PREGNANCY TEST: CPT

## 2021-12-23 PROCEDURE — ? COUNSELING

## 2021-12-23 PROCEDURE — ? ORDER TESTS

## 2021-12-23 PROCEDURE — ? DIAGNOSIS COMMENT

## 2021-12-23 PROCEDURE — ? PRESCRIPTION

## 2021-12-23 PROCEDURE — ? ISOTRETINOIN MONITORING

## 2021-12-23 PROCEDURE — ? URINE PREGNANCY TEST

## 2021-12-23 PROCEDURE — 99214 OFFICE O/P EST MOD 30 MIN: CPT

## 2021-12-23 RX ORDER — ISOTRETINOIN 40 MG/1
1 CAPSULE, LIQUID FILLED ORAL
Qty: 60 | Refills: 0 | Status: ERX

## 2021-12-23 ASSESSMENT — LOCATION SIMPLE DESCRIPTION DERM
LOCATION SIMPLE: RIGHT FOREHEAD
LOCATION SIMPLE: UPPER BACK

## 2021-12-23 ASSESSMENT — LOCATION ZONE DERM
LOCATION ZONE: FACE
LOCATION ZONE: TRUNK

## 2021-12-23 ASSESSMENT — LOCATION DETAILED DESCRIPTION DERM
LOCATION DETAILED: SUPERIOR THORACIC SPINE
LOCATION DETAILED: RIGHT MEDIAL FOREHEAD

## 2021-12-23 NOTE — PROCEDURE: ORDER TESTS
Billing Type: Third-Party Bill
Performing Laboratory: 0
Bill For Surgical Tray: no
Expected Date Of Service: 12/23/2021

## 2021-12-23 NOTE — PROCEDURE: ISOTRETINOIN MONITORING
Use Therapeutic Ranged Or Therapeutic Target: please select Range or Target
Detail Level: Zone
Nosebleeds Normal Treatment: I explained this is common when taking isotretinoin. I recommended saline mist in each nostril multiple times a day. If this worsens they will contact us.
Pounds Preamble Statement (Weight Entered In Details Tab): Reported Weight in pounds:
Retinoid Dermatitis Normal Treatment: I recommended more frequent application of Cetaphil or CeraVe to the areas of dermatitis.
Hypercholesterolemia Monitoring: I explained this is common when taking isotretinoin. We will monitor closely.
Lower Range (In Mg/Kg): 120
Headache Monitoring: I recommended monitoring the headaches for now. There is no evidence of increased intracranial pressure. They were instructed to call if the headaches are worsening.
Retinoid Dermatitis Aggressive Treatment: I recommended more frequent application of Cetaphil or CeraVe to the areas of dermatitis. I also prescribed a topical steroid for twice daily use until the dermatitis resolves.
Cheilitis Normal Treatment: I recommended application of Vaseline or Aquaphor numerous times a day (as often as every hour) and before going to bed.
What Is The Patient's Gender: Female
Target Cumulative Dosage (In Mg/Kg): 135
Upper Range (In Mg/Kg): 150
Kilograms Preamble Statement (Weight Entered In Details Tab): Reported Weight in kilograms: 71.6
Cheilitis Aggressive Treatment: I recommended application of Vaseline or Aquaphor numerous times a day (as often as every hour) and before going to bed. I also prescribed a topical steroid for twice daily use.
Female Completion Statement: After discussing her treatment course we decided to discontinue isotretinoin therapy at this time. I explained that she would need to continue her birth control methods for at least one month after the last dosage. She should also get a pregnancy test one month after the last dose. She shouldn't donate blood for one month after the last dose. She should call with any new symptoms of depression.
Use Enhanced Counseling Feature (Automatic): No
Show Text Field For Brand Names Of Contraception?: Yes
Male Completion Statement: After discussing his treatment course we decided to discontinue isotretinoin therapy at this time. He shouldn't donate blood for one month after the last dose. He should call with any new symptoms of depression.
Xerosis Normal Treatment: I recommended application of Cetaphil or CeraVe numerous times a day and before going to bed to all dry areas.
Myalgia Monitoring: I explained this is common when taking isotretinoin. If this worsens they will contact us.
Ipledge Number (Optional): [id number]
Xerosis Aggressive Treatment: I recommended application of Cetaphil or CeraVe numerous times a day and before going to bed to all dry areas. I also prescribed a topical steroid for twice daily use.
Myalgia Treatment: I explained this is common when taking isotretinoin. If this worsens they will contact us. They may try OTC ibuprofen.
Weight Units: kilograms
Next Month's Dosage: 40mg BID
Comments: Discussed the risks in elevated Triglycerides in depth with the patient. More labs ordered and patient is to alternate 40qd and 40bid for the next month.
Patient Weight (Optional But Required For Cumulative Dose-Numbers And Decimals Only): 99.7
Months Of Therapy Completed: 1
Dosing Month 1 (Required For Cumulative Dosing): 40mg Daily
Female Pregnancy Counseling Text: Female patients should also be on two forms of birth control while taking this medication and for one month after their last dose.
Counseling Text: I reviewed the side effect in detail. Patient should get monthly blood tests, not donate blood, not drive at night if vision affected, and not share medication.
Xerosis Normal Treatment: I recommended application of Cetaphil or CeraVe numerous times a day going to bed to all dry areas.
Xerosis Aggressive Treatment: I recommended application of Cetaphil or CeraVe numerous times a day going to bed to all dry areas. I also prescribed a topical steroid for twice daily use.

## 2021-12-23 NOTE — PROCEDURE: URINE PREGNANCY TEST
Urine Pregnancy Test Result: negative
Lot # (Optional): [id number]
Performed By: FRANKIE Slaughter
Detail Level: None
Expiration Date (Optional): 02/28/2023

## 2022-01-07 ENCOUNTER — TELEPHONE (OUTPATIENT)
Dept: CARDIOLOGY | Facility: MEDICAL CENTER | Age: 42
End: 2022-01-07

## 2022-01-07 NOTE — TELEPHONE ENCOUNTER
Called and spoke to pt, confirmed appt with LS 1/17/22. Pt states she is schedule to complete her CT chest & echo at [facility] and will have this all done before her schedule appt with LS. Pt thank me for calling.

## 2022-01-11 NOTE — PROGRESS NOTES
Subjective:   Chief Complaint:   Chief Complaint   Patient presents with   • Dyslipidemia   • Hypertension     F/V Dx: Essential hypertension       Princess Machado is a 41 y.o. female who returns for thoracic aortic aneurysm, hypertension, hyperlipidemia, vasovagal syncope.    Relocated to [place] 2009, was seeing another PCP who discovered enlarged aorta.    Echocardiogram 2018 measured at 4 cm.  CT scan October 2019 measured 4.5 cm.  CT , 4.5 cm.  Just had CT and echo at [facility], requested.    Has high LDL cholesterol, has been on keto.   LDL is 147, has small particle lipids also.  HDL low, triglycerides elevated.  Probably from her mother.    Has hypertension, BP at goal, tolerating very low dose metoprolol, does not bother her asthma.  Blood pressures at home typically are in the 120s, rarely the 130s and sometimes the 110s, over 70-80s.    Was seen in the ED 11/8/2021 for flulike symptoms and syncope.  Troponins normal.  Was on toilet, started sweating, fell off of the toilet, out about 30 seconds.  The flu shot the day before.  Vasovagal syncope.    Has migraine headaches, better after weight loss.    Had incidental pulmonary nodules, going to see Pulmonary.  Asthma.    Had Covid 2021, PNA, recovered but over time.  Then vaccinated.    Had two babies with vaginal delivery without complications.    Mother found to have ascending aorta, had heart cath after abnormal stress but no coronary disease.    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  Working on germ cleaning for park bathrooms.  Father passed away, lots to take care of.    Kids are 6 and 8.   stays at home with kids.    She is not limited by chest pain, pressure or tightness.   No significant dyspnea on exertion, orthopnea or lower extremity swelling.   No significant palpitations, dizziness, or presyncope/syncope.   No symptoms of leg claudication.   No stroke/TIA like symptoms.      DATA  REVIEWED by me:  ECG 10/1/2019  Sinus, rate 64, nonspecific T wave changes    Echo 4/19/2018  Normal left ventricular size and systolic function.  Left ventricular ejection fraction is visually estimated to be 65%.  Normal diastolic function.  Normal inferior vena cava size and inspiratory collapse.  Normal aortic root for body surface area. aortic root measuring 3.8 cm,   ascending aorta diameter is  4.0 cm.    CT [facility] 1- Requested    CT scan 12-1-2020  Stable aneurysmal dilatation of the ascending aorta to 4.5 cm.  Stable pulmonary nodules measuring up to 5 mm.    CT scan 10/29/2019  Dilatation of the ascending aorta up to 4.5 cm.  No aortic dissection. No mediastinal hematoma.  Multiple pulmonary nodules measuring up to 5.1 mm in diameter.  No airspace consolidation or pleural effusions.       Most recent labs:     Lab Results   Component Value Date/Time    HEMOGLOBIN 14.8 11/09/2021 07:48 AM    HEMATOCRIT 43.9 11/09/2021 07:48 AM    MCV 90.9 11/09/2021 07:48 AM      Lab Results   Component Value Date/Time    SODIUM 139 12/17/2021 08:23 AM    POTASSIUM 4.2 12/17/2021 08:23 AM    CHLORIDE 105 12/17/2021 08:23 AM    CO2 23 12/17/2021 08:23 AM    GLUCOSE 97 12/17/2021 08:23 AM    BUN 13 12/17/2021 08:23 AM    CREATININE 0.62 12/17/2021 08:23 AM      Lab Results   Component Value Date/Time    ASTSGOT 20 12/17/2021 08:23 AM    ALTSGPT 27 12/17/2021 08:23 AM    ALBUMIN 4.3 12/17/2021 08:23 AM      Lab Results   Component Value Date/Time    CHOLSTRLTOT 248 (H) 12/17/2021 08:23 AM     (H) 12/17/2021 08:23 AM    HDL 39 (A) 12/17/2021 08:23 AM    TRIGLYCERIDE 312 (H) 12/17/2021 08:23 AM       11-7-2020 na 138, k 4.3, cr, 0.74, gl 99    3/7/2019 sodium 139, potassium 4.8, creatinine 0.87, LFTs normal, , HDL 48, triglycerides 148, total cholesterol 241    Past Medical History:   Diagnosis Date   • Abnormal Pap smear of cervix    • ASTHMA     seasonal allergy related   • HPV in female    •  Hypertension    • Migraines      Past Surgical History:   Procedure Laterality Date   • DILATION AND CURETTAGE       Family History   Problem Relation Age of Onset   • Hypertension Mother    • Heart Disease Mother         Dilated ascending aorta   • Heart Disease Father    • Hypertension Father    • Hyperlipidemia Father    • Other Father         Amyloidosis, mostly GI and muscle, 78     Social History     Socioeconomic History   • Marital status:      Spouse name: Not on file   • Number of children: Not on file   • Years of education: Not on file   • Highest education level: Not on file   Occupational History   • Not on file   Tobacco Use   • Smoking status: Never Smoker   • Smokeless tobacco: Never Used   Vaping Use   • Vaping Use: Never used   Substance and Sexual Activity   • Alcohol use: No   • Drug use: No   • Sexual activity: Not on file   Other Topics Concern   • Not on file   Social History Narrative   • Not on file     Social Determinants of Health     Financial Resource Strain:    • Difficulty of Paying Living Expenses: Not on file   Food Insecurity:    • Worried About Running Out of Food in the Last Year: Not on file   • Ran Out of Food in the Last Year: Not on file   Transportation Needs:    • Lack of Transportation (Medical): Not on file   • Lack of Transportation (Non-Medical): Not on file   Physical Activity:    • Days of Exercise per Week: Not on file   • Minutes of Exercise per Session: Not on file   Stress:    • Feeling of Stress : Not on file   Social Connections:    • Frequency of Communication with Friends and Family: Not on file   • Frequency of Social Gatherings with Friends and Family: Not on file   • Attends Judaism Services: Not on file   • Active Member of Clubs or Organizations: Not on file   • Attends Club or Organization Meetings: Not on file   • Marital Status: Not on file   Intimate Partner Violence:    • Fear of Current or Ex-Partner: Not on file   • Emotionally Abused: Not  "on file   • Physically Abused: Not on file   • Sexually Abused: Not on file   Housing Stability:    • Unable to Pay for Housing in the Last Year: Not on file   • Number of Places Lived in the Last Year: Not on file   • Unstable Housing in the Last Year: Not on file     No Known Allergies    Current Outpatient Medications   Medication Sig Dispense Refill   • isotretinoin (ACCUTANE) 20 MG capsule      • CETIRIZINE HCL PO Take 1 Tablet by mouth every evening.     • Coenzyme Q10 (CO Q 10 PO) Take 1 Capsule by mouth every evening.     • Pseudoephedrine-guaiFENesin (MUCINEX D PO) Take 1 Tablet by mouth 2 times a day as needed (For cough).     • metoprolol SR (TOPROL XL) 25 MG TABLET SR 24 HR Take 0.5 Tablets by mouth every day. (Patient taking differently: Take 12.5 mg by mouth every evening.) 45 tablet 3   • albuterol 108 (90 Base) MCG/ACT Aero Soln inhalation aerosol Inhale 2 Puffs every 6 hours as needed for Shortness of Breath. 8.5 g 3   • Omega-3 Fatty Acids (FISH OIL) 1000 MG Cap capsule Take 1 Cap by mouth 2 Times a Day. 180 Cap 1   • eletriptan (RELPAX) 20 MG Tab TK 1 T PO AOS OF MIGRAINE AS NEEDED. MAY REPEAT IN 2 H. DO NOT EXCEED 2 T IN 24 H (Patient taking differently: Take 20 mg by mouth one time as needed for Migraine. TK 1 T PO AOS OF MIGRAINE AS NEEDED. MAY REPEAT IN 2 H. DO NOT EXCEED 2 T IN 24 H) 10 Tab 2   • BOTOX 200 units Recon Soln every 3 months. For migraines     • Cholecalciferol (VITAMIN D3) 5000 units Cap Take 5,000 Units by mouth every evening.     • acetaminophen (TYLENOL) 650 MG CR tablet Take 1,300 mg by mouth every 6 hours as needed for Moderate Pain.       No current facility-administered medications for this visit.       ROS    All others systems reviewed and negative.     Objective:     /80 (BP Location: Left arm, Patient Position: Sitting, BP Cuff Size: Adult)   Pulse 77   Resp 14   Ht 1.727 m (5' 8\")   Wt 100 kg (221 lb 6.4 oz)   SpO2 94%  Body mass index is 33.66 " kg/m².    Physical Exam   General: No acute distress. Well nourished.  HEENT: EOM grossly intact, no scleral icterus, no pharyngeal erythema.   Neck:  No JVD, no bruits, trachea midline  CVS: RRR. Normal S1, S2. No M/R/G. No LE edema.  2+ radial pulses, 2+ PT pulses  Resp: CTAB. No wheezing or crackles/rhonchi. Normal respiratory effort.  Abdomen: Soft, NT, no kandis hepatomegaly.  MSK/Ext: No clubbing or cyanosis.  Skin: Warm and dry, no rashes.  Neurological: CN III-XII grossly intact. No focal deficits.   Psych: A&O x 3, appropriate affect, good judgement    Physical exam performed today and unchanged, except what is noted, compared to 10/2/2020      Assessment:     1. Thoracic aortic aneurysm without rupture (HCC)     2. Essential hypertension     3. Mixed hyperlipidemia     4. Family history of thoracic aortic aneurysm     5. Metabolic syndrome X     6. Vasovagal syncope         Medical Decision Making:  Today's Assessment / Status / Plan:     -Ct and echo at [facility], following the CT, we are waiting for the CT scan results from the other day from [place], I will have to send her a Alter-G message  -If it is still around 4.5 and has not changed, we can try to kick her CT scans out to 1.5 to 2 years until it gets closer to 5 cm. Surgery would be recommended at 5.5 cm.  -Root and AV are normal  -Blood pressure to go to goal, tolerating beta-blocker  -Lifestyle modification for LDL, she was on keto diet and also I suspect partially familial  -10-year risk of atherosclerotic cardiovascular disease is low  -Doing well on metoprolol despite asthma.  -ECG is not concerning.  -We discussed lifestyle already, avoiding straining and heavy lifting.  -Brother will get screened, eventually her children when they are grown.  -Vasovagal syncope, has happened before, father prone to same  -RTC 1 year    Addendum 1/25/2022: CT received from [place], findings as such:  1/14/2022: Ascending aorta is ectatic, measuring 4.1  cm, slight motion artifact distorts the root, no dissection.  Descending aorta measures 2 cm.  I did update Estefania by phone.  I cannot explain the discrepancy.  I did go back and personally remeasure the CT scan from December 2020 at [facility] and the ascending aorta does appear to be dilated to 4.5 cm.  Ascending aorta is do not shrink, it must be a discrepancy in measuring.  I am glad to report that it is at least not greater than 4.5 cm and I think we could wait 2 years to check the next study.  I am also waiting for the echocardiogram for [place].    Written instructions given today:    CT-the scans do come with radiation and also the exposure to contrast dye. We would consider switching to MRA which is a technology using MRI that could be done without dye. It would be about 15 to 20-minute acquisition. Valium could be given. We do not have to cross this bridge right now.    -If your CT scan from [place] is still around 4.5 cm, I will say we will wait 2 years for the next CT.        Return in about 1 year (around 1/17/2023).    It is my pleasure to participate in the care of Ms. Machado.  Please do not hesitate to contact me with questions or concerns.    Meghann Lockhart MD, [facility]  Cardiologist [facility]    Please note that this dictation was created using voice recognition software. I have made every reasonable attempt to correct obvious errors, but it is possible there are errors of grammar and possibly content that I did not discover before finalizing the note.

## 2022-01-17 ENCOUNTER — OFFICE VISIT (OUTPATIENT)
Dept: CARDIOLOGY | Facility: MEDICAL CENTER | Age: 42
End: 2022-01-17
Payer: COMMERCIAL

## 2022-01-17 ENCOUNTER — TELEPHONE (OUTPATIENT)
Dept: CARDIOLOGY | Facility: MEDICAL CENTER | Age: 42
End: 2022-01-17

## 2022-01-17 VITALS
BODY MASS INDEX: 33.56 KG/M2 | HEART RATE: 77 BPM | WEIGHT: 221.4 LBS | RESPIRATION RATE: 14 BRPM | HEIGHT: 68 IN | DIASTOLIC BLOOD PRESSURE: 80 MMHG | SYSTOLIC BLOOD PRESSURE: 110 MMHG | OXYGEN SATURATION: 94 %

## 2022-01-17 DIAGNOSIS — R55 VASOVAGAL SYNCOPE: ICD-10-CM

## 2022-01-17 DIAGNOSIS — Z82.49 FAMILY HISTORY OF THORACIC AORTIC ANEURYSM: ICD-10-CM

## 2022-01-17 DIAGNOSIS — I71.20 THORACIC AORTIC ANEURYSM WITHOUT RUPTURE (HCC): ICD-10-CM

## 2022-01-17 DIAGNOSIS — E88.810 METABOLIC SYNDROME X: ICD-10-CM

## 2022-01-17 DIAGNOSIS — I10 ESSENTIAL HYPERTENSION: ICD-10-CM

## 2022-01-17 DIAGNOSIS — E78.2 MIXED HYPERLIPIDEMIA: ICD-10-CM

## 2022-01-17 PROCEDURE — 99214 OFFICE O/P EST MOD 30 MIN: CPT | Performed by: INTERNAL MEDICINE

## 2022-01-17 RX ORDER — ISOTRETINOIN 20 MG/1
CAPSULE ORAL
COMMUNITY
Start: 2021-11-23 | End: 2022-05-17

## 2022-01-17 ASSESSMENT — FIBROSIS 4 INDEX: FIB4 SCORE: 0.7

## 2022-01-17 NOTE — LETTER
[facility]   [address]  [place] [address]  Phone: [phone]  Fax: [phone]              Princess Machado  1980    Encounter Date: 1/17/2022    Meghann Lockhart M.D.          PROGRESS NOTE:  Subjective:   Chief Complaint:   Chief Complaint   Patient presents with   • Dyslipidemia   • Hypertension     F/V Dx: Essential hypertension       Princess Machado is a 41 y.o. female who returns for thoracic aortic aneurysm, hypertension, hyperlipidemia, vasovagal syncope.    Relocated to [place] 2009, was seeing another PCP who discovered enlarged aorta.    Echocardiogram 2018 measured at 4 cm.  CT scan October 2019 measured 4.5 cm.  CT , 4.5 cm.  Just had CT and echo at [facility], requested.    Has high LDL cholesterol, has been on keto.   LDL is 147, has small particle lipids also.  HDL low, triglycerides elevated.  Probably from her mother.    Has hypertension, BP at goal, tolerating very low dose metoprolol, does not bother her asthma.  Blood pressures at home typically are in the 120s, rarely the 130s and sometimes the 110s, over 70-80s.    Was seen in the ED 11/8/2021 for flulike symptoms and syncope.  Troponins normal.  Was on toilet, started sweating, fell off of the toilet, out about 30 seconds.  The flu shot the day before.  Vasovagal syncope.    Has migraine headaches, better after weight loss.    Had incidental pulmonary nodules, going to see Pulmonary.  Asthma.    Had Covid 2021, PNA, recovered but over time.  Then vaccinated.    Had two babies with vaginal delivery without complications.    Mother found to have ascending aorta, had heart cath after abnormal stress but no coronary disease.    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  Working on germ cleaning for park bathrooms.  Father passed away, lots to take care of.    Kids are 6 and 8.   stays at home with kids.    She is  not limited by chest pain, pressure or tightness.   No significant dyspnea on exertion, orthopnea or lower extremity swelling.   No significant palpitations, dizziness, or presyncope/syncope.   No symptoms of leg claudication.   No stroke/TIA like symptoms.      DATA REVIEWED by me:  ECG 10/1/2019  Sinus, rate 64, nonspecific T wave changes    Echo 4/19/2018  Normal left ventricular size and systolic function.  Left ventricular ejection fraction is visually estimated to be 65%.  Normal diastolic function.  Normal inferior vena cava size and inspiratory collapse.  Normal aortic root for body surface area. aortic root measuring 3.8 cm,   ascending aorta diameter is  4.0 cm.    CT [facility] 1- Requested    CT scan 12-1-2020  Stable aneurysmal dilatation of the ascending aorta to 4.5 cm.  Stable pulmonary nodules measuring up to 5 mm.    CT scan 10/29/2019  Dilatation of the ascending aorta up to 4.5 cm.  No aortic dissection. No mediastinal hematoma.  Multiple pulmonary nodules measuring up to 5.1 mm in diameter.  No airspace consolidation or pleural effusions.       Most recent labs:     Lab Results   Component Value Date/Time    HEMOGLOBIN 14.8 11/09/2021 07:48 AM    HEMATOCRIT 43.9 11/09/2021 07:48 AM    MCV 90.9 11/09/2021 07:48 AM      Lab Results   Component Value Date/Time    SODIUM 139 12/17/2021 08:23 AM    POTASSIUM 4.2 12/17/2021 08:23 AM    CHLORIDE 105 12/17/2021 08:23 AM    CO2 23 12/17/2021 08:23 AM    GLUCOSE 97 12/17/2021 08:23 AM    BUN 13 12/17/2021 08:23 AM    CREATININE 0.62 12/17/2021 08:23 AM      Lab Results   Component Value Date/Time    ASTSGOT 20 12/17/2021 08:23 AM    ALTSGPT 27 12/17/2021 08:23 AM    ALBUMIN 4.3 12/17/2021 08:23 AM      Lab Results   Component Value Date/Time    CHOLSTRLTOT 248 (H) 12/17/2021 08:23 AM     (H) 12/17/2021 08:23 AM    HDL 39 (A) 12/17/2021 08:23 AM    TRIGLYCERIDE 312 (H) 12/17/2021 08:23 AM       11-7-2020 na 138, k 4.3, cr, 0.74, gl  99    3/7/2019 sodium 139, potassium 4.8, creatinine 0.87, LFTs normal, , HDL 48, triglycerides 148, total cholesterol 241    Past Medical History:   Diagnosis Date   • Abnormal Pap smear of cervix    • ASTHMA     seasonal allergy related   • HPV in female    • Hypertension    • Migraines      Past Surgical History:   Procedure Laterality Date   • DILATION AND CURETTAGE       Family History   Problem Relation Age of Onset   • Hypertension Mother    • Heart Disease Mother         Dilated ascending aorta   • Heart Disease Father    • Hypertension Father    • Hyperlipidemia Father    • Other Father         Amyloidosis, mostly GI and muscle, 78     Social History     Socioeconomic History   • Marital status:      Spouse name: Not on file   • Number of children: Not on file   • Years of education: Not on file   • Highest education level: Not on file   Occupational History   • Not on file   Tobacco Use   • Smoking status: Never Smoker   • Smokeless tobacco: Never Used   Vaping Use   • Vaping Use: Never used   Substance and Sexual Activity   • Alcohol use: No   • Drug use: No   • Sexual activity: Not on file   Other Topics Concern   • Not on file   Social History Narrative   • Not on file     Social Determinants of Health     Financial Resource Strain:    • Difficulty of Paying Living Expenses: Not on file   Food Insecurity:    • Worried About Running Out of Food in the Last Year: Not on file   • Ran Out of Food in the Last Year: Not on file   Transportation Needs:    • Lack of Transportation (Medical): Not on file   • Lack of Transportation (Non-Medical): Not on file   Physical Activity:    • Days of Exercise per Week: Not on file   • Minutes of Exercise per Session: Not on file   Stress:    • Feeling of Stress : Not on file   Social Connections:    • Frequency of Communication with Friends and Family: Not on file   • Frequency of Social Gatherings with Friends and Family: Not on file   • Attends Sabianism  Services: Not on file   • Active Member of Clubs or Organizations: Not on file   • Attends Club or Organization Meetings: Not on file   • Marital Status: Not on file   Intimate Partner Violence:    • Fear of Current or Ex-Partner: Not on file   • Emotionally Abused: Not on file   • Physically Abused: Not on file   • Sexually Abused: Not on file   Housing Stability:    • Unable to Pay for Housing in the Last Year: Not on file   • Number of Places Lived in the Last Year: Not on file   • Unstable Housing in the Last Year: Not on file     No Known Allergies    Current Outpatient Medications   Medication Sig Dispense Refill   • isotretinoin (ACCUTANE) 20 MG capsule      • CETIRIZINE HCL PO Take 1 Tablet by mouth every evening.     • Coenzyme Q10 (CO Q 10 PO) Take 1 Capsule by mouth every evening.     • Pseudoephedrine-guaiFENesin (MUCINEX D PO) Take 1 Tablet by mouth 2 times a day as needed (For cough).     • metoprolol SR (TOPROL XL) 25 MG TABLET SR 24 HR Take 0.5 Tablets by mouth every day. (Patient taking differently: Take 12.5 mg by mouth every evening.) 45 tablet 3   • albuterol 108 (90 Base) MCG/ACT Aero Soln inhalation aerosol Inhale 2 Puffs every 6 hours as needed for Shortness of Breath. 8.5 g 3   • Omega-3 Fatty Acids (FISH OIL) 1000 MG Cap capsule Take 1 Cap by mouth 2 Times a Day. 180 Cap 1   • eletriptan (RELPAX) 20 MG Tab TK 1 T PO AOS OF MIGRAINE AS NEEDED. MAY REPEAT IN 2 H. DO NOT EXCEED 2 T IN 24 H (Patient taking differently: Take 20 mg by mouth one time as needed for Migraine. TK 1 T PO AOS OF MIGRAINE AS NEEDED. MAY REPEAT IN 2 H. DO NOT EXCEED 2 T IN 24 H) 10 Tab 2   • BOTOX 200 units Recon Soln every 3 months. For migraines     • Cholecalciferol (VITAMIN D3) 5000 units Cap Take 5,000 Units by mouth every evening.     • acetaminophen (TYLENOL) 650 MG CR tablet Take 1,300 mg by mouth every 6 hours as needed for Moderate Pain.       No current facility-administered medications for this visit.  "      ROS    All others systems reviewed and negative.     Objective:     /80 (BP Location: Left arm, Patient Position: Sitting, BP Cuff Size: Adult)   Pulse 77   Resp 14   Ht 1.727 m (5' 8\")   Wt 100 kg (221 lb 6.4 oz)   SpO2 94%  Body mass index is 33.66 kg/m².    Physical Exam   General: No acute distress. Well nourished.  HEENT: EOM grossly intact, no scleral icterus, no pharyngeal erythema.   Neck:  No JVD, no bruits, trachea midline  CVS: RRR. Normal S1, S2. No M/R/G. No LE edema.  2+ radial pulses, 2+ PT pulses  Resp: CTAB. No wheezing or crackles/rhonchi. Normal respiratory effort.  Abdomen: Soft, NT, no kandis hepatomegaly.  MSK/Ext: No clubbing or cyanosis.  Skin: Warm and dry, no rashes.  Neurological: CN III-XII grossly intact. No focal deficits.   Psych: A&O x 3, appropriate affect, good judgement    Physical exam performed today and unchanged, except what is noted, compared to 10/2/2020      Assessment:     1. Thoracic aortic aneurysm without rupture (HCC)     2. Essential hypertension     3. Mixed hyperlipidemia     4. Family history of thoracic aortic aneurysm     5. Metabolic syndrome X     6. Vasovagal syncope         Medical Decision Making:  Today's Assessment / Status / Plan:     -Ct and echo at [facility], following the CT, we are waiting for the CT scan results from the other day from [place], I will have to send her a [employer] message  -If it is still around 4.5 and has not changed, we can try to kick her CT scans out to 1.5 to 2 years until it gets closer to 5 cm. Surgery would be recommended at 5.5 cm.  -Root and AV are normal  -Blood pressure to go to goal, tolerating beta-blocker  -Lifestyle modification for LDL, she was on keto diet and also I suspect partially familial  -10-year risk of atherosclerotic cardiovascular disease is low  -Doing well on metoprolol despite asthma.  -ECG is not concerning.  -We discussed lifestyle already, avoiding straining and heavy " lifting.  -Brother will get screened, eventually her children when they are grown.  -Vasovagal syncope, has happened before, father prone to same  -RTC 1 year    Written instructions given today:    CT-the scans do come with radiation and also the exposure to contrast dye. We would consider switching to MRA which is a technology using MRI that could be done without dye. It would be about 15 to 20-minute acquisition. Valium could be given. We do not have to cross this bridge right now.    -If your CT scan from [place] is still around 4.5 cm, I will say we will wait 2 years for the next CT.        Return in about 1 year (around 1/17/2023).    It is my pleasure to participate in the care of Ms. Machado.  Please do not hesitate to contact me with questions or concerns.    Meghann Lockhart MD, [facility]  Cardiologist [facility]    Please note that this dictation was created using voice recognition software. I have made every reasonable attempt to correct obvious errors, but it is possible there are errors of grammar and possibly content that I did not discover before finalizing the note.          No Recipients

## 2022-01-18 NOTE — PATIENT INSTRUCTIONS
CT-the scans do come with radiation and also the exposure to contrast dye. We would consider switching to MRA which is a technology using MRI that could be done without dye. It would be about 15 to 20-minute acquisition. Valium could be given. We do not have to cross this bridge right now.    -If your CT scan from [place] is still around 4.5 cm, I will say we will wait 2 years for the next CT.

## 2022-01-18 NOTE — TELEPHONE ENCOUNTER
Per LS , requested Cardiac Imaging records from [facility] P#[phone] Fax#[phone]. (STAT)    Pending records. Confirmation received. Scanned into [employer]

## 2022-01-20 ENCOUNTER — TELEPHONE (OUTPATIENT)
Dept: CARDIOLOGY | Facility: MEDICAL CENTER | Age: 42
End: 2022-01-20

## 2022-01-20 ENCOUNTER — HOSPITAL ENCOUNTER (OUTPATIENT)
Dept: LAB | Facility: MEDICAL CENTER | Age: 42
End: 2022-01-20
Attending: DERMATOLOGY
Payer: COMMERCIAL

## 2022-01-20 LAB
ALBUMIN SERPL BCP-MCNC: 4.6 G/DL (ref 3.2–4.9)
ALP SERPL-CCNC: 53 U/L (ref 30–99)
ALT SERPL-CCNC: 18 U/L (ref 2–50)
AST SERPL-CCNC: 14 U/L (ref 12–45)
BILIRUB CONJ SERPL-MCNC: <0.2 MG/DL (ref 0.1–0.5)
BILIRUB INDIRECT SERPL-MCNC: NORMAL MG/DL (ref 0–1)
BILIRUB SERPL-MCNC: 0.4 MG/DL (ref 0.1–1.5)
CHOLEST SERPL-MCNC: 225 MG/DL (ref 100–199)
HDLC SERPL-MCNC: 34 MG/DL
LDLC SERPL CALC-MCNC: 150 MG/DL
PROT SERPL-MCNC: 7.2 G/DL (ref 6–8.2)
TRIGL SERPL-MCNC: 203 MG/DL (ref 0–149)

## 2022-01-20 PROCEDURE — 80061 LIPID PANEL: CPT

## 2022-01-20 PROCEDURE — 80076 HEPATIC FUNCTION PANEL: CPT

## 2022-01-20 PROCEDURE — 36415 COLL VENOUS BLD VENIPUNCTURE: CPT

## 2022-01-20 NOTE — TELEPHONE ENCOUNTER
STAT request sent to [facility] medical records at [phone] for pt's CT results per LS's request.

## 2022-01-24 ENCOUNTER — PATIENT MESSAGE (OUTPATIENT)
Dept: CARDIOLOGY | Facility: MEDICAL CENTER | Age: 42
End: 2022-01-24

## 2022-01-24 ENCOUNTER — APPOINTMENT (RX ONLY)
Dept: URBAN - METROPOLITAN AREA CLINIC 6 | Facility: CLINIC | Age: 42
Setting detail: DERMATOLOGY
End: 2022-01-24

## 2022-01-24 DIAGNOSIS — L70.0 ACNE VULGARIS: ICD-10-CM

## 2022-01-24 PROCEDURE — ? ORDER TESTS

## 2022-01-24 PROCEDURE — ? ADDITIONAL NOTES

## 2022-01-24 PROCEDURE — ? ISOTRETINOIN MONITORING

## 2022-01-24 PROCEDURE — ? PRESCRIPTION

## 2022-01-24 PROCEDURE — ? DIAGNOSIS COMMENT

## 2022-01-24 PROCEDURE — ? COUNSELING

## 2022-01-24 PROCEDURE — 99214 OFFICE O/P EST MOD 30 MIN: CPT

## 2022-01-24 RX ORDER — ISOTRETINOIN 40 MG/1
1 CAPSULE, LIQUID FILLED ORAL
Qty: 60 | Refills: 0 | Status: ERX

## 2022-01-24 ASSESSMENT — LOCATION SIMPLE DESCRIPTION DERM
LOCATION SIMPLE: UPPER BACK
LOCATION SIMPLE: RIGHT FOREHEAD

## 2022-01-24 ASSESSMENT — LOCATION ZONE DERM
LOCATION ZONE: FACE
LOCATION ZONE: TRUNK

## 2022-01-24 ASSESSMENT — LOCATION DETAILED DESCRIPTION DERM
LOCATION DETAILED: RIGHT MEDIAL FOREHEAD
LOCATION DETAILED: SUPERIOR THORACIC SPINE

## 2022-01-24 NOTE — PROCEDURE: ADDITIONAL NOTES
Additional Notes: Pt brought in her own pregnancy test and did it in office- test was negative
Detail Level: Simple
Render Risk Assessment In Note?: no

## 2022-01-24 NOTE — PROCEDURE: DIAGNOSIS COMMENT
Detail Level: Simple
Render Risk Assessment In Note?: no
Comment: moderate to severe
Comment: still flaring, few larger cystic papules

## 2022-01-24 NOTE — PROCEDURE: ISOTRETINOIN MONITORING
Use Therapeutic Ranged Or Therapeutic Target: please select Range or Target
Dosing Month 2 (Required For Cumulative Dosing): 60mg Daily
Detail Level: Zone
Nosebleeds Normal Treatment: I explained this is common when taking isotretinoin. I recommended saline mist in each nostril multiple times a day. If this worsens they will contact us.
Pounds Preamble Statement (Weight Entered In Details Tab): Reported Weight in pounds: 220
Retinoid Dermatitis Normal Treatment: I recommended more frequent application of Cetaphil or CeraVe to the areas of dermatitis.
Hypercholesterolemia Monitoring: I explained this is common when taking isotretinoin. We will monitor closely.
Lower Range (In Mg/Kg): 120
Headache Monitoring: I recommended monitoring the headaches for now. There is no evidence of increased intracranial pressure. They were instructed to call if the headaches are worsening.
Retinoid Dermatitis Aggressive Treatment: I recommended more frequent application of Cetaphil or CeraVe to the areas of dermatitis. I also prescribed a topical steroid for twice daily use until the dermatitis resolves.
Cheilitis Normal Treatment: I recommended application of Vaseline or Aquaphor numerous times a day (as often as every hour) and before going to bed.
What Is The Patient's Gender: Female
Target Cumulative Dosage (In Mg/Kg): 135
Upper Range (In Mg/Kg): 150
Kilograms Preamble Statement (Weight Entered In Details Tab): Reported Weight in kilograms: 99.7
Cheilitis Aggressive Treatment: I recommended application of Vaseline or Aquaphor numerous times a day (as often as every hour) and before going to bed. I also prescribed a topical steroid for twice daily use.
Most Recent Triglycerides (Optional): 203
Female Completion Statement: After discussing her treatment course we decided to discontinue isotretinoin therapy at this time. I explained that she would need to continue her birth control methods for at least one month after the last dosage. She should also get a pregnancy test one month after the last dose. She shouldn't donate blood for one month after the last dose. She should call with any new symptoms of depression.
Most Recent Cholesterol (Optional): 225
Use Enhanced Counseling Feature (Automatic): No
Show Text Field For Brand Names Of Contraception?: Yes
Male Completion Statement: After discussing his treatment course we decided to discontinue isotretinoin therapy at this time. He shouldn't donate blood for one month after the last dose. He should call with any new symptoms of depression.
Xerosis Normal Treatment: I recommended application of Cetaphil or CeraVe numerous times a day and before going to bed to all dry areas.
Myalgia Monitoring: I explained this is common when taking isotretinoin. If this worsens they will contact us.
Ipledge Number (Optional): [phone]
Xerosis Aggressive Treatment: I recommended application of Cetaphil or CeraVe numerous times a day and before going to bed to all dry areas. I also prescribed a topical steroid for twice daily use.
Myalgia Treatment: I explained this is common when taking isotretinoin. If this worsens they will contact us. They may try OTC ibuprofen.
Weight Units: kilograms
Next Month's Dosage: 40mg BID
Comments: Repeat labs x 1 more draw, and patient is to increase to 2 40mg capsules daily. Advised to monitor /lower dose if any abdominal pain.
Patient Weight (Optional But Required For Cumulative Dose-Numbers And Decimals Only): 99.7
Months Of Therapy Completed: 2
Dosing Month 1 (Required For Cumulative Dosing): 40mg Daily
Female Pregnancy Counseling Text: Female patients should also be on two forms of birth control while taking this medication and for one month after their last dose.
Counseling Text: I reviewed the side effect in detail. Patient should get monthly blood tests, not donate blood, not drive at night if vision affected, and not share medication.
Xerosis Normal Treatment: I recommended application of Cetaphil or CeraVe numerous times a day going to bed to all dry areas.
Xerosis Aggressive Treatment: I recommended application of Cetaphil or CeraVe numerous times a day going to bed to all dry areas. I also prescribed a topical steroid for twice daily use.

## 2022-01-26 NOTE — PROGRESS NOTES
I should mention, I did call and speak to her about the CT scan.  She reminded me that there was an echo.  Just let me know we can get that.  Thank you.

## 2022-01-28 DIAGNOSIS — I10 ESSENTIAL HYPERTENSION: ICD-10-CM

## 2022-01-28 DIAGNOSIS — Z82.49 FAMILY HISTORY OF THORACIC AORTIC ANEURYSM: ICD-10-CM

## 2022-01-28 DIAGNOSIS — I71.019 DISSECTION OF THORACIC AORTA (HCC): ICD-10-CM

## 2022-01-28 DIAGNOSIS — E78.00 PURE HYPERCHOLESTEROLEMIA: ICD-10-CM

## 2022-01-28 DIAGNOSIS — E66.9 OBESITY (BMI 30.0-34.9): ICD-10-CM

## 2022-01-30 ENCOUNTER — PATIENT MESSAGE (OUTPATIENT)
Dept: MEDICAL GROUP | Age: 42
End: 2022-01-30

## 2022-01-30 DIAGNOSIS — R11.0 NAUSEA: ICD-10-CM

## 2022-01-31 RX ORDER — ONDANSETRON 4 MG/1
4 TABLET, ORALLY DISINTEGRATING ORAL EVERY 8 HOURS PRN
Qty: 20 TABLET | Refills: 0 | Status: SHIPPED | OUTPATIENT
Start: 2022-01-31

## 2022-02-03 ENCOUNTER — HOSPITAL ENCOUNTER (OUTPATIENT)
Dept: RADIOLOGY | Facility: MEDICAL CENTER | Age: 42
End: 2022-02-03
Payer: COMMERCIAL

## 2022-02-07 ENCOUNTER — APPOINTMENT (OUTPATIENT)
Dept: SLEEP MEDICINE | Facility: MEDICAL CENTER | Age: 42
End: 2022-02-07
Payer: COMMERCIAL

## 2022-02-09 DIAGNOSIS — M25.50 ARTHRALGIA, UNSPECIFIED JOINT: ICD-10-CM

## 2022-02-17 ENCOUNTER — HOSPITAL ENCOUNTER (OUTPATIENT)
Dept: LAB | Facility: MEDICAL CENTER | Age: 42
End: 2022-02-17
Attending: INTERNAL MEDICINE
Payer: COMMERCIAL

## 2022-02-17 ENCOUNTER — HOSPITAL ENCOUNTER (OUTPATIENT)
Dept: LAB | Facility: MEDICAL CENTER | Age: 42
End: 2022-02-17
Attending: DERMATOLOGY
Payer: COMMERCIAL

## 2022-02-17 LAB
ALBUMIN SERPL BCP-MCNC: 4.4 G/DL (ref 3.2–4.9)
ALP SERPL-CCNC: 55 U/L (ref 30–99)
ALT SERPL-CCNC: 25 U/L (ref 2–50)
AST SERPL-CCNC: 24 U/L (ref 12–45)
BILIRUB CONJ SERPL-MCNC: <0.2 MG/DL (ref 0.1–0.5)
BILIRUB INDIRECT SERPL-MCNC: NORMAL MG/DL (ref 0–1)
BILIRUB SERPL-MCNC: 0.4 MG/DL (ref 0.1–1.5)
CHOLEST SERPL-MCNC: 287 MG/DL (ref 100–199)
FASTING STATUS PATIENT QL REPORTED: NORMAL
HDLC SERPL-MCNC: 30 MG/DL
LDLC SERPL CALC-MCNC: ABNORMAL MG/DL
PROT SERPL-MCNC: 7.3 G/DL (ref 6–8.2)
TRIGL SERPL-MCNC: 575 MG/DL (ref 0–149)

## 2022-02-17 PROCEDURE — 86812 HLA TYPING A B OR C: CPT

## 2022-02-17 PROCEDURE — 36415 COLL VENOUS BLD VENIPUNCTURE: CPT

## 2022-02-17 PROCEDURE — 83721 ASSAY OF BLOOD LIPOPROTEIN: CPT

## 2022-02-17 PROCEDURE — 80061 LIPID PANEL: CPT

## 2022-02-17 PROCEDURE — 80076 HEPATIC FUNCTION PANEL: CPT

## 2022-02-17 PROCEDURE — 86618 LYME DISEASE ANTIBODY: CPT

## 2022-02-19 LAB — HLA-B27 QL FC: NEGATIVE

## 2022-02-20 LAB — B BURGDOR AB SER IA-ACNC: 0.15 LIV (ref 0–1.2)

## 2022-02-21 LAB — LDLC SERPL-MCNC: 113 MG/DL (ref 0–129)

## 2022-02-24 ENCOUNTER — APPOINTMENT (RX ONLY)
Dept: URBAN - METROPOLITAN AREA CLINIC 6 | Facility: CLINIC | Age: 42
Setting detail: DERMATOLOGY
End: 2022-02-24

## 2022-02-24 DIAGNOSIS — L70.0 ACNE VULGARIS: ICD-10-CM

## 2022-02-24 PROCEDURE — 99214 OFFICE O/P EST MOD 30 MIN: CPT

## 2022-02-24 PROCEDURE — ? COUNSELING

## 2022-02-24 PROCEDURE — ? DIAGNOSIS COMMENT

## 2022-02-24 PROCEDURE — ? ISOTRETINOIN MONITORING

## 2022-02-24 PROCEDURE — ? ADDITIONAL NOTES

## 2022-02-24 PROCEDURE — ? PRESCRIPTION

## 2022-02-24 RX ORDER — ISOTRETINOIN 30 MG/1
2 CAPSULE, LIQUID FILLED ORAL QD
Qty: 60 | Refills: 0 | Status: ERX | COMMUNITY
Start: 2022-02-24

## 2022-02-24 RX ADMIN — ISOTRETINOIN 2: 30 CAPSULE, LIQUID FILLED ORAL at 00:00

## 2022-02-24 ASSESSMENT — LOCATION ZONE DERM
LOCATION ZONE: TRUNK
LOCATION ZONE: FACE

## 2022-02-24 ASSESSMENT — LOCATION SIMPLE DESCRIPTION DERM
LOCATION SIMPLE: UPPER BACK
LOCATION SIMPLE: RIGHT FOREHEAD

## 2022-02-24 ASSESSMENT — LOCATION DETAILED DESCRIPTION DERM
LOCATION DETAILED: RIGHT MEDIAL FOREHEAD
LOCATION DETAILED: SUPERIOR THORACIC SPINE

## 2022-02-24 NOTE — PROCEDURE: MIPS QUALITY
Quality 226: Preventive Care And Screening: Tobacco Use: Screening And Cessation Intervention: Patient screened for tobacco use and is an ex/non-smoker
Quality 130: Documentation Of Current Medications In The Medical Record: Current Medications Documented
Detail Level: Detailed
Quality 431: Preventive Care And Screening: Unhealthy Alcohol Use - Screening: Patient not identified as an unhealthy alcohol user when screened for unhealthy alcohol use using a systematic screening method
H Plasty Text: Given the location of the defect, shape of the defect and the proximity to free margins a H-plasty was deemed most appropriate for repair.  Using a sterile surgical marker, the appropriate advancement arms of the H-plasty were drawn incorporating the defect and placing the expected incisions within the relaxed skin tension lines where possible. The area thus outlined was incised deep to adipose tissue with a #15 scalpel blade. The skin margins were undermined to an appropriate distance in all directions utilizing iris scissors.  The opposing advancement arms were then advanced into place in opposite direction and anchored with interrupted buried subcutaneous sutures.

## 2022-02-24 NOTE — PROCEDURE: ISOTRETINOIN MONITORING
Use Therapeutic Ranged Or Therapeutic Target: please select Range or Target
Dosing Month 2 (Required For Cumulative Dosing): 60mg Daily
Detail Level: Zone
Nosebleeds Normal Treatment: I explained this is common when taking isotretinoin. I recommended saline mist in each nostril multiple times a day. If this worsens they will contact us.
Dosing Month 3 (Required For Cumulative Dosing): 40mg BID
Pounds Preamble Statement (Weight Entered In Details Tab): Reported Weight in pounds: 220
Retinoid Dermatitis Normal Treatment: I recommended more frequent application of Cetaphil or CeraVe to the areas of dermatitis.
Hypercholesterolemia Monitoring: I explained this is common when taking isotretinoin. We will monitor closely.
Lower Range (In Mg/Kg): 120
Headache Monitoring: I recommended monitoring the headaches for now. There is no evidence of increased intracranial pressure. They were instructed to call if the headaches are worsening.
Retinoid Dermatitis Aggressive Treatment: I recommended more frequent application of Cetaphil or CeraVe to the areas of dermatitis. I also prescribed a topical steroid for twice daily use until the dermatitis resolves.
Cheilitis Normal Treatment: I recommended application of Vaseline or Aquaphor numerous times a day (as often as every hour) and before going to bed.
What Is The Patient's Gender: Female
Target Cumulative Dosage (In Mg/Kg): 135
Upper Range (In Mg/Kg): 150
Kilograms Preamble Statement (Weight Entered In Details Tab): Reported Weight in kilograms: 99.7
Cheilitis Aggressive Treatment: I recommended application of Vaseline or Aquaphor numerous times a day (as often as every hour) and before going to bed. I also prescribed a topical steroid for twice daily use.
Most Recent Triglycerides (Optional): 203
Female Completion Statement: After discussing her treatment course we decided to discontinue isotretinoin therapy at this time. I explained that she would need to continue her birth control methods for at least one month after the last dosage. She should also get a pregnancy test one month after the last dose. She shouldn't donate blood for one month after the last dose. She should call with any new symptoms of depression.
Most Recent Cholesterol (Optional): 225
Use Enhanced Counseling Feature (Automatic): No
Show Text Field For Brand Names Of Contraception?: Yes
Male Completion Statement: After discussing his treatment course we decided to discontinue isotretinoin therapy at this time. He shouldn't donate blood for one month after the last dose. He should call with any new symptoms of depression.
Xerosis Normal Treatment: I recommended application of Cetaphil or CeraVe numerous times a day and before going to bed to all dry areas.
Myalgia Monitoring: I explained this is common when taking isotretinoin. If this worsens they will contact us.
Ipledge Number (Optional): [id number]
Xerosis Aggressive Treatment: I recommended application of Cetaphil or CeraVe numerous times a day and before going to bed to all dry areas. I also prescribed a topical steroid for twice daily use.
Myalgia Treatment: I explained this is common when taking isotretinoin. If this worsens they will contact us. They may try OTC ibuprofen.
Weight Units: kilograms
Next Month's Dosage: 30mg Alternating with 60mg Daily
Comments: Advised to monitor /lower dose if any abdominal pain. \\n\\nPatient reported piriformis pain. Patient to take 1 week off and the restart at alternating 30mg and 60mg qod\\nRecommended the use of La Roche Posay moisturizer or Neutrogena hydro boost gel cream.
Patient Weight (Optional But Required For Cumulative Dose-Numbers And Decimals Only): 99.7
Months Of Therapy Completed: 3
Dosing Month 1 (Required For Cumulative Dosing): 40mg Daily
Female Pregnancy Counseling Text: Female patients should also be on two forms of birth control while taking this medication and for one month after their last dose.
Counseling Text: I reviewed the side effect in detail. Patient should get monthly blood tests, not donate blood, not drive at night if vision affected, and not share medication.
Xerosis Normal Treatment: I recommended application of Cetaphil or CeraVe numerous times a day going to bed to all dry areas.
Xerosis Aggressive Treatment: I recommended application of Cetaphil or CeraVe numerous times a day going to bed to all dry areas. I also prescribed a topical steroid for twice daily use.

## 2022-03-28 ENCOUNTER — APPOINTMENT (RX ONLY)
Dept: URBAN - METROPOLITAN AREA CLINIC 6 | Facility: CLINIC | Age: 42
Setting detail: DERMATOLOGY
End: 2022-03-28

## 2022-03-28 DIAGNOSIS — L70.0 ACNE VULGARIS: ICD-10-CM | Status: UNCHANGED

## 2022-03-28 PROCEDURE — ? PRESCRIPTION

## 2022-03-28 PROCEDURE — ? ISOTRETINOIN MONITORING

## 2022-03-28 PROCEDURE — ? ADDITIONAL NOTES

## 2022-03-28 PROCEDURE — 99214 OFFICE O/P EST MOD 30 MIN: CPT

## 2022-03-28 PROCEDURE — ? DIAGNOSIS COMMENT

## 2022-03-28 PROCEDURE — ? COUNSELING

## 2022-03-28 RX ORDER — ISOTRETINOIN 30 MG/1
2 CAPSULE, LIQUID FILLED ORAL QD
Qty: 60 | Refills: 0 | Status: ERX

## 2022-03-28 ASSESSMENT — LOCATION DETAILED DESCRIPTION DERM
LOCATION DETAILED: RIGHT MEDIAL FOREHEAD
LOCATION DETAILED: SUPERIOR THORACIC SPINE

## 2022-03-28 ASSESSMENT — LOCATION ZONE DERM
LOCATION ZONE: FACE
LOCATION ZONE: TRUNK

## 2022-03-28 ASSESSMENT — LOCATION SIMPLE DESCRIPTION DERM
LOCATION SIMPLE: UPPER BACK
LOCATION SIMPLE: RIGHT FOREHEAD

## 2022-03-28 NOTE — PROCEDURE: COUNSELING
Doxycycline Pregnancy And Lactation Text: This medication is Pregnancy Category D and not consider safe during pregnancy. It is also excreted in breast milk but is considered safe for shorter treatment courses.
Tetracycline Counseling: Patient counseled regarding possible photosensitivity and increased risk for sunburn.  Patient instructed to avoid sunlight, if possible.  When exposed to sunlight, patients should wear protective clothing, sunglasses, and sunscreen.  The patient was instructed to call the office immediately if the following severe adverse effects occur:  hearing changes, easy bruising/bleeding, severe headache, or vision changes.  The patient verbalized understanding of the proper use and possible adverse effects of tetracycline.  All of the patient's questions and concerns were addressed. Patient understands to avoid pregnancy while on therapy due to potential birth defects.
Birth Control Pills Counseling: Birth Control Pill Counseling: I discussed with the patient the potential side effects of OCPs including but not limited to increased risk of stroke, heart attack, thrombophlebitis, deep venous thrombosis, hepatic adenomas, breast changes, GI upset, headaches, and depression.  The patient verbalized understanding of the proper use and possible adverse effects of OCPs. All of the patient's questions and concerns were addressed.
Sarecycline Counseling: Patient advised regarding possible photosensitivity and discoloration of the teeth, skin, lips, tongue and gums.  Patient instructed to avoid sunlight, if possible.  When exposed to sunlight, patients should wear protective clothing, sunglasses, and sunscreen.  The patient was instructed to call the office immediately if the following severe adverse effects occur:  hearing changes, easy bruising/bleeding, severe headache, or vision changes.  The patient verbalized understanding of the proper use and possible adverse effects of sarecycline.  All of the patient's questions and concerns were addressed.
Bactrim Pregnancy And Lactation Text: This medication is Pregnancy Category D and is known to cause fetal risk.  It is also excreted in breast milk.
Topical Retinoid counseling:  Patient advised to apply a pea-sized amount only at bedtime and wait 30 minutes after washing their face before applying.  If too drying, patient may add a non-comedogenic moisturizer. The patient verbalized understanding of the proper use and possible adverse effects of retinoids.  All of the patient's questions and concerns were addressed.
Include Pregnancy/Lactation Warning?: No
Isotretinoin Pregnancy And Lactation Text: This medication is Pregnancy Category X and is considered extremely dangerous during pregnancy. It is unknown if it is excreted in breast milk.
Topical Sulfur Applications Pregnancy And Lactation Text: This medication is Pregnancy Category C and has an unknown safety profile during pregnancy. It is unknown if this topical medication is excreted in breast milk.
Sarecycline Pregnancy And Lactation Text: This medication is Pregnancy Category D and not consider safe during pregnancy. It is also excreted in breast milk.
Tazorac Counseling:  Patient advised that medication is irritating and drying.  Patient may need to apply sparingly and wash off after an hour before eventually leaving it on overnight.  The patient verbalized understanding of the proper use and possible adverse effects of tazorac.  All of the patient's questions and concerns were addressed.
Topical Clindamycin Counseling: Patient counseled that this medication may cause skin irritation or allergic reactions.  In the event of skin irritation, the patient was advised to reduce the amount of the drug applied or use it less frequently.   The patient verbalized understanding of the proper use and possible adverse effects of clindamycin.  All of the patient's questions and concerns were addressed.
High Dose Vitamin A Pregnancy And Lactation Text: High dose vitamin A therapy is contraindicated during pregnancy and breast feeding.
Azithromycin Counseling:  I discussed with the patient the risks of azithromycin including but not limited to GI upset, allergic reaction, drug rash, diarrhea, and yeast infections.
Topical Retinoid Pregnancy And Lactation Text: This medication is Pregnancy Category C. It is unknown if this medication is excreted in breast milk.
High Dose Vitamin A Counseling: Side effects reviewed, pt to contact office should one occur.
Erythromycin Counseling:  I discussed with the patient the risks of erythromycin including but not limited to GI upset, allergic reaction, drug rash, diarrhea, increase in liver enzymes, and yeast infections.
Dapsone Pregnancy And Lactation Text: This medication is Pregnancy Category C and is not considered safe during pregnancy or breast feeding.
Tazorac Pregnancy And Lactation Text: This medication is not safe during pregnancy. It is unknown if this medication is excreted in breast milk.
Topical Clindamycin Pregnancy And Lactation Text: This medication is Pregnancy Category B and is considered safe during pregnancy. It is unknown if it is excreted in breast milk.
Detail Level: Zone
Dapsone Counseling: I discussed with the patient the risks of dapsone including but not limited to hemolytic anemia, agranulocytosis, rashes, methemoglobinemia, kidney failure, peripheral neuropathy, headaches, GI upset, and liver toxicity.  Patients who start dapsone require monitoring including baseline LFTs and weekly CBCs for the first month, then every month thereafter.  The patient verbalized understanding of the proper use and possible adverse effects of dapsone.  All of the patient's questions and concerns were addressed.
Minocycline Counseling: Patient advised regarding possible photosensitivity and discoloration of the teeth, skin, lips, tongue and gums.  Patient instructed to avoid sunlight, if possible.  When exposed to sunlight, patients should wear protective clothing, sunglasses, and sunscreen.  The patient was instructed to call the office immediately if the following severe adverse effects occur:  hearing changes, easy bruising/bleeding, severe headache, or vision changes.  The patient verbalized understanding of the proper use and possible adverse effects of minocycline.  All of the patient's questions and concerns were addressed.
Benzoyl Peroxide Counseling: Patient counseled that medicine may cause skin irritation and bleach clothing.  In the event of skin irritation, the patient was advised to reduce the amount of the drug applied or use it less frequently.   The patient verbalized understanding of the proper use and possible adverse effects of benzoyl peroxide.  All of the patient's questions and concerns were addressed.
Erythromycin Pregnancy And Lactation Text: This medication is Pregnancy Category B and is considered safe during pregnancy. It is also excreted in breast milk.
Birth Control Pills Pregnancy And Lactation Text: This medication should be avoided if pregnant and for the first 30 days post-partum.
Spironolactone Counseling: Patient advised regarding risks of diarrhea, abdominal pain, hyperkalemia, birth defects (for female patients), liver toxicity and renal toxicity. The patient may need blood work to monitor liver and kidney function and potassium levels while on therapy. The patient verbalized understanding of the proper use and possible adverse effects of spironolactone.  All of the patient's questions and concerns were addressed.
Azithromycin Pregnancy And Lactation Text: This medication is considered safe during pregnancy and is also secreted in breast milk.
Spironolactone Pregnancy And Lactation Text: This medication can cause feminization of the male fetus and should be avoided during pregnancy. The active metabolite is also found in breast milk.
Topical Sulfur Applications Counseling: Topical Sulfur Counseling: Patient counseled that this medication may cause skin irritation or allergic reactions.  In the event of skin irritation, the patient was advised to reduce the amount of the drug applied or use it less frequently.   The patient verbalized understanding of the proper use and possible adverse effects of topical sulfur application.  All of the patient's questions and concerns were addressed.
Isotretinoin Counseling: Patient should get monthly blood tests, not donate blood, not drive at night if vision affected, not share medication, and not undergo elective surgery for 6 months after tx completed. Side effects reviewed, pt to contact office should one occur.
Doxycycline Counseling:  Patient counseled regarding possible photosensitivity and increased risk for sunburn.  Patient instructed to avoid sunlight, if possible.  When exposed to sunlight, patients should wear protective clothing, sunglasses, and sunscreen.  The patient was instructed to call the office immediately if the following severe adverse effects occur:  hearing changes, easy bruising/bleeding, severe headache, or vision changes.  The patient verbalized understanding of the proper use and possible adverse effects of doxycycline.  All of the patient's questions and concerns were addressed.
Benzoyl Peroxide Pregnancy And Lactation Text: This medication is Pregnancy Category C. It is unknown if benzoyl peroxide is excreted in breast milk.
Bactrim Counseling:  I discussed with the patient the risks of sulfa antibiotics including but not limited to GI upset, allergic reaction, drug rash, diarrhea, dizziness, photosensitivity, and yeast infections.  Rarely, more serious reactions can occur including but not limited to aplastic anemia, agranulocytosis, methemoglobinemia, blood dyscrasias, liver or kidney failure, lung infiltrates or desquamative/blistering drug rashes.
Azelaic Acid Pregnancy And Lactation Text: This medication is considered safe during pregnancy and breast feeding.
Winlevi Pregnancy And Lactation Text: This medication is considered safe during pregnancy and breastfeeding.
Azelaic Acid Counseling: Patient counseled that medicine may cause skin irritation and to avoid applying near the eyes.  In the event of skin irritation, the patient was advised to reduce the amount of the drug applied or use it less frequently.   The patient verbalized understanding of the proper use and possible adverse effects of azelaic acid.  All of the patient's questions and concerns were addressed.
Winlevi Counseling:  I discussed with the patient the risks of topical clascoterone including but not limited to erythema, scaling, itching, and stinging. Patient voiced their understanding.

## 2022-03-28 NOTE — PROCEDURE: ISOTRETINOIN MONITORING
no
Use Therapeutic Ranged Or Therapeutic Target: please select Range or Target
Dosing Month 2 (Required For Cumulative Dosing): 60mg Daily
Detail Level: Zone
Nosebleeds Normal Treatment: I explained this is common when taking isotretinoin. I recommended saline mist in each nostril multiple times a day. If this worsens they will contact us.
Dosing Month 3 (Required For Cumulative Dosing): 40mg BID
Pounds Preamble Statement (Weight Entered In Details Tab): Reported Weight in pounds: 220
Retinoid Dermatitis Normal Treatment: I recommended more frequent application of Cetaphil or CeraVe to the areas of dermatitis.
Hypercholesterolemia Monitoring: I explained this is common when taking isotretinoin. We will monitor closely.
Lower Range (In Mg/Kg): 120
Headache Monitoring: I recommended monitoring the headaches for now. There is no evidence of increased intracranial pressure. They were instructed to call if the headaches are worsening.
Retinoid Dermatitis Aggressive Treatment: I recommended more frequent application of Cetaphil or CeraVe to the areas of dermatitis. I also prescribed a topical steroid for twice daily use until the dermatitis resolves.
Cheilitis Normal Treatment: I recommended application of Vaseline or Aquaphor numerous times a day (as often as every hour) and before going to bed.
What Is The Patient's Gender: Female
Target Cumulative Dosage (In Mg/Kg): 135
Upper Range (In Mg/Kg): 150
Kilograms Preamble Statement (Weight Entered In Details Tab): Reported Weight in kilograms: 99.7
Cheilitis Aggressive Treatment: I recommended application of Vaseline or Aquaphor numerous times a day (as often as every hour) and before going to bed. I also prescribed a topical steroid for twice daily use.
Female Completion Statement: After discussing her treatment course we decided to discontinue isotretinoin therapy at this time. I explained that she would need to continue her birth control methods for at least one month after the last dosage. She should also get a pregnancy test one month after the last dose. She shouldn't donate blood for one month after the last dose. She should call with any new symptoms of depression.
Dosing Month 4 (Required For Cumulative Dosing): 30mg Alternating with 60mg Daily
Use Enhanced Counseling Feature (Automatic): No
Show Text Field For Brand Names Of Contraception?: Yes
Male Completion Statement: After discussing his treatment course we decided to discontinue isotretinoin therapy at this time. He shouldn't donate blood for one month after the last dose. He should call with any new symptoms of depression.
Xerosis Normal Treatment: I recommended application of Cetaphil or CeraVe numerous times a day and before going to bed to all dry areas.
Myalgia Monitoring: I explained this is common when taking isotretinoin. If this worsens they will contact us.
Ipledge Number (Optional): [id number]
Xerosis Aggressive Treatment: I recommended application of Cetaphil or CeraVe numerous times a day and before going to bed to all dry areas. I also prescribed a topical steroid for twice daily use.
Myalgia Treatment: I explained this is common when taking isotretinoin. If this worsens they will contact us. They may try OTC ibuprofen.
Weight Units: kilograms
Comments: Patient reports persistent left gluteal pain - PCP believes unrelated,\\nnothing symptoms of plantar fasciitis - recommend decrease dose to 30mg daily x 1-2 weeks \\nRecommended La Roche Posay moisturizer or Neutrogena hydro boost gel cream.
Patient Weight (Optional But Required For Cumulative Dose-Numbers And Decimals Only): 99.7
Months Of Therapy Completed: 4
Dosing Month 1 (Required For Cumulative Dosing): 40mg Daily
Female Pregnancy Counseling Text: Female patients should also be on two forms of birth control while taking this medication and for one month after their last dose.
Counseling Text: I reviewed the side effect in detail. Patient should get monthly blood tests, not donate blood, not drive at night if vision affected, and not share medication.
Xerosis Normal Treatment: I recommended application of Cetaphil or CeraVe numerous times a day going to bed to all dry areas.
Xerosis Aggressive Treatment: I recommended application of Cetaphil or CeraVe numerous times a day going to bed to all dry areas. I also prescribed a topical steroid for twice daily use.

## 2022-04-06 DIAGNOSIS — I10 ESSENTIAL HYPERTENSION: ICD-10-CM

## 2022-04-07 RX ORDER — METOPROLOL SUCCINATE 25 MG/1
TABLET, EXTENDED RELEASE ORAL
Qty: 45 TABLET | Refills: 3 | Status: SHIPPED | OUTPATIENT
Start: 2022-04-07 | End: 2023-04-03

## 2022-04-15 ENCOUNTER — HOSPITAL ENCOUNTER (OUTPATIENT)
Dept: LAB | Facility: MEDICAL CENTER | Age: 42
End: 2022-04-15
Attending: INTERNAL MEDICINE
Payer: COMMERCIAL

## 2022-04-15 DIAGNOSIS — M25.50 ARTHRALGIA, UNSPECIFIED JOINT: ICD-10-CM

## 2022-04-15 LAB
CRP SERPL HS-MCNC: 0.34 MG/DL (ref 0–0.75)
ERYTHROCYTE [SEDIMENTATION RATE] IN BLOOD BY WESTERGREN METHOD: 11 MM/HOUR (ref 0–25)
RHEUMATOID FACT SER IA-ACNC: 54 IU/ML (ref 0–14)

## 2022-04-15 PROCEDURE — 86038 ANTINUCLEAR ANTIBODIES: CPT

## 2022-04-15 PROCEDURE — 86140 C-REACTIVE PROTEIN: CPT

## 2022-04-15 PROCEDURE — 85652 RBC SED RATE AUTOMATED: CPT

## 2022-04-15 PROCEDURE — 86431 RHEUMATOID FACTOR QUANT: CPT

## 2022-04-15 PROCEDURE — 36415 COLL VENOUS BLD VENIPUNCTURE: CPT

## 2022-04-16 LAB — NUCLEAR IGG SER QL IA: NORMAL

## 2022-04-26 ENCOUNTER — TELEMEDICINE (OUTPATIENT)
Dept: MEDICAL GROUP | Age: 42
End: 2022-04-26
Payer: COMMERCIAL

## 2022-04-26 VITALS
WEIGHT: 219 LBS | BODY MASS INDEX: 33.19 KG/M2 | DIASTOLIC BLOOD PRESSURE: 87 MMHG | SYSTOLIC BLOOD PRESSURE: 127 MMHG | HEIGHT: 68 IN

## 2022-04-26 DIAGNOSIS — E66.9 OBESITY (BMI 30.0-34.9): ICD-10-CM

## 2022-04-26 DIAGNOSIS — E78.5 DYSLIPIDEMIA: ICD-10-CM

## 2022-04-26 DIAGNOSIS — R76.8 RHEUMATOID FACTOR POSITIVE: ICD-10-CM

## 2022-04-26 DIAGNOSIS — H20.9 IRITIS: ICD-10-CM

## 2022-04-26 DIAGNOSIS — I10 ESSENTIAL HYPERTENSION: ICD-10-CM

## 2022-04-26 DIAGNOSIS — Z00.00 HEALTH CARE MAINTENANCE: ICD-10-CM

## 2022-04-26 DIAGNOSIS — I77.89 ASCENDING AORTA ENLARGEMENT (HCC): ICD-10-CM

## 2022-04-26 PROBLEM — E55.9 HYPOVITAMINOSIS D: Status: RESOLVED | Noted: 2020-07-20 | Resolved: 2022-04-26

## 2022-04-26 PROCEDURE — 99214 OFFICE O/P EST MOD 30 MIN: CPT | Mod: 95 | Performed by: INTERNAL MEDICINE

## 2022-04-26 RX ORDER — CYCLOPENTOLATE HYDROCHLORIDE 10 MG/ML
SOLUTION/ DROPS OPHTHALMIC
COMMUNITY
Start: 2022-02-15 | End: 2022-05-17

## 2022-04-26 RX ORDER — UBROGEPANT 100 MG/1
TABLET ORAL
COMMUNITY
Start: 2022-03-28 | End: 2022-05-17

## 2022-04-26 RX ORDER — PREDNISOLONE ACETATE 10 MG/ML
SUSPENSION/ DROPS OPHTHALMIC
COMMUNITY
Start: 2022-02-07 | End: 2022-04-26

## 2022-04-26 RX ORDER — ISOTRETINOIN 30 MG/1
CAPSULE, LIQUID FILLED ORAL
COMMUNITY
Start: 2022-03-28 | End: 2022-04-26

## 2022-04-26 ASSESSMENT — FIBROSIS 4 INDEX: FIB4 SCORE: 0.9

## 2022-04-26 ASSESSMENT — PATIENT HEALTH QUESTIONNAIRE - PHQ9: CLINICAL INTERPRETATION OF PHQ2 SCORE: 0

## 2022-04-26 NOTE — PROGRESS NOTES
Telemedicine Visit: Established Patient     This Remote Face to Face encounter was conducted via Zoom. Given the importance of social distancing and other strategies recommended to reduce the risk of COVID-19 transmission, I am providing medical care to this patient via audio/video visit in place of an in person visit at the request of the patient. Verbal consent to telehealth, risks, benefits, and consequences were discussed. Patient retains the right to withdraw at any time. All existing confidentiality protections apply. The patient has access to all transmitted medical information. No dissemination of any patient images or information to other entities without further written consent.    CHIEF COMPLAINT     Chief Complaint   Patient presents with   • Follow-Up   • Lab Results     4/15/22 [facility]  Princess Machado is a 42 y.o. female who presents today for the following     Hypertension  Meds: Metoprolol 12.5 mg daily.   Denies: - headaches, vision problems, tinnitus.  - chest pain/pressure, palpitations, irregular heart beats, exertional, dyspnea, peripheral edema.  - medication side effect: unusual fatigue, slow heartbeat, foot/leg swelling, cough.  Low salt diet: No  Diet: Regular  Exercise: < 4 d/w  BMI: 33  FH of HTN: Parents     Ascending aorta dilation   D28 y/o  Denies chest pain, palpitations, exertional SOB or chest pain.   Denies syncope.      CTA chest, 10/18/19  Dilatation of the ascending aorta up to 4.5 cm.  No aortic dissection. No mediastinal hematoma.  Multiple pulmonary nodules measuring up to 5.1 mm in diameter.  No airspace consolidation or pleural effusions.     Echocardiography, 2018  No prior study is available for comparison.   Normal left ventricular size and systolic function.  Left ventricular ejection fraction is visually estimated to be 65%.  Normal diastolic function.  Normal inferior vena cava size and inspiratory collapse.  Normal aortic root for body surface area.  aortic root measuring 3.8 cm, ascending aorta diameter is  4.0 cm.      F/u by cardiology.     Dyslipidemia  Interval course:  -Triglycerides went up significantly due to acutane    The patient had abnormal lipid panel, was on omega-3 1000 mg twice daily.  Diet /Exercise/BMI: As above  FH: Father     Iritis, positive RF   The patient had 3 episodes of iritis, and dermatologist requested labs that could be associated to her problem.  RF came back slightly positive at 54.    Denies:  · Arthralgia, joint swelling or redness  · Skin rash  · Fatigue  · Dysphagia  · Muscle weakness  · Family history of rheumatologic disorders.    Reviewed PMH, PSH, FH, SH, ALL, HCM/IMM, no changes  Reviewed MEDS, no changes    Patient Active Problem List    Diagnosis Date Noted   • Lung nodules 07/20/2020   • Obesity (BMI 30.0-34.9) 07/20/2020   • Health care maintenance 08/24/2018   • Abnormal cervical Papanicolaou smear 08/24/2018   • Essential hypertension 05/22/2018   • Chronic migraine 05/22/2018   • Ascending aorta enlargement (HCC) 05/22/2018   • Dyslipidemia 05/22/2018     CURRENT MEDICATIONS  Current Outpatient Medications   Medication Sig Dispense Refill   • CYCLOGYL 1 % Solution      • UBRELVY 100 MG Tab      • metoprolol SR (TOPROL XL) 25 MG TABLET SR 24 HR TAKE ONE-HALF (1/2) TABLET DAILY 45 Tablet 3   • ondansetron (ZOFRAN ODT) 4 MG TABLET DISPERSIBLE Take 1 Tablet by mouth every 8 hours as needed. 20 Tablet 0   • isotretinoin (ACCUTANE) 20 MG capsule      • CETIRIZINE HCL PO Take 1 Tablet by mouth every evening.     • Pseudoephedrine-guaiFENesin (MUCINEX D PO) Take 1 Tablet by mouth 2 times a day as needed (For cough).     • albuterol 108 (90 Base) MCG/ACT Aero Soln inhalation aerosol Inhale 2 Puffs every 6 hours as needed for Shortness of Breath. 8.5 g 3   • eletriptan (RELPAX) 20 MG Tab TK 1 T PO AOS OF MIGRAINE AS NEEDED. MAY REPEAT IN 2 H. DO NOT EXCEED 2 T IN 24 H (Patient taking differently: Take 20 mg by mouth one  time as needed for Migraine. TK 1 T PO AOS OF MIGRAINE AS NEEDED. MAY REPEAT IN 2 H. DO NOT EXCEED 2 T IN 24 H) 10 Tab 2   • BOTOX 200 units Recon Soln every 3 months. For migraines     • Cholecalciferol (VITAMIN D3) 5000 units Cap Take 5,000 Units by mouth every evening.       No current facility-administered medications for this visit.     ALLERGIES  Allergies: Patient has no known allergies.  PAST MEDICAL HISTORY  Past Medical History:   Diagnosis Date   • Abnormal Pap smear of cervix    • ASTHMA     seasonal allergy related   • HPV in female    • Hypertension    • Migraines      SURGICAL HISTORY  She  has a past surgical history that includes dilation and curettage.  SOCIAL HISTORY  Social History     Tobacco Use   • Smoking status: Never Smoker   • Smokeless tobacco: Never Used   Vaping Use   • Vaping Use: Never used   Substance Use Topics   • Alcohol use: No   • Drug use: No     Social History     Social History Narrative   • Not on file     FAMILY HISTORY  Family History   Problem Relation Age of Onset   • Hypertension Mother    • Heart Disease Mother         Dilated ascending aorta   • Heart Disease Father    • Hypertension Father    • Hyperlipidemia Father    • Other Father         Amyloidosis, mostly GI and muscle, 77     Family Status   Relation Name Status   • Mo  Alive   • Fa     • Sis Half sister dad's side Alive   • Bro  Alive       ROS   Constitutional: Negative for fever, chills, fatigue.  HENT: Negative for congestion, sore throat.  Eyes: Negative for vision problems.   Respiratory: Negative for cough, shortness of breath.  Cardiovascular: Negative for chest pain, palpitations.   Gastrointestinal: Negative for heartburn, nausea, abdominal pain.   Genitourinary: Negative for dysuria.  Musculoskeletal: Negative for significant myalgia, back and joint pain.   Skin: Negative for rash.   Neuro: Negative for dizziness, weakness and headaches.   Endo/Heme/Allergies: Does not bruise/bleed easily.  "  Psychiatric/Behavioral: Negative for depression.    Objective   Vitals obtained by patient:  Blood Pressure 127/87   Height 1.727 m (5' 8\")   Weight 99.3 kg (219 lb)  Body mass index is 33.3 kg/m².   Physical Exam:  Constitutional: Alert, no distress, well-groomed.  Skin: No rash in visible areas.  Eye: Round. Conjunctiva clear, lids normal.  ENMT: Lips pink without lesions, good dentition. Phonation normal.  Neck: No visible masses or thyromegaly. Moves freely without pain.  CV: no peripheral cyanosis, tachycardia.  Respiratory: Unlabored respiratory effort, no cough or audible wheezing.  Psych: Alert and oriented x3, normal affect and mood.     Labs     Labs are reviewed and discussed with a patient  Lab Results   Component Value Date/Time    CHOLSTRLTOT 287 (H) 02/17/2022 08:08 AM    LDL see below 02/17/2022 08:08 AM    HDL 30 (A) 02/17/2022 08:08 AM    TRIGLYCERIDE 575 (H) 02/17/2022 08:08 AM       Lab Results   Component Value Date/Time    SODIUM 139 12/17/2021 08:23 AM    POTASSIUM 4.2 12/17/2021 08:23 AM    CHLORIDE 105 12/17/2021 08:23 AM    CO2 23 12/17/2021 08:23 AM    GLUCOSE 97 12/17/2021 08:23 AM    BUN 13 12/17/2021 08:23 AM    CREATININE 0.62 12/17/2021 08:23 AM    BUNCREATRAT 16 06/05/2020 05:20 AM     Lab Results   Component Value Date/Time    ALKPHOSPHAT 55 02/17/2022 08:08 AM    ASTSGOT 24 02/17/2022 08:08 AM    ALTSGPT 25 02/17/2022 08:08 AM    TBILIRUBIN 0.4 02/17/2022 08:08 AM      Lab Results   Component Value Date/Time    HBA1C 5.4 10/24/2017 08:51 AM     No results found for: TSH  No results found for: FREET4    Lab Results   Component Value Date/Time    WBC 10.8 11/09/2021 07:48 AM    RBC 4.83 11/09/2021 07:48 AM    HEMOGLOBIN 14.8 11/09/2021 07:48 AM    HEMATOCRIT 43.9 11/09/2021 07:48 AM    MCV 90.9 11/09/2021 07:48 AM    MCH 30.6 11/09/2021 07:48 AM    MCHC 33.7 11/09/2021 07:48 AM    MPV 10.0 11/09/2021 07:48 AM    NEUTSPOLYS 73.20 (H) 11/09/2021 07:48 AM    LYMPHOCYTES 18.00 (L) " 11/09/2021 07:48 AM    MONOCYTES 7.10 11/09/2021 07:48 AM    EOSINOPHILS 0.80 11/09/2021 07:48 AM    BASOPHILS 0.50 11/09/2021 07:48 AM      Imaging      Reviewed per HPI    Assessment and Plan     Princess Machado is a 42 y.o. female    1. Essential hypertension  - Controlled, continue with current management, cardiology follow-up  2. Ascending aorta enlargement (HCC)    3. Dyslipidemia   - Triglycerides went significantly up due to Accutane, on which she will be in the next few months, dermatology follow-up    - OBESITY COUNSELING (No Charge): Patient identified as having weight management issue.  Appropriate orders and counseling given.    4. Iritis   Denies eye pain, blurred vision, continue ophthalmology follow-up  - CCP ANTIBODY; Future  - ANTI-DNA(DS); Future  - Referral to Rheumatology  5. Rheumatoid factor positive  - Referral to Rheumatology    6. Obesity (BMI 30.0-34.9)  - OBESITY COUNSELING (No Charge): Patient identified as having weight management issue.  Appropriate orders and counseling given.    7. Health care maintenance  Due pneumonia shot and COVID booster    Follow-up: In 6 months, would let

## 2022-05-02 ENCOUNTER — APPOINTMENT (RX ONLY)
Dept: URBAN - METROPOLITAN AREA CLINIC 6 | Facility: CLINIC | Age: 42
Setting detail: DERMATOLOGY
End: 2022-05-02

## 2022-05-02 DIAGNOSIS — L70.0 ACNE VULGARIS: ICD-10-CM | Status: UNCHANGED

## 2022-05-02 PROCEDURE — ? PRESCRIPTION

## 2022-05-02 PROCEDURE — 99214 OFFICE O/P EST MOD 30 MIN: CPT

## 2022-05-02 PROCEDURE — ? ADDITIONAL NOTES

## 2022-05-02 PROCEDURE — ? COUNSELING

## 2022-05-02 PROCEDURE — ? ISOTRETINOIN MONITORING

## 2022-05-02 PROCEDURE — ? DIAGNOSIS COMMENT

## 2022-05-02 RX ORDER — ISOTRETINOIN 30 MG/1
2 CAPSULE, LIQUID FILLED ORAL QD
Qty: 60 | Refills: 0 | Status: ERX

## 2022-05-02 ASSESSMENT — LOCATION DETAILED DESCRIPTION DERM
LOCATION DETAILED: SUPERIOR THORACIC SPINE
LOCATION DETAILED: RIGHT MEDIAL FOREHEAD

## 2022-05-02 ASSESSMENT — LOCATION ZONE DERM
LOCATION ZONE: FACE
LOCATION ZONE: TRUNK

## 2022-05-02 ASSESSMENT — LOCATION SIMPLE DESCRIPTION DERM
LOCATION SIMPLE: UPPER BACK
LOCATION SIMPLE: RIGHT FOREHEAD

## 2022-05-02 NOTE — PROCEDURE: ISOTRETINOIN MONITORING
Use Therapeutic Ranged Or Therapeutic Target: please select Range or Target
Dosing Month 2 (Required For Cumulative Dosing): 60mg Daily
Detail Level: Zone
Nosebleeds Normal Treatment: I explained this is common when taking isotretinoin. I recommended saline mist in each nostril multiple times a day. If this worsens they will contact us.
Dosing Month 3 (Required For Cumulative Dosing): 40mg BID
Pounds Preamble Statement (Weight Entered In Details Tab): Reported Weight in pounds: 220
Retinoid Dermatitis Normal Treatment: I recommended more frequent application of Cetaphil or CeraVe to the areas of dermatitis.
Hypercholesterolemia Monitoring: I explained this is common when taking isotretinoin. We will monitor closely.
Lower Range (In Mg/Kg): 120
Headache Monitoring: I recommended monitoring the headaches for now. There is no evidence of increased intracranial pressure. They were instructed to call if the headaches are worsening.
Retinoid Dermatitis Aggressive Treatment: I recommended more frequent application of Cetaphil or CeraVe to the areas of dermatitis. I also prescribed a topical steroid for twice daily use until the dermatitis resolves.
Cheilitis Normal Treatment: I recommended application of Vaseline or Aquaphor numerous times a day (as often as every hour) and before going to bed.
What Is The Patient's Gender: Female
Target Cumulative Dosage (In Mg/Kg): 135
Upper Range (In Mg/Kg): 150
Kilograms Preamble Statement (Weight Entered In Details Tab): Reported Weight in kilograms: 99.7
Cheilitis Aggressive Treatment: I recommended application of Vaseline or Aquaphor numerous times a day (as often as every hour) and before going to bed. I also prescribed a topical steroid for twice daily use.
Female Completion Statement: After discussing her treatment course we decided to discontinue isotretinoin therapy at this time. I explained that she would need to continue her birth control methods for at least one month after the last dosage. She should also get a pregnancy test one month after the last dose. She shouldn't donate blood for one month after the last dose. She should call with any new symptoms of depression.
Dosing Month 4 (Required For Cumulative Dosing): 30mg Alternating with 60mg Daily
Use Enhanced Counseling Feature (Automatic): No
Show Text Field For Brand Names Of Contraception?: Yes
Male Completion Statement: After discussing his treatment course we decided to discontinue isotretinoin therapy at this time. He shouldn't donate blood for one month after the last dose. He should call with any new symptoms of depression.
Xerosis Normal Treatment: I recommended application of Cetaphil or CeraVe numerous times a day and before going to bed to all dry areas.
Myalgia Monitoring: I explained this is common when taking isotretinoin. If this worsens they will contact us.
Ipledge Number (Optional): [phone]
Xerosis Aggressive Treatment: I recommended application of Cetaphil or CeraVe numerous times a day and before going to bed to all dry areas. I also prescribed a topical steroid for twice daily use.
Myalgia Treatment: I explained this is common when taking isotretinoin. If this worsens they will contact us. They may try OTC ibuprofen.
Weight Units: kilograms
Comments: PCP - w/u with +RF, sending to rheum\\nnothing symptoms of plantar fasciitis - continue 30mg alt with 60mg QOD \\nRecommended La Roche Posay moisturizer /Neutrogena hydro boost gel cream.
Patient Weight (Optional But Required For Cumulative Dose-Numbers And Decimals Only): 99.7
Months Of Therapy Completed: 5
Dosing Month 1 (Required For Cumulative Dosing): 40mg Daily
Female Pregnancy Counseling Text: Female patients should also be on two forms of birth control while taking this medication and for one month after their last dose.
Counseling Text: I reviewed the side effect in detail. Patient should get monthly blood tests, not donate blood, not drive at night if vision affected, and not share medication.
Xerosis Normal Treatment: I recommended application of Cetaphil or CeraVe numerous times a day going to bed to all dry areas.
Xerosis Aggressive Treatment: I recommended application of Cetaphil or CeraVe numerous times a day going to bed to all dry areas. I also prescribed a topical steroid for twice daily use.

## 2022-05-14 NOTE — PROGRESS NOTES
[facility]  [address], [address], [place] [address]  Phone: [phone] / Fax: [phone]    RHEUMATOLOGY NEW PATIENT VISIT NOTE      DATE OF SERVICE: 05/17/2022    REFERRING PROVIDER:  [place] [address] [address]      SUBJECTIVE:     CHIEF COMPLAINT:   Chief Complaint   Patient presents with   • New Patient     Elevated Rheumatoid Factor        HISTORY OF PRESENT ILLNESS:  Princess Machado is a 42 y.o. female with pertinent history notable for recurrent anterior uveitis of left eye, ascending aortic enlargement (presumably hereditary given family history of this in the mother), and incidental pulmonary nodules (noted in the setting of evaluation for pneumonia and when she had COVID-19 in 2020). She presents for evaluation in the setting of positive RF and reports onset of left eye uveitis sometime between 2008 and 2010 with a total of about 5 episodes since then (last flare in 2/2022). She is established with an ophthalmologist who has been treating her with steroid eyedrops during each episode. 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  Pertinent labs as of 4/2022: Positive RF 54, negative/normal ESR, CRP, SEUN, and HLA-B27.    REVIEW OF SYSTEMS:  Except as noted in the history above, a complete review of systems with emphasis on autoimmune inflammatory conditions was otherwise negative for any significant symptoms.      ACTIVE PROBLEM LIST:  Patient Active Problem List   Diagnosis   • Essential hypertension   • Chronic migraine   • Ascending aorta enlargement (HCC)   • Dyslipidemia   • Health care maintenance   • Abnormal cervical Papanicolaou smear   • Pulmonary nodules   • Obesity (BMI 30.0-34.9)   • Anterior uveitis   No problem-specific Assessment & Plan notes found for this encounter.      PAST MEDICAL  HISTORY:  Past Medical History:   Diagnosis Date   • Abnormal Pap smear of cervix    • ASTHMA     seasonal allergy related   • HPV in female    • Hypertension    • Migraines        PAST SURGICAL HISTORY:  Past Surgical History:   Procedure Laterality Date   • DILATION AND CURETTAGE         MEDICATIONS:  Current Outpatient Medications   Medication Sig Dispense Refill   • CLARAVIS 30 MG Cap TAKE 2 CAPSULES BY MOUTH DAILY WITH LARGEST MEAL     • metoprolol SR (TOPROL XL) 25 MG TABLET SR 24 HR TAKE ONE-HALF (1/2) TABLET DAILY 45 Tablet 3   • ondansetron (ZOFRAN ODT) 4 MG TABLET DISPERSIBLE Take 1 Tablet by mouth every 8 hours as needed. 20 Tablet 0   • albuterol 108 (90 Base) MCG/ACT Aero Soln inhalation aerosol Inhale 2 Puffs every 6 hours as needed for Shortness of Breath. 8.5 g 3   • eletriptan (RELPAX) 20 MG Tab TK 1 T PO AOS OF MIGRAINE AS NEEDED. MAY REPEAT IN 2 H. DO NOT EXCEED 2 T IN 24 H (Patient taking differently: Take 20 mg by mouth one time as needed for Migraine. TK 1 T PO AOS OF MIGRAINE AS NEEDED. MAY REPEAT IN 2 H. DO NOT EXCEED 2 T IN 24 H) 10 Tab 2   • BOTOX 200 units Recon Soln every 3 months. For migraines     • Cholecalciferol (VITAMIN D3) 5000 units Cap Take 5,000 Units by mouth every evening.       No current facility-administered medications for this visit.       ALLERGIES:   No Known Allergies    IMMUNIZATIONS:  Immunization History   Administered Date(s) Administered   • Influenza (IM) Preservative Free - HISTORICAL DATA 10/08/2013   • Influenza Seasonal Injectable - Historical Data 10/05/2012, 11/02/2014   • Influenza Vac Subunit Quad Inj (Pf) 10/14/2018, 09/21/2019, 09/20/2020   • Influenza Vaccine Pediatric Split - Historical Data 10/05/2012, 11/02/2014   • Influenza Vaccine Quad Inj (Pf) 10/10/2017, 10/23/2021   • Influenza Vaccine Quad Inj (Preserved) 10/10/2017   • MMR Vaccine 05/24/2013   • PFIZER PURPLE CAP SARS-COV-2 VACCINATION (12+) 04/05/2021, 04/26/2021   • Tdap Vaccine  "04/20/2011, 10/13/2018       SOCIAL HISTORY:   Social History     Tobacco Use   • Smoking status: Never Smoker   • Smokeless tobacco: Never Used   Vaping Use   • Vaping Use: Never used   Substance Use Topics   • Alcohol use: No   • Drug use: No       FAMILY HISTORY:  Family History   Problem Relation Age of Onset   • Hypertension Mother    • Heart Disease Mother         Dilated ascending aorta   • Heart Disease Father    • Hypertension Father    • Hyperlipidemia Father    • Other Father         Amyloidosis, mostly GI and muscle, 77        OBJECTIVE:     Vital Signs: /70 (BP Location: Left arm, Patient Position: Sitting, BP Cuff Size: Large adult)   Pulse 69   Temp 36.3 °C (97.3 °F) (Temporal)   Resp 16   Ht 1.727 m (5' 8\")   Wt 101 kg (222 lb 3.2 oz)   SpO2 95% Body mass index is 33.79 kg/m².    General: Appears well and comfortable; no acute distress  Eyes: Extraocular muscles and vision intact; no conjunctival lesions  ENT: Oral mucosa pink and moist; no oral or nasal lesions  Head/Neck: No scalp lesions; neck supple with no lymphadenopathy  Cardiovascular: Regular rate and rhythm; no murmurs or pericardial rubs  Respiratory: Clear to auscultation bilaterally; no wheezes, rales, or pleural rubs  Gastrointestinal: Abdomen soft and non-tender; no masses or organomegaly  Integumentary: Skin soft and supple; no significant cutaneous lesions  Musculoskeletal: No significant joint tenderness, periarticular soft tissue swelling, warmth, erythema, or overt signs of synovitis; no significant restriction in ROM of spinal and peripheral joints  Neurologic: No focal sensory or motor deficits  Psychiatric: Mood and affect appropriate      LABORATORY RESULTS REVIEWED AND INTERPRETED BY ME:  Lab Results   Component Value Date/Time    WBC 10.8 11/09/2021 07:48 AM    RBC 4.83 11/09/2021 07:48 AM    HEMOGLOBIN 14.8 11/09/2021 07:48 AM    HEMATOCRIT 43.9 11/09/2021 07:48 AM    MCV 90.9 11/09/2021 07:48 AM    MCH 30.6 " 11/09/2021 07:48 AM    MCHC 33.7 11/09/2021 07:48 AM    RDW 40.9 11/09/2021 07:48 AM    PLATELETCT 230 11/09/2021 07:48 AM    MPV 10.0 11/09/2021 07:48 AM    NEUTS 7.92 (H) 11/09/2021 07:48 AM    LYMPHOCYTES 18.00 (L) 11/09/2021 07:48 AM    MONOCYTES 7.10 11/09/2021 07:48 AM    EOSINOPHILS 0.80 11/09/2021 07:48 AM    BASOPHILS 0.50 11/09/2021 07:48 AM     Lab Results   Component Value Date/Time    SODIUM 139 12/17/2021 08:23 AM    POTASSIUM 4.2 12/17/2021 08:23 AM    CHLORIDE 105 12/17/2021 08:23 AM    CO2 23 12/17/2021 08:23 AM    GLUCOSE 97 12/17/2021 08:23 AM    BUN 13 12/17/2021 08:23 AM    CREATININE 0.62 12/17/2021 08:23 AM    BUNCREATRAT 16 06/05/2020 05:20 AM     Lab Results   Component Value Date/Time    ASTSGOT 24 02/17/2022 08:08 AM    ALTSGPT 25 02/17/2022 08:08 AM    ALKPHOSPHAT 55 02/17/2022 08:08 AM    TBILIRUBIN 0.4 02/17/2022 08:08 AM    TOTPROTEIN 7.3 02/17/2022 08:08 AM    ALBUMIN 4.4 02/17/2022 08:08 AM    CALCIUM 9.2 12/17/2021 08:23 AM    URICACID 5.4 05/22/2013 07:00 PM     Lab Results   Component Value Date/Time    SEDRATEWES 11 04/15/2022 08:06 AM    CREACTPROT 0.34 04/15/2022 08:06 AM     Lab Results   Component Value Date/Time    RHEUMFACTN 54 (H) 04/15/2022 08:06 AM    [id number] Negative 02/17/2022 08:05 AM     Lab Results   Component Value Date/Time    ANTINUCAB None Detected 04/15/2022 08:06 AM     Lab Results   Component Value Date/Time    COLORURINE Yellow 05/20/2013 07:20 PM    SPECGRAVITY 1.017 05/20/2013 07:20 PM    PHURINE 6.5 05/20/2013 07:20 PM    GLUCOSEUR Negative 05/20/2013 07:20 PM    KETONES Negative 05/20/2013 07:20 PM    PROTEINURIN 30 (A) 05/20/2013 07:20 PM     Lab Results   Component Value Date/Time    TOTALVOLUME 2,600 05/15/2013 04:08 PM    TOTPROTUR 12.9 05/14/2013 04:00 PM    MHDTJSIJ63 335.4 (H) 05/14/2013 04:00 PM     Lab Results   Component Value Date/Time    TSHULTRASEN 0.930 11/09/2021 07:48 AM     Lab Results   Component Value Date/Time    25HYDROXY 65  11/09/2021 07:48 AM     Lab Results   Component Value Date/Time    FERRITIN 87.4 10/24/2017 08:51 AM    IRON 82 10/24/2017 08:51 AM     Lab Results   Component Value Date/Time    CHOLSTRLTOT 287 (H) 02/17/2022 08:08 AM    LDL see below 02/17/2022 08:08 AM    HDL 30 (A) 02/17/2022 08:08 AM    TRIGLYCERIDE 575 (H) 02/17/2022 08:08 AM    HBA1C 5.4 10/24/2017 08:51 AM     Lab Results   Component Value Date/Time    HEPBSAG Negative 10/30/2012 11:15 AM       RADIOLOGY RESULTS REVIEWED AND INTERPRETED BY ME:    Results for orders placed during the hospital encounter of 10/13/18    DX-FOOT-COMPLETE 3+ LEFT    Impression  No evidence of fracture or dislocation.      All relevant laboratory and imaging results reported on this note were reviewed and interpreted by me.      ASSESSMENT AND PLAN:     Princess Machado is a 42 y.o. female with history as noted above whose presentation merits the following diagnostic and clinical status impressions and recommendations:    1. Anterior uveitis  Her overall clinical picture does not support the presence of any particular underlying autoimmune inflammatory disease as the culprit. That said, some important differentials to consider include but not limited to subclinical or evolving rheumatoid arthritis, spondylarthritis, sarcoidosis (considering the pulmonary nodules), and Behcet's disease. To further investigate, would undertake the additional work-up noted below.  - Miscellaneous Test (HLA-B51 for Behcet's disease risk)  - CCP antibody (previously ordered by PCP)  - ANGIOTENSIN I CONVERTING ENZYME    2. Pulmonary nodules  This appears to have been an incidentaloma and not due to an underlying autoimmune chronic inflammatory lung disease such as rheumatoid arthritis and sarcoidosis. Notably, rheumatoid lung disease typically occurs in the setting of longstanding active strongly seropositive rheumatoid arthritis (very high ACPA and RF levels) that is either untreated or poorly  controlled for years which is not the case here.  - ANGIOTENSIN I CONVERTING ENZYME    3. Rheumatoid factor positive  Though this autoantibody profile may suggest possible rheumatoid arthritis, the current history, physical, and laboratory evidence do not support the the presence of an underlying rheumatoid arthritis. Notably, aside from autoimmune diseases, positive RF may be observed in some individuals with certain bacterial or viral infections, inflammatory lung disease, primary biliary cholangitis, B-cell lymphomas, and in some healthy individuals, so does not always suggest the presence of an underlying rheumatoid arthritis. That said, the possibility of evolving or subclinical seropositive rheumatoid arthritis cannot be entirely excluded, so if symptoms of inflammatory arthritis develop and/or progress, clinical follow-up for re-evaluation would be reasonable.  - CCP antibody (previously ordered by PCP)  - Additional investigation as noted above    FOLLOW-UP: Return in about 3 months (around 8/17/2022) for Short.           Thank you for giving me the opportunity to participate in the care of Princess Machado.    Aubrey Lawler MD, MS  Rheumatologist, [facility]  , Department of Internal Medicine  [facility]

## 2022-05-14 NOTE — PATIENT INSTRUCTIONS
AFTER VISIT INSTRUCTIONS    Below are important information to help you navigate your healthcare needs and help us serve you safely and effectively:  If laboratory tests and/or imaging studies were ordered, remember to go get them done.  If new prescriptions or refills were sent to the pharmacy, remember to go pick them up.  Take your medications exactly as prescribed unless instructed otherwise.  Follow up with your primary care provider and/or specialists as scheduled.  If there are significant findings from your lab tests and imaging studies, I will notify you with explanations via [employer] or phone call, otherwise you can view them on [employer] and let me know if you have any questions.  Sign up for [employer] if you have not already done so, in order to have access to the results of your lab tests and imaging studies, and to be able to send and receive messages from me.  Please note that [employer] messaging is for non-urgent matters that do not require immediate attention and are typically read only during office hours, so do not expect immediate responses from me.

## 2022-05-17 ENCOUNTER — OFFICE VISIT (OUTPATIENT)
Dept: RHEUMATOLOGY | Facility: MEDICAL CENTER | Age: 42
End: 2022-05-17
Payer: COMMERCIAL

## 2022-05-17 VITALS
SYSTOLIC BLOOD PRESSURE: 118 MMHG | BODY MASS INDEX: 33.68 KG/M2 | HEART RATE: 69 BPM | OXYGEN SATURATION: 95 % | HEIGHT: 68 IN | TEMPERATURE: 97.3 F | RESPIRATION RATE: 16 BRPM | WEIGHT: 222.2 LBS | DIASTOLIC BLOOD PRESSURE: 70 MMHG

## 2022-05-17 DIAGNOSIS — R91.8 PULMONARY NODULES: ICD-10-CM

## 2022-05-17 DIAGNOSIS — H20.9 ANTERIOR UVEITIS: ICD-10-CM

## 2022-05-17 DIAGNOSIS — R76.8 RHEUMATOID FACTOR POSITIVE: ICD-10-CM

## 2022-05-17 PROCEDURE — 99204 OFFICE O/P NEW MOD 45 MIN: CPT | Performed by: STUDENT IN AN ORGANIZED HEALTH CARE EDUCATION/TRAINING PROGRAM

## 2022-05-17 RX ORDER — ISOTRETINOIN 30 MG/1
CAPSULE, LIQUID FILLED ORAL
COMMUNITY
Start: 2022-05-02

## 2022-05-17 ASSESSMENT — FIBROSIS 4 INDEX: FIB4 SCORE: 0.9

## 2022-06-02 ENCOUNTER — APPOINTMENT (RX ONLY)
Dept: URBAN - METROPOLITAN AREA CLINIC 6 | Facility: CLINIC | Age: 42
Setting detail: DERMATOLOGY
End: 2022-06-02

## 2022-06-02 DIAGNOSIS — L70.0 ACNE VULGARIS: ICD-10-CM

## 2022-06-02 PROCEDURE — ? ADDITIONAL NOTES

## 2022-06-02 PROCEDURE — ? ISOTRETINOIN MONITORING

## 2022-06-02 PROCEDURE — ? COUNSELING

## 2022-06-02 PROCEDURE — ? DIAGNOSIS COMMENT

## 2022-06-02 PROCEDURE — 99214 OFFICE O/P EST MOD 30 MIN: CPT

## 2022-06-02 PROCEDURE — ? PRESCRIPTION

## 2022-06-02 RX ORDER — ISOTRETINOIN 30 MG/1
2 CAPSULE, LIQUID FILLED ORAL QD
Qty: 60 | Refills: 0 | Status: ERX

## 2022-06-02 ASSESSMENT — LOCATION DETAILED DESCRIPTION DERM
LOCATION DETAILED: RIGHT MEDIAL FOREHEAD
LOCATION DETAILED: SUPERIOR THORACIC SPINE

## 2022-06-02 ASSESSMENT — LOCATION ZONE DERM
LOCATION ZONE: FACE
LOCATION ZONE: TRUNK

## 2022-06-02 ASSESSMENT — LOCATION SIMPLE DESCRIPTION DERM
LOCATION SIMPLE: UPPER BACK
LOCATION SIMPLE: RIGHT FOREHEAD

## 2022-06-02 NOTE — PROCEDURE: ISOTRETINOIN MONITORING
Use Therapeutic Ranged Or Therapeutic Target: please select Range or Target
Dosing Month 2 (Required For Cumulative Dosing): 60mg Daily
Detail Level: Zone
Nosebleeds Normal Treatment: I explained this is common when taking isotretinoin. I recommended saline mist in each nostril multiple times a day. If this worsens they will contact us.
Dosing Month 3 (Required For Cumulative Dosing): 40mg BID
Pounds Preamble Statement (Weight Entered In Details Tab): Reported Weight in pounds: 220
Retinoid Dermatitis Normal Treatment: I recommended more frequent application of Cetaphil or CeraVe to the areas of dermatitis.
Hypercholesterolemia Monitoring: I explained this is common when taking isotretinoin. We will monitor closely.
Lower Range (In Mg/Kg): 120
Headache Monitoring: I recommended monitoring the headaches for now. There is no evidence of increased intracranial pressure. They were instructed to call if the headaches are worsening.
Retinoid Dermatitis Aggressive Treatment: I recommended more frequent application of Cetaphil or CeraVe to the areas of dermatitis. I also prescribed a topical steroid for twice daily use until the dermatitis resolves.
Cheilitis Normal Treatment: I recommended application of Vaseline or Aquaphor numerous times a day (as often as every hour) and before going to bed.
What Is The Patient's Gender: Female
Target Cumulative Dosage (In Mg/Kg): 135
Upper Range (In Mg/Kg): 150
Kilograms Preamble Statement (Weight Entered In Details Tab): Reported Weight in kilograms: 99.7
Cheilitis Aggressive Treatment: I recommended application of Vaseline or Aquaphor numerous times a day (as often as every hour) and before going to bed. I also prescribed a topical steroid for twice daily use.
Female Completion Statement: After discussing her treatment course we decided to discontinue isotretinoin therapy at this time. I explained that she would need to continue her birth control methods for at least one month after the last dosage. She should also get a pregnancy test one month after the last dose. She shouldn't donate blood for one month after the last dose. She should call with any new symptoms of depression.
Dosing Month 4 (Required For Cumulative Dosing): 30mg Alternating with 60mg Daily
Use Enhanced Counseling Feature (Automatic): No
Show Text Field For Brand Names Of Contraception?: Yes
Male Completion Statement: After discussing his treatment course we decided to discontinue isotretinoin therapy at this time. He shouldn't donate blood for one month after the last dose. He should call with any new symptoms of depression.
Xerosis Normal Treatment: I recommended application of Cetaphil or CeraVe numerous times a day and before going to bed to all dry areas.
Myalgia Monitoring: I explained this is common when taking isotretinoin. If this worsens they will contact us.
Ipledge Number (Optional): [phone]
Xerosis Aggressive Treatment: I recommended application of Cetaphil or CeraVe numerous times a day and before going to bed to all dry areas. I also prescribed a topical steroid for twice daily use.
Myalgia Treatment: I explained this is common when taking isotretinoin. If this worsens they will contact us. They may try OTC ibuprofen.
Weight Units: kilograms
Comments: PCP - w/u with +RF, awaiting rheum\\nstable symptoms of plantar fasciitis - continue 30mg alt with 60mg QOD \\nStill flaring and reports hypo & hyperpigmentation - emphasized importance of sun protection (based on photos, hyperpigmentation is worsening since starting treatment)\\ntaking antihistamine daily - recommend continuing this
Patient Weight (Optional But Required For Cumulative Dose-Numbers And Decimals Only): 99.7
Months Of Therapy Completed: 6
Dosing Month 1 (Required For Cumulative Dosing): 40mg Daily
Female Pregnancy Counseling Text: Female patients should also be on two forms of birth control while taking this medication and for one month after their last dose.
Counseling Text: I reviewed the side effect in detail. Patient should get monthly blood tests, not donate blood, not drive at night if vision affected, and not share medication.
Xerosis Normal Treatment: I recommended application of Cetaphil or CeraVe numerous times a day going to bed to all dry areas.
Xerosis Aggressive Treatment: I recommended application of Cetaphil or CeraVe numerous times a day going to bed to all dry areas. I also prescribed a topical steroid for twice daily use.

## 2022-06-24 ENCOUNTER — HOSPITAL ENCOUNTER (OUTPATIENT)
Dept: LAB | Facility: MEDICAL CENTER | Age: 42
End: 2022-06-24
Attending: INTERNAL MEDICINE
Payer: COMMERCIAL

## 2022-06-24 ENCOUNTER — HOSPITAL ENCOUNTER (OUTPATIENT)
Dept: LAB | Facility: MEDICAL CENTER | Age: 42
End: 2022-06-24
Attending: STUDENT IN AN ORGANIZED HEALTH CARE EDUCATION/TRAINING PROGRAM
Payer: COMMERCIAL

## 2022-06-24 PROCEDURE — 81374 HLA I TYPING 1 ANTIGEN LR: CPT

## 2022-06-24 PROCEDURE — 86225 DNA ANTIBODY NATIVE: CPT

## 2022-06-24 PROCEDURE — 82164 ANGIOTENSIN I ENZYME TEST: CPT

## 2022-06-24 PROCEDURE — 86618 LYME DISEASE ANTIBODY: CPT

## 2022-06-24 PROCEDURE — 36415 COLL VENOUS BLD VENIPUNCTURE: CPT

## 2022-06-24 PROCEDURE — 86200 CCP ANTIBODY: CPT

## 2022-06-24 PROCEDURE — 86812 HLA TYPING A B OR C: CPT

## 2022-06-25 LAB — CCP IGG SERPL-ACNC: 3 UNITS (ref 0–19)

## 2022-06-26 LAB
B BURGDOR AB SER IA-ACNC: 0.12 LIV (ref 0–1.2)
DSDNA AB TITR SER CLIF: 5 IU (ref 0–24)
HLA-B27 QL FC: NEGATIVE

## 2022-06-27 ENCOUNTER — OFFICE VISIT (OUTPATIENT)
Dept: SLEEP MEDICINE | Facility: MEDICAL CENTER | Age: 42
End: 2022-06-27
Payer: COMMERCIAL

## 2022-06-27 VITALS
DIASTOLIC BLOOD PRESSURE: 80 MMHG | RESPIRATION RATE: 16 BRPM | BODY MASS INDEX: 32.74 KG/M2 | HEIGHT: 68 IN | HEART RATE: 71 BPM | WEIGHT: 216 LBS | OXYGEN SATURATION: 97 % | SYSTOLIC BLOOD PRESSURE: 124 MMHG

## 2022-06-27 DIAGNOSIS — J45.20 MILD INTERMITTENT REACTIVE AIRWAY DISEASE WITHOUT COMPLICATION: ICD-10-CM

## 2022-06-27 DIAGNOSIS — R91.8 PULMONARY NODULES: ICD-10-CM

## 2022-06-27 LAB — ACE SERPL-CCNC: 42 U/L (ref 16–85)

## 2022-06-27 PROCEDURE — 99214 OFFICE O/P EST MOD 30 MIN: CPT | Performed by: INTERNAL MEDICINE

## 2022-06-27 ASSESSMENT — ENCOUNTER SYMPTOMS
DIAPHORESIS: 0
BLURRED VISION: 0
STRIDOR: 0
WEIGHT LOSS: 0
HEMOPTYSIS: 0
CLAUDICATION: 0
PHOTOPHOBIA: 0
FEVER: 0
SPUTUM PRODUCTION: 0
MYALGIAS: 0
PALPITATIONS: 0
SORE THROAT: 0
VOMITING: 0
DIARRHEA: 0
EYE PAIN: 0
EYE DISCHARGE: 0
EYE REDNESS: 0
DOUBLE VISION: 0
COUGH: 0
NECK PAIN: 0
ABDOMINAL PAIN: 0
NAUSEA: 0
CHILLS: 0
TREMORS: 0
WEAKNESS: 0
DIZZINESS: 0
HEARTBURN: 0
CONSTIPATION: 0
SINUS PAIN: 0
DEPRESSION: 0
FOCAL WEAKNESS: 0
HEADACHES: 0
SPEECH CHANGE: 0
ORTHOPNEA: 0
WHEEZING: 0
FALLS: 0
PND: 0
SHORTNESS OF BREATH: 1
BACK PAIN: 0

## 2022-06-27 ASSESSMENT — FIBROSIS 4 INDEX: FIB4 SCORE: 0.9

## 2022-06-27 NOTE — PROGRESS NOTES
Chief Complaint   Patient presents with   • Pulmonary Nodule     Last seen 11/23/2020   • Results     [facility] Angio chest 2/3/22, echo 1/11/22          HPI: This patient is a 42 y.o. female whom is followed in our clinic for pulmonary nodules last seen by me on 11/23/20.  The patient's past medical history is significant for hypertension and migraine headaches.  No surgical history.  She is a lifelong non-smoker. The patient has a thoracic aortic aneurysm which is being monitored with CTA on which she was incidentally  noted to have bilateral pulmonary nodules ranging in 1 mm to 5 mm in size in 10/2019.  No parenchymal lung disease or lymphadenopathy.  She was referred to us for follow-up.  The patient does have some mild seasonal allergies as well as a cat allergy for which she has been prescribed short acting bronchodilators in the past and did use a nebulizer when she was younger for episodes of bronchitis.  She presents today after most recent CT chest from January 2022 showed stability of some centimeter pulmonary nodules largest 5 mm in size.  No new nodules.  No lymphadenopathy.  Overall patient feels well.  She did have COVID-19 recently that was mild but seem to worsen her symptoms of reactive airways disease and experienced more shortness of breath then she would have otherwise during her recent hike but states she feels she was likely still recovering.  She did require use of her nebulized bronchodilators and albuterol.    Past Medical History:   Diagnosis Date   • Abnormal Pap smear of cervix    • ASTHMA     seasonal allergy related   • HPV in female    • Hypertension    • Migraines        Social History     Socioeconomic History   • Marital status:      Spouse name: Not on file   • Number of children: Not on file   • Years of education: Not on file   • Highest education level: Not on file   Occupational History   • Not on file   Tobacco Use   • Smoking status: Never Smoker   • Smokeless tobacco:  Never Used   Vaping Use   • Vaping Use: Never used   Substance and Sexual Activity   • Alcohol use: No   • Drug use: No   • Sexual activity: Not on file   Other Topics Concern   • Not on file   Social History Narrative   • Not on file     Social Determinants of Health     Financial Resource Strain: Not on file   Food Insecurity: Not on file   Transportation Needs: Not on file   Physical Activity: Not on file   Stress: Not on file   Social Connections: Not on file   Intimate Partner Violence: Not on file   Housing Stability: Not on file       Family History   Problem Relation Age of Onset   • Hypertension Mother    • Heart Disease Mother         Dilated ascending aorta   • Heart Disease Father    • Hypertension Father    • Hyperlipidemia Father    • Other Father         Amyloidosis, mostly GI and muscle, 77       Current Outpatient Medications on File Prior to Visit   Medication Sig Dispense Refill   • CLARAVIS 30 MG Cap Every other day 30 mg, 60mg     • metoprolol SR (TOPROL XL) 25 MG TABLET SR 24 HR TAKE ONE-HALF (1/2) TABLET DAILY 45 Tablet 3   • ondansetron (ZOFRAN ODT) 4 MG TABLET DISPERSIBLE Take 1 Tablet by mouth every 8 hours as needed. 20 Tablet 0   • albuterol 108 (90 Base) MCG/ACT Aero Soln inhalation aerosol Inhale 2 Puffs every 6 hours as needed for Shortness of Breath. 8.5 g 3   • eletriptan (RELPAX) 20 MG Tab TK 1 T PO AOS OF MIGRAINE AS NEEDED. MAY REPEAT IN 2 H. DO NOT EXCEED 2 T IN 24 H (Patient taking differently: Take 20 mg by mouth one time as needed for Migraine. TK 1 T PO AOS OF MIGRAINE AS NEEDED. MAY REPEAT IN 2 H. DO NOT EXCEED 2 T IN 24 H) 10 Tab 2   • BOTOX 200 units Recon Soln every 3 months. For migraines     • Cholecalciferol (VITAMIN D3) 5000 units Cap Take 5,000 Units by mouth every evening. (Patient not taking: Reported on 6/27/2022)       No current facility-administered medications on file prior to visit.       Patient has no known allergies.      ROS:   Review of Systems  "  Constitutional: Negative for chills, diaphoresis, fever, malaise/fatigue and weight loss.   HENT: Negative for congestion, ear discharge, ear pain, hearing loss, nosebleeds, sinus pain, sore throat and tinnitus.    Eyes: Negative for blurred vision, double vision, photophobia, pain, discharge and redness.   Respiratory: Positive for shortness of breath. Negative for cough, hemoptysis, sputum production, wheezing and stridor.    Cardiovascular: Negative for chest pain, palpitations, orthopnea, claudication, leg swelling and PND.   Gastrointestinal: Negative for abdominal pain, constipation, diarrhea, heartburn, nausea and vomiting.   Genitourinary: Negative for dysuria and urgency.   Musculoskeletal: Negative for back pain, falls, joint pain, myalgias and neck pain.   Skin: Negative for itching and rash.   Neurological: Negative for dizziness, tremors, speech change, focal weakness, weakness and headaches.   Endo/Heme/Allergies: Negative for environmental allergies.   Psychiatric/Behavioral: Negative for depression.       /80 (BP Location: Right arm, Patient Position: Sitting, BP Cuff Size: Large adult)   Pulse 71   Resp 16   Ht 1.727 m (5' 8\")   Wt 98 kg (216 lb)   SpO2 97%   Physical Exam  Vitals reviewed.   Constitutional:       General: She is not in acute distress.     Appearance: Normal appearance. She is well-developed and normal weight.   HENT:      Head: Normocephalic and atraumatic.      Right Ear: External ear normal.      Left Ear: External ear normal.      Nose: Nose normal. No congestion.      Mouth/Throat:      Mouth: Mucous membranes are moist.      Pharynx: Oropharynx is clear. No oropharyngeal exudate.   Eyes:      General: No scleral icterus.     Extraocular Movements: Extraocular movements intact.      Conjunctiva/sclera: Conjunctivae normal.      Pupils: Pupils are equal, round, and reactive to light.   Neck:      Vascular: No JVD.      Trachea: No tracheal deviation. "   Cardiovascular:      Rate and Rhythm: Normal rate and regular rhythm.      Heart sounds: Normal heart sounds. No murmur heard.    No friction rub. No gallop.   Pulmonary:      Effort: Pulmonary effort is normal. No accessory muscle usage or respiratory distress.      Breath sounds: Normal breath sounds. No wheezing or rales.   Abdominal:      General: There is no distension.      Palpations: Abdomen is soft.      Tenderness: There is no abdominal tenderness.   Musculoskeletal:         General: No tenderness or deformity. Normal range of motion.      Cervical back: Normal range of motion and neck supple.      Right lower leg: No edema.      Left lower leg: No edema.   Lymphadenopathy:      Cervical: No cervical adenopathy.   Skin:     General: Skin is warm and dry.      Findings: No rash.      Nails: There is no clubbing.   Neurological:      Mental Status: She is alert and oriented to person, place, and time.      Cranial Nerves: No cranial nerve deficit.      Gait: Gait normal.   Psychiatric:         Mood and Affect: Mood normal.         Behavior: Behavior normal.         PFTs as reviewed by me personally: NA    Imagaing as reviewed by me personally:  As per HPI    Assessment:  1. Pulmonary nodules     2. Mild intermittent reactive airway disease without complication         Plan:  1.  Stable for greater than 2 years in a non-smoker.  No indication for ongoing surveillance imaging.  2.  This is not a new problem for the patient but was a new issue for me.  I do suspect reactive airways disease was worsened or exacerbated by viral infection with COVID.  Overall I recommended she continue short acting bronchodilators as needed and if symptoms do not continue to improve or in fact worsen, I am happy to see her back.  Return if symptoms worsen or fail to improve.

## 2022-06-29 ENCOUNTER — APPOINTMENT (RX ONLY)
Dept: URBAN - METROPOLITAN AREA CLINIC 6 | Facility: CLINIC | Age: 42
Setting detail: DERMATOLOGY
End: 2022-06-29

## 2022-06-29 DIAGNOSIS — L70.0 ACNE VULGARIS: ICD-10-CM

## 2022-06-29 PROCEDURE — 99214 OFFICE O/P EST MOD 30 MIN: CPT

## 2022-06-29 PROCEDURE — ? COUNSELING

## 2022-06-29 PROCEDURE — ? PRESCRIPTION

## 2022-06-29 PROCEDURE — ? ISOTRETINOIN MONITORING

## 2022-06-29 PROCEDURE — ? ADDITIONAL NOTES

## 2022-06-29 RX ORDER — ISOTRETINOIN 30 MG/1
2 CAPSULE, LIQUID FILLED ORAL QD
Qty: 60 | Refills: 0 | Status: ERX

## 2022-06-29 ASSESSMENT — LOCATION ZONE DERM
LOCATION ZONE: TRUNK
LOCATION ZONE: FACE

## 2022-06-29 ASSESSMENT — LOCATION DETAILED DESCRIPTION DERM
LOCATION DETAILED: RIGHT MEDIAL FOREHEAD
LOCATION DETAILED: SUPERIOR THORACIC SPINE

## 2022-06-29 ASSESSMENT — LOCATION SIMPLE DESCRIPTION DERM
LOCATION SIMPLE: RIGHT FOREHEAD
LOCATION SIMPLE: UPPER BACK

## 2022-06-29 NOTE — PROCEDURE: ISOTRETINOIN MONITORING
Use Therapeutic Ranged Or Therapeutic Target: please select Range or Target
Dosing Month 2 (Required For Cumulative Dosing): 60mg Daily
Detail Level: Zone
Nosebleeds Normal Treatment: I explained this is common when taking isotretinoin. I recommended saline mist in each nostril multiple times a day. If this worsens they will contact us.
Dosing Month 3 (Required For Cumulative Dosing): 40mg BID
Pounds Preamble Statement (Weight Entered In Details Tab): Reported Weight in pounds: 220
Retinoid Dermatitis Normal Treatment: I recommended more frequent application of Cetaphil or CeraVe to the areas of dermatitis.
Hypercholesterolemia Monitoring: I explained this is common when taking isotretinoin. We will monitor closely.
Lower Range (In Mg/Kg): 120
Headache Monitoring: I recommended monitoring the headaches for now. There is no evidence of increased intracranial pressure. They were instructed to call if the headaches are worsening.
Retinoid Dermatitis Aggressive Treatment: I recommended more frequent application of Cetaphil or CeraVe to the areas of dermatitis. I also prescribed a topical steroid for twice daily use until the dermatitis resolves.
Cheilitis Normal Treatment: I recommended application of Vaseline or Aquaphor numerous times a day (as often as every hour) and before going to bed.
What Is The Patient's Gender: Female
Target Cumulative Dosage (In Mg/Kg): 135
Upper Range (In Mg/Kg): 150
Kilograms Preamble Statement (Weight Entered In Details Tab): Reported Weight in kilograms: 99.7
Cheilitis Aggressive Treatment: I recommended application of Vaseline or Aquaphor numerous times a day (as often as every hour) and before going to bed. I also prescribed a topical steroid for twice daily use.
Female Completion Statement: After discussing her treatment course we decided to discontinue isotretinoin therapy at this time. I explained that she would need to continue her birth control methods for at least one month after the last dosage. She should also get a pregnancy test one month after the last dose. She shouldn't donate blood for one month after the last dose. She should call with any new symptoms of depression.
Dosing Month 4 (Required For Cumulative Dosing): 30mg Alternating with 60mg Daily
Use Enhanced Counseling Feature (Automatic): No
Show Text Field For Brand Names Of Contraception?: Yes
Male Completion Statement: After discussing his treatment course we decided to discontinue isotretinoin therapy at this time. He shouldn't donate blood for one month after the last dose. He should call with any new symptoms of depression.
Xerosis Normal Treatment: I recommended application of Cetaphil or CeraVe numerous times a day and before going to bed to all dry areas.
Myalgia Monitoring: I explained this is common when taking isotretinoin. If this worsens they will contact us.
Ipledge Number (Optional): [phone]
Xerosis Aggressive Treatment: I recommended application of Cetaphil or CeraVe numerous times a day and before going to bed to all dry areas. I also prescribed a topical steroid for twice daily use.
Myalgia Treatment: I explained this is common when taking isotretinoin. If this worsens they will contact us. They may try OTC ibuprofen.
Weight Units: kilograms
Next Month's Dosage: 30mg BID
Comments: PCP - w/u with +RF, awaiting rheum\\nstable symptoms of plantar fasciitis - will try to inc dose to 60mg daily\\nStill flaring and reports hypo & hyperpigmentation - emphasized importance of sun protection (based on photos, hyperpigmentation has improved since starting treatment)\\ntaking antihistamine daily - recommend continuing this\\nIf any joint/muscle pain to return to 30mg alt w/ 60mg QOD\\nAdvised patient of 2-3 more months of treatment
Patient Weight (Optional But Required For Cumulative Dose-Numbers And Decimals Only): 99.7
Months Of Therapy Completed: 7
Dosing Month 1 (Required For Cumulative Dosing): 40mg Daily
Female Pregnancy Counseling Text: Female patients should also be on two forms of birth control while taking this medication and for one month after their last dose.
Counseling Text: I reviewed the side effect in detail. Patient should get monthly blood tests, not donate blood, not drive at night if vision affected, and not share medication.
Xerosis Normal Treatment: I recommended application of Cetaphil or CeraVe numerous times a day going to bed to all dry areas.
Xerosis Aggressive Treatment: I recommended application of Cetaphil or CeraVe numerous times a day going to bed to all dry areas. I also prescribed a topical steroid for twice daily use.

## 2022-07-09 LAB — TEST NAME 95000: NORMAL

## 2022-08-04 ENCOUNTER — APPOINTMENT (RX ONLY)
Dept: URBAN - METROPOLITAN AREA CLINIC 6 | Facility: CLINIC | Age: 42
Setting detail: DERMATOLOGY
End: 2022-08-04

## 2022-08-04 DIAGNOSIS — L91.8 OTHER HYPERTROPHIC DISORDERS OF THE SKIN: ICD-10-CM

## 2022-08-04 DIAGNOSIS — L70.0 ACNE VULGARIS: ICD-10-CM

## 2022-08-04 PROCEDURE — ? URINE PREGNANCY TEST

## 2022-08-04 PROCEDURE — ? COUNSELING

## 2022-08-04 PROCEDURE — ? LIQUID NITROGEN

## 2022-08-04 PROCEDURE — 81025 URINE PREGNANCY TEST: CPT

## 2022-08-04 PROCEDURE — ? PRESCRIPTION

## 2022-08-04 PROCEDURE — 17110 DESTRUCTION B9 LES UP TO 14: CPT

## 2022-08-04 PROCEDURE — 99214 OFFICE O/P EST MOD 30 MIN: CPT | Mod: 25

## 2022-08-04 PROCEDURE — ? ISOTRETINOIN MONITORING

## 2022-08-04 RX ORDER — ISOTRETINOIN 30 MG/1
2 CAPSULE, LIQUID FILLED ORAL QD
Qty: 60 | Refills: 0 | Status: ERX

## 2022-08-04 ASSESSMENT — LOCATION ZONE DERM
LOCATION ZONE: TRUNK
LOCATION ZONE: NECK
LOCATION ZONE: FACE

## 2022-08-04 ASSESSMENT — LOCATION DETAILED DESCRIPTION DERM
LOCATION DETAILED: RIGHT INFERIOR ANTERIOR NECK
LOCATION DETAILED: RIGHT MEDIAL FOREHEAD
LOCATION DETAILED: RIGHT CENTRAL LATERAL NECK
LOCATION DETAILED: RIGHT INFERIOR LATERAL NECK
LOCATION DETAILED: SUPERIOR THORACIC SPINE

## 2022-08-04 ASSESSMENT — LOCATION SIMPLE DESCRIPTION DERM
LOCATION SIMPLE: RIGHT ANTERIOR NECK
LOCATION SIMPLE: NECK
LOCATION SIMPLE: RIGHT FOREHEAD
LOCATION SIMPLE: UPPER BACK

## 2022-08-04 NOTE — PROCEDURE: LIQUID NITROGEN
Show Topical Anesthesia Variable?: Yes
Number Of Freeze-Thaw Cycles: 3 freeze-thaw cycles
Render Post-Care Instructions In Note?: no
Medical Necessity Information: It is in your best interest to select a reason for this procedure from the list below. All of these items fulfill various CMS LCD requirements except the new and changing color options.
Medical Necessity Clause: This procedure was medically necessary because the lesions that were treated were:
Detail Level: Detailed
Duration Of Freeze Thaw-Cycle (Seconds): 10-15
Post-Care Instructions: I reviewed with the patient in detail post-care instructions. Patient is to wear sunprotection, and avoid picking at any of the treated lesions. Pt may apply Vaseline to crusted or scabbing areas.
Spray Paint Text: The liquid nitrogen was applied to the skin utilizing a spray paint frosting technique.
Consent: The patient's consent was obtained including but not limited to risks of crusting, scabbing, blistering, scarring, darker or lighter pigmentary change, recurrence, incomplete removal and infection.

## 2022-08-04 NOTE — PROCEDURE: ISOTRETINOIN MONITORING
Use Therapeutic Ranged Or Therapeutic Target: please select Range or Target
Dosing Month 2 (Required For Cumulative Dosing): 60mg Daily
Detail Level: Zone
Nosebleeds Normal Treatment: I explained this is common when taking isotretinoin. I recommended saline mist in each nostril multiple times a day. If this worsens they will contact us.
Dosing Month 3 (Required For Cumulative Dosing): 40mg BID
Pounds Preamble Statement (Weight Entered In Details Tab): Reported Weight in pounds: 220
Retinoid Dermatitis Normal Treatment: I recommended more frequent application of Cetaphil or CeraVe to the areas of dermatitis.
Hypercholesterolemia Monitoring: I explained this is common when taking isotretinoin. We will monitor closely.
Lower Range (In Mg/Kg): 120
Headache Monitoring: I recommended monitoring the headaches for now. There is no evidence of increased intracranial pressure. They were instructed to call if the headaches are worsening.
Retinoid Dermatitis Aggressive Treatment: I recommended more frequent application of Cetaphil or CeraVe to the areas of dermatitis. I also prescribed a topical steroid for twice daily use until the dermatitis resolves.
Cheilitis Normal Treatment: I recommended application of Vaseline or Aquaphor numerous times a day (as often as every hour) and before going to bed.
What Is The Patient's Gender: Female
Target Cumulative Dosage (In Mg/Kg): 135
Upper Range (In Mg/Kg): 150
Kilograms Preamble Statement (Weight Entered In Details Tab): Reported Weight in kilograms: 99.7
Cheilitis Aggressive Treatment: I recommended application of Vaseline or Aquaphor numerous times a day (as often as every hour) and before going to bed. I also prescribed a topical steroid for twice daily use.
Female Completion Statement: After discussing her treatment course we decided to discontinue isotretinoin therapy at this time. I explained that she would need to continue her birth control methods for at least one month after the last dosage. She should also get a pregnancy test one month after the last dose. She shouldn't donate blood for one month after the last dose. She should call with any new symptoms of depression.
Dosing Month 4 (Required For Cumulative Dosing): 30mg Alternating with 60mg Daily
Use Enhanced Counseling Feature (Automatic): No
Show Text Field For Brand Names Of Contraception?: Yes
Male Completion Statement: After discussing his treatment course we decided to discontinue isotretinoin therapy at this time. He shouldn't donate blood for one month after the last dose. He should call with any new symptoms of depression.
Xerosis Normal Treatment: I recommended application of Cetaphil or CeraVe numerous times a day and before going to bed to all dry areas.
Myalgia Monitoring: I explained this is common when taking isotretinoin. If this worsens they will contact us.
Ipledge Number (Optional): [phone]
Xerosis Aggressive Treatment: I recommended application of Cetaphil or CeraVe numerous times a day and before going to bed to all dry areas. I also prescribed a topical steroid for twice daily use.
Myalgia Treatment: I explained this is common when taking isotretinoin. If this worsens they will contact us. They may try OTC ibuprofen.
Weight Units: kilograms
Next Month's Dosage: 30mg BID
Comments: stable symptoms of plantar fasciitis \\nhypo & hyperpigmentation - emphasized importance of sun protection (based on photos, hyperpigmentation has improved since starting treatment)\\ntaking antihistamine daily - recommend continuing this\\nLikely 2 more months of treatment
Patient Weight (Optional But Required For Cumulative Dose-Numbers And Decimals Only): 99.7
Months Of Therapy Completed: 8
Dosing Month 1 (Required For Cumulative Dosing): 40mg Daily
Female Pregnancy Counseling Text: Female patients should also be on two forms of birth control while taking this medication and for one month after their last dose.
Counseling Text: I reviewed the side effect in detail. Patient should get monthly blood tests, not donate blood, not drive at night if vision affected, and not share medication.
Xerosis Normal Treatment: I recommended application of Cetaphil or CeraVe numerous times a day going to bed to all dry areas.
Xerosis Aggressive Treatment: I recommended application of Cetaphil or CeraVe numerous times a day going to bed to all dry areas. I also prescribed a topical steroid for twice daily use.

## 2022-08-04 NOTE — PROCEDURE: COUNSELING
Doxycycline Pregnancy And Lactation Text: This medication is Pregnancy Category D and not consider safe during pregnancy. It is also excreted in breast milk but is considered safe for shorter treatment courses.
Tetracycline Counseling: Patient counseled regarding possible photosensitivity and increased risk for sunburn.  Patient instructed to avoid sunlight, if possible.  When exposed to sunlight, patients should wear protective clothing, sunglasses, and sunscreen.  The patient was instructed to call the office immediately if the following severe adverse effects occur:  hearing changes, easy bruising/bleeding, severe headache, or vision changes.  The patient verbalized understanding of the proper use and possible adverse effects of tetracycline.  All of the patient's questions and concerns were addressed. Patient understands to avoid pregnancy while on therapy due to potential birth defects.
Birth Control Pills Counseling: Birth Control Pill Counseling: I discussed with the patient the potential side effects of OCPs including but not limited to increased risk of stroke, heart attack, thrombophlebitis, deep venous thrombosis, hepatic adenomas, breast changes, GI upset, headaches, and depression.  The patient verbalized understanding of the proper use and possible adverse effects of OCPs. All of the patient's questions and concerns were addressed.
Sarecycline Counseling: Patient advised regarding possible photosensitivity and discoloration of the teeth, skin, lips, tongue and gums.  Patient instructed to avoid sunlight, if possible.  When exposed to sunlight, patients should wear protective clothing, sunglasses, and sunscreen.  The patient was instructed to call the office immediately if the following severe adverse effects occur:  hearing changes, easy bruising/bleeding, severe headache, or vision changes.  The patient verbalized understanding of the proper use and possible adverse effects of sarecycline.  All of the patient's questions and concerns were addressed.
Bactrim Pregnancy And Lactation Text: This medication is Pregnancy Category D and is known to cause fetal risk.  It is also excreted in breast milk.
Topical Retinoid counseling:  Patient advised to apply a pea-sized amount only at bedtime and wait 30 minutes after washing their face before applying.  If too drying, patient may add a non-comedogenic moisturizer. The patient verbalized understanding of the proper use and possible adverse effects of retinoids.  All of the patient's questions and concerns were addressed.
Include Pregnancy/Lactation Warning?: No
Isotretinoin Pregnancy And Lactation Text: This medication is Pregnancy Category X and is considered extremely dangerous during pregnancy. It is unknown if it is excreted in breast milk.
Topical Sulfur Applications Pregnancy And Lactation Text: This medication is Pregnancy Category C and has an unknown safety profile during pregnancy. It is unknown if this topical medication is excreted in breast milk.
Sarecycline Pregnancy And Lactation Text: This medication is Pregnancy Category D and not consider safe during pregnancy. It is also excreted in breast milk.
Tazorac Counseling:  Patient advised that medication is irritating and drying.  Patient may need to apply sparingly and wash off after an hour before eventually leaving it on overnight.  The patient verbalized understanding of the proper use and possible adverse effects of tazorac.  All of the patient's questions and concerns were addressed.
Topical Clindamycin Counseling: Patient counseled that this medication may cause skin irritation or allergic reactions.  In the event of skin irritation, the patient was advised to reduce the amount of the drug applied or use it less frequently.   The patient verbalized understanding of the proper use and possible adverse effects of clindamycin.  All of the patient's questions and concerns were addressed.
High Dose Vitamin A Pregnancy And Lactation Text: High dose vitamin A therapy is contraindicated during pregnancy and breast feeding.
Azithromycin Counseling:  I discussed with the patient the risks of azithromycin including but not limited to GI upset, allergic reaction, drug rash, diarrhea, and yeast infections.
Topical Retinoid Pregnancy And Lactation Text: This medication is Pregnancy Category C. It is unknown if this medication is excreted in breast milk.
High Dose Vitamin A Counseling: Side effects reviewed, pt to contact office should one occur.
Erythromycin Counseling:  I discussed with the patient the risks of erythromycin including but not limited to GI upset, allergic reaction, drug rash, diarrhea, increase in liver enzymes, and yeast infections.
Dapsone Pregnancy And Lactation Text: This medication is Pregnancy Category C and is not considered safe during pregnancy or breast feeding.
Tazorac Pregnancy And Lactation Text: This medication is not safe during pregnancy. It is unknown if this medication is excreted in breast milk.
Topical Clindamycin Pregnancy And Lactation Text: This medication is Pregnancy Category B and is considered safe during pregnancy. It is unknown if it is excreted in breast milk.
Detail Level: Zone
Dapsone Counseling: I discussed with the patient the risks of dapsone including but not limited to hemolytic anemia, agranulocytosis, rashes, methemoglobinemia, kidney failure, peripheral neuropathy, headaches, GI upset, and liver toxicity.  Patients who start dapsone require monitoring including baseline LFTs and weekly CBCs for the first month, then every month thereafter.  The patient verbalized understanding of the proper use and possible adverse effects of dapsone.  All of the patient's questions and concerns were addressed.
Minocycline Counseling: Patient advised regarding possible photosensitivity and discoloration of the teeth, skin, lips, tongue and gums.  Patient instructed to avoid sunlight, if possible.  When exposed to sunlight, patients should wear protective clothing, sunglasses, and sunscreen.  The patient was instructed to call the office immediately if the following severe adverse effects occur:  hearing changes, easy bruising/bleeding, severe headache, or vision changes.  The patient verbalized understanding of the proper use and possible adverse effects of minocycline.  All of the patient's questions and concerns were addressed.
Benzoyl Peroxide Counseling: Patient counseled that medicine may cause skin irritation and bleach clothing.  In the event of skin irritation, the patient was advised to reduce the amount of the drug applied or use it less frequently.   The patient verbalized understanding of the proper use and possible adverse effects of benzoyl peroxide.  All of the patient's questions and concerns were addressed.
Erythromycin Pregnancy And Lactation Text: This medication is Pregnancy Category B and is considered safe during pregnancy. It is also excreted in breast milk.
Birth Control Pills Pregnancy And Lactation Text: This medication should be avoided if pregnant and for the first 30 days post-partum.
Spironolactone Counseling: Patient advised regarding risks of diarrhea, abdominal pain, hyperkalemia, birth defects (for female patients), liver toxicity and renal toxicity. The patient may need blood work to monitor liver and kidney function and potassium levels while on therapy. The patient verbalized understanding of the proper use and possible adverse effects of spironolactone.  All of the patient's questions and concerns were addressed.
Azithromycin Pregnancy And Lactation Text: This medication is considered safe during pregnancy and is also secreted in breast milk.
Spironolactone Pregnancy And Lactation Text: This medication can cause feminization of the male fetus and should be avoided during pregnancy. The active metabolite is also found in breast milk.
Topical Sulfur Applications Counseling: Topical Sulfur Counseling: Patient counseled that this medication may cause skin irritation or allergic reactions.  In the event of skin irritation, the patient was advised to reduce the amount of the drug applied or use it less frequently.   The patient verbalized understanding of the proper use and possible adverse effects of topical sulfur application.  All of the patient's questions and concerns were addressed.
Isotretinoin Counseling: Patient should get monthly blood tests, not donate blood, not drive at night if vision affected, not share medication, and not undergo elective surgery for 6 months after tx completed. Side effects reviewed, pt to contact office should one occur.
Doxycycline Counseling:  Patient counseled regarding possible photosensitivity and increased risk for sunburn.  Patient instructed to avoid sunlight, if possible.  When exposed to sunlight, patients should wear protective clothing, sunglasses, and sunscreen.  The patient was instructed to call the office immediately if the following severe adverse effects occur:  hearing changes, easy bruising/bleeding, severe headache, or vision changes.  The patient verbalized understanding of the proper use and possible adverse effects of doxycycline.  All of the patient's questions and concerns were addressed.
Benzoyl Peroxide Pregnancy And Lactation Text: This medication is Pregnancy Category C. It is unknown if benzoyl peroxide is excreted in breast milk.
Bactrim Counseling:  I discussed with the patient the risks of sulfa antibiotics including but not limited to GI upset, allergic reaction, drug rash, diarrhea, dizziness, photosensitivity, and yeast infections.  Rarely, more serious reactions can occur including but not limited to aplastic anemia, agranulocytosis, methemoglobinemia, blood dyscrasias, liver or kidney failure, lung infiltrates or desquamative/blistering drug rashes.
Azelaic Acid Pregnancy And Lactation Text: This medication is considered safe during pregnancy and breast feeding.
Winlevi Pregnancy And Lactation Text: This medication is considered safe during pregnancy and breastfeeding.
Azelaic Acid Counseling: Patient counseled that medicine may cause skin irritation and to avoid applying near the eyes.  In the event of skin irritation, the patient was advised to reduce the amount of the drug applied or use it less frequently.   The patient verbalized understanding of the proper use and possible adverse effects of azelaic acid.  All of the patient's questions and concerns were addressed.
Winlevi Counseling:  I discussed with the patient the risks of topical clascoterone including but not limited to erythema, scaling, itching, and stinging. Patient voiced their understanding.
Detail Level: Detailed

## 2022-08-04 NOTE — PROCEDURE: URINE PREGNANCY TEST
Lot # (Optional): [id number]
Performed By: FRANKIE Slaughter
Detail Level: None
Expiration Date (Optional): 11-
Urine Pregnancy Test Result: negative

## 2022-08-15 ASSESSMENT — RHEUMATOLOGY FOLLOW-UP QUESTIONNAIRE
BLURRINESS: N
IRREGULAR BEATS: N
BURNING URINATION: N
DEPRESSION: N
SPASMS: N
MUSCLE PAIN: N
BLOODY DIARRHEA: N
SHORTNESS OF BREATH: N
TEMPLE PAIN: N
PELVIC PAIN: N
EAR PAIN: N
FEVERS: N
HEARTBURN: N
EYE REDNESS: N
NOSEBLEEDS: N
HAIR LOSS WITH BALD SPOTS: N
BLOODY CONSTIPATION: N
SORE THROAT: N
SINUS PAIN: N
ABNORMAL DISCHARGE: N
DIFFICULTY SWALLOWING: N
ANXIETY: N
EYE PAIN: N
NUMBNESS: N
MUSCLE WEAKNESS: N
DRY EYES: N
GENITAL ULCERS: N
BODY ACHES: N
TINGLING: N
ACHILLES TENDON PAIN: N
NECK PAIN: Y
DIFFICULTY SPEAKING: N
CHILLS: N
FROTHY URINE: N
NAUSEA: N
DIZZINESS: N
NIGHT SWEATS: N
DRY COUGH: N
THYROID PAIN: N
CAVITIES: N
RINGING IN EARS: N
HEADACHES: N
ORAL ULCERS: N
COUGH WITH BLOODY PHLEGM: N
BLEEDING GUMS: N
MORNING STIFFNESS: <30 MINS
JOINT SWELLING: N
BLOOD IN URINE: N
CHEST PAIN WITH BREATHING: N
SKIN PLAQUES: N
PALPITATIONS: N
DRY MOUTH: N
MARK ALL THE AREAS OF PAIN: 76146290
NASAL ULCERS: N
HAIR SHEDDING: N
VERTIGO: N
SNORING: N
SINONASAL CONGESTION: N
HEARING LOSS: N
GOITER: N
ABDOMINAL PAIN: N
SKIN THICKENING: N
VISION LOSS: N
VOMITING: N

## 2022-08-16 ENCOUNTER — OFFICE VISIT (OUTPATIENT)
Dept: RHEUMATOLOGY | Facility: MEDICAL CENTER | Age: 42
End: 2022-08-16
Attending: STUDENT IN AN ORGANIZED HEALTH CARE EDUCATION/TRAINING PROGRAM
Payer: COMMERCIAL

## 2022-08-16 VITALS
HEIGHT: 68 IN | DIASTOLIC BLOOD PRESSURE: 90 MMHG | HEART RATE: 78 BPM | BODY MASS INDEX: 33.46 KG/M2 | OXYGEN SATURATION: 94 % | WEIGHT: 220.8 LBS | SYSTOLIC BLOOD PRESSURE: 112 MMHG | RESPIRATION RATE: 16 BRPM | TEMPERATURE: 98.1 F

## 2022-08-16 DIAGNOSIS — R91.8 PULMONARY NODULES: ICD-10-CM

## 2022-08-16 DIAGNOSIS — R76.8 RHEUMATOID FACTOR POSITIVE WITH CYCLIC CITRULLINATED PEPTIDE (CCP) ANTIBODY NEGATIVE: ICD-10-CM

## 2022-08-16 DIAGNOSIS — H20.9: ICD-10-CM

## 2022-08-16 PROCEDURE — 99212 OFFICE O/P EST SF 10 MIN: CPT | Performed by: STUDENT IN AN ORGANIZED HEALTH CARE EDUCATION/TRAINING PROGRAM

## 2022-08-16 PROCEDURE — 99214 OFFICE O/P EST MOD 30 MIN: CPT | Performed by: STUDENT IN AN ORGANIZED HEALTH CARE EDUCATION/TRAINING PROGRAM

## 2022-08-16 ASSESSMENT — FIBROSIS 4 INDEX: FIB4 SCORE: 0.9

## 2022-08-16 NOTE — PROGRESS NOTES
[facility]  [address], [address], [place] [address]  Phone: [phone] / Fax: [phone]    RHEUMATOLOGY FOLLOW-UP VISIT NOTE      DATE OF SERVICE: 08/16/2022    PRIMARY CARE PROVIDER:  Dennise Bustamante M.D.  [id number] [place]  [place] [address]    MEDICAL RECORD NUMBER:  [id number]    LAST CLINIC VISIT:  Visit date not found      SUBJECTIVE:     CHIEF COMPLAINT:   Chief Complaint   Patient presents with    Follow-Up     Anterior Uveitis        RHEUMATOLOGIC HISTORY:  Princess Machado is a 42 y.o. female with pertinent history notable for anterior uveitis of left eye, ascending aortic enlargement (presumably hereditary given family history of this in the mother), and incidental pulmonary nodules (noted in the setting of evaluation for pneumonia when she had COVID-19 in 2020). She initially presented on 5/17/2022 for further evaluation in the setting of positive RF. Reported onset of left eye anterior uveitis sometime between 2008 and 2010 with a total of about 5 episodes since then (last flare in 2/2022). She established with an ophthalmologist who had been treating her with steroid eyedrops during each episode. 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  Pertinent labs as of 4-5/2022: Positive RF 54 with negative ACPA; negative/normal HLA-B27, HLA-B51, SEUN, ACE, ESR and CRP.    INTERVAL HISTORY:  Travel Screening    8/15/2022  4:47 PM PDT - Filed by Patient   Do you have any of the following new or worsening symptoms? None of these   In the last 10 days, have you been in contact with someone who was confirmed or suspected to have Coronavirus / COVID-19? No / Unsure   Have you had a COVID-19 viral test in the last 10 days? No     [facility] Established Patient History Form    8/15/2022  4:49 PM PDT - Filed by Patient   Chief Complaint  Follow-up   Interval History of Present Condition   Date of worsening symptom onset:    Preceding incident/ailment:    Describe/list your symptoms:    Aggravating factors:    Alleviating factors:    Helpful medications:    Ineffective medications:    Symptom severity/impact (scale of 1-10):    Personal/emotional stressors:    [place] All The Locations Of Pain    REVIEW OF SYSTEMS    General   Fevers No   Chills No   Night sweats No   Unintentional Weight Loss None   Musculoskeletal   Joint pain None   Morning stiffness <30 mins   Joint swelling No   Achilles tendon pain No   Muscle pain No   Body Aches No   Dermatologic   Hair loss with bald spots No   Hair shedding No   Sunlight-induced skin rash    Skin thickening No   Skin plaques No   Cold-induced color changes (white, purple, red on rewarming)    Neurologic/Psychiatric   Muscle weakness No   Spasms No   Tingling No   Numbness No   Anxiety No   Depression No   Head/Eyes   Headaches No   Temple pain No   Dizziness No   Dry eyes No   Eye pain No   Eye redness No   Blurriness No   Vision loss No   Ears/Nose   Ear pain No   Ringing in ears No   Vertigo No   Hearing loss No   Nasal ulcers No   Nosebleeds No   Sinus pain No   Sinonasal congestion No   Snoring No   Mouth/Throat   Oral ulcers No   Bleeding gums No   Dry mouth No   Cavities No   Sore throat No   Difficulty speaking No   Difficulty swallowing No   Neck/Lymphatics   Neck pain Yes   Thyroid pain No   Goiter No   Swollen Glands    Cardiac/Respiratory   Chest pain with breathing No   Dry cough No   Cough with bloody phlegm No   Shortness of breath No   Palpitations No   Irregular beats No   Gastrointestinal   Nausea No   Vomiting No   Heartburn No   Abdominal pain No   Bloody diarrhea No   Bloody constipation No   Genitourinary   Pelvic Pain No   Genital ulcers No   Abnormal discharge No   Burning urination No   Frothy urine No   Blood in urine No   Supplemental Information   Notable Life Changes/Adjustments  Since Last Visit        ACTIVE PROBLEM LIST:  Patient Active Problem List   Diagnosis    Essential hypertension    Chronic migraine    Ascending aorta enlargement (HCC)    Dyslipidemia    Health care maintenance    Abnormal cervical Papanicolaou smear    Pulmonary nodules    Obesity (BMI 30.0-34.9)    Idiopathic anterior uveitis of left eye    Positive RF with negative ACPA   No problem-specific Assessment & Plan notes found for this encounter.      PAST MEDICAL HISTORY:  Past Medical History:   Diagnosis Date    Abnormal Pap smear of cervix     ASTHMA     seasonal allergy related    HPV in female     Hypertension     Migraines        PAST SURGICAL HISTORY:  Past Surgical History:   Procedure Laterality Date    DILATION AND CURETTAGE         MEDICATIONS:  Current Outpatient Medications   Medication Sig Dispense Refill    CLARAVIS 30 MG Cap Every other day 30 mg, 60mg      metoprolol SR (TOPROL XL) 25 MG TABLET SR 24 HR TAKE ONE-HALF (1/2) TABLET DAILY 45 Tablet 3    ondansetron (ZOFRAN ODT) 4 MG TABLET DISPERSIBLE Take 1 Tablet by mouth every 8 hours as needed. 20 Tablet 0    albuterol 108 (90 Base) MCG/ACT Aero Soln inhalation aerosol Inhale 2 Puffs every 6 hours as needed for Shortness of Breath. 8.5 g 3    eletriptan (RELPAX) 20 MG Tab TK 1 T PO AOS OF MIGRAINE AS NEEDED. MAY REPEAT IN 2 H. DO NOT EXCEED 2 T IN 24 H (Patient taking differently: Take 20 mg by mouth one time as needed for Migraine. TK 1 T PO AOS OF MIGRAINE AS NEEDED. MAY REPEAT IN 2 H. DO NOT EXCEED 2 T IN 24 H) 10 Tab 2    BOTOX 200 units Recon Soln every 3 months. For migraines      Cholecalciferol (VITAMIN D3) 5000 units Cap Take 5,000 Units by mouth every evening.       No current facility-administered medications for this visit.       ALLERGIES:   No Known Allergies    IMMUNIZATIONS:  Immunization History   Administered Date(s) Administered    Influenza (IM) Preservative Free - HISTORICAL DATA 10/08/2013    Influenza Seasonal Injectable -  "Historical Data 10/05/2012, 11/02/2014    Influenza Vac Subunit Quad Inj (Pf) 10/14/2018, 09/21/2019, 09/20/2020    Influenza Vaccine Pediatric Split - Historical Data 10/05/2012, 11/02/2014    Influenza Vaccine Quad Inj (Pf) 10/10/2017, 10/23/2021    Influenza Vaccine Quad Inj (Preserved) 10/10/2017    MMR Vaccine 05/24/2013    PFIZER PURPLE CAP SARS-COV-2 VACCINATION (12+) 04/05/2021, 04/26/2021    Tdap Vaccine 04/20/2011, 10/13/2018       SOCIAL HISTORY:   Social History     Tobacco Use    Smoking status: Never    Smokeless tobacco: Never   Vaping Use    Vaping Use: Never used   Substance Use Topics    Alcohol use: No    Drug use: No       FAMILY HISTORY:  Family History   Problem Relation Age of Onset    Hypertension Mother     Heart Disease Mother         Dilated ascending aorta    Heart Disease Father     Hypertension Father     Hyperlipidemia Father     Other Father         Amyloidosis, mostly GI and muscle, 77        OBJECTIVE:     Vital Signs: BP (!) 112/90 (BP Location: Right arm, Patient Position: Sitting, BP Cuff Size: Adult)   Pulse 78   Temp 36.7 °C (98.1 °F) (Temporal)   Resp 16   Ht 1.727 m (5' 8\")   Wt 100 kg (220 lb 12.8 oz)   SpO2 94% Body mass index is 33.57 kg/m².    General: Appears well and comfortable  Eyes: No scleral or conjunctival lesions  ENT: No apparent oral or nasal lesions  Head/Neck: No apparent scalp or neck lesions  Cardiovascular: Regular rate and rhythm  Respiratory: Breathing quiet and unlabored  Gastrointestinal: No organomegaly or abdominal masses  Integumentary: No significant cutaneous lesions or discolorations  Musculoskeletal: No significant joint tenderness, periarticular soft tissue swelling, warmth, erythema, or overt signs of synovitis; no significant restriction in range of motion of joints examined  Neurologic: No focal sensory or motor deficits  Psychiatric: Mood and affect appropriate      LABORATORY RESULTS REVIEWED AND INTERPRETED BY ME:  Lab Results "   Component Value Date/Time    SEDRATEWES 11 04/15/2022 08:06 AM    CREACTPROT 0.34 04/15/2022 08:06 AM    URICACID 5.4 05/22/2013 07:00 PM     Lab Results   Component Value Date/Time    RHEUMFACTN 54 (H) 04/15/2022 08:06 AM    CCPANTIBODY 3 06/24/2022 08:22 AM    [id number] Negative 06/24/2022 08:22 AM     Lab Results   Component Value Date/Time    ANTINUCAB None Detected 04/15/2022 08:06 AM     Lab Results   Component Value Date/Time    ANTIDNADS 5 06/24/2022 08:22 AM     Lab Results   Component Value Date/Time    TSHULTRASEN 0.930 11/09/2021 07:48 AM     Lab Results   Component Value Date/Time    HEPBSAG Negative 10/30/2012 11:15 AM     Lab Results   Component Value Date/Time    ASTSGOT 24 02/17/2022 08:08 AM    ALTSGPT 25 02/17/2022 08:08 AM    ALKPHOSPHAT 55 02/17/2022 08:08 AM    TBILIRUBIN 0.4 02/17/2022 08:08 AM    TOTPROTEIN 7.3 02/17/2022 08:08 AM    ALBUMIN 4.4 02/17/2022 08:08 AM     Lab Results   Component Value Date/Time    SODIUM 139 12/17/2021 08:23 AM    POTASSIUM 4.2 12/17/2021 08:23 AM    CHLORIDE 105 12/17/2021 08:23 AM    CO2 23 12/17/2021 08:23 AM    GLUCOSE 97 12/17/2021 08:23 AM    BUN 13 12/17/2021 08:23 AM    CREATININE 0.62 12/17/2021 08:23 AM    BUNCREATRAT 16 06/05/2020 05:20 AM    CALCIUM 9.2 12/17/2021 08:23 AM     Lab Results   Component Value Date/Time    WBC 10.8 11/09/2021 07:48 AM    RBC 4.83 11/09/2021 07:48 AM    HEMOGLOBIN 14.8 11/09/2021 07:48 AM    HEMATOCRIT 43.9 11/09/2021 07:48 AM    MCV 90.9 11/09/2021 07:48 AM    MCH 30.6 11/09/2021 07:48 AM    MCHC 33.7 11/09/2021 07:48 AM    RDW 40.9 11/09/2021 07:48 AM    PLATELETCT 230 11/09/2021 07:48 AM    MPV 10.0 11/09/2021 07:48 AM    NEUTS 7.92 (H) 11/09/2021 07:48 AM    LYMPHOCYTES 18.00 (L) 11/09/2021 07:48 AM    MONOCYTES 7.10 11/09/2021 07:48 AM    EOSINOPHILS 0.80 11/09/2021 07:48 AM    BASOPHILS 0.50 11/09/2021 07:48 AM     Lab Results   Component Value Date/Time    FERRITIN 87.4 10/24/2017 08:51 AM    IRON 82 10/24/2017  08:51 AM     Lab Results   Component Value Date/Time    25HYDROXY 65 11/09/2021 07:48 AM    ACESERUM 42 06/24/2022 08:18 AM     Lab Results   Component Value Date/Time    COLORURINE Yellow 05/20/2013 07:20 PM    SPECGRAVITY 1.017 05/20/2013 07:20 PM    PHURINE 6.5 05/20/2013 07:20 PM    GLUCOSEUR Negative 05/20/2013 07:20 PM    KETONES Negative 05/20/2013 07:20 PM    PROTEINURIN 30 (A) 05/20/2013 07:20 PM     Lab Results   Component Value Date/Time    TOTALVOLUME 2,600 05/15/2013 04:08 PM    TOTPROTUR 12.9 05/14/2013 04:00 PM    [id number] 335.4 (H) 05/14/2013 04:00 PM     Lab Results   Component Value Date/Time    CHOLSTRLTOT 287 (H) 02/17/2022 08:08 AM    LDL see below 02/17/2022 08:08 AM    HDL 30 (A) 02/17/2022 08:08 AM    TRIGLYCERIDE 575 (H) 02/17/2022 08:08 AM    HBA1C 5.4 10/24/2017 08:51 AM       RADIOLOGY RESULTS REVIEWED AND INTERPRETED BY ME:  Results for orders placed during the hospital encounter of 10/13/18    DX-FOOT-COMPLETE 3+ LEFT    Impression  No evidence of fracture or dislocation.      All relevant laboratory and imaging results reported on this note were reviewed and interpreted by me.      ASSESSMENT AND PLAN:     Princess Machado is a 42 y.o. female with history as noted above whose presentation merits the following diagnostic and clinical status impressions and recommendations:    1. Idiopathic anterior uveitis of left eye  Clinical picture is compatible with idiopathic anterior uveitis that is rarely active as there is no clinical or laboratory evidence to suggest the presence of an underlying autoimmune inflammatory disease.  - Plan to check ESR and CRP during an acute flare if it recurs    2. Pulmonary nodules  Presumably an incidentaloma that is not due to an underlying autoimmune inflammatory lung disease.  - Follow-up with pulmonology if needed    3. Positive RF with negative ACPA  Though this autoantibody profile may suggest possible rheumatoid arthritis, the level is  relatively low and is considered to be clinically insignificant as it may actually be a false positive. The current history, physical, and laboratory evidence do not support the presence of underlying rheumatoid arthritis based on the diagnostic criteria. Notably, aside from autoimmune diseases, positive RF may be observed in some individuals with certain bacterial or viral infections, inflammatory lung disease, primary biliary cholangitis, B-cell lymphomas, and in some healthy individuals, so does not always suggest the presence of an underlying rheumatoid arthritis. That said, the possibility of evolving seropositive rheumatoid arthritis cannot be excluded, so if symptoms of inflammatory arthritis develop and/or progress, clinical follow-up for re-evaluation would be reasonable.  - No further RA-related immunologic lab testing necessary at this time      FOLLOW-UP: Return for Short as needed.           Thank you for giving me the opportunity to participate in the care of Princess Machado.    Aubrey Lawler MD, MS  Rheumatologist, [facility]  , Department of Internal Medicine  [facility]

## 2022-08-17 NOTE — PATIENT INSTRUCTIONS
AFTER VISIT INSTRUCTIONS    Below are important information to help you navigate your healthcare needs and help us serve you safely and effectively:  If laboratory tests and/or imaging studies were ordered, remember to go get them done.  If new prescriptions or refills were sent to the pharmacy, remember to go pick them up.  Take your medications exactly as prescribed unless instructed otherwise.  If there are significant findings on your lab tests and imaging studies that warrant further action, I will notify you with explanations via [employer] or phone call, otherwise you can view them on [employer] and let me know if you have any questions.  Sign up for [employer] if you have not already done so, in order to have access to the results of your lab tests and imaging studies, and to be able to send and receive messages from me.  Note that [employer] messages are typically read during office hours only and may take 1-7 days before a response depending on the urgency of the situation and how busy my schedule is.  In general, [employer] messaging is for non-urgent matters that do not require immediate attention, so for urgent matters that cannot wait, you are advised to go to an urgent care.

## 2022-09-12 ENCOUNTER — APPOINTMENT (RX ONLY)
Dept: URBAN - METROPOLITAN AREA CLINIC 6 | Facility: CLINIC | Age: 42
Setting detail: DERMATOLOGY
End: 2022-09-12

## 2022-09-12 DIAGNOSIS — Z71.89 OTHER SPECIFIED COUNSELING: ICD-10-CM

## 2022-09-12 DIAGNOSIS — L70.0 ACNE VULGARIS: ICD-10-CM

## 2022-09-12 PROCEDURE — ? URINE PREGNANCY TEST

## 2022-09-12 PROCEDURE — 81025 URINE PREGNANCY TEST: CPT

## 2022-09-12 PROCEDURE — ? COUNSELING

## 2022-09-12 PROCEDURE — 99214 OFFICE O/P EST MOD 30 MIN: CPT

## 2022-09-12 PROCEDURE — ? SUNSCREEN TREATMENT REGIMEN

## 2022-09-12 PROCEDURE — ? PRESCRIPTION

## 2022-09-12 PROCEDURE — ? ISOTRETINOIN MONITORING

## 2022-09-12 RX ORDER — ISOTRETINOIN 30 MG/1
2 CAPSULE, LIQUID FILLED ORAL QD
Qty: 60 | Refills: 0 | Status: ERX

## 2022-09-12 ASSESSMENT — LOCATION SIMPLE DESCRIPTION DERM
LOCATION SIMPLE: RIGHT HAND
LOCATION SIMPLE: RIGHT FOREHEAD
LOCATION SIMPLE: LEFT FOREHEAD
LOCATION SIMPLE: UPPER BACK
LOCATION SIMPLE: LEFT HAND

## 2022-09-12 ASSESSMENT — LOCATION DETAILED DESCRIPTION DERM
LOCATION DETAILED: LEFT INFERIOR FOREHEAD
LOCATION DETAILED: SUPERIOR THORACIC SPINE
LOCATION DETAILED: RIGHT RADIAL DORSAL HAND
LOCATION DETAILED: RIGHT MEDIAL FOREHEAD
LOCATION DETAILED: LEFT RADIAL DORSAL HAND

## 2022-09-12 ASSESSMENT — LOCATION ZONE DERM
LOCATION ZONE: HAND
LOCATION ZONE: FACE
LOCATION ZONE: TRUNK

## 2022-09-12 NOTE — PROCEDURE: URINE PREGNANCY TEST
Lot # (Optional): [id number]
Performed By: Bruno Lambert
Detail Level: None
Expiration Date (Optional): 12-
Urine Pregnancy Test Result: negative

## 2022-09-12 NOTE — PROCEDURE: ISOTRETINOIN MONITORING
Use Therapeutic Ranged Or Therapeutic Target: please select Range or Target
Dosing Month 2 (Required For Cumulative Dosing): 60mg Daily
Is Xerosis Present?: Yes - Normal Treatment
Detail Level: Zone
Nosebleeds Normal Treatment: I explained this is common when taking isotretinoin. I recommended saline mist in each nostril multiple times a day. If this worsens they will contact us.
Dosing Month 3 (Required For Cumulative Dosing): 40mg BID
Pounds Preamble Statement (Weight Entered In Details Tab): Reported Weight in pounds: 220
Retinoid Dermatitis Normal Treatment: I recommended more frequent application of Cetaphil or CeraVe to the areas of dermatitis.
Hypercholesterolemia Monitoring: I explained this is common when taking isotretinoin. We will monitor closely.
Lower Range (In Mg/Kg): 120
Headache Monitoring: I recommended monitoring the headaches for now. There is no evidence of increased intracranial pressure. They were instructed to call if the headaches are worsening.
Retinoid Dermatitis Aggressive Treatment: I recommended more frequent application of Cetaphil or CeraVe to the areas of dermatitis. I also prescribed a topical steroid for twice daily use until the dermatitis resolves.
Cheilitis Normal Treatment: I recommended application of Vaseline or Aquaphor numerous times a day (as often as every hour) and before going to bed.
What Is The Patient's Gender: Female
Target Cumulative Dosage (In Mg/Kg): 135
Upper Range (In Mg/Kg): 150
Kilograms Preamble Statement (Weight Entered In Details Tab): Reported Weight in kilograms: 99.7
Cheilitis Aggressive Treatment: I recommended application of Vaseline or Aquaphor numerous times a day (as often as every hour) and before going to bed. I also prescribed a topical steroid for twice daily use.
Female Completion Statement: After discussing her treatment course we decided to discontinue isotretinoin therapy at this time. I explained that she would need to continue her birth control methods for at least one month after the last dosage. She should also get a pregnancy test one month after the last dose. She shouldn't donate blood for one month after the last dose. She should call with any new symptoms of depression.
Dosing Month 4 (Required For Cumulative Dosing): 30mg Alternating with 60mg Daily
Any Nosebleeds?: No
Show Text Field For Brand Names Of Contraception?: Yes
Male Completion Statement: After discussing his treatment course we decided to discontinue isotretinoin therapy at this time. He shouldn't donate blood for one month after the last dose. He should call with any new symptoms of depression.
Xerosis Normal Treatment: I recommended application of Cetaphil or CeraVe numerous times a day and before going to bed to all dry areas.
Myalgia Monitoring: I explained this is common when taking isotretinoin. If this worsens they will contact us.
Ipledge Number (Optional): [id number]
Xerosis Aggressive Treatment: I recommended application of Cetaphil or CeraVe numerous times a day and before going to bed to all dry areas. I also prescribed a topical steroid for twice daily use.
Myalgia Treatment: I explained this is common when taking isotretinoin. If this worsens they will contact us. They may try OTC ibuprofen.
Weight Units: kilograms
Next Month's Dosage: 30mg BID
Comments: stable symptoms of plantar fasciitis \\nhypo & hyperpigmentation - emphasized importance of sun protection (based on photos, hyperpigmentation has improved since starting treatment)\\ntaking antihistamine daily - recommend continuing this\\nStill breaking out, likely still 2-3 more months of treatment depending on response
Patient Weight (Optional But Required For Cumulative Dose-Numbers And Decimals Only): 99.7
Months Of Therapy Completed: 9
Dosing Month 1 (Required For Cumulative Dosing): 40mg Daily
Female Pregnancy Counseling Text: Female patients should also be on two forms of birth control while taking this medication and for one month after their last dose.
Counseling Text: I reviewed the side effect in detail. Patient should get monthly blood tests, not donate blood, not drive at night if vision affected, and not share medication.
Xerosis Normal Treatment: I recommended application of Cetaphil or CeraVe numerous times a day going to bed to all dry areas.
Xerosis Aggressive Treatment: I recommended application of Cetaphil or CeraVe numerous times a day going to bed to all dry areas. I also prescribed a topical steroid for twice daily use.

## 2022-10-25 ENCOUNTER — APPOINTMENT (RX ONLY)
Dept: URBAN - METROPOLITAN AREA CLINIC 4 | Facility: CLINIC | Age: 42
Setting detail: DERMATOLOGY
End: 2022-10-25

## 2022-10-25 DIAGNOSIS — L70.0 ACNE VULGARIS: ICD-10-CM

## 2022-10-25 DIAGNOSIS — L81.4 OTHER MELANIN HYPERPIGMENTATION: ICD-10-CM

## 2022-10-25 DIAGNOSIS — Z71.89 OTHER SPECIFIED COUNSELING: ICD-10-CM

## 2022-10-25 DIAGNOSIS — D18.0 HEMANGIOMA: ICD-10-CM

## 2022-10-25 DIAGNOSIS — D22 MELANOCYTIC NEVI: ICD-10-CM

## 2022-10-25 PROBLEM — D23.71 OTHER BENIGN NEOPLASM OF SKIN OF RIGHT LOWER LIMB, INCLUDING HIP: Status: ACTIVE | Noted: 2022-10-25

## 2022-10-25 PROBLEM — D23.72 OTHER BENIGN NEOPLASM OF SKIN OF LEFT LOWER LIMB, INCLUDING HIP: Status: ACTIVE | Noted: 2022-10-25

## 2022-10-25 PROBLEM — D18.01 HEMANGIOMA OF SKIN AND SUBCUTANEOUS TISSUE: Status: ACTIVE | Noted: 2022-10-25

## 2022-10-25 PROBLEM — D22.5 MELANOCYTIC NEVI OF TRUNK: Status: ACTIVE | Noted: 2022-10-25

## 2022-10-25 PROCEDURE — ? ISOTRETINOIN MONITORING

## 2022-10-25 PROCEDURE — 99214 OFFICE O/P EST MOD 30 MIN: CPT

## 2022-10-25 PROCEDURE — ? COUNSELING

## 2022-10-25 PROCEDURE — ? URINE PREGNANCY TEST

## 2022-10-25 PROCEDURE — ? SUNSCREEN TREATMENT REGIMEN

## 2022-10-25 PROCEDURE — 81025 URINE PREGNANCY TEST: CPT

## 2022-10-25 ASSESSMENT — LOCATION DETAILED DESCRIPTION DERM
LOCATION DETAILED: SUPERIOR THORACIC SPINE
LOCATION DETAILED: LEFT INFERIOR FOREHEAD
LOCATION DETAILED: MIDDLE STERNUM
LOCATION DETAILED: RIGHT MEDIAL FOREHEAD
LOCATION DETAILED: EPIGASTRIC SKIN
LOCATION DETAILED: LEFT PROXIMAL DORSAL FOREARM
LOCATION DETAILED: LEFT RADIAL DORSAL HAND
LOCATION DETAILED: RIGHT RADIAL DORSAL HAND

## 2022-10-25 ASSESSMENT — LOCATION SIMPLE DESCRIPTION DERM
LOCATION SIMPLE: LEFT HAND
LOCATION SIMPLE: ABDOMEN
LOCATION SIMPLE: UPPER BACK
LOCATION SIMPLE: RIGHT FOREHEAD
LOCATION SIMPLE: LEFT FOREHEAD
LOCATION SIMPLE: LEFT FOREARM
LOCATION SIMPLE: CHEST
LOCATION SIMPLE: RIGHT HAND

## 2022-10-25 ASSESSMENT — LOCATION ZONE DERM
LOCATION ZONE: FACE
LOCATION ZONE: HAND
LOCATION ZONE: ARM
LOCATION ZONE: TRUNK

## 2022-10-25 NOTE — PROCEDURE: ISOTRETINOIN MONITORING
Use Therapeutic Ranged Or Therapeutic Target: please select Range or Target
Dosing Month 2 (Required For Cumulative Dosing): 60mg Daily
Is Xerosis Present?: Yes - Normal Treatment
Detail Level: Zone
Nosebleeds Normal Treatment: I explained this is common when taking isotretinoin. I recommended saline mist in each nostril multiple times a day. If this worsens they will contact us.
Dosing Month 3 (Required For Cumulative Dosing): 40mg BID
Pounds Preamble Statement (Weight Entered In Details Tab): Reported Weight in pounds: 220
Retinoid Dermatitis Normal Treatment: I recommended more frequent application of Cetaphil or CeraVe to the areas of dermatitis.
Hypercholesterolemia Monitoring: I explained this is common when taking isotretinoin. We will monitor closely.
Lower Range (In Mg/Kg): 120
Headache Monitoring: I recommended monitoring the headaches for now. There is no evidence of increased intracranial pressure. They were instructed to call if the headaches are worsening.
Retinoid Dermatitis Aggressive Treatment: I recommended more frequent application of Cetaphil or CeraVe to the areas of dermatitis. I also prescribed a topical steroid for twice daily use until the dermatitis resolves.
Cheilitis Normal Treatment: I recommended application of Vaseline or Aquaphor numerous times a day (as often as every hour) and before going to bed.
What Is The Patient's Gender: Female
Target Cumulative Dosage (In Mg/Kg): 135
Upper Range (In Mg/Kg): 150
Kilograms Preamble Statement (Weight Entered In Details Tab): Reported Weight in kilograms: 99.7
Cheilitis Aggressive Treatment: I recommended application of Vaseline or Aquaphor numerous times a day (as often as every hour) and before going to bed. I also prescribed a topical steroid for twice daily use.
Female Completion Statement: After discussing her treatment course we decided to discontinue isotretinoin therapy at this time. I explained that she would need to continue her birth control methods for at least one month after the last dosage. She should also get a pregnancy test one month after the last dose. She shouldn't donate blood for one month after the last dose. She should call with any new symptoms of depression.
Dosing Month 4 (Required For Cumulative Dosing): 30mg Alternating with 60mg Daily
Any Nosebleeds?: No
Show Text Field For Brand Names Of Contraception?: Yes
Male Completion Statement: After discussing his treatment course we decided to discontinue isotretinoin therapy at this time. He shouldn't donate blood for one month after the last dose. He should call with any new symptoms of depression.
Xerosis Normal Treatment: I recommended application of Cetaphil or CeraVe numerous times a day and before going to bed to all dry areas.
Myalgia Monitoring: I explained this is common when taking isotretinoin. If this worsens they will contact us.
Ipledge Number (Optional): [id number]
Xerosis Aggressive Treatment: I recommended application of Cetaphil or CeraVe numerous times a day and before going to bed to all dry areas. I also prescribed a topical steroid for twice daily use.
Myalgia Treatment: I explained this is common when taking isotretinoin. If this worsens they will contact us. They may try OTC ibuprofen.
Weight Units: kilograms
Next Month's Dosage: Discontinue
Comments: stable symptoms of plantar fasciitis \\nstill concerned about hypo & hyperpigmentation - emphasized importance of sun protection (based on photos, hyperpigmentation has improved since starting treatment)\\ntaking antihistamine daily - recommend continuing this\\nStill breaking out slightly, but patient does not wish to continue (discussed r/b of acne recurrence)\\ndiscussed that hypopigmentation cannot be improved with laser/procedures, but if any residual hyperpigmentation can trial topicals vs possible fraxel
Dosing Month 8 (Required For Cumulative Dosing): 30mg BID
Patient Weight (Optional But Required For Cumulative Dose-Numbers And Decimals Only): 99.7
Months Of Therapy Completed: 10
Dosing Month 1 (Required For Cumulative Dosing): 40mg Daily
Female Pregnancy Counseling Text: Female patients should also be on two forms of birth control while taking this medication and for one month after their last dose.
Counseling Text: I reviewed the side effect in detail. Patient should get monthly blood tests, not donate blood, not drive at night if vision affected, and not share medication.
Xerosis Normal Treatment: I recommended application of Cetaphil or CeraVe numerous times a day going to bed to all dry areas.
Xerosis Aggressive Treatment: I recommended application of Cetaphil or CeraVe numerous times a day going to bed to all dry areas. I also prescribed a topical steroid for twice daily use.

## 2022-10-25 NOTE — PROCEDURE: URINE PREGNANCY TEST
Lot # (Optional): [id number]
Performed By: Manda VEGA
Detail Level: None
Expiration Date (Optional): 02-
Urine Pregnancy Test Result: negative

## 2022-10-25 NOTE — PROCEDURE: COUNSELING
Doxycycline Pregnancy And Lactation Text: This medication is Pregnancy Category D and not consider safe during pregnancy. It is also excreted in breast milk but is considered safe for shorter treatment courses.
Tetracycline Counseling: Patient counseled regarding possible photosensitivity and increased risk for sunburn.  Patient instructed to avoid sunlight, if possible.  When exposed to sunlight, patients should wear protective clothing, sunglasses, and sunscreen.  The patient was instructed to call the office immediately if the following severe adverse effects occur:  hearing changes, easy bruising/bleeding, severe headache, or vision changes.  The patient verbalized understanding of the proper use and possible adverse effects of tetracycline.  All of the patient's questions and concerns were addressed. Patient understands to avoid pregnancy while on therapy due to potential birth defects.
Birth Control Pills Counseling: Birth Control Pill Counseling: I discussed with the patient the potential side effects of OCPs including but not limited to increased risk of stroke, heart attack, thrombophlebitis, deep venous thrombosis, hepatic adenomas, breast changes, GI upset, headaches, and depression.  The patient verbalized understanding of the proper use and possible adverse effects of OCPs. All of the patient's questions and concerns were addressed.
Sarecycline Counseling: Patient advised regarding possible photosensitivity and discoloration of the teeth, skin, lips, tongue and gums.  Patient instructed to avoid sunlight, if possible.  When exposed to sunlight, patients should wear protective clothing, sunglasses, and sunscreen.  The patient was instructed to call the office immediately if the following severe adverse effects occur:  hearing changes, easy bruising/bleeding, severe headache, or vision changes.  The patient verbalized understanding of the proper use and possible adverse effects of sarecycline.  All of the patient's questions and concerns were addressed.
Bactrim Pregnancy And Lactation Text: This medication is Pregnancy Category D and is known to cause fetal risk.  It is also excreted in breast milk.
Topical Retinoid counseling:  Patient advised to apply a pea-sized amount only at bedtime and wait 30 minutes after washing their face before applying.  If too drying, patient may add a non-comedogenic moisturizer. The patient verbalized understanding of the proper use and possible adverse effects of retinoids.  All of the patient's questions and concerns were addressed.
Include Pregnancy/Lactation Warning?: No
Isotretinoin Pregnancy And Lactation Text: This medication is Pregnancy Category X and is considered extremely dangerous during pregnancy. It is unknown if it is excreted in breast milk.
Topical Sulfur Applications Pregnancy And Lactation Text: This medication is Pregnancy Category C and has an unknown safety profile during pregnancy. It is unknown if this topical medication is excreted in breast milk.
Sarecycline Pregnancy And Lactation Text: This medication is Pregnancy Category D and not consider safe during pregnancy. It is also excreted in breast milk.
Tazorac Counseling:  Patient advised that medication is irritating and drying.  Patient may need to apply sparingly and wash off after an hour before eventually leaving it on overnight.  The patient verbalized understanding of the proper use and possible adverse effects of tazorac.  All of the patient's questions and concerns were addressed.
Topical Clindamycin Counseling: Patient counseled that this medication may cause skin irritation or allergic reactions.  In the event of skin irritation, the patient was advised to reduce the amount of the drug applied or use it less frequently.   The patient verbalized understanding of the proper use and possible adverse effects of clindamycin.  All of the patient's questions and concerns were addressed.
High Dose Vitamin A Pregnancy And Lactation Text: High dose vitamin A therapy is contraindicated during pregnancy and breast feeding.
Azithromycin Counseling:  I discussed with the patient the risks of azithromycin including but not limited to GI upset, allergic reaction, drug rash, diarrhea, and yeast infections.
Topical Retinoid Pregnancy And Lactation Text: This medication is Pregnancy Category C. It is unknown if this medication is excreted in breast milk.
High Dose Vitamin A Counseling: Side effects reviewed, pt to contact office should one occur.
Erythromycin Counseling:  I discussed with the patient the risks of erythromycin including but not limited to GI upset, allergic reaction, drug rash, diarrhea, increase in liver enzymes, and yeast infections.
Dapsone Pregnancy And Lactation Text: This medication is Pregnancy Category C and is not considered safe during pregnancy or breast feeding.
Tazorac Pregnancy And Lactation Text: This medication is not safe during pregnancy. It is unknown if this medication is excreted in breast milk.
Topical Clindamycin Pregnancy And Lactation Text: This medication is Pregnancy Category B and is considered safe during pregnancy. It is unknown if it is excreted in breast milk.
Detail Level: Zone
Dapsone Counseling: I discussed with the patient the risks of dapsone including but not limited to hemolytic anemia, agranulocytosis, rashes, methemoglobinemia, kidney failure, peripheral neuropathy, headaches, GI upset, and liver toxicity.  Patients who start dapsone require monitoring including baseline LFTs and weekly CBCs for the first month, then every month thereafter.  The patient verbalized understanding of the proper use and possible adverse effects of dapsone.  All of the patient's questions and concerns were addressed.
Minocycline Counseling: Patient advised regarding possible photosensitivity and discoloration of the teeth, skin, lips, tongue and gums.  Patient instructed to avoid sunlight, if possible.  When exposed to sunlight, patients should wear protective clothing, sunglasses, and sunscreen.  The patient was instructed to call the office immediately if the following severe adverse effects occur:  hearing changes, easy bruising/bleeding, severe headache, or vision changes.  The patient verbalized understanding of the proper use and possible adverse effects of minocycline.  All of the patient's questions and concerns were addressed.
Benzoyl Peroxide Counseling: Patient counseled that medicine may cause skin irritation and bleach clothing.  In the event of skin irritation, the patient was advised to reduce the amount of the drug applied or use it less frequently.   The patient verbalized understanding of the proper use and possible adverse effects of benzoyl peroxide.  All of the patient's questions and concerns were addressed.
Erythromycin Pregnancy And Lactation Text: This medication is Pregnancy Category B and is considered safe during pregnancy. It is also excreted in breast milk.
Birth Control Pills Pregnancy And Lactation Text: This medication should be avoided if pregnant and for the first 30 days post-partum.
Spironolactone Counseling: Patient advised regarding risks of diarrhea, abdominal pain, hyperkalemia, birth defects (for female patients), liver toxicity and renal toxicity. The patient may need blood work to monitor liver and kidney function and potassium levels while on therapy. The patient verbalized understanding of the proper use and possible adverse effects of spironolactone.  All of the patient's questions and concerns were addressed.
Azithromycin Pregnancy And Lactation Text: This medication is considered safe during pregnancy and is also secreted in breast milk.
Spironolactone Pregnancy And Lactation Text: This medication can cause feminization of the male fetus and should be avoided during pregnancy. The active metabolite is also found in breast milk.
Topical Sulfur Applications Counseling: Topical Sulfur Counseling: Patient counseled that this medication may cause skin irritation or allergic reactions.  In the event of skin irritation, the patient was advised to reduce the amount of the drug applied or use it less frequently.   The patient verbalized understanding of the proper use and possible adverse effects of topical sulfur application.  All of the patient's questions and concerns were addressed.
Isotretinoin Counseling: Patient should get monthly blood tests, not donate blood, not drive at night if vision affected, not share medication, and not undergo elective surgery for 6 months after tx completed. Side effects reviewed, pt to contact office should one occur.
Doxycycline Counseling:  Patient counseled regarding possible photosensitivity and increased risk for sunburn.  Patient instructed to avoid sunlight, if possible.  When exposed to sunlight, patients should wear protective clothing, sunglasses, and sunscreen.  The patient was instructed to call the office immediately if the following severe adverse effects occur:  hearing changes, easy bruising/bleeding, severe headache, or vision changes.  The patient verbalized understanding of the proper use and possible adverse effects of doxycycline.  All of the patient's questions and concerns were addressed.
Benzoyl Peroxide Pregnancy And Lactation Text: This medication is Pregnancy Category C. It is unknown if benzoyl peroxide is excreted in breast milk.
Bactrim Counseling:  I discussed with the patient the risks of sulfa antibiotics including but not limited to GI upset, allergic reaction, drug rash, diarrhea, dizziness, photosensitivity, and yeast infections.  Rarely, more serious reactions can occur including but not limited to aplastic anemia, agranulocytosis, methemoglobinemia, blood dyscrasias, liver or kidney failure, lung infiltrates or desquamative/blistering drug rashes.
Azelaic Acid Pregnancy And Lactation Text: This medication is considered safe during pregnancy and breast feeding.
Winlevi Pregnancy And Lactation Text: This medication is considered safe during pregnancy and breastfeeding.
Azelaic Acid Counseling: Patient counseled that medicine may cause skin irritation and to avoid applying near the eyes.  In the event of skin irritation, the patient was advised to reduce the amount of the drug applied or use it less frequently.   The patient verbalized understanding of the proper use and possible adverse effects of azelaic acid.  All of the patient's questions and concerns were addressed.
Winlevi Counseling:  I discussed with the patient the risks of topical clascoterone including but not limited to erythema, scaling, itching, and stinging. Patient voiced their understanding.
Detail Level: Generalized
Detail Level: Detailed

## 2022-11-28 ENCOUNTER — APPOINTMENT (RX ONLY)
Dept: URBAN - METROPOLITAN AREA CLINIC 6 | Facility: CLINIC | Age: 42
Setting detail: DERMATOLOGY
End: 2022-11-28

## 2022-11-28 DIAGNOSIS — L70.0 ACNE VULGARIS: ICD-10-CM

## 2022-11-28 PROCEDURE — 81025 URINE PREGNANCY TEST: CPT

## 2022-11-28 PROCEDURE — 99211 OFF/OP EST MAY X REQ PHY/QHP: CPT

## 2022-11-28 PROCEDURE — ? COUNSELING

## 2022-11-28 PROCEDURE — ? URINE PREGNANCY TEST

## 2022-11-28 ASSESSMENT — LOCATION ZONE DERM: LOCATION ZONE: FACE

## 2022-11-28 ASSESSMENT — LOCATION SIMPLE DESCRIPTION DERM: LOCATION SIMPLE: SUPERIOR FOREHEAD

## 2022-11-28 ASSESSMENT — LOCATION DETAILED DESCRIPTION DERM: LOCATION DETAILED: SUPERIOR MID FOREHEAD

## 2022-11-28 NOTE — PROCEDURE: COUNSELING
Spironolactone Pregnancy And Lactation Text: This medication can cause feminization of the male fetus and should be avoided during pregnancy. The active metabolite is also found in breast milk.
Spoke with patient regarding note below. Patient states this was already addressed and she has upcoming appt scheduled with Sasha Pacheco PA-C on 2/23/22 (see E-advice encounter dated yesterday). Patient states she is not having any symptoms today. Offered to schedule sooner appointment with Sasha Pacheco PA-C for next week but patient stated she would prefer to keep appointment on 2/23/22 due to her schedule. Advised patient to call our office if any worsening symptoms develop. Patient verbalized understanding of information given.  
High Dose Vitamin A Counseling: Side effects reviewed, pt to contact office should one occur.
Azithromycin Counseling:  I discussed with the patient the risks of azithromycin including but not limited to GI upset, allergic reaction, drug rash, diarrhea, and yeast infections.
Dapsone Pregnancy And Lactation Text: This medication is Pregnancy Category C and is not considered safe during pregnancy or breast feeding.
Benzoyl Peroxide Counseling: Patient counseled that medicine may cause skin irritation and bleach clothing.  In the event of skin irritation, the patient was advised to reduce the amount of the drug applied or use it less frequently.   The patient verbalized understanding of the proper use and possible adverse effects of benzoyl peroxide.  All of the patient's questions and concerns were addressed.
Topical Clindamycin Pregnancy And Lactation Text: This medication is Pregnancy Category B and is considered safe during pregnancy. It is unknown if it is excreted in breast milk.
Topical Retinoid counseling:  Patient advised to apply a pea-sized amount only at bedtime and wait 30 minutes after washing their face before applying.  If too drying, patient may add a non-comedogenic moisturizer. The patient verbalized understanding of the proper use and possible adverse effects of retinoids.  All of the patient's questions and concerns were addressed.
Topical Sulfur Applications Pregnancy And Lactation Text: This medication is Pregnancy Category C and has an unknown safety profile during pregnancy. It is unknown if this topical medication is excreted in breast milk.
Tetracycline Pregnancy And Lactation Text: This medication is Pregnancy Category D and not consider safe during pregnancy. It is also excreted in breast milk.
Include Pregnancy/Lactation Warning?: No
Bactrim Pregnancy And Lactation Text: This medication is Pregnancy Category D and is known to cause fetal risk.  It is also excreted in breast milk.
Topical Retinoid Pregnancy And Lactation Text: This medication is Pregnancy Category C. It is unknown if this medication is excreted in breast milk.
Winlevi Counseling:  I discussed with the patient the risks of topical clascoterone including but not limited to erythema, scaling, itching, and stinging. Patient voiced their understanding.
Erythromycin Counseling:  I discussed with the patient the risks of erythromycin including but not limited to GI upset, allergic reaction, drug rash, diarrhea, increase in liver enzymes, and yeast infections.
Aklief counseling:  Patient advised to apply a pea-sized amount only at bedtime and wait 30 minutes after washing their face before applying.  If too drying, patient may add a non-comedogenic moisturizer.  The most commonly reported side effects including irritation, redness, scaling, dryness, stinging, burning, itching, and increased risk of sunburn.  The patient verbalized understanding of the proper use and possible adverse effects of retinoids.  All of the patient's questions and concerns were addressed.
Tazorac Pregnancy And Lactation Text: This medication is not safe during pregnancy. It is unknown if this medication is excreted in breast milk.
Azelaic Acid Counseling: Patient counseled that medicine may cause skin irritation and to avoid applying near the eyes.  In the event of skin irritation, the patient was advised to reduce the amount of the drug applied or use it less frequently.   The patient verbalized understanding of the proper use and possible adverse effects of azelaic acid.  All of the patient's questions and concerns were addressed.
Birth Control Pills Pregnancy And Lactation Text: This medication should be avoided if pregnant and for the first 30 days post-partum.
Isotretinoin Counseling: Patient should get monthly blood tests, not donate blood, not drive at night if vision affected, not share medication, and not undergo elective surgery for 6 months after tx completed. Side effects reviewed, pt to contact office should one occur.
Benzoyl Peroxide Pregnancy And Lactation Text: This medication is Pregnancy Category C. It is unknown if benzoyl peroxide is excreted in breast milk.
Topical Sulfur Applications Counseling: Topical Sulfur Counseling: Patient counseled that this medication may cause skin irritation or allergic reactions.  In the event of skin irritation, the patient was advised to reduce the amount of the drug applied or use it less frequently.   The patient verbalized understanding of the proper use and possible adverse effects of topical sulfur application.  All of the patient's questions and concerns were addressed.
Dapsone Counseling: I discussed with the patient the risks of dapsone including but not limited to hemolytic anemia, agranulocytosis, rashes, methemoglobinemia, kidney failure, peripheral neuropathy, headaches, GI upset, and liver toxicity.  Patients who start dapsone require monitoring including baseline LFTs and weekly CBCs for the first month, then every month thereafter.  The patient verbalized understanding of the proper use and possible adverse effects of dapsone.  All of the patient's questions and concerns were addressed.
Tetracycline Counseling: Patient counseled regarding possible photosensitivity and increased risk for sunburn.  Patient instructed to avoid sunlight, if possible.  When exposed to sunlight, patients should wear protective clothing, sunglasses, and sunscreen.  The patient was instructed to call the office immediately if the following severe adverse effects occur:  hearing changes, easy bruising/bleeding, severe headache, or vision changes.  The patient verbalized understanding of the proper use and possible adverse effects of tetracycline.  All of the patient's questions and concerns were addressed. Patient understands to avoid pregnancy while on therapy due to potential birth defects.
Isotretinoin Pregnancy And Lactation Text: This medication is Pregnancy Category X and is considered extremely dangerous during pregnancy. It is unknown if it is excreted in breast milk.
Spironolactone Counseling: Patient advised regarding risks of diarrhea, abdominal pain, hyperkalemia, birth defects (for female patients), liver toxicity and renal toxicity. The patient may need blood work to monitor liver and kidney function and potassium levels while on therapy. The patient verbalized understanding of the proper use and possible adverse effects of spironolactone.  All of the patient's questions and concerns were addressed.
Azelaic Acid Pregnancy And Lactation Text: This medication is considered safe during pregnancy and breast feeding.
Doxycycline Counseling:  Patient counseled regarding possible photosensitivity and increased risk for sunburn.  Patient instructed to avoid sunlight, if possible.  When exposed to sunlight, patients should wear protective clothing, sunglasses, and sunscreen.  The patient was instructed to call the office immediately if the following severe adverse effects occur:  hearing changes, easy bruising/bleeding, severe headache, or vision changes.  The patient verbalized understanding of the proper use and possible adverse effects of doxycycline.  All of the patient's questions and concerns were addressed.
High Dose Vitamin A Pregnancy And Lactation Text: High dose vitamin A therapy is contraindicated during pregnancy and breast feeding.
Azithromycin Pregnancy And Lactation Text: This medication is considered safe during pregnancy and is also secreted in breast milk.
Aklief Pregnancy And Lactation Text: It is unknown if this medication is safe to use during pregnancy.  It is unknown if this medication is excreted in breast milk.  Breastfeeding women should use the topical cream on the smallest area of the skin for the shortest time needed while breastfeeding.  Do not apply to nipple and areola.
Bactrim Counseling:  I discussed with the patient the risks of sulfa antibiotics including but not limited to GI upset, allergic reaction, drug rash, diarrhea, dizziness, photosensitivity, and yeast infections.  Rarely, more serious reactions can occur including but not limited to aplastic anemia, agranulocytosis, methemoglobinemia, blood dyscrasias, liver or kidney failure, lung infiltrates or desquamative/blistering drug rashes.
Erythromycin Pregnancy And Lactation Text: This medication is Pregnancy Category B and is considered safe during pregnancy. It is also excreted in breast milk.
Sarecycline Counseling: Patient advised regarding possible photosensitivity and discoloration of the teeth, skin, lips, tongue and gums.  Patient instructed to avoid sunlight, if possible.  When exposed to sunlight, patients should wear protective clothing, sunglasses, and sunscreen.  The patient was instructed to call the office immediately if the following severe adverse effects occur:  hearing changes, easy bruising/bleeding, severe headache, or vision changes.  The patient verbalized understanding of the proper use and possible adverse effects of sarecycline.  All of the patient's questions and concerns were addressed.
Tazorac Counseling:  Patient advised that medication is irritating and drying.  Patient may need to apply sparingly and wash off after an hour before eventually leaving it on overnight.  The patient verbalized understanding of the proper use and possible adverse effects of tazorac.  All of the patient's questions and concerns were addressed.
Winlevi Pregnancy And Lactation Text: This medication is considered safe during pregnancy and breastfeeding.
Doxycycline Pregnancy And Lactation Text: This medication is Pregnancy Category D and not consider safe during pregnancy. It is also excreted in breast milk but is considered safe for shorter treatment courses.
Minocycline Counseling: Patient advised regarding possible photosensitivity and discoloration of the teeth, skin, lips, tongue and gums.  Patient instructed to avoid sunlight, if possible.  When exposed to sunlight, patients should wear protective clothing, sunglasses, and sunscreen.  The patient was instructed to call the office immediately if the following severe adverse effects occur:  hearing changes, easy bruising/bleeding, severe headache, or vision changes.  The patient verbalized understanding of the proper use and possible adverse effects of minocycline.  All of the patient's questions and concerns were addressed.
Detail Level: Zone
Birth Control Pills Counseling: Birth Control Pill Counseling: I discussed with the patient the potential side effects of OCPs including but not limited to increased risk of stroke, heart attack, thrombophlebitis, deep venous thrombosis, hepatic adenomas, breast changes, GI upset, headaches, and depression.  The patient verbalized understanding of the proper use and possible adverse effects of OCPs. All of the patient's questions and concerns were addressed.
Topical Clindamycin Counseling: Patient counseled that this medication may cause skin irritation or allergic reactions.  In the event of skin irritation, the patient was advised to reduce the amount of the drug applied or use it less frequently.   The patient verbalized understanding of the proper use and possible adverse effects of clindamycin.  All of the patient's questions and concerns were addressed.

## 2022-11-28 NOTE — PROCEDURE: URINE PREGNANCY TEST
Expiration Date (Optional): 03/31/2024
Detail Level: None
Performed By: Bre Beckwith
Lot # (Optional): [id number]
Urine Pregnancy Test Result: negative

## 2023-01-05 ENCOUNTER — APPOINTMENT (OUTPATIENT)
Dept: RADIOLOGY | Facility: MEDICAL CENTER | Age: 43
End: 2023-01-05
Attending: OBSTETRICS & GYNECOLOGY
Payer: COMMERCIAL

## 2023-01-05 DIAGNOSIS — Z12.31 VISIT FOR SCREENING MAMMOGRAM: ICD-10-CM

## 2023-01-05 PROCEDURE — 77067 SCR MAMMO BI INCL CAD: CPT

## 2023-01-26 ENCOUNTER — HOSPITAL ENCOUNTER (OUTPATIENT)
Dept: LAB | Facility: MEDICAL CENTER | Age: 43
End: 2023-01-26
Attending: NURSE PRACTITIONER
Payer: COMMERCIAL

## 2023-01-26 LAB
ERYTHROCYTE [DISTWIDTH] IN BLOOD BY AUTOMATED COUNT: 39.9 FL (ref 35.9–50)
HCT VFR BLD AUTO: 41.8 % (ref 37–47)
HGB BLD-MCNC: 14.2 G/DL (ref 12–16)
MCH RBC QN AUTO: 30.3 PG (ref 27–33)
MCHC RBC AUTO-ENTMCNC: 34 G/DL (ref 33.6–35)
MCV RBC AUTO: 89.3 FL (ref 81.4–97.8)
PLATELET # BLD AUTO: 252 K/UL (ref 164–446)
PMV BLD AUTO: 9.8 FL (ref 9–12.9)
RBC # BLD AUTO: 4.68 M/UL (ref 4.2–5.4)
T4 FREE SERPL-MCNC: 1.29 NG/DL (ref 0.93–1.7)
TSH SERPL DL<=0.005 MIU/L-ACNC: 1.04 UIU/ML (ref 0.38–5.33)
WBC # BLD AUTO: 7.6 K/UL (ref 4.8–10.8)

## 2023-01-26 PROCEDURE — 85027 COMPLETE CBC AUTOMATED: CPT

## 2023-01-26 PROCEDURE — 84439 ASSAY OF FREE THYROXINE: CPT

## 2023-01-26 PROCEDURE — 36415 COLL VENOUS BLD VENIPUNCTURE: CPT

## 2023-01-26 PROCEDURE — 84443 ASSAY THYROID STIM HORMONE: CPT

## 2023-03-09 ENCOUNTER — APPOINTMENT (RX ONLY)
Dept: URBAN - METROPOLITAN AREA CLINIC 6 | Facility: CLINIC | Age: 43
Setting detail: DERMATOLOGY
End: 2023-03-09

## 2023-03-09 DIAGNOSIS — L81.4 OTHER MELANIN HYPERPIGMENTATION: ICD-10-CM | Status: INADEQUATELY CONTROLLED

## 2023-03-09 PROBLEM — L81.9 DISORDER OF PIGMENTATION, UNSPECIFIED: Status: ACTIVE | Noted: 2023-03-09

## 2023-03-09 PROCEDURE — ? PRESCRIPTION MEDICATION MANAGEMENT

## 2023-03-09 PROCEDURE — ? ADDITIONAL NOTES

## 2023-03-09 PROCEDURE — 99214 OFFICE O/P EST MOD 30 MIN: CPT

## 2023-03-09 PROCEDURE — ? COUNSELING

## 2023-03-09 PROCEDURE — ? PRESCRIPTION

## 2023-03-09 RX ORDER — H-QUINONE/TRETINOIN/HYDROCORT 8 %-0.025%
EMULSION (GRAM) TOPICAL QHS
Qty: 30 | Refills: 1 | Status: ERX | COMMUNITY
Start: 2023-03-09

## 2023-03-09 RX ORDER — H-QUINONE/TRETINOIN/HYDROCORT 8 %-0.025%
EMULSION (GRAM) TOPICAL QHS
Qty: 30 | Refills: 1 | Status: ERX

## 2023-03-09 RX ADMIN — Medication 1: at 00:00

## 2023-03-09 ASSESSMENT — LOCATION DETAILED DESCRIPTION DERM: LOCATION DETAILED: LEFT INFERIOR CENTRAL MALAR CHEEK

## 2023-03-09 ASSESSMENT — LOCATION SIMPLE DESCRIPTION DERM: LOCATION SIMPLE: LEFT CHEEK

## 2023-03-09 ASSESSMENT — LOCATION ZONE DERM: LOCATION ZONE: FACE

## 2023-03-09 NOTE — PROCEDURE: PRESCRIPTION MEDICATION MANAGEMENT
Initiate Treatment: Kutaryaxm 8 %-0.025 %-0.5 % topical emulsion Qhs
Plan: Advised pt to apply Kutaryaxm every other night, then increase to every night.
Detail Level: Zone
Render In Strict Bullet Format?: No

## 2023-04-02 DIAGNOSIS — I10 ESSENTIAL HYPERTENSION: ICD-10-CM

## 2023-04-03 RX ORDER — METOPROLOL SUCCINATE 25 MG/1
TABLET, EXTENDED RELEASE ORAL
Qty: 45 TABLET | Refills: 3 | Status: SHIPPED | OUTPATIENT
Start: 2023-04-03 | End: 2024-03-27

## 2023-04-03 NOTE — TELEPHONE ENCOUNTER
Is the patient due for a refill? Yes    Was the patient seen the past year? No    Date of last office visit: 1/17/2022    Does the patient have an upcoming appointment?  No   If yes, When?     Provider to refill:DA, ADD for LS    Does the patients insurance require a 100 day supply?  No

## 2023-05-23 ENCOUNTER — APPOINTMENT (RX ONLY)
Dept: URBAN - METROPOLITAN AREA CLINIC 6 | Facility: CLINIC | Age: 43
Setting detail: DERMATOLOGY
End: 2023-05-23

## 2023-05-23 DIAGNOSIS — L81.4 OTHER MELANIN HYPERPIGMENTATION: ICD-10-CM

## 2023-05-23 PROBLEM — L81.9 DISORDER OF PIGMENTATION, UNSPECIFIED: Status: ACTIVE | Noted: 2023-05-23

## 2023-05-23 PROCEDURE — ? ADDITIONAL NOTES

## 2023-05-23 PROCEDURE — ? MEDICATION COUNSELING

## 2023-05-23 PROCEDURE — 99214 OFFICE O/P EST MOD 30 MIN: CPT

## 2023-05-23 PROCEDURE — ? COUNSELING

## 2023-05-23 PROCEDURE — ? PRESCRIPTION

## 2023-05-23 PROCEDURE — ? PRESCRIPTION MEDICATION MANAGEMENT

## 2023-05-23 RX ORDER — H-QUINONE/TRETINOIN/HYDROCORT 8 %-0.025%
EMULSION (GRAM) TOPICAL QHS
Qty: 30 | Refills: 2 | Status: ERX

## 2023-05-23 ASSESSMENT — LOCATION SIMPLE DESCRIPTION DERM: LOCATION SIMPLE: LEFT CHEEK

## 2023-05-23 ASSESSMENT — LOCATION DETAILED DESCRIPTION DERM: LOCATION DETAILED: LEFT INFERIOR CENTRAL MALAR CHEEK

## 2023-05-23 ASSESSMENT — LOCATION ZONE DERM: LOCATION ZONE: FACE

## 2023-05-23 NOTE — PROCEDURE: MEDICATION COUNSELING
Protopic Pregnancy And Lactation Text: This medication is Pregnancy Category C. It is unknown if this medication is excreted in breast milk when applied topically.
Tranexamic Acid Counseling:  Patient advised of the small risk of bleeding problems with tranexamic acid. They were also instructed to call if they developed any nausea, vomiting or diarrhea. All of the patient's questions and concerns were addressed.
Acitretin Counseling:  I discussed with the patient the risks of acitretin including but not limited to hair loss, dry lips/skin/eyes, liver damage, hyperlipidemia, depression/suicidal ideation, photosensitivity.  Serious rare side effects can include but are not limited to pancreatitis, pseudotumor cerebri, bony changes, clot formation/stroke/heart attack.  Patient understands that alcohol is contraindicated since it can result in liver toxicity and significantly prolong the elimination of the drug by many years.
Azathioprine Pregnancy And Lactation Text: This medication is Pregnancy Category D and isn't considered safe during pregnancy. It is unknown if this medication is excreted in breast milk.
Dutasteride Pregnancy And Lactation Text: This medication is absolutely contraindicated in women, especially during pregnancy and breast feeding. Feminization of male fetuses is possible if taking while pregnant.
Doxycycline Counseling:  Patient counseled regarding possible photosensitivity and increased risk for sunburn.  Patient instructed to avoid sunlight, if possible.  When exposed to sunlight, patients should wear protective clothing, sunglasses, and sunscreen.  The patient was instructed to call the office immediately if the following severe adverse effects occur:  hearing changes, easy bruising/bleeding, severe headache, or vision changes.  The patient verbalized understanding of the proper use and possible adverse effects of doxycycline.  All of the patient's questions and concerns were addressed.
Nsaids Pregnancy And Lactation Text: These medications are considered safe up to 30 weeks gestation. It is excreted in breast milk.
Glycopyrrolate Counseling:  I discussed with the patient the risks of glycopyrrolate including but not limited to skin rash, drowsiness, dry mouth, difficulty urinating, and blurred vision.
Infliximab Pregnancy And Lactation Text: This medication is Pregnancy Category B and is considered safe during pregnancy. It is unknown if this medication is excreted in breast milk.
Topical Steroids Applications Pregnancy And Lactation Text: Most topical steroids are considered safe to use during pregnancy and lactation.  Any topical steroid applied to the breast or nipple should be washed off before breastfeeding.
Tazorac Counseling:  Patient advised that medication is irritating and drying.  Patient may need to apply sparingly and wash off after an hour before eventually leaving it on overnight.  The patient verbalized understanding of the proper use and possible adverse effects of tazorac.  All of the patient's questions and concerns were addressed.
Propranolol Counseling:  I discussed with the patient the risks of propranolol including but not limited to low heart rate, low blood pressure, low blood sugar, restlessness and increased cold sensitivity. They should call the office if they experience any of these side effects.
Terbinafine Pregnancy And Lactation Text: This medication is Pregnancy Category B and is considered safe during pregnancy. It is also excreted in breast milk and breast feeding isn't recommended.
Zoryve Counseling:  I discussed with the patient that Zoryve is not for use in the eyes, mouth or vagina. The most commonly reported side effects include diarrhea, headache, insomnia, application site pain, upper respiratory tract infections, and urinary tract infections.  All of the patient's questions and concerns were addressed.
Stelara Counseling:  I discussed with the patient the risks of ustekinumab including but not limited to immunosuppression, malignancy, posterior leukoencephalopathy syndrome, and serious infections.  The patient understands that monitoring is required including a PPD at baseline and must alert us or the primary physician if symptoms of infection or other concerning signs are noted.
Fluconazole Pregnancy And Lactation Text: This medication is Pregnancy Category C and it isn't know if it is safe during pregnancy. It is also excreted in breast milk.
Dupixent Pregnancy And Lactation Text: This medication likely crosses the placenta but the risk for the fetus is uncertain. This medication is excreted in breast milk.
Carac Pregnancy And Lactation Text: This medication is Pregnancy Category X and contraindicated in pregnancy and in women who may become pregnant. It is unknown if this medication is excreted in breast milk.
Ivermectin Pregnancy And Lactation Text: This medication is Pregnancy Category C and it isn't known if it is safe during pregnancy. It is also excreted in breast milk.
Eucrisa Counseling: Patient may experience a mild burning sensation during topical application. Eucrisa is not approved in children less than 2 years of age.
Olumiant Pregnancy And Lactation Text: Based on animal studies, Olumiant may cause embryo-fetal harm when administered to pregnant women.  The medication should not be used in pregnancy.  Breastfeeding is not recommended during treatment.
Azithromycin Counseling:  I discussed with the patient the risks of azithromycin including but not limited to GI upset, allergic reaction, drug rash, diarrhea, and yeast infections.
Cyclosporine Pregnancy And Lactation Text: This medication is Pregnancy Category C and it isn't know if it is safe during pregnancy. This medication is excreted in breast milk.
Rhofade Counseling: Rhofade is a topical medication which can decrease superficial blood flow where applied. Side effects are uncommon and include stinging, redness and allergic reactions.
Clofazimine Counseling:  I discussed with the patient the risks of clofazimine including but not limited to skin and eye pigmentation, liver damage, nausea/vomiting, gastrointestinal bleeding and allergy.
Minoxidil Pregnancy And Lactation Text: This medication has not been assigned a Pregnancy Risk Category but animal studies failed to show danger with the topical medication. It is unknown if the medication is excreted in breast milk.
Tranexamic Acid Pregnancy And Lactation Text: It is unknown if this medication is safe during pregnancy or breast feeding.
Acitretin Pregnancy And Lactation Text: This medication is Pregnancy Category X and should not be given to women who are pregnant or may become pregnant in the future. This medication is excreted in breast milk.
Finasteride Male Counseling: Finasteride Counseling:  I discussed with the patient the risks of use of finasteride including but not limited to decreased libido, decreased ejaculate volume, gynecomastia, and depression. Women should not handle medication.  All of the patient's questions and concerns were addressed.
Rituxan Counseling:  I discussed with the patient the risks of Rituxan infusions. Side effects can include infusion reactions, severe drug rashes including mucocutaneous reactions, reactivation of latent hepatitis and other infections and rarely progressive multifocal leukoencephalopathy.  All of the patient's questions and concerns were addressed.
Glycopyrrolate Pregnancy And Lactation Text: This medication is Pregnancy Category B and is considered safe during pregnancy. It is unknown if it is excreted breast milk.
Tazorac Pregnancy And Lactation Text: This medication is not safe during pregnancy. It is unknown if this medication is excreted in breast milk.
Erivedge Counseling- I discussed with the patient the risks of Erivedge including but not limited to nausea, vomiting, diarrhea, constipation, weight loss, changes in the sense of taste, decreased appetite, muscle spasms, and hair loss.  The patient verbalized understanding of the proper use and possible adverse effects of Erivedge.  All of the patient's questions and concerns were addressed.
Qbrexza Counseling:  I discussed with the patient the risks of Qbrexza including but not limited to headache, mydriasis, blurred vision, dry eyes, nasal dryness, dry mouth, dry throat, dry skin, urinary hesitation, and constipation.  Local skin reactions including erythema, burning, stinging, and itching can also occur.
Olanzapine Counseling- I discussed with the patient the common side effects of olanzapine including but are not limited to: lack of energy, dry mouth, increased appetite, sleepiness, tremor, constipation, dizziness, changes in behavior, or restlessness.  Explained that teenagers are more likely to experience headaches, abdominal pain, pain in the arms or legs, tiredness, and sleepiness.  Serious side effects include but are not limited: increased risk of death in elderly patients who are confused, have memory loss, or dementia-related psychosis; hyperglycemia; increased cholesterol and triglycerides; and weight gain.
Calcipotriene Counseling:  I discussed with the patient the risks of calcipotriene including but not limited to erythema, scaling, itching, and irritation.
Doxycycline Pregnancy And Lactation Text: This medication is Pregnancy Category D and not consider safe during pregnancy. It is also excreted in breast milk but is considered safe for shorter treatment courses.
Topical Sulfur Applications Counseling: Topical Sulfur Counseling: Patient counseled that this medication may cause skin irritation or allergic reactions.  In the event of skin irritation, the patient was advised to reduce the amount of the drug applied or use it less frequently.   The patient verbalized understanding of the proper use and possible adverse effects of topical sulfur application.  All of the patient's questions and concerns were addressed.
Propranolol Pregnancy And Lactation Text: This medication is Pregnancy Category C and it isn't known if it is safe during pregnancy. It is excreted in breast milk.
Zoryve Pregnancy And Lactation Text: It is unknown if this medication can cause problems during pregnancy and breastfeeding.
Enbrel Counseling:  I discussed with the patient the risks of etanercept including but not limited to myelosuppression, immunosuppression, autoimmune hepatitis, demyelinating diseases, lymphoma, and infections.  The patient understands that monitoring is required including a PPD at baseline and must alert us or the primary physician if symptoms of infection or other concerning signs are noted.
Griseofulvin Counseling:  I discussed with the patient the risks of griseofulvin including but not limited to photosensitivity, cytopenia, liver damage, nausea/vomiting and severe allergy.  The patient understands that this medication is best absorbed when taken with a fatty meal (e.g., ice cream or french fries).
Doxepin Counseling:  Patient advised that the medication is sedating and not to drive a car after taking this medication. Patient informed of potential adverse effects including but not limited to dry mouth, urinary retention, and blurry vision.  The patient verbalized understanding of the proper use and possible adverse effects of doxepin.  All of the patient's questions and concerns were addressed.
Calcipotriene Pregnancy And Lactation Text: The use of this medication during pregnancy or lactation is not recommended as there is insufficient data.
Valtrex Counseling: I discussed with the patient the risks of valacyclovir including but not limited to kidney damage, nausea, vomiting and severe allergy.  The patient understands that if the infection seems to be worsening or is not improving, they are to call.
Rinvoq Counseling: I discussed with the patient the risks of Rinvoq therapy including but not limited to upper respiratory tract infections, shingles, cold sores, bronchitis, nausea, cough, fever, acne, and headache. Live vaccines should be avoided.  This medication has been linked to serious infections; higher rate of mortality; malignancy and lymphoproliferative disorders; major adverse cardiovascular events; thrombosis; thrombocytopenia, anemia, and neutropenia; lipid elevations; liver enzyme elevations; and gastrointestinal perforations.
Rifampin Counseling: I discussed with the patient the risks of rifampin including but not limited to liver damage, kidney damage, red-orange body fluids, nausea/vomiting and severe allergy.
Azithromycin Pregnancy And Lactation Text: This medication is considered safe during pregnancy and is also secreted in breast milk.
Methotrexate Counseling:  Patient counseled regarding adverse effects of methotrexate including but not limited to nausea, vomiting, abnormalities in liver function tests. Patients may develop mouth sores, rash, diarrhea, and abnormalities in blood counts. The patient understands that monitoring is required including LFT's and blood counts.  There is a rare possibility of scarring of the liver and lung problems that can occur when taking methotrexate. Persistent nausea, loss of appetite, pale stools, dark urine, cough, and shortness of breath should be reported immediately. Patient advised to discontinue methotrexate treatment at least three months before attempting to become pregnant.  I discussed the need for folate supplements while taking methotrexate.  These supplements can decrease side effects during methotrexate treatment. The patient verbalized understanding of the proper use and possible adverse effects of methotrexate.  All of the patient's questions and concerns were addressed.
Hydroxychloroquine Counseling:  I discussed with the patient that a baseline ophthalmologic exam is needed at the start of therapy and every year thereafter while on therapy. A CBC may also be warranted for monitoring.  The side effects of this medication were discussed with the patient, including but not limited to agranulocytosis, aplastic anemia, seizures, rashes, retinopathy, and liver toxicity. Patient instructed to call the office should any adverse effect occur.  The patient verbalized understanding of the proper use and possible adverse effects of Plaquenil.  All the patient's questions and concerns were addressed.
Detail Level: Simple
Clofazimine Pregnancy And Lactation Text: This medication is Pregnancy Category C and isn't considered safe during pregnancy. It is excreted in breast milk.
Topical Clindamycin Counseling: Patient counseled that this medication may cause skin irritation or allergic reactions.  In the event of skin irritation, the patient was advised to reduce the amount of the drug applied or use it less frequently.   The patient verbalized understanding of the proper use and possible adverse effects of clindamycin.  All of the patient's questions and concerns were addressed.
Rhofade Pregnancy And Lactation Text: This medication has not been assigned a Pregnancy Risk Category. It is unknown if the medication is excreted in breast milk.
Mirvaso Counseling: Mirvaso is a topical medication which can decrease superficial blood flow where applied. Side effects are uncommon and include stinging, redness and allergic reactions.
Finasteride Pregnancy And Lactation Text: This medication is absolutely contraindicated during pregnancy. It is unknown if it is excreted in breast milk.
Erivedge Pregnancy And Lactation Text: This medication is Pregnancy Category X and is absolutely contraindicated during pregnancy. It is unknown if it is excreted in breast milk.
Aklief counseling:  Patient advised to apply a pea-sized amount only at bedtime and wait 30 minutes after washing their face before applying.  If too drying, patient may add a non-comedogenic moisturizer.  The most commonly reported side effects including irritation, redness, scaling, dryness, stinging, burning, itching, and increased risk of sunburn.  The patient verbalized understanding of the proper use and possible adverse effects of retinoids.  All of the patient's questions and concerns were addressed.
Erythromycin Counseling:  I discussed with the patient the risks of erythromycin including but not limited to GI upset, allergic reaction, drug rash, diarrhea, increase in liver enzymes, and yeast infections.
Rituxan Pregnancy And Lactation Text: This medication is Pregnancy Category C and it isn't know if it is safe during pregnancy. It is unknown if this medication is excreted in breast milk but similar antibodies are known to be excreted.
Olanzapine Pregnancy And Lactation Text: This medication is pregnancy category C.   There are no adequate and well controlled trials with olanzapine in pregnant females.  Olanzapine should be used during pregnancy only if the potential benefit justifies the potential risk to the fetus.   In a study in lactating healthy women, olanzapine was excreted in breast milk.  It is recommended that women taking olanzapine should not breast feed.
Adbry Counseling: I discussed with the patient the risks of tralokinumab including but not limited to eye infection and irritation, cold sores, injection site reactions, worsening of asthma, allergic reactions and increased risk of parasitic infection.  Live vaccines should be avoided while taking tralokinumab. The patient understands that monitoring is required and they must alert us or the primary physician if symptoms of infection or other concerning signs are noted.
Cellcept Counseling:  I discussed with the patient the risks of mycophenolate mofetil including but not limited to infection/immunosuppression, GI upset, hypokalemia, hypercholesterolemia, bone marrow suppression, lymphoproliferative disorders, malignancy, GI ulceration/bleed/perforation, colitis, interstitial lung disease, kidney failure, progressive multifocal leukoencephalopathy, and birth defects.  The patient understands that monitoring is required including a baseline creatinine and regular CBC testing. In addition, patient must alert us immediately if symptoms of infection or other concerning signs are noted.
Topical Sulfur Applications Pregnancy And Lactation Text: This medication is Pregnancy Category C and has an unknown safety profile during pregnancy. It is unknown if this topical medication is excreted in breast milk.
Qbrexza Pregnancy And Lactation Text: There is no available data on Qbrexza use in pregnant women.  There is no available data on Qbrexza use in lactation.
Bexarotene Counseling:  I discussed with the patient the risks of bexarotene including but not limited to hair loss, dry lips/skin/eyes, liver abnormalities, hyperlipidemia, pancreatitis, depression/suicidal ideation, photosensitivity, drug rash/allergic reactions, hypothyroidism, anemia, leukopenia, infection, cataracts, and teratogenicity.  Patient understands that they will need regular blood tests to check lipid profile, liver function tests, white blood cell count, thyroid function tests and pregnancy test if applicable.
Cantharidin Counseling:  I discussed with the patient the risks of Cantharidin including but not limited to pain, redness, burning, itching, and blistering.
SSKI Counseling:  I discussed with the patient the risks of SSKI including but not limited to thyroid abnormalities, metallic taste, GI upset, fever, headache, acne, arthralgias, paraesthesias, lymphadenopathy, easy bleeding, arrhythmias, and allergic reaction.
Bactrim Counseling:  I discussed with the patient the risks of sulfa antibiotics including but not limited to GI upset, allergic reaction, drug rash, diarrhea, dizziness, photosensitivity, and yeast infections.  Rarely, more serious reactions can occur including but not limited to aplastic anemia, agranulocytosis, methemoglobinemia, blood dyscrasias, liver or kidney failure, lung infiltrates or desquamative/blistering drug rashes.
Taltz Counseling: I discussed with the patient the risks of ixekizumab including but not limited to immunosuppression, serious infections, worsening of inflammatory bowel disease and drug reactions.  The patient understands that monitoring is required including a PPD at baseline and must alert us or the primary physician if symptoms of infection or other concerning signs are noted.
Zyclara Counseling:  I discussed with the patient the risks of imiquimod including but not limited to erythema, scaling, itching, weeping, crusting, and pain.  Patient understands that the inflammatory response to imiquimod is variable from person to person and was educated regarded proper titration schedule.  If flu-like symptoms develop, patient knows to discontinue the medication and contact us.
Hydroquinone Counseling:  Patient advised that medication may result in skin irritation, lightening (hypopigmentation), dryness, and burning.  In the event of skin irritation, the patient was advised to reduce the amount of the drug applied or use it less frequently.  Rarely, spots that are treated with hydroquinone can become darker (pseudoochronosis).  Should this occur, patient instructed to stop medication and call the office. The patient verbalized understanding of the proper use and possible adverse effects of hydroquinone.  All of the patient's questions and concerns were addressed.
Doxepin Pregnancy And Lactation Text: This medication is Pregnancy Category C and it isn't known if it is safe during pregnancy. It is also excreted in breast milk and breast feeding isn't recommended.
Griseofulvin Pregnancy And Lactation Text: This medication is Pregnancy Category X and is known to cause serious birth defects. It is unknown if this medication is excreted in breast milk but breast feeding should be avoided.
Rinvoq Pregnancy And Lactation Text: Based on animal studies, Rinvoq may cause embryo-fetal harm when administered to pregnant women.  The medication should not be used in pregnancy.  Breastfeeding is not recommended during treatment and for 6 days after the last dose.
Methotrexate Pregnancy And Lactation Text: This medication is Pregnancy Category X and is known to cause fetal harm. This medication is excreted in breast milk.
Rifampin Pregnancy And Lactation Text: This medication is Pregnancy Category C and it isn't know if it is safe during pregnancy. It is also excreted in breast milk and should not be used if you are breast feeding.
Colchicine Counseling:  Patient counseled regarding adverse effects including but not limited to stomach upset (nausea, vomiting, stomach pain, or diarrhea).  Patient instructed to limit alcohol consumption while taking this medication.  Colchicine may reduce blood counts especially with prolonged use.  The patient understands that monitoring of kidney function and blood counts may be required, especially at baseline. The patient verbalized understanding of the proper use and possible adverse effects of colchicine.  All of the patient's questions and concerns were addressed.
Solaraze Counseling:  I discussed with the patient the risks of Solaraze including but not limited to erythema, scaling, itching, weeping, crusting, and pain.
Hydroxychloroquine Pregnancy And Lactation Text: This medication has been shown to cause fetal harm but it isn't assigned a Pregnancy Risk Category. There are small amounts excreted in breast milk.
Cantharidin Pregnancy And Lactation Text: This medication has not been proven safe during pregnancy. It is unknown if this medication is excreted in breast milk.
Valtrex Pregnancy And Lactation Text: this medication is Pregnancy Category B and is considered safe during pregnancy. This medication is not directly found in breast milk but it's metabolite acyclovir is present.
Include Pregnancy/Lactation Warning?: No
Bexarotene Pregnancy And Lactation Text: This medication is Pregnancy Category X and should not be given to women who are pregnant or may become pregnant. This medication should not be used if you are breast feeding.
Aklief Pregnancy And Lactation Text: It is unknown if this medication is safe to use during pregnancy.  It is unknown if this medication is excreted in breast milk.  Breastfeeding women should use the topical cream on the smallest area of the skin for the shortest time needed while breastfeeding.  Do not apply to nipple and areola.
Libtayo Counseling- I discussed with the patient the risks of Libtayo including but not limited to nausea, vomiting, diarrhea, and bone or muscle pain.  The patient verbalized understanding of the proper use and possible adverse effects of Libtayo.  All of the patient's questions and concerns were addressed.
Birth Control Pills Counseling: Birth Control Pill Counseling: I discussed with the patient the potential side effects of OCPs including but not limited to increased risk of stroke, heart attack, thrombophlebitis, deep venous thrombosis, hepatic adenomas, breast changes, GI upset, headaches, and depression.  The patient verbalized understanding of the proper use and possible adverse effects of OCPs. All of the patient's questions and concerns were addressed.
Topical Clindamycin Pregnancy And Lactation Text: This medication is Pregnancy Category B and is considered safe during pregnancy. It is unknown if it is excreted in breast milk.
Erythromycin Pregnancy And Lactation Text: This medication is Pregnancy Category B and is considered safe during pregnancy. It is also excreted in breast milk.
Adbry Pregnancy And Lactation Text: It is unknown if this medication will adversely affect pregnancy or breast feeding.
Oral Minoxidil Counseling- I discussed with the patient the risks of oral minoxidil including but not limited to shortness of breath, swelling of the feet or ankles, dizziness, lightheadedness, unwanted hair growth and allergic reaction.  The patient verbalized understanding of the proper use and possible adverse effects of oral minoxidil.  All of the patient's questions and concerns were addressed.
Siliq Counseling:  I discussed with the patient the risks of Siliq including but not limited to new or worsening depression, suicidal thoughts and behavior, immunosuppression, malignancy, posterior leukoencephalopathy syndrome, and serious infections.  The patient understands that monitoring is required including a PPD at baseline and must alert us or the primary physician if symptoms of infection or other concerning signs are noted. There is also a special program designed to monitor depression which is required with Siliq.
Wartpeel Counseling:  I discussed with the patient the risks of Wartpeel including but not limited to erythema, scaling, itching, weeping, crusting, and pain.
Zyclara Pregnancy And Lactation Text: This medication is Pregnancy Category C. It is unknown if this medication is excreted in breast milk.
5-Fu Counseling: 5-Fluorouracil Counseling:  I discussed with the patient the risks of 5-fluorouracil including but not limited to erythema, scaling, itching, weeping, crusting, and pain.
Hydroxyzine Counseling: Patient advised that the medication is sedating and not to drive a car after taking this medication.  Patient informed of potential adverse effects including but not limited to dry mouth, urinary retention, and blurry vision.  The patient verbalized understanding of the proper use and possible adverse effects of hydroxyzine.  All of the patient's questions and concerns were addressed.
Taltz Pregnancy And Lactation Text: The risk during pregnancy and breastfeeding is uncertain with this medication.
Humira Counseling:  I discussed with the patient the risks of adalimumab including but not limited to myelosuppression, immunosuppression, autoimmune hepatitis, demyelinating diseases, lymphoma, and serious infections.  The patient understands that monitoring is required including a PPD at baseline and must alert us or the primary physician if symptoms of infection or other concerning signs are noted.
Opzelura Counseling:  I discussed with the patient the risks of Opzelura including but not limited to nasopharngitis, bronchitis, ear infection, eosinophila, hives, diarrhea, folliculitis, tonsillitis, and rhinorrhea.  Taken orally, this medication has been linked to serious infections; higher rate of mortality; malignancy and lymphoproliferative disorders; major adverse cardiovascular events; thrombosis; thrombocytopenia, anemia, and neutropenia; and lipid elevations.
Prednisone Counseling:  I discussed with the patient the risks of prolonged use of prednisone including but not limited to weight gain, insomnia, osteoporosis, mood changes, diabetes, susceptibility to infection, glaucoma and high blood pressure.  In cases where prednisone use is prolonged, patients should be monitored with blood pressure checks, serum glucose levels and an eye exam.  Additionally, the patient may need to be placed on GI prophylaxis, PCP prophylaxis, and calcium and vitamin D supplementation and/or a bisphosphonate.  The patient verbalized understanding of the proper use and the possible adverse effects of prednisone.  All of the patient's questions and concerns were addressed.
Sarecycline Counseling: Patient advised regarding possible photosensitivity and discoloration of the teeth, skin, lips, tongue and gums.  Patient instructed to avoid sunlight, if possible.  When exposed to sunlight, patients should wear protective clothing, sunglasses, and sunscreen.  The patient was instructed to call the office immediately if the following severe adverse effects occur:  hearing changes, easy bruising/bleeding, severe headache, or vision changes.  The patient verbalized understanding of the proper use and possible adverse effects of sarecycline.  All of the patient's questions and concerns were addressed.
Sotyktu Counseling:  I discussed the most common side effects of Sotyktu including: common cold, sore throat, sinus infections, cold sores, canker sores, folliculitis, and acne.  I also discussed more serious side effects of Sotyktu including but not limited to: serious allergic reactions; increased risk for infections such as TB; cancers such as lymphomas; rhabdomyolysis and elevated CPK; and elevated triglycerides and liver enzymes. 
Bactrim Pregnancy And Lactation Text: This medication is Pregnancy Category D and is known to cause fetal risk.  It is also excreted in breast milk.
Low Dose Naltrexone Counseling- I discussed with the patient the potential risks and side effects of low dose naltrexone including but not limited to: more vivid dreams, headaches, nausea, vomiting, abdominal pain, fatigue, dizziness, and anxiety.
Birth Control Pills Pregnancy And Lactation Text: This medication should be avoided if pregnant and for the first 30 days post-partum.
Itraconazole Counseling:  I discussed with the patient the risks of itraconazole including but not limited to liver damage, nausea/vomiting, neuropathy, and severe allergy.  The patient understands that this medication is best absorbed when taken with acidic beverages such as non-diet cola or ginger ale.  The patient understands that monitoring is required including baseline LFTs and repeat LFTs at intervals.  The patient understands that they are to contact us or the primary physician if concerning signs are noted.
Solaraze Pregnancy And Lactation Text: This medication is Pregnancy Category B and is considered safe. There is some data to suggest avoiding during the third trimester. It is unknown if this medication is excreted in breast milk.
Topical Ketoconazole Counseling: Patient counseled that this medication may cause skin irritation or allergic reactions.  In the event of skin irritation, the patient was advised to reduce the amount of the drug applied or use it less frequently.   The patient verbalized understanding of the proper use and possible adverse effects of ketoconazole.  All of the patient's questions and concerns were addressed.
Azelaic Acid Counseling: Patient counseled that medicine may cause skin irritation and to avoid applying near the eyes.  In the event of skin irritation, the patient was advised to reduce the amount of the drug applied or use it less frequently.   The patient verbalized understanding of the proper use and possible adverse effects of azelaic acid.  All of the patient's questions and concerns were addressed.
Isotretinoin Counseling: Patient should get monthly blood tests, not donate blood, not drive at night if vision affected, not share medication, and not undergo elective surgery for 6 months after tx completed. Side effects reviewed, pt to contact office should one occur.
Metronidazole Counseling:  I discussed with the patient the risks of metronidazole including but not limited to seizures, nausea/vomiting, a metallic taste in the mouth, nausea/vomiting and severe allergy.
Oral Minoxidil Pregnancy And Lactation Text: This medication should only be used when clearly needed if you are pregnant, attempting to become pregnant or breast feeding.
Libtayo Pregnancy And Lactation Text: This medication is contraindicated in pregnancy and when breast feeding.
Cyclophosphamide Counseling:  I discussed with the patient the risks of cyclophosphamide including but not limited to hair loss, hormonal abnormalities, decreased fertility, abdominal pain, diarrhea, nausea and vomiting, bone marrow suppression and infection. The patient understands that monitoring is required while taking this medication.
Cimzia Counseling:  I discussed with the patient the risks of Cimzia including but not limited to immunosuppression, allergic reactions and infections.  The patient understands that monitoring is required including a PPD at baseline and must alert us or the primary physician if symptoms of infection or other concerning signs are noted.
Tremfya Counseling: I discussed with the patient the risks of guselkumab including but not limited to immunosuppression, serious infections, and drug reactions.  The patient understands that monitoring is required including a PPD at baseline and must alert us or the primary physician if symptoms of infection or other concerning signs are noted.
Imiquimod Counseling:  I discussed with the patient the risks of imiquimod including but not limited to erythema, scaling, itching, weeping, crusting, and pain.  Patient understands that the inflammatory response to imiquimod is variable from person to person and was educated regarded proper titration schedule.  If flu-like symptoms develop, patient knows to discontinue the medication and contact us.
Opzelura Pregnancy And Lactation Text: There is insufficient data to evaluate drug-associated risk for major birth defects, miscarriage, or other adverse maternal or fetal outcomes.  There is a pregnancy registry that monitors pregnancy outcomes in pregnant persons exposed to the medication during pregnancy.  It is unknown if this medication is excreted in breast milk.  Do not breastfeed during treatment and for about 4 weeks after the last dose.
Dapsone Counseling: I discussed with the patient the risks of dapsone including but not limited to hemolytic anemia, agranulocytosis, rashes, methemoglobinemia, kidney failure, peripheral neuropathy, headaches, GI upset, and liver toxicity.  Patients who start dapsone require monitoring including baseline LFTs and weekly CBCs for the first month, then every month thereafter.  The patient verbalized understanding of the proper use and possible adverse effects of dapsone.  All of the patient's questions and concerns were addressed.
Sotyktu Pregnancy And Lactation Text: There is insufficient data to evaluate whether or not Sotyktu is safe to use during pregnancy.   It is not known if Sotyktu passes into breast milk and whether or not it is safe to use when breastfeeding.  
Hydroxyzine Pregnancy And Lactation Text: This medication is not safe during pregnancy and should not be taken. It is also excreted in breast milk and breast feeding isn't recommended.
Cephalexin Counseling: I counseled the patient regarding use of cephalexin as an antibiotic for prophylactic and/or therapeutic purposes. Cephalexin (commonly prescribed under brand name Keflex) is a cephalosporin antibiotic which is active against numerous classes of bacteria, including most skin bacteria. Side effects may include nausea, diarrhea, gastrointestinal upset, rash, hives, yeast infections, and in rare cases, hepatitis, kidney disease, seizures, fever, confusion, neurologic symptoms, and others. Patients with severe allergies to penicillin medications are cautioned that there is about a 10% incidence of cross-reactivity with cephalosporins. When possible, patients with penicillin allergies should use alternatives to cephalosporins for antibiotic therapy.
Soolantra Counseling: I discussed with the patients the risks of topial Soolantra. This is a medicine which decreases the number of mites and inflammation in the skin. You experience burning, stinging, eye irritation or allergic reactions.  Please call our office if you develop any problems from using this medication.
Low Dose Naltrexone Pregnancy And Lactation Text: Naltrexone is pregnancy category C.  There have been no adequate and well-controlled studies in pregnant women.  It should be used in pregnancy only if the potential benefit justifies the potential risk to the fetus.   Limited data indicates that naltrexone is minimally excreted into breastmilk.
Sarecycline Pregnancy And Lactation Text: This medication is Pregnancy Category D and not consider safe during pregnancy. It is also excreted in breast milk.
Opioid Counseling: I discussed with the patient the potential side effects of opioids including but not limited to addiction, altered mental status, and depression. I stressed avoiding alcohol, benzodiazepines, muscle relaxants and sleep aids unless specifically okayed by a physician. The patient verbalized understanding of the proper use and possible adverse effects of opioids. All of the patient's questions and concerns were addressed. They were instructed to flush the remaining pills down the toilet if they did not need them for pain.
Spironolactone Counseling: Patient advised regarding risks of diarrhea, abdominal pain, hyperkalemia, birth defects (for female patients), liver toxicity and renal toxicity. The patient may need blood work to monitor liver and kidney function and potassium levels while on therapy. The patient verbalized understanding of the proper use and possible adverse effects of spironolactone.  All of the patient's questions and concerns were addressed.
Azelaic Acid Pregnancy And Lactation Text: This medication is considered safe during pregnancy and breast feeding.
Odomzo Counseling- I discussed with the patient the risks of Odomzo including but not limited to nausea, vomiting, diarrhea, constipation, weight loss, changes in the sense of taste, decreased appetite, muscle spasms, and hair loss.  The patient verbalized understanding of the proper use and possible adverse effects of Odomzo.  All of the patient's questions and concerns were addressed.
Otezla Counseling: The side effects of Otezla were discussed with the patient, including but not limited to worsening or new depression, weight loss, diarrhea, nausea, upper respiratory tract infection, and headache. Patient instructed to call the office should any adverse effect occur.  The patient verbalized understanding of the proper use and possible adverse effects of Otezla.  All the patient's questions and concerns were addressed.
Winlevi Counseling:  I discussed with the patient the risks of topical clascoterone including but not limited to erythema, scaling, itching, and stinging. Patient voiced their understanding.
Metronidazole Pregnancy And Lactation Text: This medication is Pregnancy Category B and considered safe during pregnancy.  It is also excreted in breast milk.
Cimzia Pregnancy And Lactation Text: This medication crosses the placenta but can be considered safe in certain situations. Cimzia may be excreted in breast milk.
Simponi Counseling:  I discussed with the patient the risks of golimumab including but not limited to myelosuppression, immunosuppression, autoimmune hepatitis, demyelinating diseases, lymphoma, and serious infections.  The patient understands that monitoring is required including a PPD at baseline and must alert us or the primary physician if symptoms of infection or other concerning signs are noted.
Drysol Counseling:  I discussed with the patient the risks of drysol/aluminum chloride including but not limited to skin rash, itching, irritation, burning.
Isotretinoin Pregnancy And Lactation Text: This medication is Pregnancy Category X and is considered extremely dangerous during pregnancy. It is unknown if it is excreted in breast milk.
Sski Pregnancy And Lactation Text: This medication is Pregnancy Category D and isn't considered safe during pregnancy. It is excreted in breast milk.
Cephalexin Pregnancy And Lactation Text: This medication is Pregnancy Category B and considered safe during pregnancy.  It is also excreted in breast milk but can be used safely for shorter doses.
Tetracycline Counseling: Patient counseled regarding possible photosensitivity and increased risk for sunburn.  Patient instructed to avoid sunlight, if possible.  When exposed to sunlight, patients should wear protective clothing, sunglasses, and sunscreen.  The patient was instructed to call the office immediately if the following severe adverse effects occur:  hearing changes, easy bruising/bleeding, severe headache, or vision changes.  The patient verbalized understanding of the proper use and possible adverse effects of tetracycline.  All of the patient's questions and concerns were addressed. Patient understands to avoid pregnancy while on therapy due to potential birth defects.
Ketoconazole Counseling:   Patient counseled regarding improving absorption with orange juice.  Adverse effects include but are not limited to breast enlargement, headache, diarrhea, nausea, upset stomach, liver function test abnormalities, taste disturbance, and stomach pain.  There is a rare possibility of liver failure that can occur when taking ketoconazole. The patient understands that monitoring of LFTs may be required, especially at baseline. The patient verbalized understanding of the proper use and possible adverse effects of ketoconazole.  All of the patient's questions and concerns were addressed.
Dapsone Pregnancy And Lactation Text: This medication is Pregnancy Category C and is not considered safe during pregnancy or breast feeding.
Ilumya Counseling: I discussed with the patient the risks of tildrakizumab including but not limited to immunosuppression, malignancy, posterior leukoencephalopathy syndrome, and serious infections.  The patient understands that monitoring is required including a PPD at baseline and must alert us or the primary physician if symptoms of infection or other concerning signs are noted.
Opioid Pregnancy And Lactation Text: These medications can lead to premature delivery and should be avoided during pregnancy. These medications are also present in breast milk in small amounts.
Soolantra Pregnancy And Lactation Text: This medication is Pregnancy Category C. This medication is considered safe during breast feeding.
Xeljanz Counseling: I discussed with the patient the risks of Xeljanz therapy including increased risk of infection, liver issues, headache, diarrhea, or cold symptoms. Live vaccines should be avoided. They were instructed to call if they have any problems.
Niacinamide Counseling: I recommended taking niacin or niacinamide, also know as vitamin B3, twice daily. Recent evidence suggests that taking vitamin B3 (500 mg twice daily) can reduce the risk of actinic keratoses and non-melanoma skin cancers. Side effects of vitamin B3 include flushing and headache.
Picato Counseling:  I discussed with the patient the risks of Picato including but not limited to erythema, scaling, itching, weeping, crusting, and pain.
Topical Metronidazole Counseling: Metronidazole is a topical antibiotic medication. You may experience burning, stinging, redness, or allergic reactions.  Please call our office if you develop any problems from using this medication.
Benzoyl Peroxide Counseling: Patient counseled that medicine may cause skin irritation and bleach clothing.  In the event of skin irritation, the patient was advised to reduce the amount of the drug applied or use it less frequently.   The patient verbalized understanding of the proper use and possible adverse effects of benzoyl peroxide.  All of the patient's questions and concerns were addressed.
Otezla Pregnancy And Lactation Text: This medication is Pregnancy Category C and it isn't known if it is safe during pregnancy. It is unknown if it is excreted in breast milk.
Winlevi Pregnancy And Lactation Text: This medication is considered safe during pregnancy and breastfeeding.
Spironolactone Pregnancy And Lactation Text: This medication can cause feminization of the male fetus and should be avoided during pregnancy. The active metabolite is also found in breast milk.
Minocycline Counseling: Patient advised regarding possible photosensitivity and discoloration of the teeth, skin, lips, tongue and gums.  Patient instructed to avoid sunlight, if possible.  When exposed to sunlight, patients should wear protective clothing, sunglasses, and sunscreen.  The patient was instructed to call the office immediately if the following severe adverse effects occur:  hearing changes, easy bruising/bleeding, severe headache, or vision changes.  The patient verbalized understanding of the proper use and possible adverse effects of minocycline.  All of the patient's questions and concerns were addressed.
Cosentyx Counseling:  I discussed with the patient the risks of Cosentyx including but not limited to worsening of Crohn's disease, immunosuppression, allergic reactions and infections.  The patient understands that monitoring is required including a PPD at baseline and must alert us or the primary physician if symptoms of infection or other concerning signs are noted.
Cibinqo Counseling: I discussed with the patient the risks of Cibinqo therapy including but not limited to common cold, nausea, headache, cold sores, increased blood CPK levels, dizziness, UTIs, fatigue, acne, and vomitting. Live vaccines should be avoided.  This medication has been linked to serious infections; higher rate of mortality; malignancy and lymphoproliferative disorders; major adverse cardiovascular events; thrombosis; thrombocytopenia and lymphopenia; lipid elevations; and retinal detachment.
Albendazole Counseling:  I discussed with the patient the risks of albendazole including but not limited to cytopenia, kidney damage, nausea/vomiting and severe allergy.  The patient understands that this medication is being used in an off-label manner.
High Dose Vitamin A Counseling: Side effects reviewed, pt to contact office should one occur.
Thalidomide Counseling: I discussed with the patient the risks of thalidomide including but not limited to birth defects, anxiety, weakness, chest pain, dizziness, cough and severe allergy.
Klisyri Counseling:  I discussed with the patient the risks of Klisyri including but not limited to erythema, scaling, itching, weeping, crusting, and pain.
Xolair Counseling:  Patient informed of potential adverse effects including but not limited to fever, muscle aches, rash and allergic reactions.  The patient verbalized understanding of the proper use and possible adverse effects of Xolair.  All of the patient's questions and concerns were addressed.
Gabapentin Counseling: I discussed with the patient the risks of gabapentin including but not limited to dizziness, somnolence, fatigue and ataxia.
Niacinamide Pregnancy And Lactation Text: These medications are considered safe during pregnancy.
Ketoconazole Pregnancy And Lactation Text: This medication is Pregnancy Category C and it isn't know if it is safe during pregnancy. It is also excreted in breast milk and breast feeding isn't recommended.
Topical Retinoid counseling:  Patient advised to apply a pea-sized amount only at bedtime and wait 30 minutes after washing their face before applying.  If too drying, patient may add a non-comedogenic moisturizer. The patient verbalized understanding of the proper use and possible adverse effects of retinoids.  All of the patient's questions and concerns were addressed.
Xelmaryluz Pregnancy And Lactation Text: This medication is Pregnancy Category D and is not considered safe during pregnancy.  The risk during breast feeding is also uncertain.
Clindamycin Counseling: I counseled the patient regarding use of clindamycin as an antibiotic for prophylactic and/or therapeutic purposes. Clindamycin is active against numerous classes of bacteria, including skin bacteria. Side effects may include nausea, diarrhea, gastrointestinal upset, rash, hives, yeast infections, and in rare cases, colitis.
Oxybutynin Counseling:  I discussed with the patient the risks of oxybutynin including but not limited to skin rash, drowsiness, dry mouth, difficulty urinating, and blurred vision.
Topical Metronidazole Pregnancy And Lactation Text: This medication is Pregnancy Category B and considered safe during pregnancy.  It is also considered safe to use while breastfeeding.
Benzoyl Peroxide Pregnancy And Lactation Text: This medication is Pregnancy Category C. It is unknown if benzoyl peroxide is excreted in breast milk.
VTAMA Counseling: I discussed with the patient that VTAMA is not for use in the eyes, mouth or mouth. They should call the office if they develop any signs of allergic reactions to VTAMA. The patient verbalized understanding of the proper use and possible adverse effects of VTAMA.  All of the patient's questions and concerns were addressed.
Cibinqo Pregnancy And Lactation Text: It is unknown if this medication will adversely affect pregnancy or breast feeding.  You should not take this medication if you are currently pregnant or planning a pregnancy or while breastfeeding.
Skyrizi Counseling: I discussed with the patient the risks of risankizumab-rzaa including but not limited to immunosuppression, and serious infections.  The patient understands that monitoring is required including a PPD at baseline and must alert us or the primary physician if symptoms of infection or other concerning signs are noted.
Cyclophosphamide Pregnancy And Lactation Text: This medication is Pregnancy Category D and it isn't considered safe during pregnancy. This medication is excreted in breast milk.
Elidel Counseling: Patient may experience a mild burning sensation during topical application. Elidel is not approved in children less than 2 years of age. There have been case reports of hematologic and skin malignancies in patients using topical calcineurin inhibitors although causality is questionable.
Arava Counseling:  Patient counseled regarding adverse effects of Arava including but not limited to nausea, vomiting, abnormalities in liver function tests. Patients may develop mouth sores, rash, diarrhea, and abnormalities in blood counts. The patient understands that monitoring is required including LFTs and blood counts.  There is a rare possibility of scarring of the liver and lung problems that can occur when taking methotrexate. Persistent nausea, loss of appetite, pale stools, dark urine, cough, and shortness of breath should be reported immediately. Patient advised to discontinue Arava treatment and consult with a physician prior to attempting conception. The patient will have to undergo a treatment to eliminate Arava from the body prior to conception.
Azathioprine Counseling:  I discussed with the patient the risks of azathioprine including but not limited to myelosuppression, immunosuppression, hepatotoxicity, lymphoma, and infections.  The patient understands that monitoring is required including baseline LFTs, Creatinine, possible TPMP genotyping and weekly CBCs for the first month and then every 2 weeks thereafter.  The patient verbalized understanding of the proper use and possible adverse effects of azathioprine.  All of the patient's questions and concerns were addressed.
Xolair Pregnancy And Lactation Text: This medication is Pregnancy Category B and is considered safe during pregnancy. This medication is excreted in breast milk.
Dutasteride Male Counseling: Dustasteride Counseling:  I discussed with the patient the risks of use of dutasteride including but not limited to decreased libido, decreased ejaculate volume, and gynecomastia. Women who can become pregnant should not handle medication.  All of the patient's questions and concerns were addressed.
Infliximab Counseling:  I discussed with the patient the risks of infliximab including but not limited to myelosuppression, immunosuppression, autoimmune hepatitis, demyelinating diseases, lymphoma, and serious infections.  The patient understands that monitoring is required including a PPD at baseline and must alert us or the primary physician if symptoms of infection or other concerning signs are noted.
Klisyri Pregnancy And Lactation Text: It is unknown if this medication can harm a developing fetus or if it is excreted in breast milk.
Terbinafine Counseling: Patient counseling regarding adverse effects of terbinafine including but not limited to headache, diarrhea, rash, upset stomach, liver function test abnormalities, itching, taste/smell disturbance, nausea, abdominal pain, and flatulence.  There is a rare possibility of liver failure that can occur when taking terbinafine.  The patient understands that a baseline LFT and kidney function test may be required. The patient verbalized understanding of the proper use and possible adverse effects of terbinafine.  All of the patient's questions and concerns were addressed.
High Dose Vitamin A Pregnancy And Lactation Text: High dose vitamin A therapy is contraindicated during pregnancy and breast feeding.
Carac Counseling:  I discussed with the patient the risks of Carac including but not limited to erythema, scaling, itching, weeping, crusting, and pain.
Nsaids Counseling: NSAID Counseling: I discussed with the patient that NSAIDs should be taken with food. Prolonged use of NSAIDs can result in the development of stomach ulcers.  Patient advised to stop taking NSAIDs if abdominal pain occurs.  The patient verbalized understanding of the proper use and possible adverse effects of NSAIDs.  All of the patient's questions and concerns were addressed.
Topical Steroids Counseling: I discussed with the patient that prolonged use of topical steroids can result in the increased appearance of superficial blood vessels (telangiectasias), lightening (hypopigmentation) and thinning of the skin (atrophy).  Patient understands to avoid using high potency steroids in skin folds, the groin or the face.  The patient verbalized understanding of the proper use and possible adverse effects of topical steroids.  All of the patient's questions and concerns were addressed.
Clindamycin Pregnancy And Lactation Text: This medication can be used in pregnancy if certain situations. Clindamycin is also present in breast milk.
Protopic Counseling: Patient may experience a mild burning sensation during topical application. Protopic is not approved in children less than 2 years of age. There have been case reports of hematologic and skin malignancies in patients using topical calcineurin inhibitors although causality is questionable.
Cimetidine Counseling:  I discussed with the patient the risks of Cimetidine including but not limited to gynecomastia, headache, diarrhea, nausea, drowsiness, arrhythmias, pancreatitis, skin rashes, psychosis, bone marrow suppression and kidney toxicity.
Ivermectin Counseling:  Patient instructed to take medication on an empty stomach with a full glass of water.  Patient informed of potential adverse effects including but not limited to nausea, diarrhea, dizziness, itching, and swelling of the extremities or lymph nodes.  The patient verbalized understanding of the proper use and possible adverse effects of ivermectin.  All of the patient's questions and concerns were addressed.
Olumiant Counseling: I discussed with the patient the risks of Olumiant therapy including but not limited to upper respiratory tract infections, shingles, cold sores, and nausea. Live vaccines should be avoided.  This medication has been linked to serious infections; higher rate of mortality; malignancy and lymphoproliferative disorders; major adverse cardiovascular events; thrombosis; gastrointestinal perforations; neutropenia; lymphopenia; anemia; liver enzyme elevations; and lipid elevations.
Cyclosporine Counseling:  I discussed with the patient the risks of cyclosporine including but not limited to hypertension, gingival hyperplasia,myelosuppression, immunosuppression, liver damage, kidney damage, neurotoxicity, lymphoma, and serious infections. The patient understands that monitoring is required including baseline blood pressure, CBC, CMP, lipid panel and uric acid, and then 1-2 times monthly CMP and blood pressure.
Quinolones Counseling:  I discussed with the patient the risks of fluoroquinolones including but not limited to GI upset, allergic reaction, drug rash, diarrhea, dizziness, photosensitivity, yeast infections, liver function test abnormalities, tendonitis/tendon rupture.
Dupixent Counseling: I discussed with the patient the risks of dupilumab including but not limited to eye infection and irritation, cold sores, injection site reactions, worsening of asthma, allergic reactions and increased risk of parasitic infection.  Live vaccines should be avoided while taking dupilumab. Dupilumab will also interact with certain medications such as warfarin and cyclosporine. The patient understands that monitoring is required and they must alert us or the primary physician if symptoms of infection or other concerning signs are noted.
Minoxidil Counseling: Minoxidil is a topical medication which can increase blood flow where it is applied. It is uncertain how this medication increases hair growth. Side effects are uncommon and include stinging and allergic reactions.
Fluconazole Counseling:  Patient counseled regarding adverse effects of fluconazole including but not limited to headache, diarrhea, nausea, upset stomach, liver function test abnormalities, taste disturbance, and stomach pain.  There is a rare possibility of liver failure that can occur when taking fluconazole.  The patient understands that monitoring of LFTs and kidney function test may be required, especially at baseline. The patient verbalized understanding of the proper use and possible adverse effects of fluconazole.  All of the patient's questions and concerns were addressed.

## 2023-05-23 NOTE — PROCEDURE: ADDITIONAL NOTES
Render Risk Assessment In Note?: no
Detail Level: Simple
Additional Notes: Discussed importance of tinted sunscreen\\nRecommended niacinamide\\nDiscussed laser RX (pt aware we are treating hyperpigmentation, cannot improve hypopigmentation)

## 2023-05-23 NOTE — PROCEDURE: PRESCRIPTION MEDICATION MANAGEMENT
Continue Regimen: Kutaryaxm 8 %-0.025 %-0.5 % topical emulsion Qhs x 1 more month, then take 1 month off
Plan: Continue applying topical tranexamic acid, rodan & fields azelaic acid lightener (can cont through month break)
Detail Level: Zone
Render In Strict Bullet Format?: No

## 2023-08-24 ENCOUNTER — APPOINTMENT (RX ONLY)
Dept: URBAN - METROPOLITAN AREA CLINIC 6 | Facility: CLINIC | Age: 43
Setting detail: DERMATOLOGY
End: 2023-08-24

## 2023-08-24 DIAGNOSIS — L72.8 OTHER FOLLICULAR CYSTS OF THE SKIN AND SUBCUTANEOUS TISSUE: ICD-10-CM

## 2023-08-24 PROBLEM — D48.5 NEOPLASM OF UNCERTAIN BEHAVIOR OF SKIN: Status: ACTIVE | Noted: 2023-08-24

## 2023-08-24 PROCEDURE — ? ORDER ULTRASOUND

## 2023-08-24 PROCEDURE — ? COUNSELING

## 2023-08-24 PROCEDURE — 11900 INJECT SKIN LESIONS </W 7: CPT

## 2023-08-24 PROCEDURE — ? INTRALESIONAL KENALOG

## 2023-08-24 ASSESSMENT — LOCATION ZONE DERM: LOCATION ZONE: LEG

## 2023-08-24 ASSESSMENT — LOCATION SIMPLE DESCRIPTION DERM: LOCATION SIMPLE: LEFT POPLITEAL SKIN

## 2023-08-24 ASSESSMENT — LOCATION DETAILED DESCRIPTION DERM: LOCATION DETAILED: LEFT POPLITEAL SKIN

## 2023-08-24 NOTE — PROCEDURE: ORDER ULTRASOUND
Provider: Ml Velez MD
Detail Level: Simple
Lesion Location: Left Popliteal Skin
Priority: normal
Ultrasound Protocol: Ultrasound of Subcutaneous Mass

## 2023-08-24 NOTE — PROCEDURE: INTRALESIONAL KENALOG
Lot # For Kenalog (Optional): [id number]
Detail Level: Detailed
How Many Mls Were Removed From The 40 Mg/Ml (1ml) Vial When Preparing The Injectable Solution?: 0
Bill For Wasted Drug (Kenalog)?: no
Ndc# For Kenalog Only: 0540-7061-30
Medical Necessity Clause: This procedure was medically necessary because the lesions that were treated were:
Kenalog Type Of Vial: Multiple Dose
Kenalog Preparation: Kenalog
Concentration Of Kenalog Solution Injected (Mg/Ml): 10.0
Total Volume (Ccs): 0.1
Which Kenalog Vial Was Used?: Kenalog 10 mg/ml (5 ml vial)
Validate Note Data When Using Inventory: Yes
Administered By (Optional): JAKUB
Consent: The risks of atrophy were reviewed with the patient.
Expiration Date For Kenalog (Optional): Aug 2024

## 2023-09-18 ENCOUNTER — APPOINTMENT (RX ONLY)
Dept: URBAN - METROPOLITAN AREA CLINIC 6 | Facility: CLINIC | Age: 43
Setting detail: DERMATOLOGY
End: 2023-09-18

## 2023-09-18 DIAGNOSIS — L81.4 OTHER MELANIN HYPERPIGMENTATION: ICD-10-CM

## 2023-09-18 DIAGNOSIS — L72.8 OTHER FOLLICULAR CYSTS OF THE SKIN AND SUBCUTANEOUS TISSUE: ICD-10-CM

## 2023-09-18 PROBLEM — L81.9 DISORDER OF PIGMENTATION, UNSPECIFIED: Status: ACTIVE | Noted: 2023-09-18

## 2023-09-18 PROBLEM — D48.5 NEOPLASM OF UNCERTAIN BEHAVIOR OF SKIN: Status: ACTIVE | Noted: 2023-09-18

## 2023-09-18 PROCEDURE — 99214 OFFICE O/P EST MOD 30 MIN: CPT

## 2023-09-18 PROCEDURE — ? ADDITIONAL NOTES

## 2023-09-18 PROCEDURE — ? MEDICATION COUNSELING

## 2023-09-18 PROCEDURE — ? COUNSELING

## 2023-09-18 PROCEDURE — ? PRESCRIPTION MEDICATION MANAGEMENT

## 2023-09-18 ASSESSMENT — LOCATION DETAILED DESCRIPTION DERM
LOCATION DETAILED: LEFT INFERIOR CENTRAL MALAR CHEEK
LOCATION DETAILED: LEFT POPLITEAL SKIN

## 2023-09-18 ASSESSMENT — LOCATION ZONE DERM
LOCATION ZONE: LEG
LOCATION ZONE: FACE

## 2023-09-18 ASSESSMENT — LOCATION SIMPLE DESCRIPTION DERM
LOCATION SIMPLE: LEFT CHEEK
LOCATION SIMPLE: LEFT POPLITEAL SKIN

## 2023-09-18 NOTE — PROCEDURE: ADDITIONAL NOTES
Render Risk Assessment In Note?: no
Detail Level: Simple
Additional Notes: Improving from last visit. \\nDiscussed importance of tinted sunscreen\\nDiscussed laser RX (pt aware we are treating hyperpigmentation, cannot improve hypopigmentation)
Additional Notes: Decreased in size. Pt has ultrasound scheduled for 09/19/2023.

## 2023-09-18 NOTE — PROCEDURE: PRESCRIPTION MEDICATION MANAGEMENT
Continue Regimen: Kutaryaxm 8 %-0.025 %-0.5 % topical emulsion Qhs x 1 more month, then take 1 month off
Plan: Continue applying topical tranexamic acid, rodan & fields azelaic acid lightener (can cont through month break)\\nmother has recent h/o thrombosis - hold off on discussion on PO tranexamic acid
Detail Level: Zone
Render In Strict Bullet Format?: No

## 2023-09-19 ENCOUNTER — HOSPITAL ENCOUNTER (OUTPATIENT)
Dept: RADIOLOGY | Facility: MEDICAL CENTER | Age: 43
End: 2023-09-19
Payer: COMMERCIAL

## 2023-09-19 DIAGNOSIS — D48.5 NEOPLASM OF UNCERTAIN BEHAVIOR OF SKIN: ICD-10-CM

## 2023-09-19 PROCEDURE — 76882 US LMTD JT/FCL EVL NVASC XTR: CPT | Mod: LT

## 2023-11-02 ENCOUNTER — APPOINTMENT (RX ONLY)
Dept: URBAN - METROPOLITAN AREA CLINIC 6 | Facility: CLINIC | Age: 43
Setting detail: DERMATOLOGY
End: 2023-11-02

## 2023-11-02 DIAGNOSIS — L72.8 OTHER FOLLICULAR CYSTS OF THE SKIN AND SUBCUTANEOUS TISSUE: ICD-10-CM

## 2023-11-02 DIAGNOSIS — L81.4 OTHER MELANIN HYPERPIGMENTATION: ICD-10-CM | Status: IMPROVED

## 2023-11-02 PROBLEM — L81.9 DISORDER OF PIGMENTATION, UNSPECIFIED: Status: ACTIVE | Noted: 2023-11-02

## 2023-11-02 PROCEDURE — ? PRESCRIPTION

## 2023-11-02 PROCEDURE — ? MEDICATION COUNSELING

## 2023-11-02 PROCEDURE — ? INTRALESIONAL KENALOG

## 2023-11-02 PROCEDURE — ? COUNSELING

## 2023-11-02 PROCEDURE — 99213 OFFICE O/P EST LOW 20 MIN: CPT | Mod: 25

## 2023-11-02 PROCEDURE — 11900 INJECT SKIN LESIONS </W 7: CPT

## 2023-11-02 RX ORDER — TAZAROTENE 0.45 MG/G
1 LOTION TOPICAL QD
Qty: 45 | Refills: 6 | Status: ERX | COMMUNITY
Start: 2023-11-02

## 2023-11-02 RX ORDER — H-QUINONE/TRETINOIN/HYDROCORT 8 %-0.025%
1 EMULSION (GRAM) TOPICAL QHS
Qty: 30 | Refills: 6 | Status: ERX

## 2023-11-02 RX ADMIN — TAZAROTENE 1: 0.45 LOTION TOPICAL at 00:00

## 2023-11-02 ASSESSMENT — LOCATION ZONE DERM
LOCATION ZONE: FACE
LOCATION ZONE: LEG

## 2023-11-02 ASSESSMENT — LOCATION DETAILED DESCRIPTION DERM
LOCATION DETAILED: LEFT POPLITEAL SKIN
LOCATION DETAILED: LEFT INFERIOR CENTRAL MALAR CHEEK

## 2023-11-02 ASSESSMENT — LOCATION SIMPLE DESCRIPTION DERM
LOCATION SIMPLE: LEFT POPLITEAL SKIN
LOCATION SIMPLE: LEFT CHEEK

## 2023-11-02 NOTE — PROCEDURE: INTRALESIONAL KENALOG
Total Volume (Ccs): 0.5
Expiration Date For Kenalog (Optional): August 2024
Require Ndc Code?: No
Kenalog Preparation: Kenalog
Concentration Of Kenalog Solution Injected (Mg/Ml): 10.0
Medical Necessity Clause: This procedure was medically necessary because the lesions that were treated were:
How Many Mls Were Removed From The 80 Mg/Ml (5ml) Vial When Preparing The Injectable Solution?: 0
Detail Level: Detailed
Kenalog Type Of Vial: Multiple Dose
Lot # For Kenalog (Optional): [id number]
Ndc# For Kenalog Only: 7016-4169-44
Consent: The risks of atrophy were reviewed with the patient.
Administered By (Optional): Dr. Velez
Validate Note Data When Using Inventory: Yes

## 2023-12-12 ENCOUNTER — APPOINTMENT (RX ONLY)
Dept: URBAN - METROPOLITAN AREA CLINIC 6 | Facility: CLINIC | Age: 43
Setting detail: DERMATOLOGY
End: 2023-12-12

## 2023-12-12 DIAGNOSIS — L72.8 OTHER FOLLICULAR CYSTS OF THE SKIN AND SUBCUTANEOUS TISSUE: ICD-10-CM

## 2023-12-12 PROCEDURE — 12032 INTMD RPR S/A/T/EXT 2.6-7.5: CPT

## 2023-12-12 PROCEDURE — ? PRESCRIPTION

## 2023-12-12 PROCEDURE — ? EXCISION

## 2023-12-12 PROCEDURE — 11404 EXC TR-EXT B9+MARG 3.1-4 CM: CPT

## 2023-12-12 RX ADMIN — CEPHALEXIN: 500 TABLET ORAL at 00:00

## 2023-12-12 ASSESSMENT — LOCATION DETAILED DESCRIPTION DERM: LOCATION DETAILED: LEFT POPLITEAL SKIN

## 2023-12-12 ASSESSMENT — LOCATION SIMPLE DESCRIPTION DERM: LOCATION SIMPLE: LEFT POPLITEAL SKIN

## 2023-12-12 ASSESSMENT — LOCATION ZONE DERM: LOCATION ZONE: LEG

## 2023-12-12 NOTE — PROCEDURE: EXCISION

## 2023-12-13 RX ORDER — CEPHALEXIN 500 MG/1
TABLET ORAL TID
Qty: 30 | Refills: 0 | Status: ERX | COMMUNITY
Start: 2023-12-12

## 2024-01-29 ENCOUNTER — RX ONLY (OUTPATIENT)
Age: 44
Setting detail: RX ONLY
End: 2024-01-29

## 2024-01-29 RX ORDER — CLASCOTERONE 1 G/100G
1 CREAM TOPICAL BID
Qty: 60 | Refills: 6 | Status: ERX | COMMUNITY
Start: 2024-01-29

## 2024-02-01 ENCOUNTER — APPOINTMENT (OUTPATIENT)
Dept: RADIOLOGY | Facility: MEDICAL CENTER | Age: 44
End: 2024-02-01
Attending: INTERNAL MEDICINE
Payer: COMMERCIAL

## 2024-02-01 DIAGNOSIS — Z12.31 VISIT FOR SCREENING MAMMOGRAM: ICD-10-CM

## 2024-02-01 PROCEDURE — 77067 SCR MAMMO BI INCL CAD: CPT

## 2024-02-23 ENCOUNTER — HOSPITAL ENCOUNTER (OUTPATIENT)
Dept: RADIOLOGY | Facility: MEDICAL CENTER | Age: 44
End: 2024-02-23
Attending: INTERNAL MEDICINE
Payer: COMMERCIAL

## 2024-02-23 ENCOUNTER — HOSPITAL ENCOUNTER (OUTPATIENT)
Dept: RADIOLOGY | Facility: MEDICAL CENTER | Age: 44
End: 2024-02-23
Attending: OBSTETRICS & GYNECOLOGY
Payer: COMMERCIAL

## 2024-02-23 DIAGNOSIS — N93.9 HEMORRHAGE IN UTERUS: ICD-10-CM

## 2024-02-23 DIAGNOSIS — E05.90 THYROTOXICOSIS WITHOUT THYROID STORM, UNSPECIFIED THYROTOXICOSIS TYPE: ICD-10-CM

## 2024-02-23 PROCEDURE — 76830 TRANSVAGINAL US NON-OB: CPT

## 2024-02-23 PROCEDURE — 76536 US EXAM OF HEAD AND NECK: CPT

## 2024-03-14 ENCOUNTER — HOSPITAL ENCOUNTER (OUTPATIENT)
Dept: LAB | Facility: MEDICAL CENTER | Age: 44
End: 2024-03-14
Attending: INTERNAL MEDICINE
Payer: COMMERCIAL

## 2024-03-14 LAB
25(OH)D3 SERPL-MCNC: 61 NG/ML (ref 30–100)
ALBUMIN SERPL BCP-MCNC: 4.3 G/DL (ref 3.2–4.9)
ALBUMIN/GLOB SERPL: 1.4 G/DL
ALP SERPL-CCNC: 51 U/L (ref 30–99)
ALT SERPL-CCNC: 24 U/L (ref 2–50)
ANION GAP SERPL CALC-SCNC: 12 MMOL/L (ref 7–16)
AST SERPL-CCNC: 19 U/L (ref 12–45)
BILIRUB SERPL-MCNC: 0.3 MG/DL (ref 0.1–1.5)
BUN SERPL-MCNC: 16 MG/DL (ref 8–22)
CALCIUM ALBUM COR SERPL-MCNC: 9.2 MG/DL (ref 8.5–10.5)
CALCIUM SERPL-MCNC: 9.4 MG/DL (ref 8.5–10.5)
CHLORIDE SERPL-SCNC: 105 MMOL/L (ref 96–112)
CHOLEST SERPL-MCNC: 207 MG/DL (ref 100–199)
CO2 SERPL-SCNC: 21 MMOL/L (ref 20–33)
CREAT SERPL-MCNC: 0.61 MG/DL (ref 0.5–1.4)
EST. AVERAGE GLUCOSE BLD GHB EST-MCNC: 94 MG/DL
FASTING STATUS PATIENT QL REPORTED: NORMAL
GFR SERPLBLD CREATININE-BSD FMLA CKD-EPI: 112 ML/MIN/1.73 M 2
GLOBULIN SER CALC-MCNC: 3 G/DL (ref 1.9–3.5)
GLUCOSE SERPL-MCNC: 98 MG/DL (ref 65–99)
HBA1C MFR BLD: 4.9 % (ref 4–5.6)
HDLC SERPL-MCNC: 34 MG/DL
LDLC SERPL CALC-MCNC: 111 MG/DL
POTASSIUM SERPL-SCNC: 3.9 MMOL/L (ref 3.6–5.5)
PROT SERPL-MCNC: 7.3 G/DL (ref 6–8.2)
SODIUM SERPL-SCNC: 138 MMOL/L (ref 135–145)
T4 FREE SERPL-MCNC: 1.29 NG/DL (ref 0.93–1.7)
TRIGL SERPL-MCNC: 309 MG/DL (ref 0–149)
TSH SERPL DL<=0.005 MIU/L-ACNC: 1.15 UIU/ML (ref 0.38–5.33)

## 2024-03-14 PROCEDURE — 82306 VITAMIN D 25 HYDROXY: CPT

## 2024-03-14 PROCEDURE — 80053 COMPREHEN METABOLIC PANEL: CPT

## 2024-03-14 PROCEDURE — 83036 HEMOGLOBIN GLYCOSYLATED A1C: CPT

## 2024-03-14 PROCEDURE — 36415 COLL VENOUS BLD VENIPUNCTURE: CPT

## 2024-03-14 PROCEDURE — 80061 LIPID PANEL: CPT

## 2024-03-14 PROCEDURE — 84439 ASSAY OF FREE THYROXINE: CPT

## 2024-03-14 PROCEDURE — 84443 ASSAY THYROID STIM HORMONE: CPT

## 2024-03-26 DIAGNOSIS — I10 ESSENTIAL HYPERTENSION: ICD-10-CM

## 2024-03-27 RX ORDER — METOPROLOL SUCCINATE 25 MG/1
TABLET, EXTENDED RELEASE ORAL
Qty: 45 TABLET | Refills: 0 | Status: SHIPPED | OUTPATIENT
Start: 2024-03-27

## 2024-03-27 NOTE — TELEPHONE ENCOUNTER
Is the patient due for a refill? Yes    Was the patient seen the past year? No    Date of last office visit: 1/17/22    Does the patient have an upcoming appointment?  No    Provider to refill:CHRISTINA, ADD    Does the patients insurance require a 100 day supply?  No

## 2024-04-16 ENCOUNTER — HOSPITAL ENCOUNTER (OUTPATIENT)
Dept: RADIOLOGY | Facility: MEDICAL CENTER | Age: 44
End: 2024-04-16
Attending: INTERNAL MEDICINE
Payer: COMMERCIAL

## 2024-04-16 DIAGNOSIS — R59.0 LOCALIZED ENLARGED LYMPH NODES: ICD-10-CM

## 2024-04-16 LAB — CYTOLOGY REG CYTOL: NORMAL

## 2024-04-16 PROCEDURE — 88305 TISSUE EXAM BY PATHOLOGIST: CPT

## 2024-04-16 PROCEDURE — 10005 FNA BX W/US GDN 1ST LES: CPT

## 2024-04-16 PROCEDURE — 88173 CYTOPATH EVAL FNA REPORT: CPT

## 2024-04-16 NOTE — PROGRESS NOTES
US guided Right neck fine needle aspiration done by Dr. Aviles; NON-SEDATION (no H&P required as this is a NON SEDATION procedure) Right anterior aspect of neck access site, dressing CDI; 3 FNA in 1 jar of cytolyt obtained, 11 FNA in RPMI obtained and sent to lab. Pt tolerated the procedure well. Pt hemodynamically stable pre/intra/post procedure; all questions and concerns answered prior to being d/c; patient provided with appropriate education for procedure; pt d/c home.

## 2024-05-09 ENCOUNTER — HOSPITAL ENCOUNTER (OUTPATIENT)
Dept: RADIOLOGY | Facility: MEDICAL CENTER | Age: 44
End: 2024-05-09
Attending: OBSTETRICS & GYNECOLOGY
Payer: COMMERCIAL

## 2024-05-09 ENCOUNTER — APPOINTMENT (RX ONLY)
Dept: URBAN - METROPOLITAN AREA CLINIC 6 | Facility: CLINIC | Age: 44
Setting detail: DERMATOLOGY
End: 2024-05-09

## 2024-05-09 DIAGNOSIS — N83.292 COMPLEX CYST OF LEFT OVARY: ICD-10-CM

## 2024-05-09 DIAGNOSIS — L70.0 ACNE VULGARIS: ICD-10-CM | Status: INADEQUATELY CONTROLLED

## 2024-05-09 PROCEDURE — ? PRESCRIPTION

## 2024-05-09 PROCEDURE — ? PRESCRIPTION MEDICATION MANAGEMENT

## 2024-05-09 PROCEDURE — ? COUNSELING

## 2024-05-09 PROCEDURE — 99214 OFFICE O/P EST MOD 30 MIN: CPT

## 2024-05-09 RX ORDER — SPIRONOLACTONE/NIACINAMIDE 5 %-4 %
GEL (GRAM) TOPICAL QD
Qty: 30 | Refills: 3 | Status: ERX | COMMUNITY
Start: 2024-05-09

## 2024-05-09 RX ADMIN — Medication: at 00:00

## 2024-05-09 ASSESSMENT — LOCATION ZONE DERM: LOCATION ZONE: FACE

## 2024-05-09 ASSESSMENT — LOCATION SIMPLE DESCRIPTION DERM: LOCATION SIMPLE: RIGHT CHEEK

## 2024-05-09 ASSESSMENT — LOCATION DETAILED DESCRIPTION DERM: LOCATION DETAILED: RIGHT INFERIOR CENTRAL BUCCAL CHEEK

## 2024-05-09 NOTE — PROCEDURE: COUNSELING

## 2024-05-09 NOTE — PROCEDURE: PRESCRIPTION MEDICATION MANAGEMENT
Continue Regimen: Arazlo 0.045% lotion
Render In Strict Bullet Format?: No
Detail Level: Zone
Plan: Recommended laser hair removal - plucking hairs is likely contributing to breakouts / folliculitis
Initiate Treatment: Dimoxia 5 %-4 % topical gel Qd
Discontinue Regimen: Winlevi

## 2024-05-30 ENCOUNTER — OFFICE VISIT (OUTPATIENT)
Dept: CARDIOLOGY | Facility: MEDICAL CENTER | Age: 44
End: 2024-05-30
Attending: INTERNAL MEDICINE
Payer: COMMERCIAL

## 2024-05-30 VITALS
OXYGEN SATURATION: 97 % | DIASTOLIC BLOOD PRESSURE: 90 MMHG | WEIGHT: 229 LBS | BODY MASS INDEX: 34.71 KG/M2 | HEART RATE: 74 BPM | SYSTOLIC BLOOD PRESSURE: 120 MMHG | HEIGHT: 68 IN | RESPIRATION RATE: 18 BRPM

## 2024-05-30 DIAGNOSIS — I10 ESSENTIAL HYPERTENSION: ICD-10-CM

## 2024-05-30 DIAGNOSIS — I35.1 AORTIC VALVE INSUFFICIENCY, ETIOLOGY OF CARDIAC VALVE DISEASE UNSPECIFIED: ICD-10-CM

## 2024-05-30 DIAGNOSIS — I77.810 ASCENDING AORTA DILATION (HCC): ICD-10-CM

## 2024-05-30 DIAGNOSIS — E78.5 DYSLIPIDEMIA: ICD-10-CM

## 2024-05-30 RX ORDER — ATOGEPANT 30 MG/1
1 TABLET ORAL DAILY
COMMUNITY
Start: 2024-05-10

## 2024-05-30 RX ORDER — ROSUVASTATIN CALCIUM 20 MG/1
20 TABLET, COATED ORAL DAILY
COMMUNITY
Start: 2024-04-06

## 2024-05-30 ASSESSMENT — ENCOUNTER SYMPTOMS
DIZZINESS: 0
SHORTNESS OF BREATH: 0
PALPITATIONS: 0
LOSS OF CONSCIOUSNESS: 0
MYALGIAS: 0
COUGH: 0

## 2024-05-30 ASSESSMENT — FIBROSIS 4 INDEX: FIB4 SCORE: 0.69

## 2024-05-30 NOTE — PROGRESS NOTES
Chief Complaint   Patient presents with    Follow-Up     F/v Dx: Thoracic aortic aneurysm without rupture (HCC    Dyslipidemia    Hypertension     F/v Dx: Essential hypertension         Subjective     Estefania Machado is a 44 y.o. female who presents today for follow-up cardiac care.    The patient has ascending aortic dilation, mild aortic regurgitation, hypertension and dyslipidemia.    Previously followed by Meghann Lockhart MD last seen 1/17/2022.    Since her last appointment she has had no cardiac symptoms of chest pain.  No back pain.  No shortness of breath or palpitations.  She recently was started on a statin by her PCP Dennies Bustamante MD and has plans for follow-up assessment in the near future.  Was started on metformin for weight loss measures.  She anticipates is being scheduled for a hysterectomy in 12/2024 and will need a preoperative cardiac clearance.    Past Medical History:   Diagnosis Date    Abnormal Pap smear of cervix     ASTHMA     seasonal allergy related    HPV in female     Hypertension     Migraines      Past Surgical History:   Procedure Laterality Date    DILATION AND CURETTAGE       Family History   Problem Relation Age of Onset    Hypertension Mother     Heart Disease Mother         Dilated ascending aorta    Heart Disease Father     Hypertension Father     Hyperlipidemia Father     Other Father         Amyloidosis, mostly GI and muscle, 78     Social History     Socioeconomic History    Marital status:      Spouse name: Not on file    Number of children: Not on file    Years of education: Not on file    Highest education level: Not on file   Occupational History    Not on file   Tobacco Use    Smoking status: Never    Smokeless tobacco: Never   Vaping Use    Vaping status: Never Used   Substance and Sexual Activity    Alcohol use: Yes     Comment: very little    Drug use: No    Sexual activity: Not on file   Other Topics Concern    Not on file   Social History Narrative    Not on  "file     Social Determinants of Health     Financial Resource Strain: Not on file   Food Insecurity: Not on file   Transportation Needs: Not on file   Physical Activity: Not on file   Stress: Not on file   Social Connections: Not on file   Intimate Partner Violence: Not on file   Housing Stability: Not on file     No Known Allergies  Outpatient Encounter Medications as of 5/30/2024   Medication Sig Dispense Refill    rosuvastatin (CRESTOR) 20 MG Tab Take 20 mg by mouth every day.      QULIPTA 30 MG Tab Take 1 Tablet by mouth every day.      metoprolol SR (TOPROL XL) 25 MG TABLET SR 24 HR TAKE ONE-HALF (1/2) TABLET DAILY 45 Tablet 0    ondansetron (ZOFRAN ODT) 4 MG TABLET DISPERSIBLE Take 1 Tablet by mouth every 8 hours as needed. 20 Tablet 0    albuterol 108 (90 Base) MCG/ACT Aero Soln inhalation aerosol Inhale 2 Puffs every 6 hours as needed for Shortness of Breath. 8.5 g 3    eletriptan (RELPAX) 20 MG Tab TK 1 T PO AOS OF MIGRAINE AS NEEDED. MAY REPEAT IN 2 H. DO NOT EXCEED 2 T IN 24 H (Patient taking differently: Take 20 mg by mouth one time as needed for Migraine. TK 1 T PO AOS OF MIGRAINE AS NEEDED. MAY REPEAT IN 2 H. DO NOT EXCEED 2 T IN 24 H) 10 Tab 2    BOTOX 200 units Recon Soln every 3 months. For migraines      Cholecalciferol (VITAMIN D3) 5000 units Cap Take 5,000 Units by mouth every evening.      CLARAVIS 30 MG Cap Every other day 30 mg, 60mg       No facility-administered encounter medications on file as of 5/30/2024.     Review of Systems   Respiratory:  Negative for cough and shortness of breath.    Cardiovascular:  Negative for chest pain and palpitations.   Musculoskeletal:  Negative for myalgias.   Neurological:  Negative for dizziness and loss of consciousness.              Objective     BP (!) 120/90 (BP Location: Left arm, Patient Position: Sitting, BP Cuff Size: Adult)   Pulse 74   Resp 18   Ht 1.727 m (5' 8\")   Wt 104 kg (229 lb)   SpO2 97%   BMI 34.82 kg/m²     Physical Exam  Vitals " reviewed.   Constitutional:       General: She is not in acute distress.  Eyes:      Conjunctiva/sclera: Conjunctivae normal.      Pupils: Pupils are equal, round, and reactive to light.   Neck:      Vascular: No JVD.   Cardiovascular:      Rate and Rhythm: Normal rate and regular rhythm.      Pulses:           Carotid pulses are 1+ on the right side and 1+ on the left side.       Radial pulses are 1+ on the right side and 1+ on the left side.        Posterior tibial pulses are 2+ on the right side and 2+ on the left side.      Heart sounds: Normal heart sounds. No murmur heard.     No friction rub. No gallop.   Pulmonary:      Effort: Pulmonary effort is normal. No accessory muscle usage or respiratory distress.      Breath sounds: Normal breath sounds. No wheezing or rales.   Musculoskeletal:      Cervical back: Normal range of motion and neck supple.      Right lower leg: No edema.      Left lower leg: No edema.   Skin:     General: Skin is warm and dry.      Findings: No rash.      Nails: There is no clubbing.   Neurological:      Mental Status: She is alert and oriented to person, place, and time.   Psychiatric:         Behavior: Behavior normal.            EKG 5/30/2024 sinus rhythm personally interpreted.    Assessment & Plan     1. Ascending aorta dilation (HCC)        2. Essential hypertension  EKG      3. Dyslipidemia            Medical Decision Making: Today's Assessment/Status/Plan:        Assessment  Ascending aortic dilation 4.1 cm.  Aortic regurgitation, mild  Hypertension  Dyslipidemia  Migraine headaches    Recommendation Discussion  Repeat CTA and make any further recommendations based on results  Reviewed blood pressure log which shows good control of blood pressure on metoprolol  Pursuing weight loss program and regiment  Follow-up with PCP regarding lipid management.  Anticipate cardiac clearance assessment for future hysterectomy tentatively scheduled and 12/2024 depending on her clinical  circumstances.  RTC 1 year, sooner if necessary.

## 2024-05-31 LAB — EKG IMPRESSION: NORMAL

## 2024-06-24 DIAGNOSIS — I10 ESSENTIAL HYPERTENSION: ICD-10-CM

## 2024-06-25 ENCOUNTER — PATIENT MESSAGE (OUTPATIENT)
Dept: CARDIOLOGY | Facility: MEDICAL CENTER | Age: 44
End: 2024-06-25

## 2024-06-25 ENCOUNTER — HOSPITAL ENCOUNTER (OUTPATIENT)
Dept: RADIOLOGY | Facility: MEDICAL CENTER | Age: 44
End: 2024-06-25
Attending: INTERNAL MEDICINE
Payer: COMMERCIAL

## 2024-06-25 DIAGNOSIS — I77.810 ASCENDING AORTA DILATION (HCC): ICD-10-CM

## 2024-06-25 PROCEDURE — 700117 HCHG RX CONTRAST REV CODE 255: Performed by: INTERNAL MEDICINE

## 2024-06-25 PROCEDURE — 71275 CT ANGIOGRAPHY CHEST: CPT

## 2024-06-25 RX ORDER — METOPROLOL SUCCINATE 25 MG/1
TABLET, EXTENDED RELEASE ORAL
Qty: 45 TABLET | Refills: 3 | Status: SHIPPED | OUTPATIENT
Start: 2024-06-25

## 2024-06-25 RX ADMIN — IOHEXOL 80 ML: 350 INJECTION, SOLUTION INTRAVENOUS at 08:34

## 2024-06-25 NOTE — TELEPHONE ENCOUNTER
Is the patient due for a refill? Yes    Was the patient seen the past year? Yes    Date of last office visit: 5/30/24    Does the patient have an upcoming appointment?  No    Provider to refill:SW    Does the patients insurance require a 100 day supply?  No

## 2024-06-26 ENCOUNTER — TELEPHONE (OUTPATIENT)
Dept: CARDIOLOGY | Facility: MEDICAL CENTER | Age: 44
End: 2024-06-26
Payer: COMMERCIAL

## 2024-06-26 ENCOUNTER — TELEPHONE (OUTPATIENT)
Dept: CARDIOTHORACIC SURGERY | Facility: MEDICAL CENTER | Age: 44
End: 2024-06-26
Payer: COMMERCIAL

## 2024-06-26 DIAGNOSIS — I71.21 ANEURYSM OF ASCENDING AORTA WITHOUT RUPTURE (HCC): ICD-10-CM

## 2024-06-26 NOTE — TELEPHONE ENCOUNTER
"Per SW, \"Please make a follow-up appointment for this patient with me in 4 to 5 months\" - called pt and LVM for pt to call back and schd. /cg 06/26/24   "

## 2024-06-26 NOTE — RESULT ENCOUNTER NOTE
I called and spoke with the patient about the results of her chest CT scan showing ascending aorta increased to 4.9 cm.  In 1/2022 ascending aorta was 4.1 cm  In 12/2020 ascending aorta was 4.5 cm  In 10/2019 ascending aorta was 4.5 cm  Will refer to cardiothoracic surgery for recommendations otherwise continue surveillance  Blood pressure has been well-controlled 120 systolic range.

## 2024-06-27 ENCOUNTER — HOSPITAL ENCOUNTER (OUTPATIENT)
Dept: CARDIOLOGY | Facility: MEDICAL CENTER | Age: 44
End: 2024-06-27
Attending: NURSE PRACTITIONER
Payer: COMMERCIAL

## 2024-06-27 DIAGNOSIS — I71.21 ANEURYSM OF ASCENDING AORTA WITHOUT RUPTURE (HCC): ICD-10-CM

## 2024-06-27 LAB
LV EJECT FRACT MOD 2C 99903: 54.16
LV EJECT FRACT MOD 4C 99902: 67.32
LV EJECT FRACT MOD BP 99901: 60.75

## 2024-06-27 PROCEDURE — 93306 TTE W/DOPPLER COMPLETE: CPT

## 2024-06-27 PROCEDURE — 93306 TTE W/DOPPLER COMPLETE: CPT | Mod: 26 | Performed by: STUDENT IN AN ORGANIZED HEALTH CARE EDUCATION/TRAINING PROGRAM

## 2024-06-28 ENCOUNTER — HOSPITAL ENCOUNTER (OUTPATIENT)
Dept: LAB | Facility: MEDICAL CENTER | Age: 44
End: 2024-06-28
Attending: INTERNAL MEDICINE
Payer: COMMERCIAL

## 2024-06-28 LAB
ALBUMIN SERPL BCP-MCNC: 4.6 G/DL (ref 3.2–4.9)
ALBUMIN/GLOB SERPL: 1.7 G/DL
ALP SERPL-CCNC: 57 U/L (ref 30–99)
ALT SERPL-CCNC: 24 U/L (ref 2–50)
ANION GAP SERPL CALC-SCNC: 11 MMOL/L (ref 7–16)
AST SERPL-CCNC: 16 U/L (ref 12–45)
BILIRUB SERPL-MCNC: 0.4 MG/DL (ref 0.1–1.5)
BUN SERPL-MCNC: 17 MG/DL (ref 8–22)
CALCIUM ALBUM COR SERPL-MCNC: 9.3 MG/DL (ref 8.5–10.5)
CALCIUM SERPL-MCNC: 9.8 MG/DL (ref 8.5–10.5)
CHLORIDE SERPL-SCNC: 102 MMOL/L (ref 96–112)
CHOLEST SERPL-MCNC: 118 MG/DL (ref 100–199)
CO2 SERPL-SCNC: 23 MMOL/L (ref 20–33)
CREAT SERPL-MCNC: 0.63 MG/DL (ref 0.5–1.4)
EST. AVERAGE GLUCOSE BLD GHB EST-MCNC: 97 MG/DL
GFR SERPLBLD CREATININE-BSD FMLA CKD-EPI: 111 ML/MIN/1.73 M 2
GLOBULIN SER CALC-MCNC: 2.7 G/DL (ref 1.9–3.5)
GLUCOSE SERPL-MCNC: 91 MG/DL (ref 65–99)
HBA1C MFR BLD: 5 % (ref 4–5.6)
HDLC SERPL-MCNC: 44 MG/DL
LDLC SERPL CALC-MCNC: 45 MG/DL
POTASSIUM SERPL-SCNC: 4.4 MMOL/L (ref 3.6–5.5)
PROT SERPL-MCNC: 7.3 G/DL (ref 6–8.2)
SODIUM SERPL-SCNC: 136 MMOL/L (ref 135–145)
TRIGL SERPL-MCNC: 143 MG/DL (ref 0–149)

## 2024-06-28 PROCEDURE — 80061 LIPID PANEL: CPT

## 2024-06-28 PROCEDURE — 36415 COLL VENOUS BLD VENIPUNCTURE: CPT

## 2024-06-28 PROCEDURE — 80053 COMPREHEN METABOLIC PANEL: CPT

## 2024-06-28 PROCEDURE — 83036 HEMOGLOBIN GLYCOSYLATED A1C: CPT

## 2024-07-02 RX ORDER — TAZAROTENE 0.45 MG/G
1 LOTION TOPICAL QD
Qty: 45 | Refills: 6 | Status: ERX

## 2024-07-03 ENCOUNTER — OFFICE VISIT (OUTPATIENT)
Dept: CARDIOTHORACIC SURGERY | Facility: MEDICAL CENTER | Age: 44
End: 2024-07-03
Payer: COMMERCIAL

## 2024-07-03 ENCOUNTER — TELEPHONE (OUTPATIENT)
Dept: CARDIOLOGY | Facility: MEDICAL CENTER | Age: 44
End: 2024-07-03

## 2024-07-03 VITALS
TEMPERATURE: 97.2 F | WEIGHT: 226.7 LBS | HEART RATE: 76 BPM | OXYGEN SATURATION: 96 % | SYSTOLIC BLOOD PRESSURE: 114 MMHG | HEIGHT: 68 IN | DIASTOLIC BLOOD PRESSURE: 84 MMHG | BODY MASS INDEX: 34.36 KG/M2

## 2024-07-03 DIAGNOSIS — I71.21 ANEURYSM OF ASCENDING AORTA WITHOUT RUPTURE (HCC): ICD-10-CM

## 2024-07-03 PROCEDURE — 99205 OFFICE O/P NEW HI 60 MIN: CPT | Performed by: THORACIC SURGERY (CARDIOTHORACIC VASCULAR SURGERY)

## 2024-07-03 PROCEDURE — 3074F SYST BP LT 130 MM HG: CPT | Performed by: THORACIC SURGERY (CARDIOTHORACIC VASCULAR SURGERY)

## 2024-07-03 PROCEDURE — 3079F DIAST BP 80-89 MM HG: CPT | Performed by: THORACIC SURGERY (CARDIOTHORACIC VASCULAR SURGERY)

## 2024-07-03 RX ORDER — CETIRIZINE HYDROCHLORIDE 10 MG/1
10 TABLET ORAL DAILY
COMMUNITY

## 2024-07-03 RX ORDER — AMITRIPTYLINE HYDROCHLORIDE 10 MG/1
TABLET, FILM COATED ORAL
COMMUNITY

## 2024-07-03 ASSESSMENT — ENCOUNTER SYMPTOMS
DEPRESSION: 0
SPEECH CHANGE: 0
EYE PAIN: 0
DIZZINESS: 0
DIAPHORESIS: 0
COUGH: 0
SEIZURES: 0
CONSTIPATION: 0
CHILLS: 0
DOUBLE VISION: 0
HEMOPTYSIS: 0
PALPITATIONS: 0
WHEEZING: 0
VOMITING: 0
FEVER: 0
POLYDIPSIA: 0
ORTHOPNEA: 0
STRIDOR: 0
BRUISES/BLEEDS EASILY: 0
WEAKNESS: 0
WEIGHT LOSS: 0
HEARTBURN: 0
SORE THROAT: 0
BLURRED VISION: 0
FLANK PAIN: 0
NECK PAIN: 0
FOCAL WEAKNESS: 0
EYE DISCHARGE: 0
MYALGIAS: 0
HALLUCINATIONS: 0
ABDOMINAL PAIN: 0
HEADACHES: 0
SPUTUM PRODUCTION: 0
NAUSEA: 0

## 2024-07-03 ASSESSMENT — FIBROSIS 4 INDEX: FIB4 SCORE: 0.58

## 2024-07-03 ASSESSMENT — LIFESTYLE VARIABLES: SUBSTANCE_ABUSE: 0

## 2024-07-05 ENCOUNTER — HOSPITAL ENCOUNTER (OUTPATIENT)
Dept: RADIOLOGY | Facility: MEDICAL CENTER | Age: 44
End: 2024-07-05
Attending: NURSE PRACTITIONER
Payer: COMMERCIAL

## 2024-07-05 DIAGNOSIS — I71.21 ANEURYSM OF ASCENDING AORTA WITHOUT RUPTURE (HCC): ICD-10-CM

## 2024-07-05 PROCEDURE — 93880 EXTRACRANIAL BILAT STUDY: CPT

## 2024-07-10 ENCOUNTER — APPOINTMENT (OUTPATIENT)
Dept: ADMISSIONS | Facility: MEDICAL CENTER | Age: 44
End: 2024-07-10
Attending: INTERNAL MEDICINE
Payer: COMMERCIAL

## 2024-07-15 ENCOUNTER — APPOINTMENT (OUTPATIENT)
Dept: ADMISSIONS | Facility: MEDICAL CENTER | Age: 44
End: 2024-07-15
Attending: INTERNAL MEDICINE
Payer: COMMERCIAL

## 2024-07-16 ENCOUNTER — TELEPHONE (OUTPATIENT)
Dept: CARDIOLOGY | Facility: MEDICAL CENTER | Age: 44
End: 2024-07-16
Payer: COMMERCIAL

## 2024-07-24 NOTE — PROGRESS NOTES
REFERRING PHYSICIAN: Abdirashid Chamorro MD    CONSULTING PHYSICIAN: Mago Lerma MD, FACS    CHIEF COMPLAINT: Ascending aortic aneurysm repair    HISTORY OF PRESENT ILLNESS: The patient is a 44 y.o. female with past medical history dyslipidemia, hypertension, asthma, mild aortic insufficiency and ascending aortic aneurysm. Today, she states she is her usual state of health. She denies chest pain, shortness of breath, orthopnea, PND, dizziness, and syncope. She has a significant family history of ascending aortic aneurysm in her mom, brother, uncle and aunt. None have had surgical repair but are in surveilance. 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 She is not aware of any sudden cardiac deaths in her family. 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 She has two sons for 7 and  9.    PAST MEDICAL HISTORY:   Active Ambulatory Problems     Diagnosis Date Noted    Essential hypertension 05/22/2018    Chronic migraine 05/22/2018    Ascending aorta enlargement (HCC) 05/22/2018    Dyslipidemia 05/22/2018    Health care maintenance 08/24/2018    Abnormal cervical Papanicolaou smear 08/24/2018    Pulmonary nodules 07/20/2020    Obesity (BMI 30.0-34.9) 07/20/2020    Idiopathic anterior uveitis of left eye 05/17/2022    Positive RF with negative ACPA 08/16/2022    Ascending aorta dilation (HCC) 05/30/2024    Aortic regurgitation 05/30/2024     Resolved Ambulatory Problems     Diagnosis Date Noted    Indication for care in labor or delivery 05/22/2013    Hypovitaminosis D 07/20/2020     Past Medical History:   Diagnosis Date    Abnormal Pap smear of cervix     ASTHMA     HPV in female     Hypertension     Migraines      PAST SURGICAL HISTORY:   Past Surgical History:   Procedure Laterality Date    DILATION AND CURETTAGE       ALLERGIES: No Known Allergies     CURRENT MEDICATIONS:   Current Outpatient Medications:     metformin (GLUCOPHAGE) 1000 MG tablet, Take 1,000 mg by mouth every  evening., Disp: , Rfl:     cetirizine (ZYRTEC) 10 MG Tab, Take 10 mg by mouth every evening., Disp: , Rfl:     metoprolol SR (TOPROL XL) 25 MG TABLET SR 24 HR, TAKE ONE-HALF (1/2) TABLET DAILY (CALL TO SCHEDULE FOLLOW UP APPOINTMENT FOR FURTHER REFILLS, [phone]) (Patient taking differently: Take 12.5 mg by mouth every evening.), Disp: 45 Tablet, Rfl: 3    rosuvastatin (CRESTOR) 20 MG Tab, Take 20 mg by mouth every evening., Disp: , Rfl:     QULIPTA 30 MG Tab, Take 1 Tablet by mouth every evening., Disp: , Rfl:     ondansetron (ZOFRAN ODT) 4 MG TABLET DISPERSIBLE, Take 1 Tablet by mouth every 8 hours as needed., Disp: 20 Tablet, Rfl: 0    albuterol 108 (90 Base) MCG/ACT Aero Soln inhalation aerosol, Inhale 2 Puffs every 6 hours as needed for Shortness of Breath., Disp: 8.5 g, Rfl: 3    eletriptan (RELPAX) 20 MG Tab, TK 1 T PO AOS OF MIGRAINE AS NEEDED. MAY REPEAT IN 2 H. DO NOT EXCEED 2 T IN 24 H (Patient taking differently: Take 20 mg by mouth one time as needed for Migraine. TK 1 T PO AOS OF MIGRAINE AS NEEDED. MAY REPEAT IN 2 H. DO NOT EXCEED 2 T IN 24 H), Disp: 10 Tab, Rfl: 2    BOTOX 200 units Recon Soln, every 3 months. For migraines, Disp: , Rfl:     Cholecalciferol (VITAMIN D3) 5000 units Cap, Take 5,000 Units by mouth every evening., Disp: , Rfl:     FAMILY HISTORY:   Family History   Problem Relation Age of Onset    Hypertension Mother     Heart Disease Mother         Dilated ascending aorta    Heart Disease Father     Hypertension Father     Hyperlipidemia Father     Other Father         Amyloidosis, mostly GI and muscle, 78    Heart Disease Maternal Grandmother       SOCIAL HISTORY:   Social History     Socioeconomic History    Marital status:      Spouse name: Not on file    Number of children: Not on file    Years of education: Not on file    Highest education level: Not on file   Occupational History    Not on file   Tobacco Use    Smoking status: Never    Smokeless tobacco: Never   Vaping  "Use    Vaping status: Never Used   Substance and Sexual Activity    Alcohol use: Yes     Comment: very little    Drug use: No    Sexual activity: Not on file   Other Topics Concern    Not on file   Social History Narrative    Two sons- 9&11     Social Determinants of Health     Financial Resource Strain: Not on file   Food Insecurity: Not on file   Transportation Needs: Not on file   Physical Activity: Not on file   Stress: Not on file   Social Connections: Not on file   Intimate Partner Violence: Not on file   Housing Stability: Not on file     REVIEW OF SYSTEMS:  Review of Systems   Constitutional: Negative.    HENT: Negative.     Eyes: Negative.    Respiratory: Negative.     Cardiovascular: Negative.    Gastrointestinal: Negative.    Genitourinary: Negative.    Musculoskeletal: Negative.    Skin: Negative.    Neurological: Negative.    Endo/Heme/Allergies: Negative.    Psychiatric/Behavioral: Negative.       PHYSICAL EXAMINATION:    /72 (BP Location: Left arm, Patient Position: Sitting, BP Cuff Size: Adult)   Pulse 70   Temp (!) 35.8 °C (96.4 °F) (Temporal)   Ht 1.727 m (5' 8\")   Wt 104 kg (229 lb 12.8 oz)   SpO2 97%   BMI 34.94 kg/m²      Physical Exam  Constitutional:       General: She is not in acute distress.  HENT:      Head: Normocephalic.   Eyes:      Pupils: Pupils are equal, round, and reactive to light.   Cardiovascular:      Rate and Rhythm: Normal rate and regular rhythm.      Heart sounds:      No gallop.   Pulmonary:      Effort: Pulmonary effort is normal. No respiratory distress.      Breath sounds: Normal breath sounds. No wheezing or rales.   Abdominal:      General: Bowel sounds are normal. There is no distension.      Palpations: Abdomen is soft.      Tenderness: There is no abdominal tenderness.   Musculoskeletal:         General: Normal range of motion.      Cervical back: Neck supple.   Skin:     General: Skin is warm and dry.   Neurological:      Mental Status: She is alert " and oriented to person, place, and time.   Psychiatric:         Mood and Affect: Mood and affect normal.         Cognition and Memory: Memory normal.         Judgment: Judgment normal.       LABS REVIEWED:  Lab Results   Component Value Date/Time    SODIUM 136 08/05/2024 08:40 AM    POTASSIUM 4.0 08/05/2024 08:40 AM    CHLORIDE 104 08/05/2024 08:40 AM    CO2 21 08/05/2024 08:40 AM    GLUCOSE 97 08/05/2024 08:40 AM    BUN 15 08/05/2024 08:40 AM    CREATININE 0.64 08/05/2024 08:40 AM    BUNCREATRAT 16 06/05/2020 05:20 AM      Lab Results   Component Value Date/Time    PROTHROMBTM 13.0 08/05/2024 08:40 AM    INR 0.97 08/05/2024 08:40 AM      Lab Results   Component Value Date/Time    WBC 8.7 08/05/2024 08:40 AM    RBC 4.28 08/05/2024 08:40 AM    HEMOGLOBIN 13.4 08/05/2024 08:40 AM    HEMATOCRIT 38.4 08/05/2024 08:40 AM    MCV 89.7 08/05/2024 08:40 AM    MCH 31.3 08/05/2024 08:40 AM    MCHC 34.9 08/05/2024 08:40 AM    MPV 9.1 08/05/2024 08:40 AM    NEUTSPOLYS 73.20 (H) 11/09/2021 07:48 AM    LYMPHOCYTES 18.00 (L) 11/09/2021 07:48 AM    MONOCYTES 7.10 11/09/2021 07:48 AM    EOSINOPHILS 0.80 11/09/2021 07:48 AM    BASOPHILS 0.50 11/09/2021 07:48 AM      IMAGING REVIEWED AND INTERPRETED:    ECHOCARDIOGRAM 6/27/24 [facility] [place] [facility]:  Normal left ventricular systolic function. The left ventricular   ejection fraction is visually estimated to be 60%.   Mild concentric left ventricular hypertrophy.  Normal right ventricular size.   Normal right ventricular systolic function.  Mild aortic insufficiency.  Estimated right ventricular systolic pressure is 20 mmHg (normal).  The ascending aorta is dilated with a diameter of 4.3 cm.  No recent study is available on file for comparison.     CARDIAC CATHETERIZATION C 8/5/2024:  The left main coronary artery : Normal-appearing large caliber vessel that bifurcates to LAD and left circumflex  The left anterior descending coronary artery : Large-caliber transapical vessel with small distal/apical LAD  in diameter.  Gives rise to small-medium caliber diagonal branches  The left circumflex coronary artery : Normal-appearing large-caliber vessel that gives rise to a large bifurcating OM and true circumflex tapers into the AV groove  The right coronary artery  : Normal-appearing large caliber dominant vessel     Supravalvular aortography:  Aortic root 3.8 cm  Mid thoracic ascending aorta 4.3 cm  No AI     CT SCAN CHEST 6/25/24 [facility]:  1.  Ascending thoracic aortic aneurysm, 4.9 x 4.5 cm. This has increased in size since December 2020.  2.  RIGHT hepatic mass is new or newly apparent since the prior study. They have some imaging features which are suggestive of hemangiomas however these remain indeterminate. Recommend further assessment with hepatic mass MRI or CT when clinically   appropriate.  3.  LEFT lung base pulmonary nodules unchanged since at least December 2020, very likely benign      IMPRESSION:  Ascending aortic aneurysm (4.9 cm), mild aortic regurgitation, asthma, hypertension, dyslipidemia    PLAN:  I recommend that she undergo ascending aortic aneurysm repair, possible aortic valve replacement and intraoperative transesophageal echocardiography.    The procedure, its risks, benefits, potential complications and alternative treatments were discussed with the patient in detail including the risks should she decide not to undergo my recommended treatment. All of her questions were answered to her satisfaction and she is willing to proceed with the operation. The risks include death, stroke, infection: to include a rare bacterial infection related to the use of the heart/lung machine, zachary-operative myocardial infarction, dysrhythmias, diaphragmatic paralysis, chest wall paresthesia, tracheostomy, kidney or other organ failure, possible return to the operating room for bleeding, bleeding requiring transfusion with its attendant risks including AIDS or hepatitis, dehiscence of surgical incisions, respiratory  complications including the need for prolonged ventilator support, Protamine or other drug reaction, peripheral neuropathy, loss of limb, and miscount of surgical items. The operative mortality risk is approximately 1%. The scores were discussed with patient.    The operation is scheduled for Thursday, September 12, 2024 at 7:30 AM at [facility].  The differences between tissue and mechanical valves were explained in detail to the patient and her  in the event she requires an aortic valve replacement.  She is inclined to have a tissue valve implanted at this time in order to avoid anticoagulation.    Findings and recommendations have been discussed with the patient’s cardiologist, Abdirashid Chamorro MD.  Thank you for this very challenging consultation and participation in the patient’s care.  I will keep you apprised of all future developments.    Sincerely,    Mago Lerma MD, FACS

## 2024-08-02 ENCOUNTER — PRE-ADMISSION TESTING (OUTPATIENT)
Dept: ADMISSIONS | Facility: MEDICAL CENTER | Age: 44
End: 2024-08-02
Payer: COMMERCIAL

## 2024-08-02 VITALS — HEIGHT: 68 IN | BODY MASS INDEX: 34.47 KG/M2

## 2024-08-02 RX ORDER — METFORMIN HCL 500 MG
1000 TABLET, EXTENDED RELEASE 24 HR ORAL EVERY EVENING
COMMUNITY
End: 2024-08-02

## 2024-08-02 NOTE — OR NURSING
Preadmit: Telephone preadmit done with patient scheduled for upcoming cath lab procedure on 8/5/24. Pre-procedure instructions, fasting guidelines, check in location, and medication instructions reviewed with patient. Patient instructed to hold metformin day of procedure and 48 hours after procedure. Patient verbalized understanding of instructions.

## 2024-08-05 ENCOUNTER — APPOINTMENT (OUTPATIENT)
Dept: CARDIOLOGY | Facility: MEDICAL CENTER | Age: 44
End: 2024-08-05
Attending: NURSE PRACTITIONER
Payer: COMMERCIAL

## 2024-08-05 ENCOUNTER — HOSPITAL ENCOUNTER (OUTPATIENT)
Facility: MEDICAL CENTER | Age: 44
End: 2024-08-05
Attending: INTERNAL MEDICINE | Admitting: INTERNAL MEDICINE
Payer: COMMERCIAL

## 2024-08-05 VITALS
RESPIRATION RATE: 16 BRPM | HEIGHT: 68 IN | BODY MASS INDEX: 35.08 KG/M2 | OXYGEN SATURATION: 96 % | DIASTOLIC BLOOD PRESSURE: 77 MMHG | WEIGHT: 231.48 LBS | SYSTOLIC BLOOD PRESSURE: 150 MMHG | HEART RATE: 70 BPM | TEMPERATURE: 97.2 F

## 2024-08-05 DIAGNOSIS — I71.21 ANEURYSM OF ASCENDING AORTA WITHOUT RUPTURE (HCC): ICD-10-CM

## 2024-08-05 LAB
ALBUMIN SERPL BCP-MCNC: 4.4 G/DL (ref 3.2–4.9)
ALBUMIN/GLOB SERPL: 1.5 G/DL
ALP SERPL-CCNC: 58 U/L (ref 30–99)
ALT SERPL-CCNC: 19 U/L (ref 2–50)
ANION GAP SERPL CALC-SCNC: 11 MMOL/L (ref 7–16)
APTT PPP: 26 SEC (ref 24.7–36)
AST SERPL-CCNC: 15 U/L (ref 12–45)
BILIRUB SERPL-MCNC: 0.5 MG/DL (ref 0.1–1.5)
BUN SERPL-MCNC: 15 MG/DL (ref 8–22)
CALCIUM ALBUM COR SERPL-MCNC: 9 MG/DL (ref 8.5–10.5)
CALCIUM SERPL-MCNC: 9.3 MG/DL (ref 8.5–10.5)
CHLORIDE SERPL-SCNC: 104 MMOL/L (ref 96–112)
CO2 SERPL-SCNC: 21 MMOL/L (ref 20–33)
CREAT SERPL-MCNC: 0.64 MG/DL (ref 0.5–1.4)
EKG IMPRESSION: NORMAL
ERYTHROCYTE [DISTWIDTH] IN BLOOD BY AUTOMATED COUNT: 39.8 FL (ref 35.9–50)
GFR SERPLBLD CREATININE-BSD FMLA CKD-EPI: 111 ML/MIN/1.73 M 2
GLOBULIN SER CALC-MCNC: 2.9 G/DL (ref 1.9–3.5)
GLUCOSE SERPL-MCNC: 97 MG/DL (ref 65–99)
HCG SERPL QL: NEGATIVE
HCT VFR BLD AUTO: 38.4 % (ref 37–47)
HGB BLD-MCNC: 13.4 G/DL (ref 12–16)
INR PPP: 0.97 (ref 0.87–1.13)
MCH RBC QN AUTO: 31.3 PG (ref 27–33)
MCHC RBC AUTO-ENTMCNC: 34.9 G/DL (ref 32.2–35.5)
MCV RBC AUTO: 89.7 FL (ref 81.4–97.8)
PLATELET # BLD AUTO: 244 K/UL (ref 164–446)
PMV BLD AUTO: 9.1 FL (ref 9–12.9)
POTASSIUM SERPL-SCNC: 4 MMOL/L (ref 3.6–5.5)
PROT SERPL-MCNC: 7.3 G/DL (ref 6–8.2)
PROTHROMBIN TIME: 13 SEC (ref 12–14.6)
RBC # BLD AUTO: 4.28 M/UL (ref 4.2–5.4)
SODIUM SERPL-SCNC: 136 MMOL/L (ref 135–145)
WBC # BLD AUTO: 8.7 K/UL (ref 4.8–10.8)

## 2024-08-05 PROCEDURE — 160046 HCHG PACU - 1ST 60 MINS PHASE II

## 2024-08-05 PROCEDURE — C1894 INTRO/SHEATH, NON-LASER: HCPCS

## 2024-08-05 PROCEDURE — 160002 HCHG RECOVERY MINUTES (STAT)

## 2024-08-05 PROCEDURE — 85730 THROMBOPLASTIN TIME PARTIAL: CPT

## 2024-08-05 PROCEDURE — 99152 MOD SED SAME PHYS/QHP 5/>YRS: CPT | Performed by: INTERNAL MEDICINE

## 2024-08-05 PROCEDURE — 700117 HCHG RX CONTRAST REV CODE 255: Performed by: INTERNAL MEDICINE

## 2024-08-05 PROCEDURE — 84703 CHORIONIC GONADOTROPIN ASSAY: CPT

## 2024-08-05 PROCEDURE — 700102 HCHG RX REV CODE 250 W/ 637 OVERRIDE(OP)

## 2024-08-05 PROCEDURE — 85610 PROTHROMBIN TIME: CPT

## 2024-08-05 PROCEDURE — 80053 COMPREHEN METABOLIC PANEL: CPT

## 2024-08-05 PROCEDURE — 93010 ELECTROCARDIOGRAM REPORT: CPT | Performed by: INTERNAL MEDICINE

## 2024-08-05 PROCEDURE — 93567 NJX CAR CTH SPRVLV AORTGRPHY: CPT | Performed by: INTERNAL MEDICINE

## 2024-08-05 PROCEDURE — 700111 HCHG RX REV CODE 636 W/ 250 OVERRIDE (IP)

## 2024-08-05 PROCEDURE — 93005 ELECTROCARDIOGRAM TRACING: CPT | Performed by: INTERNAL MEDICINE

## 2024-08-05 PROCEDURE — A9270 NON-COVERED ITEM OR SERVICE: HCPCS

## 2024-08-05 PROCEDURE — 85027 COMPLETE CBC AUTOMATED: CPT

## 2024-08-05 PROCEDURE — 700101 HCHG RX REV CODE 250

## 2024-08-05 PROCEDURE — 93458 L HRT ARTERY/VENTRICLE ANGIO: CPT | Mod: 26 | Performed by: INTERNAL MEDICINE

## 2024-08-05 PROCEDURE — 160035 HCHG PACU - 1ST 60 MINS PHASE I

## 2024-08-05 RX ORDER — ASPIRIN 81 MG/1
TABLET, CHEWABLE ORAL
Status: COMPLETED
Start: 2024-08-05 | End: 2024-08-05

## 2024-08-05 RX ORDER — MIDAZOLAM HYDROCHLORIDE 1 MG/ML
INJECTION INTRAMUSCULAR; INTRAVENOUS
Status: COMPLETED
Start: 2024-08-05 | End: 2024-08-05

## 2024-08-05 RX ORDER — HEPARIN SODIUM 1000 [USP'U]/ML
INJECTION, SOLUTION INTRAVENOUS; SUBCUTANEOUS
Status: COMPLETED
Start: 2024-08-05 | End: 2024-08-05

## 2024-08-05 RX ORDER — LIDOCAINE HYDROCHLORIDE 20 MG/ML
INJECTION, SOLUTION INFILTRATION; PERINEURAL
Status: COMPLETED
Start: 2024-08-05 | End: 2024-08-05

## 2024-08-05 RX ORDER — VERAPAMIL HYDROCHLORIDE 2.5 MG/ML
INJECTION, SOLUTION INTRAVENOUS
Status: COMPLETED
Start: 2024-08-05 | End: 2024-08-05

## 2024-08-05 RX ORDER — HEPARIN SODIUM 200 [USP'U]/100ML
INJECTION, SOLUTION INTRAVENOUS
Status: COMPLETED
Start: 2024-08-05 | End: 2024-08-05

## 2024-08-05 RX ADMIN — ASPIRIN 324 MG: 81 TABLET, CHEWABLE ORAL at 12:19

## 2024-08-05 RX ADMIN — VERAPAMIL HYDROCHLORIDE 2.5 MG: 2.5 INJECTION, SOLUTION INTRAVENOUS at 12:21

## 2024-08-05 RX ADMIN — FENTANYL CITRATE 100 MCG: 50 INJECTION, SOLUTION INTRAMUSCULAR; INTRAVENOUS at 12:48

## 2024-08-05 RX ADMIN — HEPARIN SODIUM 2000 UNITS: 200 INJECTION, SOLUTION INTRAVENOUS at 12:23

## 2024-08-05 RX ADMIN — IOHEXOL 35 ML: 350 INJECTION, SOLUTION INTRAVENOUS at 13:03

## 2024-08-05 RX ADMIN — LIDOCAINE HYDROCHLORIDE: 20 INJECTION, SOLUTION INFILTRATION; PERINEURAL at 12:21

## 2024-08-05 RX ADMIN — NITROGLYCERIN 10 ML: 20 INJECTION INTRAVENOUS at 12:20

## 2024-08-05 RX ADMIN — MIDAZOLAM HYDROCHLORIDE 2 MG: 1 INJECTION, SOLUTION INTRAMUSCULAR; INTRAVENOUS at 12:48

## 2024-08-05 RX ADMIN — HEPARIN SODIUM: 1000 INJECTION, SOLUTION INTRAVENOUS; SUBCUTANEOUS at 12:23

## 2024-08-05 ASSESSMENT — ENCOUNTER SYMPTOMS
BRUISES/BLEEDS EASILY: 0
PND: 0
CLAUDICATION: 0
BLOOD IN STOOL: 0
VOMITING: 0
SHORTNESS OF BREATH: 0
WEAKNESS: 0
DIZZINESS: 0
PSYCHIATRIC NEGATIVE: 1
WHEEZING: 0
NAUSEA: 0
HEADACHES: 0
FEVER: 0
SPUTUM PRODUCTION: 0
MUSCULOSKELETAL NEGATIVE: 1
PALPITATIONS: 0
COUGH: 0
ORTHOPNEA: 0
ABDOMINAL PAIN: 0
CHILLS: 0

## 2024-08-05 ASSESSMENT — PAIN DESCRIPTION - PAIN TYPE
TYPE: SURGICAL PAIN

## 2024-08-05 ASSESSMENT — FIBROSIS 4 INDEX: FIB4 SCORE: 0.58

## 2024-08-05 NOTE — DISCHARGE INSTRUCTIONS
POST ANGIOGRAM  General Care Instructions  Maintain a bandage over the incision site for 24 hours.  It's normal to find a small bruise or dime-sized lump at the insertion site. This should disappear within a few weeks.  Do not apply lotions or powders to the site.  Do not immerse the catheter insertion site in water (bathtub/swimming) for five days. It is ok to shower 24 hours after the procedure.  You may resume your normal diet immediately; on the day of your procedure, drink 6-10 glasses of water to help flush the contrast liquid out of your system.  If the doctor inserted the catheter in through your groin:  Walking short distances on a flat surface is OK. Limit going up/down stairs for the first 2 days.  DO NOT do yard work, drive, squat, lift heavy objects, or play sports for 2 days; or until your health care provider tells you it is OK.  If the doctor inserted the catheter in your arm:  For 3 days, DO NOT lift anything heavier than 10 pounds (approximately a gallon of milk). DO NOT do any heavy pushing, pulling, or twisting.    Medications  If your current medications need to be changed, you will be provided with an updated list of your medications prior to discharge.  If you take warfarin (Coumadin), resume taking your usual dose the evening after the procedure.  DO NOT STOP taking prescribed blood thinning (anti-platelet) medications unless instructed by your cardiologist.  These medications include:  Aspirin, Clopidogrel (Plavix), Ticagrelor (Brilinta), or Prasugrel (Effient)   If you take one of the following anticoagulants, RESUME 24 HOURS after your procedure:  Apixiban (Eliquis), Rivaroxaban (Xarelto), Dabigatran (Pradaxa), Edoxaban (Savaysa)  If you take metformin (Glucophage), RESUME 48 HOURS after your procedure.    When to call your healthcare provider  Call your cardiologist right away at [phone] if you have any of the following:   Problems/Concerns taking any of your prescribed heart  medicines.   The insertion site has increasing pain, swelling, redness, bleeding, or drainage.   Your arm or leg below where the insertion site changes color, is cool, or is numb.   You have chest pain or shortness of breath that does not go away with rest.   Your pulse feels irregular -- very slow (less than 60 beats/minute) or very fast (over 100 beats/minute).   You have dizziness, fainting, or you are very tired.   You are coughing up blood or yellow or green mucus.   You have chills or a fever over 101°F (38.3°C).    If there is bleeding at the catheter insertion site, apply pressure for 10 minutes.  If bleeding persists, call 911, and continue to hold pressure until advanced medical support arrives.        Exercising Safely After Percutaneous Coronary Intervention (PCI)  After percutaneous coronary intervention (PCI), which involves angioplasty and often stenting, it's important to focus on your heart health. Exercise can help strengthen your heart. It can also help you feel good and improve your overall health. Talk with your health care provider or cardiac rehab team member about good options for you.  Start slowly. Work up to more vigorous exercise as you get stronger. Aim for at least 150 minutes of exercise each week.  Include aerobic activities. These make the heart beat faster. They work the heart and lungs, and improve the body's ability to use oxygen. Good choices include walking, swimming, and biking .  Always follow your doctor's recommendation for exercise.   You have been referred to cardiac rehabilitation, which is important for your recovery.  You may contact [facility] at [phone] to learn more and schedule a visit.        Lifestyle Management After Percutaneous Coronary Intervention (PCI)  Percutaneous coronary intervention (PCI)  involves angioplasty and often stenting. This procedure can open arteries and relieve symptoms. But, it doesn't cure coronary artery  disease. New blockages can still form. You need to take steps to prevent this by managing risk factors. Doing so will help make your heart and arteries healthier. Your doctor may prescribe cardiac rehabilitation to help with this lifelong process.  Understanding risk factors  Some risk factors for coronary artery disease can be controlled. These include smoking, high blood pressure, cholesterol, diabetes, and obesity. They can be managed with medication, diet, and exercise. Support and counseling can also play a role. The effort will pay off! Managing risk factors can help you be more active, feel better, and reduce the risk of heart attack.    If you smoke, quit!  If your doctor has been urging you to quit smoking, it's for good reasons. Smoking damages your heart, blood vessels, and lungs. The good news is that quitting can halt or even reverse the damage of smoking. To quit now:  Get medical help. Ask your doctor for advice on stop-smoking programs. Also ask about medications or nicotine replacement therapy products that may help you quit smoking.  Get support. Join a support group. Ask for help from your family and friends.  Don't give up. It often takes several tries to succeed in quitting smoking.  Avoid secondhand smoke. Ask family and friends not to smoke around you.

## 2024-08-05 NOTE — OR NURSING
1312 Arrived from cath lab ID verified. Patient awake. On RA breathing even and unlabored. Rt arm TR band clean and soft. Vss     1320 Tolerating PO fluids.     1325 Updated family plan of care.     1333  at the bedside. \    1345 3 cc of air removed no bleeding no hematoma    1358  discharge instructions given to patient and family verbalize understanding of the orders. Copy of instructions given to patient.     1400 3 cc of air removed from TR band site clean    1415 3 cc of air removed from TR band site clean      1430 4 cc of air removed from TR band site clean TR band removed gauze and tegaderm applied no bleeding no hematoma site clean.     1436 Hand off to Imani GUILLORY

## 2024-08-05 NOTE — OR NURSING
1445 - Pt's VSS; denies N/V; patient up to edge of bed, denies discomfort. Dressing CDI to right wrist. D/c orders received. All lines and monitors discontinued. IV dc'd. Patient states ready to d/c home. No prescriptions given. Pt ambulated to car with RN and , in stable condition with all belongings present.

## 2024-08-05 NOTE — PROCEDURES
Cardiac Catheterization Laboratory Procedure Note    DATE: 8/5/2024    : Joshua Mccoy MD, MPH    PROCEDURES PERFORMED:  Left heart catheterization  Coronary angiography  Supravalvular aortography  Ultrasound-guided right radial arterial access  Moderate conscious sedation    INDICATIONS:  Ascending thoracic aortic aneurysm  Preoperative cardiovascular testing    CONSENT:  The complete alternatives, risks, and benefits of the procedure were explained to the patient. Informed consent was obtained prior to the procedure.    MEDICATIONS:  Lidocaine  Fentanyl  Midazolam  Nitroglycerin  Verapamil  Heparin    MODERATE CONSCIOUS SEDATION:  I personally supervised the administration of moderate conscious sedation by the nursing staff for 34 minutes.  Start time: 12:27 PM  End time: 13:01 p.m.    CONTRAST: Omnipaque 35 cc    RADIATION DOSE (Air Kerma): 122 mGy    FLUOROSCOPY TIME: 3.5 minutes    ACCESS:  6-[place]an Glidesheath in the right radial artery    ESTIMATED BLOOD LOSS: <10 cc    COMPLICATIONS: None    PROCEDURE IN DETAIL:  The patient was brought to the cardiac catheterization laboratory in the fasting state. The skin over the right wrist was prepped and draped in the usual sterile fashion. Lidocaine infiltration was used to anesthetize the tissue over the right radial artery. Using the micropuncture technique, a 6-[place] Glidesheath was inserted in the right radial artery. A 6 [place]inean JR4 diagnostic catheter was then advanced over a standard J-wire into the left ventricular cavity where it was gently aspirated, flushed, and then withdrawn across the aortic valve with sequential pressures measured. This catheter was then used to engage the ostium of the right  coronary artery and cineangiograms were obtained in multiple projections for complete evaluation of the right coronary system. This catheter was then exchanged over J-wire to a 6 Guinean JL 4 diagnostic catheter which was used to engage the ostium of the  left coronary artery and cineangiograms were obtained in multiple projections for complete evaluation of the left coronary system.  Then this catheter was exchanged over J-wire to a 6 Cambodian angled pigtail catheter which was used for the supravalvular aortography.  Following completion of coronary angiography, all wires, catheters, and sheaths were removed.  A TR band was placed over the right wrist using the patent hemostasis technique.     HEMODYNAMICS:  Aortic pressure: 138 /89 mmHg  LVEDP: 15 mmHg  No significant aortic gradient on pullback    CORONARY ANGIOGRAPHY:  The left main coronary artery : Normal-appearing large caliber vessel that bifurcates to LAD and left circumflex  The left anterior descending coronary artery : Large-caliber transapical vessel with small distal/apical LAD in diameter.  Gives rise to small-medium caliber diagonal branches  The left circumflex coronary artery : Normal-appearing large-caliber vessel that gives rise to a large bifurcating OM and true circumflex tapers into the AV groove  The right coronary artery  : Normal-appearing large caliber dominant vessel    Supravalvular aortography:  Aortic root 3.8 cm  Mid thoracic ascending aorta 4.3 cm  No AI      IMPRESSION:  1.  Normal-appearing epicardial coronary arteries  2.  Dominant RCA  3.  Minimally elevated resting LVEDP 15 mmHg with no significant transaortic gradient on pullback  3.  Dilated ascending aorta (angiographically, mid thoracic ascending aorta 4.3 cm)    RECOMMENDATIONS:  Ongoing care per referring cardiothoracic surgical team  Ongoing primary ASCVD prevention  Adequate blood pressure control, target <120/80mmHg.  Patient reports controlled blood pressure at home    NOTIFICATION:  The patient's spouse was called and notified of the results.    Cardiothoracic surgical team updated.    Joshua Mccoy MD, MPH [facility]  Interventional Cardiologist  [facility]   of  Clinical Internal Medicine - [facility] FRAGA

## 2024-08-05 NOTE — H&P
History:  Primary Diagnosis: Ascending thoracic aortic aneurysm, 4.9 x 4.5 cm     HPI:  Princess Machado is 44 y.o. female is a patient of Dr. Chamorro with significant history of ascending aortic dilation, mild aortic regurgitation, hypertension and dyslipidemia.     Recent cardiac imaging showed:   CT-CTA chest 6/25/2024:  IMPRESSION:     1.  Ascending thoracic aortic aneurysm, 4.9 x 4.5 cm. This has increased in size since December 2020.  2.  RIGHT hepatic mass is new or newly apparent since the prior study. They have some imaging features which are suggestive of hemangiomas however these remain indeterminate. Recommend further assessment with hepatic mass MRI or CT when clinically   appropriate.  3.  LEFT lung base pulmonary nodules unchanged since at least December 2020, very likely benign      Echocardiography Laboratory 6/27/2024    Normal left ventricular systolic function. The left ventricular   ejection fraction is visually estimated to be 60%.   Mild concentric left ventricular hypertrophy.  Normal right ventricular size. Normal right ventricular systolic   function.  Mild aortic insufficiency.  Estimated right ventricular systolic pressure is 20 mmHg (normal).  The ascending aorta is dilated with a diameter of 4.3 cm.  No recent study is available on file for comparison.        Currently patient denies chest pain, shortness of breath or palpitations.     Patient is Not on blood thinners.  They have been NPO since midnight the night before.   Allergy to contrast, iodine  - no  Hold Metformin the morning of and for 48 hours after.        Medical history:   Past Medical History:   Diagnosis Date    Abnormal Pap smear of cervix     ASTHMA     seasonal allergy related    HPV in female     Hypertension     Migraines        Surgical history:   Past Surgical History:   Procedure Laterality Date    DILATION AND CURETTAGE         Social history:   Social History     Tobacco Use   Smoking Status Never    Smokeless Tobacco Never      Social History     Substance and Sexual Activity   Alcohol Use Yes    Comment: very little      Social History     Substance and Sexual Activity   Drug Use No        Family history:   Family History   Problem Relation Age of Onset    Hypertension Mother     Heart Disease Mother         Dilated ascending aorta    Heart Disease Father     Hypertension Father     Hyperlipidemia Father     Other Father         Amyloidosis, mostly GI and muscle, 78    Heart Disease Maternal Grandmother        Allergies: No Known Allergies    Home medications: No current facility-administered medications for this encounter.    Current Outpatient Medications:     metformin (GLUCOPHAGE) 1000 MG tablet, Take 1,000 mg by mouth every evening., Disp: , Rfl:     cetirizine (ZYRTEC) 10 MG Tab, Take 10 mg by mouth every evening., Disp: , Rfl:     metoprolol SR (TOPROL XL) 25 MG TABLET SR 24 HR, TAKE ONE-HALF (1/2) TABLET DAILY (CALL TO SCHEDULE FOLLOW UP APPOINTMENT FOR FURTHER REFILLS, [phone]) (Patient taking differently: Take 12.5 mg by mouth every evening.), Disp: 45 Tablet, Rfl: 3    rosuvastatin (CRESTOR) 20 MG Tab, Take 20 mg by mouth every evening., Disp: , Rfl:     QULIPTA 30 MG Tab, Take 1 Tablet by mouth every evening., Disp: , Rfl:     ondansetron (ZOFRAN ODT) 4 MG TABLET DISPERSIBLE, Take 1 Tablet by mouth every 8 hours as needed., Disp: 20 Tablet, Rfl: 0    albuterol 108 (90 Base) MCG/ACT Aero Soln inhalation aerosol, Inhale 2 Puffs every 6 hours as needed for Shortness of Breath., Disp: 8.5 g, Rfl: 3    eletriptan (RELPAX) 20 MG Tab, TK 1 T PO AOS OF MIGRAINE AS NEEDED. MAY REPEAT IN 2 H. DO NOT EXCEED 2 T IN 24 H (Patient taking differently: Take 20 mg by mouth one time as needed for Migraine. TK 1 T PO AOS OF MIGRAINE AS NEEDED. MAY REPEAT IN 2 H. DO NOT EXCEED 2 T IN 24 H), Disp: 10 Tab, Rfl: 2    BOTOX 200 units Recon Soln, every 3 months. For migraines, Disp: , Rfl:     Cholecalciferol  (VITAMIN D3) 5000 units Cap, Take 5,000 Units by mouth every evening., Disp: , Rfl:     Review of Systems:  Review of Systems   Constitutional:  Negative for chills, fever and malaise/fatigue.   Respiratory:  Negative for cough, sputum production, shortness of breath and wheezing.    Cardiovascular:  Negative for chest pain, palpitations, orthopnea, claudication, leg swelling and PND.   Gastrointestinal:  Negative for abdominal pain, blood in stool, nausea and vomiting.   Musculoskeletal: Negative.    Neurological:  Negative for dizziness, weakness and headaches.   Endo/Heme/Allergies:  Does not bruise/bleed easily.   Psychiatric/Behavioral: Negative.         Physical Examination:  Physical Exam  Constitutional:       Appearance: She is obese.   HENT:      Head: Normocephalic and atraumatic.   Cardiovascular:      Rate and Rhythm: Normal rate and regular rhythm.      Heart sounds: No murmur heard.  Pulmonary:      Effort: Pulmonary effort is normal.      Breath sounds: Normal breath sounds. No wheezing or rales.   Abdominal:      General: There is no distension.      Palpations: Abdomen is soft.   Musculoskeletal:      Right lower leg: No edema.      Left lower leg: No edema.   Skin:     General: Skin is warm and dry.      Capillary Refill: Capillary refill takes 2 to 3 seconds.   Neurological:      Mental Status: She is alert.   Psychiatric:         Mood and Affect: Mood normal.         Behavior: Behavior normal.         Thought Content: Thought content normal.         Judgment: Judgment normal.         Impression:  Ascending thoracic aortic aneurysm, 4.9 x 4.5 cm    Plan:  Left cardiac cath with aortic root, with possible intervention.     The risks, benefits, and alternatives to coronary angiography with IV sedation were discussed in great detail. Specific risks mentioned include bleeding, infection, kidney damage, allergic reaction, cardiac perforation with possible tamponade requiring pericardiocentesis or  possibly open heart surgery. In addition, we discussed that 10% of patients will experience small to moderate bruising at the site of the arterial puncture. Lastly, the risks of heart attack, stroke and death were discussed; the risk of major complications such as heart attack or stroke caused by the angiogram is approximately 1%; the risk of death is approximately 1 in 1000. The patient verbalized understanding of the potential complications and wishes to proceed with this procedure.    The risks/benefits of the procedure will be further discussed with the consenting physician performing the procedure.         Please note this dictation was created using voice recognition software.  I have made every reasonable attempt to correct obvious errors, but there may be errors of grammar and possibly content that I did not discover before finalizing the note.    RADHA Taylor.  [facility]  [phone]

## 2024-08-14 ENCOUNTER — OFFICE VISIT (OUTPATIENT)
Dept: CARDIOTHORACIC SURGERY | Facility: MEDICAL CENTER | Age: 44
End: 2024-08-14
Payer: COMMERCIAL

## 2024-08-14 VITALS
HEART RATE: 70 BPM | SYSTOLIC BLOOD PRESSURE: 138 MMHG | TEMPERATURE: 96.4 F | HEIGHT: 68 IN | DIASTOLIC BLOOD PRESSURE: 72 MMHG | WEIGHT: 229.8 LBS | OXYGEN SATURATION: 97 % | BODY MASS INDEX: 34.83 KG/M2

## 2024-08-14 DIAGNOSIS — I71.21 ANEURYSM OF ASCENDING AORTA WITHOUT RUPTURE (HCC): ICD-10-CM

## 2024-08-14 PROCEDURE — 3075F SYST BP GE 130 - 139MM HG: CPT | Performed by: THORACIC SURGERY (CARDIOTHORACIC VASCULAR SURGERY)

## 2024-08-14 PROCEDURE — 3078F DIAST BP <80 MM HG: CPT | Performed by: THORACIC SURGERY (CARDIOTHORACIC VASCULAR SURGERY)

## 2024-08-14 PROCEDURE — 99215 OFFICE O/P EST HI 40 MIN: CPT | Performed by: THORACIC SURGERY (CARDIOTHORACIC VASCULAR SURGERY)

## 2024-08-14 ASSESSMENT — ENCOUNTER SYMPTOMS
CONSTITUTIONAL NEGATIVE: 1
EYES NEGATIVE: 1
GASTROINTESTINAL NEGATIVE: 1
PSYCHIATRIC NEGATIVE: 1
CARDIOVASCULAR NEGATIVE: 1
MUSCULOSKELETAL NEGATIVE: 1
RESPIRATORY NEGATIVE: 1
NEUROLOGICAL NEGATIVE: 1

## 2024-08-14 ASSESSMENT — FIBROSIS 4 INDEX: FIB4 SCORE: 0.63

## 2024-08-15 ENCOUNTER — APPOINTMENT (OUTPATIENT)
Dept: ADMISSIONS | Facility: MEDICAL CENTER | Age: 44
End: 2024-08-15
Attending: THORACIC SURGERY (CARDIOTHORACIC VASCULAR SURGERY)
Payer: COMMERCIAL

## 2024-08-19 ENCOUNTER — PRE-ADMISSION TESTING (OUTPATIENT)
Dept: ADMISSIONS | Facility: MEDICAL CENTER | Age: 44
End: 2024-08-19
Attending: THORACIC SURGERY (CARDIOTHORACIC VASCULAR SURGERY)
Payer: COMMERCIAL

## 2024-08-19 RX ORDER — MELATONIN 10 MG
10 CAPSULE ORAL
COMMUNITY

## 2024-08-19 RX ORDER — BIOTIN 1000 MCG
1000 TABLET,CHEWABLE ORAL DAILY
COMMUNITY

## 2024-08-19 NOTE — OR NURSING
Patient following all fasting and medication instructions per Dr. Zamarripa' office instructions.

## 2024-08-28 RX ORDER — EPINEPHRINE HCL IN 0.9 % NACL 4MG/250ML
0-.5 PLASTIC BAG, INJECTION (ML) INTRAVENOUS CONTINUOUS
Status: CANCELLED | OUTPATIENT
Start: 2024-08-28

## 2024-08-28 RX ORDER — ACETAMINOPHEN 500 MG
1000 TABLET ORAL ONCE
Status: CANCELLED | OUTPATIENT
Start: 2024-08-28 | End: 2024-08-28

## 2024-08-28 RX ORDER — NOREPINEPHRINE BITARTRATE 0.03 MG/ML
0-1 INJECTION, SOLUTION INTRAVENOUS CONTINUOUS
Status: CANCELLED | OUTPATIENT
Start: 2024-08-28

## 2024-08-28 RX ORDER — DEXMEDETOMIDINE HYDROCHLORIDE 4 UG/ML
0-1.5 INJECTION, SOLUTION INTRAVENOUS CONTINUOUS
Status: CANCELLED | OUTPATIENT
Start: 2024-08-28

## 2024-09-09 ENCOUNTER — PRE-ADMISSION TESTING (OUTPATIENT)
Dept: ADMISSIONS | Facility: MEDICAL CENTER | Age: 44
DRG: 220 | End: 2024-09-09
Attending: THORACIC SURGERY (CARDIOTHORACIC VASCULAR SURGERY)
Payer: COMMERCIAL

## 2024-09-09 ENCOUNTER — HOSPITAL ENCOUNTER (OUTPATIENT)
Dept: RADIOLOGY | Facility: MEDICAL CENTER | Age: 44
DRG: 220 | End: 2024-09-09
Attending: THORACIC SURGERY (CARDIOTHORACIC VASCULAR SURGERY) | Admitting: THORACIC SURGERY (CARDIOTHORACIC VASCULAR SURGERY)
Payer: COMMERCIAL

## 2024-09-09 ENCOUNTER — TELEPHONE (OUTPATIENT)
Dept: CARDIOTHORACIC SURGERY | Facility: MEDICAL CENTER | Age: 44
End: 2024-09-09

## 2024-09-09 DIAGNOSIS — Z01.811 PRE-OPERATIVE RESPIRATORY EXAMINATION: ICD-10-CM

## 2024-09-09 DIAGNOSIS — Z01.810 PRE-OPERATIVE CARDIOVASCULAR EXAMINATION: ICD-10-CM

## 2024-09-09 DIAGNOSIS — Z01.812 PRE-OPERATIVE LABORATORY EXAMINATION: ICD-10-CM

## 2024-09-09 LAB
ABO GROUP BLD: NORMAL
ALBUMIN SERPL BCP-MCNC: 4.5 G/DL (ref 3.2–4.9)
ALBUMIN/GLOB SERPL: 2 G/DL
ALP SERPL-CCNC: 54 U/L (ref 30–99)
ALT SERPL-CCNC: 21 U/L (ref 2–50)
AMPHET UR QL SCN: NEGATIVE
ANION GAP SERPL CALC-SCNC: 14 MMOL/L (ref 7–16)
APPEARANCE UR: CLEAR
APTT PPP: 26.2 SEC (ref 24.7–36)
AST SERPL-CCNC: 19 U/L (ref 12–45)
BARBITURATES UR QL SCN: NEGATIVE
BASOPHILS # BLD AUTO: 0.7 % (ref 0–1.8)
BASOPHILS # BLD: 0.07 K/UL (ref 0–0.12)
BENZODIAZ UR QL SCN: NEGATIVE
BILIRUB SERPL-MCNC: 0.4 MG/DL (ref 0.1–1.5)
BILIRUB UR QL STRIP.AUTO: NEGATIVE
BLD GP AB SCN SERPL QL: NORMAL
BUN SERPL-MCNC: 11 MG/DL (ref 8–22)
BZE UR QL SCN: NEGATIVE
CALCIUM ALBUM COR SERPL-MCNC: 9.4 MG/DL (ref 8.5–10.5)
CALCIUM SERPL-MCNC: 9.8 MG/DL (ref 8.5–10.5)
CANNABINOIDS UR QL SCN: NEGATIVE
CHLORIDE SERPL-SCNC: 101 MMOL/L (ref 96–112)
CO2 SERPL-SCNC: 22 MMOL/L (ref 20–33)
COLOR UR: YELLOW
CREAT SERPL-MCNC: 0.63 MG/DL (ref 0.5–1.4)
EKG IMPRESSION: NORMAL
EOSINOPHIL # BLD AUTO: 0.15 K/UL (ref 0–0.51)
EOSINOPHIL NFR BLD: 1.6 % (ref 0–6.9)
ERYTHROCYTE [DISTWIDTH] IN BLOOD BY AUTOMATED COUNT: 40.1 FL (ref 35.9–50)
EST. AVERAGE GLUCOSE BLD GHB EST-MCNC: 100 MG/DL
FENTANYL UR QL: NEGATIVE
GFR SERPLBLD CREATININE-BSD FMLA CKD-EPI: 111 ML/MIN/1.73 M 2
GLOBULIN SER CALC-MCNC: 2.3 G/DL (ref 1.9–3.5)
GLUCOSE SERPL-MCNC: 86 MG/DL (ref 65–99)
GLUCOSE UR STRIP.AUTO-MCNC: NEGATIVE MG/DL
HBA1C MFR BLD: 5.1 % (ref 4–5.6)
HCG SERPL QL: NEGATIVE
HCT VFR BLD AUTO: 41.3 % (ref 37–47)
HGB BLD-MCNC: 13.8 G/DL (ref 12–16)
IMM GRANULOCYTES # BLD AUTO: 0.04 K/UL (ref 0–0.11)
IMM GRANULOCYTES NFR BLD AUTO: 0.4 % (ref 0–0.9)
INR PPP: 0.9 (ref 0.87–1.13)
KETONES UR STRIP.AUTO-MCNC: NEGATIVE MG/DL
LEUKOCYTE ESTERASE UR QL STRIP.AUTO: NEGATIVE
LYMPHOCYTES # BLD AUTO: 2.98 K/UL (ref 1–4.8)
LYMPHOCYTES NFR BLD: 31 % (ref 22–41)
MCH RBC QN AUTO: 30.5 PG (ref 27–33)
MCHC RBC AUTO-ENTMCNC: 33.4 G/DL (ref 32.2–35.5)
MCV RBC AUTO: 91.2 FL (ref 81.4–97.8)
METHADONE UR QL SCN: NEGATIVE
MICRO URNS: NORMAL
MONOCYTES # BLD AUTO: 0.75 K/UL (ref 0–0.85)
MONOCYTES NFR BLD AUTO: 7.8 % (ref 0–13.4)
NEUTROPHILS # BLD AUTO: 5.61 K/UL (ref 1.82–7.42)
NEUTROPHILS NFR BLD: 58.5 % (ref 44–72)
NITRITE UR QL STRIP.AUTO: NEGATIVE
NRBC # BLD AUTO: 0 K/UL
NRBC BLD-RTO: 0 /100 WBC (ref 0–0.2)
OPIATES UR QL SCN: NEGATIVE
OXYCODONE UR QL SCN: NEGATIVE
PCP UR QL SCN: NEGATIVE
PH UR STRIP.AUTO: 7 [PH] (ref 5–8)
PLATELET # BLD AUTO: 276 K/UL (ref 164–446)
PMV BLD AUTO: 9.3 FL (ref 9–12.9)
POTASSIUM SERPL-SCNC: 4.2 MMOL/L (ref 3.6–5.5)
PROPOXYPH UR QL SCN: NEGATIVE
PROT SERPL-MCNC: 6.8 G/DL (ref 6–8.2)
PROT UR QL STRIP: NEGATIVE MG/DL
PROTHROMBIN TIME: 12.2 SEC (ref 12–14.6)
RBC # BLD AUTO: 4.53 M/UL (ref 4.2–5.4)
RBC UR QL AUTO: NEGATIVE
RH BLD: NORMAL
SCCMEC + MECA PNL NOSE NAA+PROBE: NEGATIVE
SCCMEC + MECA PNL NOSE NAA+PROBE: POSITIVE
SODIUM SERPL-SCNC: 137 MMOL/L (ref 135–145)
SP GR UR STRIP.AUTO: 1.01
UROBILINOGEN UR STRIP.AUTO-MCNC: 0.2 MG/DL
WBC # BLD AUTO: 9.6 K/UL (ref 4.8–10.8)

## 2024-09-09 PROCEDURE — 85610 PROTHROMBIN TIME: CPT

## 2024-09-09 PROCEDURE — 36415 COLL VENOUS BLD VENIPUNCTURE: CPT

## 2024-09-09 PROCEDURE — 85730 THROMBOPLASTIN TIME PARTIAL: CPT

## 2024-09-09 PROCEDURE — 84703 CHORIONIC GONADOTROPIN ASSAY: CPT

## 2024-09-09 PROCEDURE — 93005 ELECTROCARDIOGRAM TRACING: CPT

## 2024-09-09 PROCEDURE — 87640 STAPH A DNA AMP PROBE: CPT

## 2024-09-09 PROCEDURE — 86850 RBC ANTIBODY SCREEN: CPT

## 2024-09-09 PROCEDURE — 81003 URINALYSIS AUTO W/O SCOPE: CPT

## 2024-09-09 PROCEDURE — 93010 ELECTROCARDIOGRAM REPORT: CPT | Performed by: INTERNAL MEDICINE

## 2024-09-09 PROCEDURE — 71046 X-RAY EXAM CHEST 2 VIEWS: CPT

## 2024-09-09 PROCEDURE — 86901 BLOOD TYPING SEROLOGIC RH(D): CPT

## 2024-09-09 PROCEDURE — 80053 COMPREHEN METABOLIC PANEL: CPT

## 2024-09-09 PROCEDURE — 86900 BLOOD TYPING SEROLOGIC ABO: CPT

## 2024-09-09 PROCEDURE — 85025 COMPLETE CBC W/AUTO DIFF WBC: CPT

## 2024-09-09 PROCEDURE — 80307 DRUG TEST PRSMV CHEM ANLYZR: CPT

## 2024-09-09 PROCEDURE — 87641 MR-STAPH DNA AMP PROBE: CPT

## 2024-09-09 PROCEDURE — 83036 HEMOGLOBIN GLYCOSYLATED A1C: CPT

## 2024-09-09 NOTE — TELEPHONE ENCOUNTER
Patient was reminded that TAA repair, possible AVR surgery with  Dr. Lerma is on 9/12 at 0730 in the morning. she   are aware check in is at 0515 am.    PAT date and time was reviewed with patient and  verified it is within 72 hours of surgery.    Vascular studies and procedures: none  were scheduled.    she did not need a dental check/work.    Baseline IS was 3250. she has been mostly compliant   with the IS. Volume has not improved, she is at 2500.    she was prescribed a walking regimen or  told to continue she current regimen and  She has been compliant.   She has been practicing sit to stand.    She had no medications to stop.    she was told that no medication (s) are okay to  take the morning of surgery prior to check in.    The CHG wipes instructions were reviewed and understood.    she was reminded no food after midnight.    She had a few additional questions which were answered.    Call time 21 minutes

## 2024-09-11 RX ORDER — NOREPINEPHRINE BITARTRATE 0.03 MG/ML
0-1 INJECTION, SOLUTION INTRAVENOUS CONTINUOUS
Status: DISCONTINUED | OUTPATIENT
Start: 2024-09-12 | End: 2024-09-12

## 2024-09-11 RX ORDER — EPINEPHRINE HCL IN 0.9 % NACL 4MG/250ML
0-.5 PLASTIC BAG, INJECTION (ML) INTRAVENOUS CONTINUOUS
Status: DISCONTINUED | OUTPATIENT
Start: 2024-09-12 | End: 2024-09-12

## 2024-09-11 RX ORDER — DEXMEDETOMIDINE HYDROCHLORIDE 4 UG/ML
0-1.5 INJECTION, SOLUTION INTRAVENOUS CONTINUOUS
Status: DISCONTINUED | OUTPATIENT
Start: 2024-09-12 | End: 2024-09-12

## 2024-09-11 ASSESSMENT — FIBROSIS 4 INDEX: FIB4 SCORE: 0.68

## 2024-09-12 ENCOUNTER — ANESTHESIA EVENT (OUTPATIENT)
Dept: SURGERY | Facility: MEDICAL CENTER | Age: 44
DRG: 220 | End: 2024-09-12
Payer: COMMERCIAL

## 2024-09-12 ENCOUNTER — HOSPITAL ENCOUNTER (INPATIENT)
Facility: MEDICAL CENTER | Age: 44
LOS: 4 days | DRG: 220 | End: 2024-09-16
Attending: THORACIC SURGERY (CARDIOTHORACIC VASCULAR SURGERY) | Admitting: THORACIC SURGERY (CARDIOTHORACIC VASCULAR SURGERY)
Payer: COMMERCIAL

## 2024-09-12 ENCOUNTER — APPOINTMENT (OUTPATIENT)
Dept: RADIOLOGY | Facility: MEDICAL CENTER | Age: 44
DRG: 220 | End: 2024-09-12
Attending: THORACIC SURGERY (CARDIOTHORACIC VASCULAR SURGERY)
Payer: COMMERCIAL

## 2024-09-12 ENCOUNTER — ANESTHESIA (OUTPATIENT)
Dept: SURGERY | Facility: MEDICAL CENTER | Age: 44
DRG: 220 | End: 2024-09-12
Payer: COMMERCIAL

## 2024-09-12 ENCOUNTER — APPOINTMENT (OUTPATIENT)
Dept: CARDIOLOGY | Facility: MEDICAL CENTER | Age: 44
DRG: 220 | End: 2024-09-12
Attending: ANESTHESIOLOGY
Payer: COMMERCIAL

## 2024-09-12 DIAGNOSIS — G89.18 ACUTE POST-OPERATIVE PAIN: ICD-10-CM

## 2024-09-12 DIAGNOSIS — Z86.79 S/P ASCENDING AORTIC ANEURYSM REPAIR: ICD-10-CM

## 2024-09-12 DIAGNOSIS — Z98.890 S/P ASCENDING AORTIC ANEURYSM REPAIR: ICD-10-CM

## 2024-09-12 PROBLEM — Z99.11 ENCOUNTER FOR WEANING FROM VENTILATOR (HCC): Status: ACTIVE | Noted: 2024-09-12

## 2024-09-12 PROBLEM — I25.10 CAD (CORONARY ARTERY DISEASE): Status: ACTIVE | Noted: 2024-09-12

## 2024-09-12 PROBLEM — I25.10 CAD (CORONARY ARTERY DISEASE): Status: RESOLVED | Noted: 2024-09-12 | Resolved: 2024-09-12

## 2024-09-12 PROBLEM — J45.20 MILD INTERMITTENT ASTHMA: Status: ACTIVE | Noted: 2024-09-12

## 2024-09-12 LAB
ACT BLD: 122 SEC (ref 74–137)
ACT BLD: 128 SEC (ref 74–137)
ACT BLD: 568 SEC (ref 74–137)
ACT BLD: 568 SEC (ref 74–137)
ACT BLD: 825 SEC (ref 74–137)
APTT PPP: 26.3 SEC (ref 24.7–36)
ARTERIAL PATENCY WRIST A: ABNORMAL
BASE EXCESS BLDA CALC-SCNC: -3 MMOL/L (ref -4–3)
BASE EXCESS BLDA CALC-SCNC: -3 MMOL/L (ref -4–3)
BASE EXCESS BLDA CALC-SCNC: -4 MMOL/L (ref -4–3)
BASE EXCESS BLDA CALC-SCNC: -5 MMOL/L (ref -4–3)
BASE EXCESS BLDA CALC-SCNC: -6 MMOL/L (ref -4–3)
BASE EXCESS BLDV CALC-SCNC: -3 MMOL/L (ref -4–3)
BODY TEMPERATURE: ABNORMAL DEGREES
CA-I BLD ISE-SCNC: 1.03 MMOL/L (ref 1.1–1.3)
CA-I BLD ISE-SCNC: 1.1 MMOL/L (ref 1.1–1.3)
CA-I BLD ISE-SCNC: 1.23 MMOL/L (ref 1.1–1.3)
CA-I BLD ISE-SCNC: 1.25 MMOL/L (ref 1.1–1.3)
CA-I BLD ISE-SCNC: 1.27 MMOL/L (ref 1.1–1.3)
CA-I BLD ISE-SCNC: 1.34 MMOL/L (ref 1.1–1.3)
CO2 BLDA-SCNC: 21 MMOL/L (ref 20–33)
CO2 BLDA-SCNC: 21 MMOL/L (ref 20–33)
CO2 BLDA-SCNC: 23 MMOL/L (ref 20–33)
CO2 BLDA-SCNC: 23 MMOL/L (ref 20–33)
CO2 BLDA-SCNC: 24 MMOL/L (ref 20–33)
CO2 BLDA-SCNC: 24 MMOL/L (ref 20–33)
CO2 BLDA-SCNC: 27 MMOL/L (ref 20–33)
CO2 BLDV-SCNC: 26 MMOL/L (ref 20–33)
DELSYS IDSYS: ABNORMAL
EKG IMPRESSION: NORMAL
END TIDAL CARBON DIOXIDE IECO2: 32 MMHG
END TIDAL CARBON DIOXIDE IECO2: 35 MMHG
END TIDAL CARBON DIOXIDE IECO2: 36 MMHG
GLUCOSE BLD STRIP.AUTO-MCNC: 101 MG/DL (ref 65–99)
GLUCOSE BLD STRIP.AUTO-MCNC: 127 MG/DL (ref 65–99)
GLUCOSE BLD STRIP.AUTO-MCNC: 132 MG/DL (ref 65–99)
GLUCOSE BLD STRIP.AUTO-MCNC: 135 MG/DL (ref 65–99)
GLUCOSE BLD STRIP.AUTO-MCNC: 135 MG/DL (ref 65–99)
GLUCOSE BLD STRIP.AUTO-MCNC: 137 MG/DL (ref 65–99)
GLUCOSE BLD STRIP.AUTO-MCNC: 139 MG/DL (ref 65–99)
GLUCOSE BLD STRIP.AUTO-MCNC: 141 MG/DL (ref 65–99)
GLUCOSE BLD STRIP.AUTO-MCNC: 143 MG/DL (ref 65–99)
GLUCOSE BLD STRIP.AUTO-MCNC: 144 MG/DL (ref 65–99)
GLUCOSE BLD STRIP.AUTO-MCNC: 144 MG/DL (ref 65–99)
GLUCOSE BLD STRIP.AUTO-MCNC: 146 MG/DL (ref 65–99)
GLUCOSE BLD STRIP.AUTO-MCNC: 149 MG/DL (ref 65–99)
GLUCOSE BLD STRIP.AUTO-MCNC: 170 MG/DL (ref 65–99)
GLUCOSE BLD STRIP.AUTO-MCNC: 173 MG/DL (ref 65–99)
GLUCOSE BLD STRIP.AUTO-MCNC: 176 MG/DL (ref 65–99)
GLUCOSE BLD STRIP.AUTO-MCNC: 181 MG/DL (ref 65–99)
HCO3 BLDA-SCNC: 19.6 MMOL/L (ref 17–25)
HCO3 BLDA-SCNC: 19.9 MMOL/L (ref 17–25)
HCO3 BLDA-SCNC: 21.5 MMOL/L (ref 17–25)
HCO3 BLDA-SCNC: 21.6 MMOL/L (ref 17–25)
HCO3 BLDA-SCNC: 22.5 MMOL/L (ref 17–25)
HCO3 BLDA-SCNC: 22.6 MMOL/L (ref 17–25)
HCO3 BLDA-SCNC: 24.9 MMOL/L (ref 17–25)
HCO3 BLDV-SCNC: 24.3 MMOL/L (ref 24–28)
HCT VFR BLD AUTO: 35.6 % (ref 37–47)
HCT VFR BLD CALC: 31 % (ref 37–47)
HCT VFR BLD CALC: 31 % (ref 37–47)
HCT VFR BLD CALC: 33 % (ref 37–47)
HCT VFR BLD CALC: 37 % (ref 37–47)
HCT VFR BLD CALC: 37 % (ref 37–47)
HGB BLD-MCNC: 10.5 G/DL (ref 12–16)
HGB BLD-MCNC: 10.5 G/DL (ref 12–16)
HGB BLD-MCNC: 11.2 G/DL (ref 12–16)
HGB BLD-MCNC: 12.4 G/DL (ref 12–16)
HGB BLD-MCNC: 12.6 G/DL (ref 12–16)
HGB BLD-MCNC: 12.6 G/DL (ref 12–16)
HOROWITZ INDEX BLDA+IHG-RTO: 226 MM[HG]
HOROWITZ INDEX BLDA+IHG-RTO: 322 MM[HG]
HOROWITZ INDEX BLDA+IHG-RTO: 325 MM[HG]
INR PPP: 1.23 (ref 0.87–1.13)
MAGNESIUM SERPL-MCNC: 2.9 MG/DL (ref 1.5–2.5)
MODE IMODE: ABNORMAL
O2/TOTAL GAS SETTING VFR VENT: 100 %
O2/TOTAL GAS SETTING VFR VENT: 40 %
O2/TOTAL GAS SETTING VFR VENT: 70 %
PATHOLOGY CONSULT NOTE: NORMAL
PCO2 BLDA: 36.3 MMHG (ref 26–37)
PCO2 BLDA: 36.5 MMHG (ref 26–37)
PCO2 BLDA: 40.4 MMHG (ref 26–37)
PCO2 BLDA: 41 MMHG (ref 26–37)
PCO2 BLDA: 42.5 MMHG (ref 26–37)
PCO2 BLDA: 45.3 MMHG (ref 26–37)
PCO2 BLDA: 54.7 MMHG (ref 26–37)
PCO2 BLDV: 53.1 MMHG (ref 41–51)
PCO2 TEMP ADJ BLDA: 36.3 MMHG (ref 26–37)
PCO2 TEMP ADJ BLDA: 36.8 MMHG (ref 26–37)
PCO2 TEMP ADJ BLDA: 40.4 MMHG (ref 26–37)
PCO2 TEMP ADJ BLDA: 40.7 MMHG (ref 26–37)
PCO2 TEMP ADJ BLDA: 40.8 MMHG (ref 26–37)
PCO2 TEMP ADJ BLDA: 43.3 MMHG (ref 26–37)
PCO2 TEMP ADJ BLDA: 52.3 MMHG (ref 26–37)
PCO2 TEMP ADJ BLDV: 49.7 MMHG (ref 41–51)
PEEP END EXPIRATORY PRESSURE IPEEP: 8 CMH20
PERCENT MINUTE VOLUME IPMV: 160
PERCENT MINUTE VOLUME IPMV: 160
PH BLDA: 7.27 [PH] (ref 7.4–7.5)
PH BLDA: 7.31 [PH] (ref 7.4–7.5)
PH BLDA: 7.33 [PH] (ref 7.4–7.5)
PH BLDA: 7.34 [PH] (ref 7.4–7.5)
PH BLDA: 7.35 [PH] (ref 7.4–7.5)
PH BLDV: 7.27 [PH] (ref 7.31–7.45)
PH TEMP ADJ BLDA: 7.28 [PH] (ref 7.4–7.5)
PH TEMP ADJ BLDA: 7.32 [PH] (ref 7.4–7.5)
PH TEMP ADJ BLDA: 7.33 [PH] (ref 7.4–7.5)
PH TEMP ADJ BLDA: 7.33 [PH] (ref 7.4–7.5)
PH TEMP ADJ BLDA: 7.34 [PH] (ref 7.4–7.5)
PH TEMP ADJ BLDV: 7.29 [PH] (ref 7.31–7.45)
PLATELET # BLD AUTO: 152 K/UL (ref 164–446)
PO2 BLDA: 130 MMHG (ref 64–87)
PO2 BLDA: 144 MMHG (ref 64–87)
PO2 BLDA: 158 MMHG (ref 64–87)
PO2 BLDA: 236 MMHG (ref 64–87)
PO2 BLDA: 258 MMHG (ref 64–87)
PO2 BLDA: 299 MMHG (ref 64–87)
PO2 BLDA: 322 MMHG (ref 64–87)
PO2 BLDV: 55 MMHG (ref 25–40)
PO2 TEMP ADJ BLDA: 132 MMHG (ref 64–87)
PO2 TEMP ADJ BLDA: 139 MMHG (ref 64–87)
PO2 TEMP ADJ BLDA: 158 MMHG (ref 64–87)
PO2 TEMP ADJ BLDA: 231 MMHG (ref 64–87)
PO2 TEMP ADJ BLDA: 258 MMHG (ref 64–87)
PO2 TEMP ADJ BLDA: 294 MMHG (ref 64–87)
PO2 TEMP ADJ BLDA: 321 MMHG (ref 64–87)
PO2 TEMP ADJ BLDV: 49 MMHG (ref 25–40)
POTASSIUM BLD-SCNC: 3.7 MMOL/L (ref 3.6–5.5)
POTASSIUM BLD-SCNC: 3.8 MMOL/L (ref 3.6–5.5)
POTASSIUM BLD-SCNC: 4.2 MMOL/L (ref 3.6–5.5)
POTASSIUM BLD-SCNC: 4.2 MMOL/L (ref 3.6–5.5)
POTASSIUM BLD-SCNC: 4.5 MMOL/L (ref 3.6–5.5)
POTASSIUM BLD-SCNC: 4.8 MMOL/L (ref 3.6–5.5)
POTASSIUM SERPL-SCNC: 4.2 MMOL/L (ref 3.6–5.5)
POTASSIUM SERPL-SCNC: 4.3 MMOL/L (ref 3.6–5.5)
POTASSIUM SERPL-SCNC: 5 MMOL/L (ref 3.6–5.5)
PRESSURE SUPPORT SETTING VENT: 5 CM[H2O]
PROTHROMBIN TIME: 15.6 SEC (ref 12–14.6)
SAO2 % BLDA: 100 % (ref 93–99)
SAO2 % BLDA: 99 % (ref 93–99)
SAO2 % BLDV: 83 %
SODIUM BLD-SCNC: 137 MMOL/L (ref 135–145)
SODIUM BLD-SCNC: 138 MMOL/L (ref 135–145)
SODIUM BLD-SCNC: 139 MMOL/L (ref 135–145)
SPECIMEN DRAWN FROM PATIENT: ABNORMAL

## 2024-09-12 PROCEDURE — 71045 X-RAY EXAM CHEST 1 VIEW: CPT

## 2024-09-12 PROCEDURE — 700101 HCHG RX REV CODE 250: Performed by: ANESTHESIOLOGY

## 2024-09-12 PROCEDURE — 33859 AS-AORT GRF F/DS OTH/THN DSJ: CPT | Performed by: THORACIC SURGERY (CARDIOTHORACIC VASCULAR SURGERY)

## 2024-09-12 PROCEDURE — 85730 THROMBOPLASTIN TIME PARTIAL: CPT

## 2024-09-12 PROCEDURE — 33859 AS-AORT GRF F/DS OTH/THN DSJ: CPT | Mod: AS | Performed by: NURSE PRACTITIONER

## 2024-09-12 PROCEDURE — 94002 VENT MGMT INPAT INIT DAY: CPT

## 2024-09-12 PROCEDURE — C9248 INJ, CLEVIDIPINE BUTYRATE: HCPCS | Mod: JZ | Performed by: NURSE PRACTITIONER

## 2024-09-12 PROCEDURE — 700101 HCHG RX REV CODE 250: Performed by: NURSE PRACTITIONER

## 2024-09-12 PROCEDURE — C1751 CATH, INF, PER/CENT/MIDLINE: HCPCS | Performed by: THORACIC SURGERY (CARDIOTHORACIC VASCULAR SURGERY)

## 2024-09-12 PROCEDURE — 85049 AUTOMATED PLATELET COUNT: CPT

## 2024-09-12 PROCEDURE — 82803 BLOOD GASES ANY COMBINATION: CPT | Mod: 91

## 2024-09-12 PROCEDURE — 503001 HCHG PERFUSION: Performed by: THORACIC SURGERY (CARDIOTHORACIC VASCULAR SURGERY)

## 2024-09-12 PROCEDURE — 160009 HCHG ANES TIME/MIN: Performed by: THORACIC SURGERY (CARDIOTHORACIC VASCULAR SURGERY)

## 2024-09-12 PROCEDURE — 83735 ASSAY OF MAGNESIUM: CPT

## 2024-09-12 PROCEDURE — 700111 HCHG RX REV CODE 636 W/ 250 OVERRIDE (IP): Performed by: NURSE PRACTITIONER

## 2024-09-12 PROCEDURE — 93005 ELECTROCARDIOGRAM TRACING: CPT | Performed by: NURSE PRACTITIONER

## 2024-09-12 PROCEDURE — 94799 UNLISTED PULMONARY SVC/PX: CPT

## 2024-09-12 PROCEDURE — 85610 PROTHROMBIN TIME: CPT

## 2024-09-12 PROCEDURE — 160031 HCHG SURGERY MINUTES - 1ST 30 MINS LEVEL 5: Performed by: THORACIC SURGERY (CARDIOTHORACIC VASCULAR SURGERY)

## 2024-09-12 PROCEDURE — 84295 ASSAY OF SERUM SODIUM: CPT | Mod: 91

## 2024-09-12 PROCEDURE — C9248 INJ, CLEVIDIPINE BUTYRATE: HCPCS | Performed by: ANESTHESIOLOGY

## 2024-09-12 PROCEDURE — C1898 LEAD, PMKR, OTHER THAN TRANS: HCPCS | Performed by: THORACIC SURGERY (CARDIOTHORACIC VASCULAR SURGERY)

## 2024-09-12 PROCEDURE — C1729 CATH, DRAINAGE: HCPCS | Performed by: THORACIC SURGERY (CARDIOTHORACIC VASCULAR SURGERY)

## 2024-09-12 PROCEDURE — 700105 HCHG RX REV CODE 258: Performed by: ANESTHESIOLOGY

## 2024-09-12 PROCEDURE — 82962 GLUCOSE BLOOD TEST: CPT

## 2024-09-12 PROCEDURE — 700111 HCHG RX REV CODE 636 W/ 250 OVERRIDE (IP)

## 2024-09-12 PROCEDURE — 93312 ECHO TRANSESOPHAGEAL: CPT

## 2024-09-12 PROCEDURE — 700102 HCHG RX REV CODE 250 W/ 637 OVERRIDE(OP): Performed by: THORACIC SURGERY (CARDIOTHORACIC VASCULAR SURGERY)

## 2024-09-12 PROCEDURE — 160048 HCHG OR STATISTICAL LEVEL 1-5: Performed by: THORACIC SURGERY (CARDIOTHORACIC VASCULAR SURGERY)

## 2024-09-12 PROCEDURE — 700105 HCHG RX REV CODE 258: Performed by: NURSE PRACTITIONER

## 2024-09-12 PROCEDURE — B246ZZ4 ULTRASONOGRAPHY OF RIGHT AND LEFT HEART, TRANSESOPHAGEAL: ICD-10-PCS | Performed by: THORACIC SURGERY (CARDIOTHORACIC VASCULAR SURGERY)

## 2024-09-12 PROCEDURE — 770022 HCHG ROOM/CARE - ICU (200)

## 2024-09-12 PROCEDURE — 02RX0JZ REPLACEMENT OF THORACIC AORTA, ASCENDING/ARCH WITH SYNTHETIC SUBSTITUTE, OPEN APPROACH: ICD-10-PCS | Performed by: THORACIC SURGERY (CARDIOTHORACIC VASCULAR SURGERY)

## 2024-09-12 PROCEDURE — 5A1221Z PERFORMANCE OF CARDIAC OUTPUT, CONTINUOUS: ICD-10-PCS | Performed by: THORACIC SURGERY (CARDIOTHORACIC VASCULAR SURGERY)

## 2024-09-12 PROCEDURE — 700111 HCHG RX REV CODE 636 W/ 250 OVERRIDE (IP): Performed by: ANESTHESIOLOGY

## 2024-09-12 PROCEDURE — 700102 HCHG RX REV CODE 250 W/ 637 OVERRIDE(OP): Performed by: NURSE PRACTITIONER

## 2024-09-12 PROCEDURE — 700105 HCHG RX REV CODE 258: Performed by: THORACIC SURGERY (CARDIOTHORACIC VASCULAR SURGERY)

## 2024-09-12 PROCEDURE — 94150 VITAL CAPACITY TEST: CPT

## 2024-09-12 PROCEDURE — A9270 NON-COVERED ITEM OR SERVICE: HCPCS | Performed by: NURSE PRACTITIONER

## 2024-09-12 PROCEDURE — 82330 ASSAY OF CALCIUM: CPT | Mod: 91

## 2024-09-12 PROCEDURE — C1768 GRAFT, VASCULAR: HCPCS | Performed by: THORACIC SURGERY (CARDIOTHORACIC VASCULAR SURGERY)

## 2024-09-12 PROCEDURE — 700111 HCHG RX REV CODE 636 W/ 250 OVERRIDE (IP): Performed by: THORACIC SURGERY (CARDIOTHORACIC VASCULAR SURGERY)

## 2024-09-12 PROCEDURE — 160042 HCHG SURGERY MINUTES - EA ADDL 1 MIN LEVEL 5: Performed by: THORACIC SURGERY (CARDIOTHORACIC VASCULAR SURGERY)

## 2024-09-12 PROCEDURE — 85014 HEMATOCRIT: CPT | Mod: 91

## 2024-09-12 PROCEDURE — 85018 HEMOGLOBIN: CPT

## 2024-09-12 PROCEDURE — 84132 ASSAY OF SERUM POTASSIUM: CPT | Mod: 91

## 2024-09-12 PROCEDURE — 700111 HCHG RX REV CODE 636 W/ 250 OVERRIDE (IP): Mod: JZ | Performed by: NURSE PRACTITIONER

## 2024-09-12 PROCEDURE — C1713 ANCHOR/SCREW BN/BN,TIS/BN: HCPCS | Performed by: THORACIC SURGERY (CARDIOTHORACIC VASCULAR SURGERY)

## 2024-09-12 PROCEDURE — 700105 HCHG RX REV CODE 258

## 2024-09-12 PROCEDURE — 88304 TISSUE EXAM BY PATHOLOGIST: CPT

## 2024-09-12 PROCEDURE — A9270 NON-COVERED ITEM OR SERVICE: HCPCS | Performed by: THORACIC SURGERY (CARDIOTHORACIC VASCULAR SURGERY)

## 2024-09-12 PROCEDURE — 85347 COAGULATION TIME ACTIVATED: CPT | Mod: 91

## 2024-09-12 PROCEDURE — 99291 CRITICAL CARE FIRST HOUR: CPT | Performed by: STUDENT IN AN ORGANIZED HEALTH CARE EDUCATION/TRAINING PROGRAM

## 2024-09-12 PROCEDURE — P9047 ALBUMIN (HUMAN), 25%, 50ML: HCPCS

## 2024-09-12 PROCEDURE — 700101 HCHG RX REV CODE 250: Performed by: THORACIC SURGERY (CARDIOTHORACIC VASCULAR SURGERY)

## 2024-09-12 DEVICE — BONE PUTTY HEMOSTATIC ABSORBABLE (12/BX): Type: IMPLANTABLE DEVICE | Site: CHEST | Status: FUNCTIONAL

## 2024-09-12 DEVICE — GRAFT HEMASHIELD PLAT 30MM - 30CM WDV: Type: IMPLANTABLE DEVICE | Site: CHEST | Status: FUNCTIONAL

## 2024-09-12 RX ORDER — MORPHINE SULFATE 4 MG/ML
4 INJECTION INTRAVENOUS
Status: DISCONTINUED | OUTPATIENT
Start: 2024-09-12 | End: 2024-09-12

## 2024-09-12 RX ORDER — SODIUM CHLORIDE, SODIUM GLUCONATE, SODIUM ACETATE, POTASSIUM CHLORIDE AND MAGNESIUM CHLORIDE 526; 502; 368; 37; 30 MG/100ML; MG/100ML; MG/100ML; MG/100ML; MG/100ML
INJECTION, SOLUTION INTRAVENOUS
Status: DISCONTINUED | OUTPATIENT
Start: 2024-09-12 | End: 2024-09-12 | Stop reason: SURG

## 2024-09-12 RX ORDER — ACETAMINOPHEN 500 MG
1000 TABLET ORAL EVERY 6 HOURS
Status: DISCONTINUED | OUTPATIENT
Start: 2024-09-12 | End: 2024-09-16 | Stop reason: HOSPADM

## 2024-09-12 RX ORDER — ACETAMINOPHEN 500 MG
1000 TABLET ORAL EVERY 6 HOURS PRN
Status: DISCONTINUED | OUTPATIENT
Start: 2024-09-22 | End: 2024-09-16 | Stop reason: HOSPADM

## 2024-09-12 RX ORDER — MAGNESIUM SULFATE 1 G/100ML
1 INJECTION INTRAVENOUS DAILY
Status: COMPLETED | OUTPATIENT
Start: 2024-09-12 | End: 2024-09-14

## 2024-09-12 RX ORDER — MIDAZOLAM HYDROCHLORIDE 1 MG/ML
INJECTION INTRAMUSCULAR; INTRAVENOUS PRN
Status: DISCONTINUED | OUTPATIENT
Start: 2024-09-12 | End: 2024-09-12 | Stop reason: SURG

## 2024-09-12 RX ORDER — INSULIN LISPRO 100 [IU]/ML
0-14 INJECTION, SOLUTION INTRAVENOUS; SUBCUTANEOUS
Status: DISCONTINUED | OUTPATIENT
Start: 2024-09-12 | End: 2024-09-13

## 2024-09-12 RX ORDER — ALUMINA, MAGNESIA, AND SIMETHICONE 2400; 2400; 240 MG/30ML; MG/30ML; MG/30ML
30 SUSPENSION ORAL EVERY 4 HOURS PRN
Status: DISCONTINUED | OUTPATIENT
Start: 2024-09-12 | End: 2024-09-16 | Stop reason: HOSPADM

## 2024-09-12 RX ORDER — MIDAZOLAM HYDROCHLORIDE 1 MG/ML
2 INJECTION INTRAMUSCULAR; INTRAVENOUS
Status: DISCONTINUED | OUTPATIENT
Start: 2024-09-12 | End: 2024-09-12

## 2024-09-12 RX ORDER — SODIUM CHLORIDE, SODIUM LACTATE, POTASSIUM CHLORIDE, CALCIUM CHLORIDE 600; 310; 30; 20 MG/100ML; MG/100ML; MG/100ML; MG/100ML
INJECTION, SOLUTION INTRAVENOUS CONTINUOUS
Status: ACTIVE | OUTPATIENT
Start: 2024-09-12 | End: 2024-09-12

## 2024-09-12 RX ORDER — INSULIN LISPRO 100 [IU]/ML
0-14 INJECTION, SOLUTION INTRAVENOUS; SUBCUTANEOUS
Status: CANCELLED | OUTPATIENT
Start: 2024-09-12

## 2024-09-12 RX ORDER — METOPROLOL TARTRATE 25 MG/1
12.5 TABLET, FILM COATED ORAL 2 TIMES DAILY
Status: DISPENSED | OUTPATIENT
Start: 2024-09-13 | End: 2024-09-14

## 2024-09-12 RX ORDER — METFORMIN HCL 500 MG
1000 TABLET, EXTENDED RELEASE 24 HR ORAL DAILY
COMMUNITY

## 2024-09-12 RX ORDER — OXYCODONE HYDROCHLORIDE 5 MG/1
5 TABLET ORAL
Status: DISCONTINUED | OUTPATIENT
Start: 2024-09-12 | End: 2024-09-16 | Stop reason: HOSPADM

## 2024-09-12 RX ORDER — NITROGLYCERIN 20 MG/100ML
0-100 INJECTION INTRAVENOUS CONTINUOUS
Status: DISCONTINUED | OUTPATIENT
Start: 2024-09-12 | End: 2024-09-14

## 2024-09-12 RX ORDER — ROCURONIUM BROMIDE 10 MG/ML
INJECTION, SOLUTION INTRAVENOUS PRN
Status: DISCONTINUED | OUTPATIENT
Start: 2024-09-12 | End: 2024-09-12 | Stop reason: SURG

## 2024-09-12 RX ORDER — ACETAMINOPHEN 500 MG
1000 TABLET ORAL ONCE
Status: COMPLETED | OUTPATIENT
Start: 2024-09-12 | End: 2024-09-12

## 2024-09-12 RX ORDER — ACETAMINOPHEN 650 MG/1
650 SUPPOSITORY RECTAL EVERY 4 HOURS PRN
Status: DISCONTINUED | OUTPATIENT
Start: 2024-09-12 | End: 2024-09-16 | Stop reason: HOSPADM

## 2024-09-12 RX ORDER — TRAMADOL HYDROCHLORIDE 50 MG/1
50 TABLET ORAL EVERY 4 HOURS PRN
Status: DISCONTINUED | OUTPATIENT
Start: 2024-09-12 | End: 2024-09-16 | Stop reason: HOSPADM

## 2024-09-12 RX ORDER — HYDROMORPHONE HYDROCHLORIDE 1 MG/ML
1 INJECTION, SOLUTION INTRAMUSCULAR; INTRAVENOUS; SUBCUTANEOUS ONCE
Status: COMPLETED | OUTPATIENT
Start: 2024-09-12 | End: 2024-09-12

## 2024-09-12 RX ORDER — PROTAMINE SULFATE 10 MG/ML
INJECTION, SOLUTION INTRAVENOUS PRN
Status: DISCONTINUED | OUTPATIENT
Start: 2024-09-12 | End: 2024-09-12 | Stop reason: SURG

## 2024-09-12 RX ORDER — SODIUM CHLORIDE 9 MG/ML
INJECTION, SOLUTION INTRAVENOUS CONTINUOUS
Status: DISCONTINUED | OUTPATIENT
Start: 2024-09-12 | End: 2024-09-14

## 2024-09-12 RX ORDER — NOREPINEPHRINE BITARTRATE 0.03 MG/ML
0-1 INJECTION, SOLUTION INTRAVENOUS CONTINUOUS
Status: DISCONTINUED | OUTPATIENT
Start: 2024-09-12 | End: 2024-09-14

## 2024-09-12 RX ORDER — OXYCODONE HYDROCHLORIDE 10 MG/1
10 TABLET ORAL
Status: DISCONTINUED | OUTPATIENT
Start: 2024-09-12 | End: 2024-09-16 | Stop reason: HOSPADM

## 2024-09-12 RX ORDER — SODIUM CHLORIDE 9 MG/ML
INJECTION, SOLUTION INTRAVENOUS
Status: DISCONTINUED | OUTPATIENT
Start: 2024-09-12 | End: 2024-09-12 | Stop reason: SURG

## 2024-09-12 RX ORDER — HEPARIN SODIUM,PORCINE 1000/ML
VIAL (ML) INJECTION PRN
Status: DISCONTINUED | OUTPATIENT
Start: 2024-09-12 | End: 2024-09-12 | Stop reason: SURG

## 2024-09-12 RX ORDER — POLYETHYLENE GLYCOL 3350 17 G/17G
1 POWDER, FOR SOLUTION ORAL DAILY
Status: DISCONTINUED | OUTPATIENT
Start: 2024-09-13 | End: 2024-09-16 | Stop reason: HOSPADM

## 2024-09-12 RX ORDER — ENOXAPARIN SODIUM 100 MG/ML
40 INJECTION SUBCUTANEOUS DAILY
Status: DISCONTINUED | OUTPATIENT
Start: 2024-09-13 | End: 2024-09-16 | Stop reason: HOSPADM

## 2024-09-12 RX ORDER — BISACODYL 10 MG
10 SUPPOSITORY, RECTAL RECTAL
Status: DISCONTINUED | OUTPATIENT
Start: 2024-09-12 | End: 2024-09-16 | Stop reason: HOSPADM

## 2024-09-12 RX ORDER — METOPROLOL TARTRATE 25 MG/1
12.5 TABLET, FILM COATED ORAL ONCE
Status: COMPLETED | OUTPATIENT
Start: 2024-09-12 | End: 2024-09-12

## 2024-09-12 RX ORDER — SODIUM CHLORIDE, SODIUM GLUCONATE, SODIUM ACETATE, POTASSIUM CHLORIDE AND MAGNESIUM CHLORIDE 526; 502; 368; 37; 30 MG/100ML; MG/100ML; MG/100ML; MG/100ML; MG/100ML
INJECTION, SOLUTION INTRAVENOUS PRN
Status: DISCONTINUED | OUTPATIENT
Start: 2024-09-12 | End: 2024-09-14

## 2024-09-12 RX ORDER — ALBUTEROL SULFATE 5 MG/ML
2.5 SOLUTION RESPIRATORY (INHALATION)
Status: DISCONTINUED | OUTPATIENT
Start: 2024-09-12 | End: 2024-09-16 | Stop reason: HOSPADM

## 2024-09-12 RX ORDER — PROCHLORPERAZINE EDISYLATE 5 MG/ML
10 INJECTION INTRAMUSCULAR; INTRAVENOUS EVERY 6 HOURS PRN
Status: DISCONTINUED | OUTPATIENT
Start: 2024-09-12 | End: 2024-09-16 | Stop reason: HOSPADM

## 2024-09-12 RX ORDER — DEXMEDETOMIDINE HYDROCHLORIDE 4 UG/ML
0-1.5 INJECTION, SOLUTION INTRAVENOUS CONTINUOUS
Status: DISCONTINUED | OUTPATIENT
Start: 2024-09-12 | End: 2024-09-14

## 2024-09-12 RX ORDER — ACETAMINOPHEN 325 MG/1
650 TABLET ORAL EVERY 4 HOURS PRN
Status: DISCONTINUED | OUTPATIENT
Start: 2024-09-12 | End: 2024-09-16 | Stop reason: HOSPADM

## 2024-09-12 RX ORDER — CEFAZOLIN SODIUM 1 G/3ML
INJECTION, POWDER, FOR SOLUTION INTRAMUSCULAR; INTRAVENOUS PRN
Status: DISCONTINUED | OUTPATIENT
Start: 2024-09-12 | End: 2024-09-12 | Stop reason: SURG

## 2024-09-12 RX ORDER — METHADONE HYDROCHLORIDE 10 MG/ML
20 INJECTION, SOLUTION INTRAMUSCULAR; INTRAVENOUS; SUBCUTANEOUS ONCE
Status: COMPLETED | OUTPATIENT
Start: 2024-09-12 | End: 2024-09-12

## 2024-09-12 RX ORDER — DEXTROSE MONOHYDRATE 25 G/50ML
12.5-25 INJECTION, SOLUTION INTRAVENOUS PRN
Status: CANCELLED | OUTPATIENT
Start: 2024-09-12

## 2024-09-12 RX ORDER — METOPROLOL TARTRATE 25 MG/1
25 TABLET, FILM COATED ORAL 2 TIMES DAILY
Status: DISCONTINUED | OUTPATIENT
Start: 2024-09-14 | End: 2024-09-16 | Stop reason: HOSPADM

## 2024-09-12 RX ORDER — POTASSIUM CHLORIDE 7.45 MG/ML
10 INJECTION INTRAVENOUS ONCE
Status: COMPLETED | OUTPATIENT
Start: 2024-09-12 | End: 2024-09-12

## 2024-09-12 RX ORDER — DEXTROSE MONOHYDRATE 25 G/50ML
12.5-25 INJECTION, SOLUTION INTRAVENOUS PRN
Status: DISCONTINUED | OUTPATIENT
Start: 2024-09-12 | End: 2024-09-13

## 2024-09-12 RX ORDER — ONDANSETRON 2 MG/ML
8 INJECTION INTRAMUSCULAR; INTRAVENOUS EVERY 6 HOURS PRN
Status: DISCONTINUED | OUTPATIENT
Start: 2024-09-12 | End: 2024-09-16 | Stop reason: HOSPADM

## 2024-09-12 RX ORDER — AMOXICILLIN 250 MG
2 CAPSULE ORAL 2 TIMES DAILY
Status: DISCONTINUED | OUTPATIENT
Start: 2024-09-12 | End: 2024-09-16 | Stop reason: HOSPADM

## 2024-09-12 RX ORDER — ASPIRIN 81 MG/1
81 TABLET ORAL DAILY
Status: DISCONTINUED | OUTPATIENT
Start: 2024-09-13 | End: 2024-09-16 | Stop reason: HOSPADM

## 2024-09-12 RX ORDER — NOREPINEPHRINE BITARTRATE 0.03 MG/ML
0-1 INJECTION, SOLUTION INTRAVENOUS CONTINUOUS
Status: CANCELLED | OUTPATIENT
Start: 2024-09-12

## 2024-09-12 RX ADMIN — ROCURONIUM BROMIDE 20 MG: 50 INJECTION, SOLUTION INTRAVENOUS at 10:24

## 2024-09-12 RX ADMIN — CEFAZOLIN 2 G: 1 INJECTION, POWDER, FOR SOLUTION INTRAMUSCULAR; INTRAVENOUS at 07:59

## 2024-09-12 RX ADMIN — PROPOFOL 200 MG: 10 INJECTION, EMULSION INTRAVENOUS at 07:36

## 2024-09-12 RX ADMIN — HEPARIN SODIUM 40000 UNITS: 1000 INJECTION, SOLUTION INTRAVENOUS; SUBCUTANEOUS at 08:25

## 2024-09-12 RX ADMIN — METHADONE HYDROCHLORIDE 10 MG: 10 INJECTION, SOLUTION INTRAMUSCULAR; INTRAVENOUS; SUBCUTANEOUS at 08:17

## 2024-09-12 RX ADMIN — ACETAMINOPHEN 1000 MG: 500 TABLET ORAL at 17:12

## 2024-09-12 RX ADMIN — PROCHLORPERAZINE EDISYLATE 10 MG: 5 INJECTION INTRAMUSCULAR; INTRAVENOUS at 18:16

## 2024-09-12 RX ADMIN — MORPHINE SULFATE 1 MG: 4 INJECTION, SOLUTION INTRAMUSCULAR; INTRAVENOUS at 12:26

## 2024-09-12 RX ADMIN — SENNOSIDES AND DOCUSATE SODIUM 2 TABLET: 50; 8.6 TABLET ORAL at 18:00

## 2024-09-12 RX ADMIN — FENTANYL CITRATE 50 MCG: 50 INJECTION, SOLUTION INTRAMUSCULAR; INTRAVENOUS at 15:45

## 2024-09-12 RX ADMIN — SODIUM CHLORIDE, SODIUM GLUCONATE, SODIUM ACETATE, POTASSIUM CHLORIDE AND MAGNESIUM CHLORIDE: 526; 502; 368; 37; 30 INJECTION, SOLUTION INTRAVENOUS at 07:33

## 2024-09-12 RX ADMIN — AMINOCAPROIC ACID 10 G: 250 INJECTION, SOLUTION INTRAVENOUS at 08:05

## 2024-09-12 RX ADMIN — CLEVIPIDINE 200 MCG: 0.5 EMULSION INTRAVENOUS at 08:20

## 2024-09-12 RX ADMIN — SODIUM CHLORIDE: 9 INJECTION, SOLUTION INTRAVENOUS at 11:42

## 2024-09-12 RX ADMIN — METHADONE HYDROCHLORIDE 10 MG: 10 INJECTION, SOLUTION INTRAMUSCULAR; INTRAVENOUS; SUBCUTANEOUS at 07:33

## 2024-09-12 RX ADMIN — CLEVIPIDINE 2 MG/HR: 0.5 EMULSION INTRAVENOUS at 11:32

## 2024-09-12 RX ADMIN — NOREPINEPHRINE BITARTRATE 0.02 MCG/KG/MIN: 1 INJECTION, SOLUTION, CONCENTRATE INTRAVENOUS at 10:01

## 2024-09-12 RX ADMIN — TRAMADOL HYDROCHLORIDE 50 MG: 50 TABLET ORAL at 16:32

## 2024-09-12 RX ADMIN — OXYCODONE HYDROCHLORIDE 5 MG: 5 TABLET ORAL at 22:00

## 2024-09-12 RX ADMIN — MAGNESIUM SULFATE IN DEXTROSE 1 G: 10 INJECTION, SOLUTION INTRAVENOUS at 11:44

## 2024-09-12 RX ADMIN — ONDANSETRON 8 MG: 2 INJECTION INTRAMUSCULAR; INTRAVENOUS at 14:08

## 2024-09-12 RX ADMIN — POTASSIUM CHLORIDE 10 MEQ: 10 INJECTION, SOLUTION INTRAVENOUS at 18:32

## 2024-09-12 RX ADMIN — METOPROLOL TARTRATE 12.5 MG: 25 TABLET, FILM COATED ORAL at 06:36

## 2024-09-12 RX ADMIN — ACETAMINOPHEN 1000 MG: 500 TABLET ORAL at 06:36

## 2024-09-12 RX ADMIN — SODIUM CHLORIDE 8 UNITS/HR: 9 INJECTION, SOLUTION INTRAVENOUS at 09:41

## 2024-09-12 RX ADMIN — MIDAZOLAM HYDROCHLORIDE 2 MG: 2 INJECTION, SOLUTION INTRAMUSCULAR; INTRAVENOUS at 07:33

## 2024-09-12 RX ADMIN — ROCURONIUM BROMIDE 50 MG: 50 INJECTION, SOLUTION INTRAVENOUS at 08:50

## 2024-09-12 RX ADMIN — ACETAMINOPHEN 1000 MG: 500 TABLET ORAL at 23:53

## 2024-09-12 RX ADMIN — SODIUM CHLORIDE, POTASSIUM CHLORIDE, SODIUM LACTATE AND CALCIUM CHLORIDE: 600; 310; 30; 20 INJECTION, SOLUTION INTRAVENOUS at 07:33

## 2024-09-12 RX ADMIN — DEXMEDETOMIDINE HYDROCHLORIDE 0.4 MCG/KG/HR: 100 INJECTION, SOLUTION INTRAVENOUS at 08:05

## 2024-09-12 RX ADMIN — ROCURONIUM BROMIDE 30 MG: 50 INJECTION, SOLUTION INTRAVENOUS at 09:43

## 2024-09-12 RX ADMIN — NOREPINEPHRINE BITARTRATE 0.05 MCG/KG/MIN: 1 INJECTION INTRAVENOUS at 13:02

## 2024-09-12 RX ADMIN — ROCURONIUM BROMIDE 100 MG: 50 INJECTION, SOLUTION INTRAVENOUS at 07:36

## 2024-09-12 RX ADMIN — AMINOCAPROIC ACID 2 G/HR: 250 INJECTION, SOLUTION INTRAVENOUS at 08:54

## 2024-09-12 RX ADMIN — OXYCODONE HYDROCHLORIDE 10 MG: 10 TABLET ORAL at 19:03

## 2024-09-12 RX ADMIN — CLEVIPIDINE 2 MG/HR: 0.5 EMULSION INTRAVENOUS at 08:12

## 2024-09-12 RX ADMIN — SODIUM CHLORIDE, SODIUM GLUCONATE, SODIUM ACETATE, POTASSIUM CHLORIDE AND MAGNESIUM CHLORIDE: 526; 502; 368; 37; 30 INJECTION, SOLUTION INTRAVENOUS at 21:08

## 2024-09-12 RX ADMIN — Medication 1 APPLICATOR: at 11:50

## 2024-09-12 RX ADMIN — DEXMEDETOMIDINE HYDROCHLORIDE 0.4 MCG/KG/HR: 100 INJECTION, SOLUTION INTRAVENOUS at 11:34

## 2024-09-12 RX ADMIN — CLEVIPIDINE 200 MCG: 0.5 EMULSION INTRAVENOUS at 08:17

## 2024-09-12 RX ADMIN — PROTAMINE SULFATE 300 MG: 10 INJECTION, SOLUTION INTRAVENOUS at 10:17

## 2024-09-12 RX ADMIN — Medication 1 APPLICATOR: at 21:08

## 2024-09-12 RX ADMIN — SODIUM CHLORIDE 2 UNITS/HR: 9 INJECTION, SOLUTION INTRAVENOUS at 14:16

## 2024-09-12 RX ADMIN — OXYCODONE HYDROCHLORIDE 10 MG: 10 TABLET ORAL at 14:59

## 2024-09-12 RX ADMIN — SODIUM CHLORIDE: 9 INJECTION, SOLUTION INTRAVENOUS at 07:33

## 2024-09-12 RX ADMIN — SODIUM CHLORIDE: 9 INJECTION, SOLUTION INTRAVENOUS at 11:41

## 2024-09-12 RX ADMIN — CLEVIPIDINE 200 MCG: 0.5 EMULSION INTRAVENOUS at 08:22

## 2024-09-12 RX ADMIN — CEFAZOLIN 2 G: 2 INJECTION, POWDER, FOR SOLUTION INTRAMUSCULAR; INTRAVENOUS at 15:27

## 2024-09-12 RX ADMIN — HYDROMORPHONE HYDROCHLORIDE 1 MG: 1 INJECTION, SOLUTION INTRAMUSCULAR; INTRAVENOUS; SUBCUTANEOUS at 17:45

## 2024-09-12 ASSESSMENT — PAIN DESCRIPTION - PAIN TYPE
TYPE: ACUTE PAIN
TYPE: SURGICAL PAIN
TYPE: ACUTE PAIN;SURGICAL PAIN
TYPE: ACUTE PAIN
TYPE: ACUTE PAIN;SURGICAL PAIN
TYPE: SURGICAL PAIN
TYPE: ACUTE PAIN;SURGICAL PAIN

## 2024-09-12 ASSESSMENT — COPD QUESTIONNAIRES
HAVE YOU SMOKED AT LEAST 100 CIGARETTES IN YOUR ENTIRE LIFE: NO/DON'T KNOW
DO YOU EVER COUGH UP ANY MUCUS OR PHLEGM?: NO/ONLY WITH OCCASIONAL COLDS OR INFECTIONS
DURING THE PAST 4 WEEKS HOW MUCH DID YOU FEEL SHORT OF BREATH: NONE/LITTLE OF THE TIME
COPD SCREENING SCORE: 0

## 2024-09-12 ASSESSMENT — FIBROSIS 4 INDEX: FIB4 SCORE: 0.68

## 2024-09-12 ASSESSMENT — PULMONARY FUNCTION TESTS: FVC: 1

## 2024-09-12 NOTE — ANESTHESIA PROCEDURE NOTES
Airway    Date/Time: 9/12/2024 7:37 AM    Performed by: Basim Delgado M.D.  Authorized by: Basim Delgado M.D.    Location:  OR  Urgency:  Elective  Difficult Airway: No    Indications for Airway Management:  Anesthesia      Spontaneous Ventilation: absent    Sedation Level:  Deep  Preoxygenated: Yes    Patient Position:  Sniffing  Mask Difficulty Assessment:  0 - not attempted  Final Airway Type:  Endotracheal airway  Final Endotracheal Airway:  ETT  Cuffed: Yes    Technique Used for Successful ETT Placement:  Direct laryngoscopy    Insertion Site:  Oral  Blade Type:  Felicita  Laryngoscope Blade/Videolaryngoscope Blade Size:  3  ETT Size (mm):  8.0  Measured from:  Teeth  ETT to Teeth (cm):  22  Placement Verified by: auscultation and capnometry    Cormack-Lehane Classification:  Grade I - full view of glottis  Number of Attempts at Approach:  1

## 2024-09-12 NOTE — PROGRESS NOTES
Medication history reviewed with PT at bedside    [facility] is complete per PT reporting    Allergies reviewed.     Patient denies any outpatient antibiotics in the last 30 days.     Patient is not taking anticoagulants.    Preferred pharmacy for this visit - [facility] ([phone])

## 2024-09-12 NOTE — ANESTHESIA PROCEDURE NOTES
Arterial Line    Performed by: Basim Delgado M.D.  Authorized by: Basim Delgado M.D.    Start Time:  9/12/2024 7:40 AM  End Time:  9/12/2024 7:42 AM  Localization: surface landmarks    Patient Location:  OR  Indication: continuous blood pressure monitoring        Catheter Size:  20 G  Seldinger Technique?: Yes    Laterality:  Right  Site:  Radial artery  Line Secured:  Antimicrobial disc, tape and transparent dressing  Events: patient tolerated procedure well with no complications

## 2024-09-12 NOTE — ASSESSMENT & PLAN NOTE
Extubated on track    -Mobility  -IS  -Titrate supplemental O2 as able, on minimal 02 at this point

## 2024-09-12 NOTE — PROGRESS NOTES
Patient wakes up, follows commands, moves all extremities and answers yes and no questions appropriately.

## 2024-09-12 NOTE — H&P
Cath reviewed no CAD.      No other interval changes to H&P.  Plan for ascending aortic repair with possible AVR today.  See note on 8/14/24 for Full H&P

## 2024-09-12 NOTE — ANESTHESIA PROCEDURE NOTES
CAR    Date/Time: 9/12/2024 7:53 AM    Performed by: Basim Delgado M.D.  Authorized by: Basim Delgado M.D.    Start Time:9/12/2024 7:53 AM  Preanesthetic Checklist: patient identified, IV checked, site marked, risks and benefits discussed, surgical consent, monitors and equipment checked, pre-op evaluation and timeout performed    Indication for CAR: diagnostic   Patient Location: OR  Intubated: Yes  Bite Block: Yes  Heart Visualized: Yes  Insertion: atraumatic    **See FULL CAR report in patient's chart via CV Synapse**

## 2024-09-12 NOTE — ANESTHESIA PROCEDURE NOTES
Central Venous Line    Performed by: Basim Delgado M.D.  Authorized by: Basim Delgado M.D.    Start Time:  9/12/2024 7:45 AM  End Time:  9/12/2024 7:50 AM  Patient Location:  OR  Indication: central venous access and hemodynamic monitoring        provider hand hygiene performed prior to central venous catheter insertion, all 5 sterile barriers used (gloves, gown, cap, mask, large sterile drape) during central venous catheter insertion and skin prep agent completely dried prior to procedure    Patient Position:  Trendelenburg  Laterality:  Right  Site:  Internal jugular  Prep:  Chlorhexidine  Catheter Size:  7 Fr  Catheter Length (cm):  20  Number of Lumens:  Triple lumen  target vein identified, needle advanced into vein and blood aspirated and guidewire advanced into vein    Seldinger Technique?: Yes    Ultrasound-Guided: ultrasound-guided  Image captured, interpreted and electronically stored.  Sterile Gel and Probe Cover Used for Ultrasound?: Yes    Intravenous Verification: verified by ultrasound, venous blood return and chest x-ray pending    all ports aspirated, all ports flushed easily, guidewire was removed intact, biopatch was applied, line was sutured in place and dressing was applied    Events: patient tolerated procedure well with no complications    PA Catheter Placed?: No

## 2024-09-12 NOTE — DISCHARGE INSTR - DIET
Heart-Healthy Nutrition Therapy    A heart-healthy diet is recommended to reduce your unhealthy blood cholesterol levels, manage high blood pressure, and lower your risk for heart disease.    To follow a heart-healthy diet,    Eat a balanced diet with whole grains, fruits and vegetables, and lean protein sources.  Achieve and maintain a healthy weight.  Choose heart-healthy unsaturated fats. Limit saturated fats, trans fats, and cholesterol intake. Eat more plant-based or vegetarian meals using beans and soy foods for protein.  Eat whole, unprocessed foods to limit the amount of sodium (salt) you eat.  Limit refined carbohydrates especially sugar, sweets and sugar-sweetened beverages.  If you drink alcohol, do so in moderation: one serving per day (women) and two servings per day (men).  One serving is equivalent to 12 ounces beer, 5 ounces wine, or 1.5 ounces distilled spirits    Tips for Choosing Heart-Healthy Fats    Choose lean protein and low-fat dairy foods to reduce saturated fat intake.    Saturated fat is usually found in animal-based protein and is associated with certain health risks. Saturated fat is the biggest contributor to raised low-density lipoprotein (LDL) cholesterol levels in the diet. Research shows that limiting saturated fat lowers unhealthy cholesterol levels. Eat no more than 5-6% of your total calories each day from saturated fat. Ask your registered dietitian nutritionist (RDN) to help you determine how much saturated fat is right for you.  There are many foods that do not contain large amounts of saturated fats. Swapping these foods to replace foods high in saturated fats will help you limit the saturated fat you eat and improve your cholesterol levels. You can also try eating more plant-based or vegetarian meals.        Avoid trans fats    Trans fats increase levels of LDL-cholesterol. Hydrogenated fat in processed foods is the main source of trans fats in foods.   Trans fats can be  found in stick margarine, shortening, processed sweets, baked goods, some fried foods, and packaged foods made with hydrogenated oils. Avoid foods with “partially hydrogenated oil” on the ingredient list such as: cookies, pastries, baked goods, biscuits, crackers, microwave popcorn, and frozen dinners.    Choose foods with heart healthy fats    Polyunsaturated and monounsaturated fat are unsaturated fats that may help lower your blood cholesterol level when used in place of saturated fat in your diet.  Ask your RDN about taking a dietary supplement with plant sterols and stanols to help lower your cholesterol level.  Research shows that substituting saturated fats with unsaturated fats is beneficial to cholesterol levels. Try these easy swaps:      Limit the amount of cholesterol you eat to less than 200 milligrams per day.    Cholesterol is a substance carried through the bloodstream via lipoproteins, which are known as “transporters” of fat. Some body functions need cholesterol to work properly, but too much cholesterol in the bloodstream can damage arteries and build up blood vessel linings (which can lead to heart attack and stroke). You should eat less than 200 milligrams cholesterol per day.  People respond differently to eating cholesterol. There is no test available right now that can figure out which people will respond more to dietary cholesterol and which will respond less. For individuals with high intake of dietary cholesterol, different types of increase (none, small, moderate, large) in LDL-cholesterol levels are all possible.    Food sources of cholesterol include egg yolks and organ meats such as liver, gizzards.  Limit egg yolks to two to four per week and avoid organ meats like liver and gizzards to control cholesterol intake.    Tips for Choosing Heart-Healthy Carbohydrates    Consume foods rich in viscous (soluble) fiber    Viscous, or soluble, fiber is found in the walls of plant cells. Viscous  fiber is found only in plant-based foods--animal-based foods like meat or dairy products do not contain fiber. In the stomach, viscous fibers absorb water and swell to form a thick, jelly-like mass. This helps to lower your unhealthy cholesterol  Rich sources of viscous fiber include asparagus, [place] sprouts, sweet potatoes, turnips, apricots, mangoes, oranges, legumes, barley, oats, and oat bran.  Eat at least 5 to 10 grams of viscous fiber each day. As you increase your fiber intake gradually, also increase the amount of water you drink. This will help prevent constipation.  If you have difficulty achieving this goal, ask your RDN about fiber laxatives. Choose fiber supplements made with viscous fibers such as psyllium seed husks or methylcellulose to help lower unhealthy cholesterol.    Limit refined carbohydrates    There are three types of carbohydrates: starches, sugar, and fiber. Some carbohydrates occur naturally in food, like the starches in rice or corn or the sugars in fruits and milk. Refined carbohydrates--foods with high amounts of simple sugars--can raise triglyceride levels. High triglyceride levels are associated with coronary heart disease.  Some examples of refined carbohydrate foods are table sugar, sweets, and beverages sweetened with added sugar.    Tips for Reducing Sodium (Salt)  Although sodium is important for your body to function, too much sodium can be harmful for people with high blood pressure.  As sodium and fluid buildup in your tissues and bloodstream, your blood pressure increases. High blood pressure may cause damage to other organs and increase your risk for a stroke.    Keep your salt intake to 2300 milligrams or less per day. Even if you take a pill for blood pressure or a water pill (diuretic) to remove fluid, it is still important to have less salt in your diet. Ask your RDN what amount of sodium is right for you.    Avoid processed foods.  Eat more fresh foods.  Fresh  "fruits and vegetables are naturally low in sodium, as well as frozen vegetables and fruits that have no added juices or sauces.  Fresh meats are lower in sodium than processed meats, such as uribe, sausage, and hotdogs.  Read the nutrition label or ask your  to help you find a fresh meat that is low in sodium.  Eat less salt--at the table and when cooking.  A single teaspoon of table salt has 2,300 mg of sodium.  Leave the salt out of recipes for pasta, casseroles, and soups.  Ask your RDN how to cook your favorite recipes without sodium  Be a smart .  Look for food packages that say “salt-free” or “sodium-free.” These items contain less than 5 milligrams of sodium per serving.  “Very low-sodium” products contain less than 35 milligrams of sodium per serving.  “Low-sodium” products contain less than 140 milligrams of sodium per serving.   Beware of “reduced salt” or \"reduced sodium\" products.  These items may still be high in sodium. Check the nutrition label.   Add flavors to your food without adding sodium.  Try lemon juice, lime juice, fruit juice or vinegar.    Dry or fresh herbs add flavor. Try basil, bay leaf, dill, rosemary, parsley, christiano, dry mustard, nutmeg, thyme, and paprika.  Pepper, red pepper flakes, and cayenne pepper can add spice to your meals without adding sodium. Hot sauce contains sodium, but if you use just a drop or two, it will not add up to much.  Buy a sodium-free seasoning blend or make your own at home.     Additional Lifestyle Tips    Achieve and maintain a healthy weight.  Talk with your RDN or your doctor about what is a healthy weight for you.  Set goals to reach and maintain that weight.   To lose weight, reduce your calorie intake along with increasing your physical activity. A weight loss of 10 to 15 pounds could reduce LDL-cholesterol by 5 milligrams per deciliter.  Participate in physical activity.    Talk with your health care team to find out what types of " physical activity are best for you. Set a plan to get about 30 minutes of exercise on most days.          Nutrition Counseling  Our expert team offers:   Medical Nutrition Therapy for Chronic Conditions   Weight Management   Diabetes Education and Management   Wellness Services   Body Composition Measurements   Gastrointestinal Health    [facility] are located at:  [address], [place] [address]  For more information and to schedule a consultation, please call [phone].  A physician referral may be required by your insurance for coverage.

## 2024-09-12 NOTE — ASSESSMENT & PLAN NOTE
S/P tube graft ascending aorta repair. Hemodynamically stable.     Off vasoactives, well supported, electrically stable    --Monitor and optimize hemodynamics  --Goals SBP< 120 mmHg, MAP> 65

## 2024-09-12 NOTE — CONSULTS
Critical Care Consultation    Date of consult: 9/12/2024    Referring Physician  Mago Lerma M.D.    Reason for Consultation  S/P ascending aorta repair--post operative critical care assistance     History of Presenting Illness  44 y.o. female with PMH of DLD, HTM, ascending aortic aneurysm, and aortic insufficiency who presented 9/12/2024 s/p ascending aorta repair.    EF Normal   No AI or AS per CAR  on minimal clevidipine  8.0 ETT with uncomplicated airway      .    Code Status  Full Code    Review of Systems  Review of Systems   Unable to perform ROS: Critical illness       Past Medical History   has a past medical history of Abnormal Pap smear of cervix, Anesthesia, ASTHMA, High cholesterol (2009), HPV in female, Hypertension, Migraines, and Pneumonia (2021).    Surgical History   has a past surgical history that includes dilation and curettage.    Family History  family history includes Heart Disease in her father, maternal grandmother, and mother; Hyperlipidemia in her father; Hypertension in her father and mother; Other in her father.    Social History   reports that she has never smoked. She has never been exposed to tobacco smoke. She has never used smokeless tobacco. She reports current alcohol use. She reports that she does not use drugs.    Medications  Home Medications       Reviewed by Anand Restrepo D.O. (Physician) on 09/12/24 at 0701  Med List Status: Complete     Medication Last Dose Status   albuterol 108 (90 Base) MCG/ACT Aero Soln inhalation aerosol >1 WEEK AGO Active   Biotin 1000 MCG Chew Tab 9/11/2024 Active   BOTOX 200 units Recon Soln 9/10/2024 Active   cetirizine (ZYRTEC) 10 MG Tab 9/11/2024 Active   Cholecalciferol (VITAMIN D3) 5000 units Cap 9/11/2024 Active   Cyanocobalamin (B-12 PO) 9/11/2024 Active   eletriptan (RELPAX) 20 MG Tab 9/11/2024 Active   Magnesium Glycinate 120 MG Cap 9/11/2024 Active   Melatonin 10 MG Cap 9/11/2024 Active   metFORMIN ER (GLUCOPHAGE XR) 500 MG TABLET SR  24 HR 9/10/2024 Active   metoprolol SR (TOPROL XL) 25 MG TABLET SR 24 HR 9/11/2024 Active   ondansetron (ZOFRAN ODT) 4 MG TABLET DISPERSIBLE LAST WEEK Active   QULIPTA 30 MG Tab 9/11/2024 Active   Riboflavin (B-2-400 PO) 9/11/2024 Active   rosuvastatin (CRESTOR) 20 MG Tab 9/11/2024 Active                  Audit from Redirected Encounters    **Home medications have not yet been reviewed for this encounter**       Current Facility-Administered Medications   Medication Dose Route Frequency Provider Last Rate Last Admin    Respiratory Therapy Consult   Nebulization Continuous RT Sebastian Brizuela, A.P.R.N.        NS infusion   Intravenous Continuous Sebastian Brizuela A.P.R.N.        NS infusion   Intravenous Continuous Sebastian Brizuela, A.P.R.N.        electrolyte-A (Plasmalyte-A) infusion   Intravenous PRN Sebastian Brizuela, A.P.R.N.        [START ON 9/13/2024] enoxaparin (Lovenox) inj 40 mg  40 mg Subcutaneous DAILY AT 1800 Sebastian Brizuela, A.P.R.N.        Nozin nasal  swab  1 Applicator Each Nostril BID Sebastian Brizuela, A.P.R.N.        calcium CHLORIDE 10 % 1,000 mg in dextrose 5% 100 mL IVPB  1,000 mg Intravenous Once PRN Sebastian Brizuela, A.P.R.N.        [START ON 9/13/2024] calcium CHLORIDE 10 % 1,000 mg in dextrose 5% 100 mL IVPB  1,000 mg Intravenous Once PRN Sebastian Brizuela, A.P.R.N.        magnesium sulfate in D5W IVPB premix 1 g  1 g Intravenous DAILY Sebastian Brizuela, A.P.R.N.        K+ Scale: Goal of 4.5  1 Each Intravenous Q6HRS Sebastian Brizuela, A.P.R.N.        [START ON 9/13/2024] metoprolol tartrate (Lopressor) tablet 12.5 mg  12.5 mg Oral BID Sebastian Brizuela, A.P.R.N.        Followed by    [START ON 9/14/2024] metoprolol tartrate (Lopressor) tablet 25 mg  25 mg Oral BID Sebastian Brizuela, A.P.R.N.        [START ON 9/13/2024] aspirin EC tablet 81 mg  81 mg Oral DAILY RAYSA Frazier        clevidipine (Cleviprex) IV emulsion  0-21 mg/hr Intravenous Continuous RAYSA Frazier        nitroglycerin 50 mg in D5W 250  ml infusion  0-100 mcg/min Intravenous Continuous Sebastian Brizuela, A.P.R.N.        Pharmacy Consult Request ...Pain Management Review 1 Each  1 Each Other PHARMACY TO DOSE Sebastian Brizuela, A.P.R.N.        acetaminophen (Tylenol) tablet 1,000 mg  1,000 mg Oral Q6HRS Sebastian Brizuela, A.P.R.N.        Followed by    [START ON 9/22/2024] acetaminophen (Tylenol) tablet 1,000 mg  1,000 mg Oral Q6HRS PRN Sebastian Brizuela, A.P.R.N.        oxyCODONE immediate-release (Roxicodone) tablet 5 mg  5 mg Oral Q3HRS PRN Sebastian Brizuela, A.P.R.N.        Or    oxyCODONE immediate release (Roxicodone) tablet 10 mg  10 mg Oral Q3HRS PRN Sebastian Brizuela, A.P.R.N.        Or    fentaNYL (Sublimaze) injection 50 mcg  50 mcg Intravenous Q3HRS PRN Sebastian Brizuela, A.P.R.N.        traMADol (Ultram) 50 MG tablet 50 mg  50 mg Oral Q4HRS PRN Sebastian Brizuela, A.P.R.N.        midazolam (Versed) injection 2 mg  2 mg Intravenous Q HOUR PRN Sebastian Brizuela, A.P.R.N.        dexmedetomidine (Precedex) 400 mcg/100mL infusion  0-1.5 mcg/kg/hr (Ideal) Intravenous Continuous Sebastian Brizuela, A.P.R.N.        sodium bicarbonate 8.4 % injection 50 mEq  50 mEq Intravenous Q HOUR PRN Sebastian Brizuela, A.P.R.N.        morphine 4 MG/ML injection 4 mg  4 mg Intravenous Q HOUR PRN Sebastian Brizuela, A.P.R.N.        ondansetron (Zofran) syringe/vial injection 8 mg  8 mg Intravenous Q6HRS PRN Sebastian Brizuela, A.P.R.N.        Or    prochlorperazine (Compazine) injection 10 mg  10 mg Intravenous Q6HRS PRN Sebastian Brizuela, A.P.R.N.        acetaminophen (Tylenol) tablet 650 mg  650 mg Oral Q4HRS PRN Sebastian Brizuela, A.P.R.N.        Or    acetaminophen (Tylenol) suppository 650 mg  650 mg Rectal Q4HRS PRN FUNMILAYO FrazierRHAILEY.        senna-docusate (Pericolace Or Senokot S) 8.6-50 MG per tablet 2 Tablet  2 Tablet Oral BID TRISTIN Frazier.RALICIA        And    [START ON 9/13/2024] polyethylene glycol/lytes (Miralax) Packet 1 Packet  1 Packet Oral DAILY FUNMILAYO FrazierRALICIA        And    [START ON  9/14/2024] magnesium hydroxide (Milk Of Magnesia) suspension 30 mL  30 mL Oral DAILY Sebastian Brizuela, A.P.R.N.        And    bisacodyl (Dulcolax) suppository 10 mg  10 mg Rectal QDAY PRN Sebastian Brizuela, A.P.R.N.        [START ON 9/13/2024] omeprazole (PriLOSEC) capsule 20 mg  20 mg Oral DAILY Sebastian Brizuela, A.P.R.N.        mag hydrox-al hydrox-simeth (Maalox Plus Es Or Mylanta Ds) suspension 30 mL  30 mL Oral Q4HRS PRN Sebastian Brizuela, A.P.R.N.        ceFAZolin (Ancef) 2 g in  mL IVPB  2 g Intravenous Once Sebastian Brizuela, A.P.R.N.        norepinephrine (Levophed) 8 mg in 250 mL NS infusion (premix)  0-1 mcg/kg/min (Ideal) Intravenous Continuous Sebastian Brizuela, A.P.R.N.        insulin regular (HumuLIN R/NovoLIN R) 1 unit/mL IV syringe 0-14 Units  0-14 Units Intravenous Once Sebastian Brizuela, A.P.R.N.        insulin lispro (HumaLOG,AdmeLOG) injection  0-14 Units Subcutaneous TID AC Sebastian Brizuela, A.P.R.N.        insulin regular (HumuLIN R/NovoLIN R) 100 Units in  mL Infusion  0-29 Units/hr Intravenous Continuous Sebastian Brizuela, A.P.R.N.        dextrose 50% (D50W) injection 12.5-25 g  12.5-25 g Intravenous PRN Sebastian Brizuela A.P.R.MARRY.        MD Alert...Pharmacy to initiate transition from insulin infusion to subcutaneous insulin for cardiothoracic surgery 1 Each  1 Each Other Continuous Sebastian Brizuela, A.P.R.N.        albuterol (Proventil) 2.5mg/0.5ml nebulizer solution 2.5 mg  2.5 mg Nebulization Q4H PRN (RT) Aanndjamila Restrepo D.O.           Allergies  No Known Allergies    Vital Signs last 24 hours  Temp:  [35.8 °C (96.5 °F)] 35.8 °C (96.5 °F)  Pulse:  [78] 78  Resp:  [18] 18  BP: (127-138)/() 127/83  SpO2:  [94 %] 94 %    Physical Exam  Physical Exam  Vitals and nursing note reviewed.   Constitutional:       Appearance: She is obese.      Interventions: She is intubated.   HENT:      Head: Normocephalic and atraumatic.      Mouth/Throat:      Mouth: Mucous membranes are moist.   Cardiovascular:      Rate and  Rhythm: Normal rate and regular rhythm.      Pulses: Normal pulses.      Heart sounds: Murmur heard.   Pulmonary:      Effort: She is intubated.   Chest:       Abdominal:      General: Abdomen is flat.      Palpations: Abdomen is soft.   Skin:     General: Skin is warm and dry.         Fluids    Intake/Output Summary (Last 24 hours) at 9/12/2024 1119  Last data filed at 9/12/2024 1114  Gross per 24 hour   Intake 1697.98 ml   Output 1100 ml   Net 597.98 ml       Laboratory  Recent Results (from the past 48 hour(s))   POCT activated clotting time device results    Collection Time: 09/12/24  8:05 AM   Result Value Ref Range    Istat Activated Clotting Time 128 74 - 137 sec   POCT arterial blood gas device results    Collection Time: 09/12/24  8:05 AM   Result Value Ref Range    Ph 7.331 (L) 7.400 - 7.500    Pco2 42.5 (H) 26.0 - 37.0 mmHg    Po2 299 (H) 64 - 87 mmHg    Tco2 24 20 - 33 mmol/L    S02 100 (H) 93 - 99 %    Hco3 22.5 17.0 - 25.0 mmol/L    BE -3 -4 - 3 mmol/L    Body Temp 36.0 C degrees    Ph Temp Franc 7.345 (L) 7.400 - 7.500    Pco2 Temp Co 40.7 (H) 26.0 - 37.0 mmHg    Po2 Temp Cor 294 (H) 64 - 87 mmHg    Specimen Arterial    POCT sodium device results    Collection Time: 09/12/24  8:05 AM   Result Value Ref Range    Istat Sodium 139 135 - 145 mmol/L   POCT potassium device results    Collection Time: 09/12/24  8:05 AM   Result Value Ref Range    Istat Potassium 3.7 3.6 - 5.5 mmol/L   POCT ionized CA device results    Collection Time: 09/12/24  8:05 AM   Result Value Ref Range    Istat Ionized Calcium 1.25 1.10 - 1.30 mmol/L   POCT hematocrit and hemoglobin device results    Collection Time: 09/12/24  8:05 AM   Result Value Ref Range    Istat Hematocrit 37 37 - 47 %    Istat Hemoglobin 12.6 12.0 - 16.0 g/dL   POCT activated clotting time device results    Collection Time: 09/12/24  8:31 AM   Result Value Ref Range    Istat Activated Clotting Time 825 (H) 74 - 137 sec   POCT arterial blood gas device results     Collection Time: 09/12/24  8:32 AM   Result Value Ref Range    Ph 7.306 (L) 7.400 - 7.500    Pco2 45.3 (H) 26.0 - 37.0 mmHg    Po2 236 (H) 64 - 87 mmHg    Tco2 24 20 - 33 mmol/L    S02 100 (H) 93 - 99 %    Hco3 22.6 17.0 - 25.0 mmol/L    BE -4 -4 - 3 mmol/L    Body Temp 36.0 C degrees    Ph Temp Franc 7.320 (L) 7.400 - 7.500    Pco2 Temp Co 43.3 (H) 26.0 - 37.0 mmHg    Po2 Temp Cor 231 (H) 64 - 87 mmHg    Specimen Arterial    POCT sodium device results    Collection Time: 09/12/24  8:32 AM   Result Value Ref Range    Istat Sodium 138 135 - 145 mmol/L   POCT potassium device results    Collection Time: 09/12/24  8:32 AM   Result Value Ref Range    Istat Potassium 3.8 3.6 - 5.5 mmol/L   POCT ionized CA device results    Collection Time: 09/12/24  8:32 AM   Result Value Ref Range    Istat Ionized Calcium 1.23 1.10 - 1.30 mmol/L   POCT hematocrit and hemoglobin device results    Collection Time: 09/12/24  8:32 AM   Result Value Ref Range    Istat Hematocrit 37 37 - 47 %    Istat Hemoglobin 12.6 12.0 - 16.0 g/dL   POCT activated clotting time device results    Collection Time: 09/12/24  9:09 AM   Result Value Ref Range    Istat Activated Clotting Time 568 (H) 74 - 137 sec   POCT venous blood gas device results    Collection Time: 09/12/24  9:10 AM   Result Value Ref Range    Ph 7.269 (L) 7.310 - 7.450    Pco2 53.1 (H) 41.0 - 51.0 mmHg    Po2 55 (H) 25 - 40 mmHg    Tco2 26 20 - 33 mmol/L    SO2 83 %    Hco3 24.3 24.0 - 28.0 mmol/L    BE -3 -4 - 3 mmol/L    Body Temp 35.5 C degrees    Ph Temp Correc 7.290 (L) 7.310 - 7.450    Pco2 Temp Franc 49.7 41.0 - 51.0 mmHg    Po2 Temp Corre 49 (H) 25 - 40 mmHg    Specimen Venous    POCT sodium device results    Collection Time: 09/12/24  9:10 AM   Result Value Ref Range    Istat Sodium 137 135 - 145 mmol/L   POCT potassium device results    Collection Time: 09/12/24  9:10 AM   Result Value Ref Range    Istat Potassium 4.2 3.6 - 5.5 mmol/L   POCT ionized CA device results     Collection Time: 09/12/24  9:10 AM   Result Value Ref Range    Istat Ionized Calcium 1.03 (L) 1.10 - 1.30 mmol/L   POCT hematocrit and hemoglobin device results    Collection Time: 09/12/24  9:10 AM   Result Value Ref Range    Istat Hematocrit 31 (L) 37 - 47 %    Istat Hemoglobin 10.5 (L) 12.0 - 16.0 g/dL   POCT activated clotting time device results    Collection Time: 09/12/24  9:42 AM   Result Value Ref Range    Istat Activated Clotting Time 568 (H) 74 - 137 sec   POCT arterial blood gas device results    Collection Time: 09/12/24  9:47 AM   Result Value Ref Range    Ph 7.266 (LL) 7.400 - 7.500    Pco2 54.7 (HH) 26.0 - 37.0 mmHg    Po2 144 (H) 64 - 87 mmHg    Tco2 27 20 - 33 mmol/L    S02 99 93 - 99 %    Hco3 24.9 17.0 - 25.0 mmol/L    BE -3 -4 - 3 mmol/L    Body Temp 36.0 C degrees    Ph Temp Franc 7.280 (LL) 7.400 - 7.500    Pco2 Temp Co 52.3 (HH) 26.0 - 37.0 mmHg    Po2 Temp Cor 139 (H) 64 - 87 mmHg    Specimen Arterial    POCT sodium device results    Collection Time: 09/12/24  9:47 AM   Result Value Ref Range    Istat Sodium 138 135 - 145 mmol/L   POCT potassium device results    Collection Time: 09/12/24  9:47 AM   Result Value Ref Range    Istat Potassium 4.5 3.6 - 5.5 mmol/L   POCT ionized CA device results    Collection Time: 09/12/24  9:47 AM   Result Value Ref Range    Istat Ionized Calcium 1.10 1.10 - 1.30 mmol/L   POCT hematocrit and hemoglobin device results    Collection Time: 09/12/24  9:47 AM   Result Value Ref Range    Istat Hematocrit 33 (L) 37 - 47 %    Istat Hemoglobin 11.2 (L) 12.0 - 16.0 g/dL   POCT activated clotting time device results    Collection Time: 09/12/24 10:31 AM   Result Value Ref Range    Istat Activated Clotting Time 122 74 - 137 sec   POCT arterial blood gas device results    Collection Time: 09/12/24 10:32 AM   Result Value Ref Range    Ph 7.335 (L) 7.400 - 7.500    Pco2 40.4 (H) 26.0 - 37.0 mmHg    Po2 258 (H) 64 - 87 mmHg    Tco2 23 20 - 33 mmol/L    S02 100 (H) 93 - 99 %     Hco3 21.6 17.0 - 25.0 mmol/L    BE -4 -4 - 3 mmol/L    Body Temp 37.0 C degrees    Ph Temp Franc 7.335 (L) 7.400 - 7.500    Pco2 Temp Co 40.4 (H) 26.0 - 37.0 mmHg    Po2 Temp Cor 258 (H) 64 - 87 mmHg    Specimen Arterial    POCT sodium device results    Collection Time: 09/12/24 10:32 AM   Result Value Ref Range    Istat Sodium 138 135 - 145 mmol/L   POCT potassium device results    Collection Time: 09/12/24 10:32 AM   Result Value Ref Range    Istat Potassium 4.2 3.6 - 5.5 mmol/L   POCT ionized CA device results    Collection Time: 09/12/24 10:32 AM   Result Value Ref Range    Istat Ionized Calcium 1.34 (H) 1.10 - 1.30 mmol/L   POCT hematocrit and hemoglobin device results    Collection Time: 09/12/24 10:32 AM   Result Value Ref Range    Istat Hematocrit 31 (L) 37 - 47 %    Istat Hemoglobin 10.5 (L) 12.0 - 16.0 g/dL       Imaging  DX-CHEST-PORTABLE (1 VIEW)    (Results Pending)   EC-CAR W/O CONT    (Results Pending)       Assessment/Plan  * Ascending aorta dilation (HCC)- (present on admission)  Assessment & Plan  Now S/P tube graft ascending aorta repair  Monitor and optimize hemodynamics  Goals SBP< 120 mmHg, MAP> 65,   Titrate vasoactive/ionotropic medications vs BP reducing medications to achieve hemodynamic goals.  Maintain intravascular euvolemia  Monitor chest tube output for postoperative hemorrhage.      Mild intermittent asthma  Assessment & Plan  Not in acute exacerbation  --Albuterol PRN    Encounter for weaning from ventilator (HCC)  Assessment & Plan  Fast track to extubate.  Lung protective ventilation strategies  Optimize oxygenation, ventilation, and acid base balance  ABCDEF Bundle      Obesity (BMI 30.0-34.9)- (present on admission)  Assessment & Plan  Weight loss counseling when appropriate    Dyslipidemia- (present on admission)  Assessment & Plan  Continue statin    Essential hypertension- (present on admission)  Assessment & Plan  Takes Metop XL 12.5mg at home.  --Hold BB  --reintroduce  oral antihypertensives when clinically appropriate        Discussed patient condition and risk of morbidity and/or mortality with  Anesthia .    The patient remains critically ill.  Critical care time = 32 minutes in directly providing and coordinating critical care and extensive data review.  No time overlap and excludes procedures.

## 2024-09-12 NOTE — PROGRESS NOTES
Pt arrived s/p AAA repair. Report received from Dr Delgado anesthesiologist. Pacer tested rate 40 Ma 10 Mv 2. Chest tubes placed to suction. No Air leak noted. Chest x ray reviewed by intensivist. No kristine hugger applied, temp 36.9 C. Labs sent.

## 2024-09-12 NOTE — OR SURGEON
OPERATIVE REPORT    Operation date: 9/12/2024    Referring physician: Abdirashid Chamorro MD    PreOp Diagnosis: Ascending aortic aneurysm (4.9 cm), mild aortic regurgitation, asthma, hypertension, dyslipidemia     PostOp Diagnosis: Ascending aortic aneurysm (4.9 cm), mild aortic regurgitation, asthma, hypertension, dyslipidemia     Procedure(s):  ASCENDING AORTIC ANEURYSM REPAIR (30 mm Hemashield tube graft, noncoronary sinus plication, distal ascending aortic wrap) and intraoperative transesophageal echocardiography    Surgeon(s):  Mago Lerma M.D.    Assistant:  SHEA Kim    Anesthesiologist/Type of Anesthesia:  Anesthesiologist: Basim Delgado M.D.  Perfusionist: Johnna Montenegro/General    Surgical Staff:  Circulator: Calderon Sales R.N.  Scrub Person: Gely Paul; Izabela Sears  First Assist: RAYSA Frazier    Specimens removed if any:  ID Type Source Tests Collected by Time Destination   A : Ascending aortic aneurysm Tissue Heart PATHOLOGY SPECIMEN Mago Lerma M.D. 9/12/2024  9:03 AM      Estimated Blood Loss: Minimal    Findings: Ascending aortic aneurysm approximately 4.5 cm, mild aortic regurgitation, LVEF approximately 65%    Complications: None    Indications:  Ms. Machado is a 44-year-old female with a 4.5 to 4.9 cm ascending aortic aneurysm.  Echocardiography showed mild aortic regurgitation.  Cardiac catheterization did not show any significant coronary artery disease.    Procedure:  The patient was brought to the operating room and placed on the operating room table in the supine position.  After successful induction of general anesthesia endotracheal intubation, the patient was prepped and draped in the usual sterile fashion.  SHEA Kim assisted with retraction and exposure during the operation and closed the sternal wound.  Intraoperative transesophageal echocardiography showed an approximately 4.5 cm ascending aortic aneurysm and mild aortic regurgitation.  Her left  ventricular ejection fraction was approximately 65%.    Incision was made from the sternal notch to the xiphoid.  The sternum was opened longitudinally with a sternal saw.  Hemostasis was obtained with electrocautery at the sternal edges.  The patient was systemically heparinized.  The pericardium was opened longitudinally and tented anteriorly with Ethibond stay stitches.  A 22 Azeri femoral arterial cannula was placed in the proximal arch of the aorta in the usual fashion followed by a dual stage venous cannula in the right atrium.  An antegrade cardioplegia cannula was placed in the distal ascending aorta.  Cardiopulmonary bypass was instituted.  The aorta was crossclamped and the patient was given 1 L of cold [place] cardioplegia in an antegrade fashion.  There was prompt cardiac arrest.  Ice slush was placed on the heart for further myocardial protection.  A phrenic nerve protector pad was used.    The ascending aortic aneurysm visually appeared to be approximately 4.5 cm.  It was excised from the sinotubular junction to just below the aortic cross-clamp.  The aortic valve leaflets had normal appearance.  A small V shaped excision of the noncoronary sinus was performed and the edges were reapproximated using #5-0 Prolene sutures.  A 1 x 3 cm piece of Felt was placed below the right coronary artery to support the right coronary sinus.  A proximal anastomosis was performed between the 30 mm Hemashield tube graft and the aorta at the sinotubular junction using a #5-0 Prolene suture in a running fashion.  Rewarming of the patient was initiated.  A distal anastomosis was performed between the aorta and the Hemashield tube graft.  Approximately 300 mL of warm blood was given in an antegrade fashion and the aortic cross-clamp was removed.  Aortic cross-clamp time was 60 minutes and total cardiopulmonary bypass time was 85 minutes.  The left ventricle was de-aired in the usual fashion.  The carbon dioxide which had  been released over the operative field during the operation was discontinued.  A straight and an angled 32 Yi chest tubes were placed the mediastinum.  Temporary epicardial ventricular pacemaker wires were inserted.  There was spontaneous conversion into sinus rhythm.  The antegrade cardioplegia cannula was removed.  The distal ascending aortic remnant was wrapped with the Hemashield tube graft using a #4-0 Prolene suture in a running fashion.  When she was adequately warmed, she was slowly taken off cardiopulmonary bypass which she tolerated well.  The dual stage venous cannula was removed.  Protamine was given to reverse the effects of the heparin.  The aortic cannula was removed.  When adequate hemostasis had been obtained, the sternum was reapproximated using size 5 sternal wires and the remainder of the incision was closed in several layers using Vicryl sutures.    Intraoperative transesophageal echocardiography showed no evidence of residual aortic regurgitation.  Her left ventricular ejection fraction remained normal at approximately 65%.  There were no apparent complications..  The patient tolerated the procedure well and left the operating room in guarded condition.      9/12/2024 10:48 AM Mago Lerma MD, FACS

## 2024-09-12 NOTE — ASSESSMENT & PLAN NOTE
Takes Metop XL 12.5mg at home.  --Will reintroduce BB when appropriate  --Will likely need additional antihypertensive prior to discharge

## 2024-09-12 NOTE — DIETARY
Nutrition Services: Cardiac Diet Education Consult   Day 0 of admit.  Princess Machado is a 44 y.o. female with admitting DX of Ascending aortic aneurysm, unspecified whether ruptured (HCC) [I71.21]    RD received referral for cardiac diet education. Dx list includes ascending aorta dilation, DLD, HTN, obesity; pt is s/p AAA repair. RD provided information via discharge instructions which includes nutrition recommendations and tips to support a heart healthy dietary pattern. This includes outpatient resources to [facility] affiliated [facility] for continued nutrition education and guidance as desired.     No other education needs identified at this time. Please re-consult RD for supplemental education or at the request of patient.    Please re-consult RD PRN

## 2024-09-12 NOTE — ANESTHESIA TIME REPORT
Anesthesia Start and Stop Event Times       Date Time Event    9/12/2024 0720 Ready for Procedure     0733 Anesthesia Start     1115 Anesthesia Stop          Responsible Staff  09/12/24      Name Role Begin End    Basim Delgado M.D. Anesth 0733 1113          Overtime Reason:  no overtime (within assigned shift)    Comments:

## 2024-09-12 NOTE — ANESTHESIA PREPROCEDURE EVALUATION
" Case: [id number] Date/Time: 09/12/24 0715    Procedures:       ASCENDING AORTIC ANEURYSM REPAIR, POSSIBLE AORTIC VALVE REPLACEMENT, TRANSESOPHAGEAL ECHOCARDIOGRAM (Chest)      REPLACEMENT, AORTIC VALVE (Chest)      ECHOCARDIOGRAM, TRANSESOPHAGEAL, INTRAOPERATIVE (Esophagus)    Anesthesia type: General    Pre-op diagnosis: ASCENDING AORTIC ANEURYSM    Location: [facility] OR 02 / SURGERY [facility]    Surgeons: Mago Lerma M.D.            Relevant Problems   PULMONARY   (positive) Mild intermittent asthma      NEURO   (positive) Chronic migraine      CARDIAC   (positive) Aortic regurgitation   (positive) Ascending aorta dilation (HCC)   (positive) Ascending aorta enlargement (HCC)   (positive) Chronic migraine   (positive) Essential hypertension     /83   Pulse 78   Temp 35.8 °C (96.5 °F) (Temporal)   Resp 18   Ht 1.727 m (5' 8\")   Wt 105 kg (231 lb 14.8 oz)   LMP 08/28/2024 (Exact Date)   SpO2 94%   BMI 35.26 kg/m²     Physical Exam    Airway   Mallampati: II  TM distance: >3 FB  Neck ROM: full       Cardiovascular - normal exam  Rhythm: regular  Rate: normal  (-) murmur     Dental - normal exam           Pulmonary - normal exam  Breath sounds clear to auscultation     Abdominal    Neurological - normal exam                   Anesthesia Plan    ASA 4       Plan - general       Airway plan will be ETT          Induction: intravenous    Postoperative Plan: Postoperative administration of opioids is intended.    Pertinent diagnostic labs and testing reviewed    Informed Consent:    Anesthetic plan and risks discussed with patient.    Use of blood products discussed with: patient whom consented to blood products.           "

## 2024-09-12 NOTE — PROGRESS NOTES
Patient meeting all extubation parameters, follows commands, OROZCO, NIF, Vt,  and ABG WNL.      Patient extubated at 1417 by RT, placed on 4L NC oxygen saturation 97%. No stridor.

## 2024-09-13 ENCOUNTER — APPOINTMENT (OUTPATIENT)
Dept: RADIOLOGY | Facility: MEDICAL CENTER | Age: 44
DRG: 220 | End: 2024-09-13
Attending: NURSE PRACTITIONER
Payer: COMMERCIAL

## 2024-09-13 LAB
ANION GAP SERPL CALC-SCNC: 9 MMOL/L (ref 7–16)
BUN SERPL-MCNC: 11 MG/DL (ref 8–22)
CA-I SERPL-SCNC: 1.1 MMOL/L (ref 1.1–1.3)
CALCIUM SERPL-MCNC: 7.7 MG/DL (ref 8.5–10.5)
CHLORIDE SERPL-SCNC: 106 MMOL/L (ref 96–112)
CO2 SERPL-SCNC: 21 MMOL/L (ref 20–33)
CREAT SERPL-MCNC: 0.6 MG/DL (ref 0.5–1.4)
EKG IMPRESSION: NORMAL
ERYTHROCYTE [DISTWIDTH] IN BLOOD BY AUTOMATED COUNT: 43.1 FL (ref 35.9–50)
GFR SERPLBLD CREATININE-BSD FMLA CKD-EPI: 112 ML/MIN/1.73 M 2
GLUCOSE BLD STRIP.AUTO-MCNC: 110 MG/DL (ref 65–99)
GLUCOSE BLD STRIP.AUTO-MCNC: 111 MG/DL (ref 65–99)
GLUCOSE BLD STRIP.AUTO-MCNC: 118 MG/DL (ref 65–99)
GLUCOSE BLD STRIP.AUTO-MCNC: 123 MG/DL (ref 65–99)
GLUCOSE BLD STRIP.AUTO-MCNC: 125 MG/DL (ref 65–99)
GLUCOSE BLD STRIP.AUTO-MCNC: 133 MG/DL (ref 65–99)
GLUCOSE BLD STRIP.AUTO-MCNC: 135 MG/DL (ref 65–99)
GLUCOSE BLD STRIP.AUTO-MCNC: 136 MG/DL (ref 65–99)
GLUCOSE SERPL-MCNC: 142 MG/DL (ref 65–99)
HCT VFR BLD AUTO: 31.5 % (ref 37–47)
HGB BLD-MCNC: 10.9 G/DL (ref 12–16)
MCH RBC QN AUTO: 32 PG (ref 27–33)
MCHC RBC AUTO-ENTMCNC: 34.6 G/DL (ref 32.2–35.5)
MCV RBC AUTO: 92.4 FL (ref 81.4–97.8)
PLATELET # BLD AUTO: 179 K/UL (ref 164–446)
PMV BLD AUTO: 9.4 FL (ref 9–12.9)
POTASSIUM SERPL-SCNC: 4.2 MMOL/L (ref 3.6–5.5)
POTASSIUM SERPL-SCNC: 4.3 MMOL/L (ref 3.6–5.5)
RBC # BLD AUTO: 3.41 M/UL (ref 4.2–5.4)
SODIUM SERPL-SCNC: 136 MMOL/L (ref 135–145)
WBC # BLD AUTO: 21.2 K/UL (ref 4.8–10.8)

## 2024-09-13 PROCEDURE — 71045 X-RAY EXAM CHEST 1 VIEW: CPT

## 2024-09-13 PROCEDURE — 700102 HCHG RX REV CODE 250 W/ 637 OVERRIDE(OP): Performed by: NURSE PRACTITIONER

## 2024-09-13 PROCEDURE — A9270 NON-COVERED ITEM OR SERVICE: HCPCS | Performed by: NURSE PRACTITIONER

## 2024-09-13 PROCEDURE — 93005 ELECTROCARDIOGRAM TRACING: CPT | Performed by: NURSE PRACTITIONER

## 2024-09-13 PROCEDURE — 770022 HCHG ROOM/CARE - ICU (200)

## 2024-09-13 PROCEDURE — 80048 BASIC METABOLIC PNL TOTAL CA: CPT

## 2024-09-13 PROCEDURE — 99233 SBSQ HOSP IP/OBS HIGH 50: CPT | Performed by: STUDENT IN AN ORGANIZED HEALTH CARE EDUCATION/TRAINING PROGRAM

## 2024-09-13 PROCEDURE — 700111 HCHG RX REV CODE 636 W/ 250 OVERRIDE (IP): Mod: JZ | Performed by: NURSE PRACTITIONER

## 2024-09-13 PROCEDURE — 84132 ASSAY OF SERUM POTASSIUM: CPT

## 2024-09-13 PROCEDURE — 99024 POSTOP FOLLOW-UP VISIT: CPT | Performed by: NURSE PRACTITIONER

## 2024-09-13 PROCEDURE — 85027 COMPLETE CBC AUTOMATED: CPT

## 2024-09-13 PROCEDURE — 82962 GLUCOSE BLOOD TEST: CPT | Mod: 91

## 2024-09-13 RX ORDER — SUMATRIPTAN 50 MG/1
25 TABLET, FILM COATED ORAL
Status: DISCONTINUED | OUTPATIENT
Start: 2024-09-13 | End: 2024-09-16 | Stop reason: HOSPADM

## 2024-09-13 RX ORDER — DEXTROSE MONOHYDRATE 25 G/50ML
25 INJECTION, SOLUTION INTRAVENOUS
Status: DISCONTINUED | OUTPATIENT
Start: 2024-09-13 | End: 2024-09-14

## 2024-09-13 RX ADMIN — ACETAMINOPHEN 1000 MG: 500 TABLET ORAL at 05:49

## 2024-09-13 RX ADMIN — OXYCODONE HYDROCHLORIDE 10 MG: 10 TABLET ORAL at 13:26

## 2024-09-13 RX ADMIN — OXYCODONE HYDROCHLORIDE 10 MG: 10 TABLET ORAL at 16:35

## 2024-09-13 RX ADMIN — ASPIRIN 81 MG: 81 TABLET, COATED ORAL at 05:49

## 2024-09-13 RX ADMIN — Medication 1 APPLICATOR: at 20:46

## 2024-09-13 RX ADMIN — Medication 1 APPLICATOR: at 09:00

## 2024-09-13 RX ADMIN — TRAMADOL HYDROCHLORIDE 50 MG: 50 TABLET ORAL at 15:53

## 2024-09-13 RX ADMIN — OXYCODONE HYDROCHLORIDE 10 MG: 10 TABLET ORAL at 04:05

## 2024-09-13 RX ADMIN — OXYCODONE HYDROCHLORIDE 10 MG: 10 TABLET ORAL at 10:01

## 2024-09-13 RX ADMIN — ACETAMINOPHEN 650 MG: 325 TABLET ORAL at 23:12

## 2024-09-13 RX ADMIN — SENNOSIDES AND DOCUSATE SODIUM 2 TABLET: 50; 8.6 TABLET ORAL at 05:49

## 2024-09-13 RX ADMIN — SENNOSIDES AND DOCUSATE SODIUM 2 TABLET: 50; 8.6 TABLET ORAL at 17:46

## 2024-09-13 RX ADMIN — ENOXAPARIN SODIUM 40 MG: 100 INJECTION SUBCUTANEOUS at 18:00

## 2024-09-13 RX ADMIN — ACETAMINOPHEN 1000 MG: 500 TABLET ORAL at 11:56

## 2024-09-13 RX ADMIN — OXYCODONE HYDROCHLORIDE 10 MG: 10 TABLET ORAL at 07:03

## 2024-09-13 RX ADMIN — POLYETHYLENE GLYCOL 3350 1 PACKET: 17 POWDER, FOR SOLUTION ORAL at 06:00

## 2024-09-13 RX ADMIN — ACETAMINOPHEN 1000 MG: 500 TABLET ORAL at 17:45

## 2024-09-13 RX ADMIN — MAGNESIUM SULFATE IN DEXTROSE 1 G: 10 INJECTION, SOLUTION INTRAVENOUS at 05:55

## 2024-09-13 RX ADMIN — ONDANSETRON 8 MG: 2 INJECTION INTRAMUSCULAR; INTRAVENOUS at 15:14

## 2024-09-13 RX ADMIN — SUMATRIPTAN SUCCINATE 25 MG: 50 TABLET ORAL at 18:27

## 2024-09-13 RX ADMIN — OMEPRAZOLE 20 MG: 20 CAPSULE, DELAYED RELEASE ORAL at 05:49

## 2024-09-13 RX ADMIN — FENTANYL CITRATE 50 MCG: 50 INJECTION, SOLUTION INTRAMUSCULAR; INTRAVENOUS at 04:31

## 2024-09-13 RX ADMIN — FENTANYL CITRATE 50 MCG: 50 INJECTION, SOLUTION INTRAMUSCULAR; INTRAVENOUS at 10:51

## 2024-09-13 RX ADMIN — OXYCODONE HYDROCHLORIDE 10 MG: 10 TABLET ORAL at 01:00

## 2024-09-13 RX ADMIN — METOPROLOL TARTRATE 12.5 MG: 25 TABLET, FILM COATED ORAL at 17:52

## 2024-09-13 RX ADMIN — OXYCODONE HYDROCHLORIDE 10 MG: 10 TABLET ORAL at 21:38

## 2024-09-13 ASSESSMENT — COGNITIVE AND FUNCTIONAL STATUS - GENERAL
DRESSING REGULAR UPPER BODY CLOTHING: A LITTLE
MOBILITY SCORE: 12
MOVING TO AND FROM BED TO CHAIR: A LOT
MOVING FROM LYING ON BACK TO SITTING ON SIDE OF FLAT BED: A LOT
SUGGESTED CMS G CODE MODIFIER DAILY ACTIVITY: CK
DRESSING REGULAR LOWER BODY CLOTHING: A LOT
WALKING IN HOSPITAL ROOM: A LOT
CLIMB 3 TO 5 STEPS WITH RAILING: A LOT
STANDING UP FROM CHAIR USING ARMS: A LOT
TOILETING: A LOT
TURNING FROM BACK TO SIDE WHILE IN FLAT BAD: A LOT
DAILY ACTIVITIY SCORE: 17
HELP NEEDED FOR BATHING: A LOT
SUGGESTED CMS G CODE MODIFIER MOBILITY: CL

## 2024-09-13 ASSESSMENT — PAIN DESCRIPTION - PAIN TYPE
TYPE: SURGICAL PAIN
TYPE: ACUTE PAIN;SURGICAL PAIN
TYPE: ACUTE PAIN
TYPE: ACUTE PAIN;SURGICAL PAIN
TYPE: ACUTE PAIN
TYPE: ACUTE PAIN;SURGICAL PAIN
TYPE: SURGICAL PAIN
TYPE: ACUTE PAIN;SURGICAL PAIN
TYPE: SURGICAL PAIN
TYPE: SURGICAL PAIN
TYPE: ACUTE PAIN;SURGICAL PAIN
TYPE: ACUTE PAIN;SURGICAL PAIN
TYPE: SURGICAL PAIN
TYPE: SURGICAL PAIN
TYPE: ACUTE PAIN;SURGICAL PAIN
TYPE: SURGICAL PAIN
TYPE: ACUTE PAIN;SURGICAL PAIN

## 2024-09-13 ASSESSMENT — ENCOUNTER SYMPTOMS
CONSTITUTIONAL NEGATIVE: 1
DIZZINESS: 1
NAUSEA: 1
MUSCULOSKELETAL NEGATIVE: 1

## 2024-09-13 ASSESSMENT — FIBROSIS 4 INDEX: FIB4 SCORE: 1.04

## 2024-09-13 NOTE — PROGRESS NOTES
Monitor Summary:    Rhythm: Sinus rhythm    Rate: 60's    Ectopy: None    ID - 0.14 QRS - 0.06 QT - 0.43  Pacemaker set to backup rate of 40/10/2

## 2024-09-13 NOTE — THERAPY
Physical Therapy Contact Note    Patient Name: Princess Machado  Age:  44 y.o., Sex:  female  Medical Record #: [id number]  Today's Date: 9/13/2024    PT orders received. Attempted in AM, pt with high pain and dizziness with standing. Will re-attempt as appropriate.    Jerome Nicolas PT, DPT

## 2024-09-13 NOTE — CARE PLAN
The patient is Watcher - Medium risk of patient condition declining or worsening    Shift Goals  Clinical Goals: Hemodyanmic stability, extubate  Patient Goals: KESHIA  Family Goals: Updates    Progress made toward(s) clinical / shift goals:    Problem: Day of surgery post CABG/Heart valve replacement  Goal: Stabilization in immediate post op period  Outcome: Progressing  Intervention: VS q 15 min x 4 hours, then q 1 hour. Include temperature immediately upon arrival. Check CO/CI q 2-4 hours and PRN  Note: Vital signs checked per protocol, Patient on Levo for BP management, Art line in place, No [place]  Intervention: If radial artery used, elevate arm, no BP checks or needle sticks from affected arm, monitor ulnar pulse and capillary refill  Note: Right radial art line in place, good pulses and cap refill maintained.  Intervention: First post op hour labs and EKG per order  Note: Labs drawn and EKG obtained   Intervention: Serum K q 6 hours x 24 hours.  ABG and CBC prn.  Note: Patient on K scale, ABG's obtained while intubated.  Intervention: Initiate post cardiac insulin infusion protocol orders for FSBS greater than 140 and check frequency per protocol  Note: Patient on insulin gtt protocol.   Intervention: FSBS frequency as per Cardiac Surgery Insulin Drip Protocol  Note: On an insulin gtt  Intervention: For patients on Beta Blockers: verify dose given prior to surgery or within 6 hours after arrival to the unit  Note: N/A  Intervention: Chest tube to 20 cm suction, record CT drainage with VS, and check for air leak  Note: Chest tubes connected to suction, no air leaks  Intervention: For CT drainage >300 mL in first hour post op and/or 150 mL in subsequent hours: Stat platelets, PT, INR, TEG, iSTAT, and H&H per order  Note: N/A  Intervention: Titrate and wean off vasoactive drips per patient's condition and per MD order while maintaining SBP  mmHg per MD order  Note: Patient on Levo to maintain SBP greater than  90.  Intervention: VAP protocol in place  Note: Patient successfully extubated this shift.   Intervention: Wean from Vent per protocol (see protocol), extubation goal within 6 hours post op  Note: Extubated within 6 hours  Intervention: IS q 1 hour while awake post extubation  Note: IS at bedside, will perform when patients pain is under control  Intervention: Bedrest until extubated and groin lines out  Note: Patient no longer bedrest, extubated and no groin lines  Intervention: Dangle within 4 hours post extubation  Note: Patient unable to dangle within 4 hours of extubation d/t uncontrolled pain.  Intervention: Up in chair 4 hours, day of extubation  Note: N/A, Pain uncontrolled  Intervention: Maintain all original surgical dressings per provider orders and specifications  Note: Dressings clean dry and intact  Intervention: Clear liquids post extubation, order carbohydrate free (post cardiac surgery) diet, advance as tolerated  Note: Patient tolerating clear liquid diet  Intervention: Discontinue [place] slava and arterial line 12-18 hours post op if hemodynamically stable and off vasoactive drips  Note: No [place]  Intervention: A-Fib and DVT prophylaxis per MD order or contraindications documented (refer to DVT/VTE problem on Care Plan)  Note: SCD's in place  Intervention: Amiodarone protocol per MD order  Note: N/A, patient Sinus rhythm.        Patient is not progressing towards the following goals:      Problem: Pain - Standard  Goal: Alleviation of pain or a reduction in pain to the patient’s comfort goal  Outcome: Not Progressing  Note: Patient pain difficult to control, Given Oxycodone, Fentanyl, Tramadol and Tylenol with no effect, one time dose of dilaudid given, Patient pain controled after dilaudid.

## 2024-09-13 NOTE — PROGRESS NOTES
Cardiovascular Surgery Progress Note    Name: Princess Machado  MRN: [id number]  : 1980  Admit Date: 2024  5:10 AM  1 Day Post-Op     Procedure:  Procedure(s) and Anesthesia Type:     * ASCENDING AORTIC ANEURYSM REPAIR, TRANSESOPHAGEAL ECHOCARDIOGRAM - General     * ECHOCARDIOGRAM, TRANSESOPHAGEAL, INTRAOPERATIVE - General    Vitals:  Vitals:    24 0545 24 0600 24 0635 24 0733   BP:  100/55 93/58    Pulse: 79 85 74 73   Resp: 19 (!) 21 (!) 7 12   Temp:       TempSrc:       SpO2: 92% 93% 92% 92%   Weight:  106 kg (233 lb 14.5 oz)     Height:          Temp (24hrs), Av.4 °C (99.4 °F), Min:36.8 °C (98.2 °F), Max:37.7 °C (99.9 °F)      Respiratory:  Vent Mode: ASV, PEEP/CPAP: 8, PIP: 17, MAP: 11 Respiration: 12, Pulse Oximetry: 92 %       Fluids:    Intake/Output Summary (Last 24 hours) at 2024 0928  Last data filed at 2024 0600  Gross per 24 hour   Intake 5997.02 ml   Output 4536 ml   Net 1461.02 ml     Admit weight: Weight: 104 kg (229 lb 4.5 oz)  Current weight: Weight: 106 kg (233 lb 14.5 oz) (24 0600)    Labs:  Recent Labs     24  1112 24  0304   WBC  --  21.2*   RBC  --  3.41*   HEMOGLOBIN 12.4 10.9*   HEMATOCRIT 35.6* 31.5*   MCV  --  92.4   MCH  --  32.0   MCHC  --  34.6   RDW  --  43.1   PLATELETCT 152* 179   MPV  --  9.4     Recent Labs     24  2300 24  0304 24  0455   SODIUM  --  136  --    POTASSIUM 4.3 4.2 4.3   CHLORIDE  --  106  --    CO2  --  21  --    GLUCOSE  --  142*  --    BUN  --  11  --    CREATININE  --  0.60  --    CALCIUM  --  7.7*  --      Recent Labs     24  1112   APTT 26.3   INR 1.23*       HgbA1c:  5.1    Diabetic Educator Consult:  no    Medications:  Scheduled Medications   Medication Dose Frequency    insulin regular  2-9 Units 4X/DAY ACHS    enoxaparin (LOVENOX) injection  40 mg DAILY AT 1800    Nozin nasal  swab  1 Applicator BID    magnesium sulfate  1 g DAILY    K+ Scale: Goal of  4.5  1 Each Q6HRS    metoprolol tartrate  12.5 mg BID    Followed by    [START ON 9/14/2024] metoprolol tartrate  25 mg BID    aspirin  81 mg DAILY    Pharmacy Consult Request  1 Each PHARMACY TO DOSE    acetaminophen  1,000 mg Q6HRS    senna-docusate  2 Tablet BID    And    polyethylene glycol/lytes  1 Packet DAILY    And    [START ON 9/14/2024] magnesium hydroxide  30 mL DAILY    omeprazole  20 mg DAILY        Exam:   Physical Exam  Vitals and nursing note reviewed.   Constitutional:       General: She is not in acute distress.     Appearance: She is normal weight. She is not ill-appearing.   HENT:      Head: Normocephalic and atraumatic.      Right Ear: External ear normal.      Left Ear: External ear normal.      Nose: Nose normal.      Mouth/Throat:      Mouth: Mucous membranes are moist.   Eyes:      Pupils: Pupils are equal, round, and reactive to light.   Cardiovascular:      Rate and Rhythm: Normal rate and regular rhythm.      Pulses: Normal pulses.   Pulmonary:      Effort: Pulmonary effort is normal.   Abdominal:      General: Abdomen is flat. There is no distension.      Palpations: Abdomen is soft.   Musculoskeletal:         General: Normal range of motion.      Cervical back: Normal range of motion and neck supple.   Skin:     General: Skin is warm and dry.      Capillary Refill: Capillary refill takes less than 2 seconds.      Comments: Sternum-CDI   Neurological:      General: No focal deficit present.      Mental Status: She is alert and oriented to person, place, and time. Mental status is at baseline.      Sensory: No sensory deficit.      Motor: No weakness.   Psychiatric:         Mood and Affect: Mood normal.         Behavior: Behavior normal.         Thought Content: Thought content normal.         Cardiac Medications:    ASA - Yes    Plavix - No; contraindicated because of Other Aneurysm repair    Post-operative Beta Blockers - Yes    Ace/ARB- No; contraindicated because of Normal  EF    Statin - No; contraindicated because of No CAD    Aldactone- No; contraindicated because of Normal EF    SGLT2i-  No contraindicated because of No; contraindicated because of Normal EF    Ejection Fraction:  60%    Telemetry:   9/13 SR    Assessment/Plan:  POD 1 Extubated.  Off gtts.  SR. Neuro intact.  Wounds CDI.  Abdomen soft nontender.  Mild dizziness this AM.  MAP appropriate.  Cr 0.6 Hgb 10.9.  Moderate output from mediastinals overnight.  Plan: Keep mediastinals and andre today for strict I/Os.  Diurese when bp improved.  Wean oxygen.    Disposition:  TBD

## 2024-09-13 NOTE — PROGRESS NOTES
Critical Care Progress Note    Date of admission  9/12/2024    Chief Complaint  44 y.o. female with PMH of DLD, HTM, ascending aortic aneurysm, and aortic insufficiency who presented 9/12/2024 s/p ascending aorta repair.     EF Normal   No AI or AS per CAR  on minimal clevidipine  8.0 ETT with uncomplicated airway    Hospital Course  9/13: POD #1: extubated and off drips. On minimal oxygen.    Interval Problem Update  Reviewed last 24 hour events:  --Admitted to ICU following aortic repair    Review of Systems  Review of Systems   Constitutional: Negative.    Cardiovascular:  Positive for chest pain.   Gastrointestinal:  Positive for nausea.   Musculoskeletal: Negative.    Skin: Negative.    Neurological:  Positive for dizziness.        Vital Signs for last 24 hours   Temp:  [37 °C (98.6 °F)-37.7 °C (99.9 °F)] 37.4 °C (99.3 °F)  Pulse:  [50-85] 73  Resp:  [7-38] 13  BP: ()/(50-66) 102/57  SpO2:  [87 %-99 %] 93 %    Hemodynamic parameters for last 24 hours  CVP:  [6 MM HG-23 MM HG] 14 MM HG    Respiratory Information for the last 24 hours       Physical Exam   Physical Exam  Vitals and nursing note reviewed.   Constitutional:       Appearance: Normal appearance.   HENT:      Head: Normocephalic and atraumatic.   Eyes:      Pupils: Pupils are equal, round, and reactive to light.   Cardiovascular:      Rate and Rhythm: Normal rate and regular rhythm.      Heart sounds: Murmur heard.   Pulmonary:      Effort: Pulmonary effort is normal.      Breath sounds: Normal breath sounds.   Abdominal:      General: Abdomen is flat. Bowel sounds are normal.   Skin:     General: Skin is warm.   Neurological:      General: No focal deficit present.      Mental Status: She is alert and oriented to person, place, and time.   Psychiatric:         Mood and Affect: Mood normal.         Medications  Current Facility-Administered Medications   Medication Dose Route Frequency Provider Last Rate Last Admin    insulin regular (HumuLIN  R,NovoLIN R) injection  2-9 Units Subcutaneous 4X/DAY ARJUN Lerma M.D.        And    dextrose 50% (D50W) injection 25 g  25 g Intravenous Q15 MIN PRN Mago Lerma M.D.        Respiratory Therapy Consult   Nebulization Continuous RT Sebastian Brizuela, A.P.R.N.        NS infusion   Intravenous Continuous Sebastian Brizuela, A.P.R.N. 10 mL/hr at 09/12/24 1142 New Bag at 09/12/24 1142    NS infusion   Intravenous Continuous Sebastian Brizuela, A.P.R.N. 30 mL/hr at 09/12/24 1141 New Bag at 09/12/24 1141    electrolyte-A (Plasmalyte-A) infusion   Intravenous PRN Sebastian Brizuela, A.P.R.N.   Stopped at 09/13/24 0310    enoxaparin (Lovenox) inj 40 mg  40 mg Subcutaneous DAILY AT 1800 Sebastian Brizuela, A.P.R.N.        Nozin nasal  swab  1 Applicator Each Nostril BID Sebastian Brizuela, A.P.R.N.   1 Applicator at 09/13/24 0900    calcium CHLORIDE 10 % 1,000 mg in dextrose 5% 100 mL IVPB  1,000 mg Intravenous Once PRN Sebastian Brizuela, A.P.R.N.        magnesium sulfate in D5W IVPB premix 1 g  1 g Intravenous DAILY Sebastian Brizuela, A.P.R.N.   Stopped at 09/13/24 0655    metoprolol tartrate (Lopressor) tablet 12.5 mg  12.5 mg Oral BID Sebastian Brizuela, A.P.R.N.        Followed by    [START ON 9/14/2024] metoprolol tartrate (Lopressor) tablet 25 mg  25 mg Oral BID Sebastian Brizuela, A.P.R.N.        aspirin EC tablet 81 mg  81 mg Oral DAILY Sebastian Brizuela, A.P.R.N.   81 mg at 09/13/24 0549    clevidipine (Cleviprex) IV emulsion  0-21 mg/hr Intravenous Continuous Sebastian Brizuela, A.P.R.N.   Stopped at 09/12/24 1230    nitroglycerin 50 mg in D5W 250 ml infusion  0-100 mcg/min Intravenous Continuous RAYSA Frazier        Pharmacy Consult Request ...Pain Management Review 1 Each  1 Each Other PHARMACY TO DOSE RAYSA Frazier        acetaminophen (Tylenol) tablet 1,000 mg  1,000 mg Oral Q6HRS RAYSA Frazier   1,000 mg at 09/13/24 1156    Followed by    [START ON 9/22/2024] acetaminophen (Tylenol) tablet 1,000 mg  1,000 mg Oral  Q6HRS PRN Sebastian Brizuela, A.P.R.N.        oxyCODONE immediate-release (Roxicodone) tablet 5 mg  5 mg Oral Q3HRS PRN Sebastian Brizuela, A.P.R.N.   5 mg at 09/12/24 2200    Or    oxyCODONE immediate release (Roxicodone) tablet 10 mg  10 mg Oral Q3HRS PRN Sebastian Brizuela, A.P.R.N.   10 mg at 09/13/24 1001    Or    fentaNYL (Sublimaze) injection 50 mcg  50 mcg Intravenous Q3HRS PRN Sebastian Brizuela, A.P.R.N.   50 mcg at 09/13/24 1051    traMADol (Ultram) 50 MG tablet 50 mg  50 mg Oral Q4HRS PRN Sebastian Brizuela, A.P.R.N.   50 mg at 09/12/24 1632    dexmedetomidine (Precedex) 400 mcg/100mL infusion  0-1.5 mcg/kg/hr (Ideal) Intravenous Continuous Sebastian Brizuela, A.P.R.N.   Stopped at 09/13/24 0407    sodium bicarbonate 8.4 % injection 50 mEq  50 mEq Intravenous Q HOUR PRN Sebastian Brizuela, A.P.R.N.        ondansetron (Zofran) syringe/vial injection 8 mg  8 mg Intravenous Q6HRS PRN Sebastian Brizuela, A.P.R.N.   8 mg at 09/12/24 1408    Or    prochlorperazine (Compazine) injection 10 mg  10 mg Intravenous Q6HRS PRN Sebastian Brizuela, A.P.R.N.   10 mg at 09/12/24 1816    acetaminophen (Tylenol) tablet 650 mg  650 mg Oral Q4HRS PRN Sebastian Brizuela, A.P.R.N.        Or    acetaminophen (Tylenol) suppository 650 mg  650 mg Rectal Q4HRS PRN Sebastian Brizuela, A.P.R.N.        senna-docusate (Pericolace Or Senokot S) 8.6-50 MG per tablet 2 Tablet  2 Tablet Oral BID Sebastian Brizuela, A.P.R.N.   2 Tablet at 09/13/24 0549    And    polyethylene glycol/lytes (Miralax) Packet 1 Packet  1 Packet Oral DAILY Sebastian Brizuela A.P.R.N.   1 Packet at 09/13/24 0600    And    [START ON 9/14/2024] magnesium hydroxide (Milk Of Magnesia) suspension 30 mL  30 mL Oral DAILY Sebastian Brizuela A.P.R.N.        And    bisacodyl (Dulcolax) suppository 10 mg  10 mg Rectal QDAY PRN Sebastian Brizuela A.P.R.N.        omeprazole (PriLOSEC) capsule 20 mg  20 mg Oral DAILY Sebastian Brizuela A.P.R.N.   20 mg at 09/13/24 0549    mag hydrox-al hydrox-simeth (Maalox Plus Es Or Mylanta Ds) suspension 30 mL  30  mL Oral Q4HRS PRN RAYSA Frazier        norepinephrine (Levophed) 8 mg in 250 mL NS infusion (premix)  0-1 mcg/kg/min (Ideal) Intravenous Continuous RAYSA Frazier   Stopped at 09/12/24 7297    MD Alert...Pharmacy to initiate transition from insulin infusion to subcutaneous insulin for cardiothoracic surgery 1 Each  1 Each Other Continuous RAYSA Frazier        albuterol (Proventil) 2.5mg/0.5ml nebulizer solution 2.5 mg  2.5 mg Nebulization Q4H PRN (RT) Anand [place] at [place], D.OCheri           Fluids    Intake/Output Summary (Last 24 hours) at 9/13/2024 1235  Last data filed at 9/13/2024 0600  Gross per 24 hour   Intake 1984.54 ml   Output 1072 ml   Net 912.54 ml       Laboratory  Recent Labs     09/12/24  1112 09/12/24  1203 09/12/24  1412   ISTATAPH 7.327* 7.338* 7.346*   ISTATAPCO2 41.0* 36.5 36.3   ISTATAPO2 322* 158* 130*   ISTATATCO2 23 21 21   [id number] 100* 99 99   ISTATARTHCO3 21.5 19.6 19.9   ISTATARTBE -4 -6* -5*   ISTATTEMP 36.9 C 36.9 C 37.3 C   ISTATFIO2 100 70 40   ISTATSPEC Arterial Arterial Arterial   ISTATAPHTC 7.328* 7.340* 7.342*   [id number] 321* 158* 132*         Recent Labs     09/12/24  1112 09/12/24  1719 09/12/24  2300 09/13/24  0304 09/13/24  0455   SODIUM  --   --   --  136  --    POTASSIUM 5.0   < > 4.3 4.2 4.3   CHLORIDE  --   --   --  106  --    CO2  --   --   --  21  --    BUN  --   --   --  11  --    CREATININE  --   --   --  0.60  --    MAGNESIUM 2.9*  --   --   --   --    CALCIUM  --   --   --  7.7*  --     < > = values in this interval not displayed.     Recent Labs     09/13/24  0304   GLUCOSE 142*     Recent Labs     09/13/24  0304   WBC 21.2*     Recent Labs     09/12/24  1112 09/13/24  0304   RBC  --  3.41*   HEMOGLOBIN 12.4 10.9*   HEMATOCRIT 35.6* 31.5*   PLATELETCT 152* 179   PROTHROMBTM 15.6*  --    APTT 26.3  --    INR 1.23*  --        Imaging  X-Ray:  I have personally reviewed the images and compared with prior images.    Assessment/Plan  * Ascending  aorta dilation (HCC)- (present on admission)  Assessment & Plan  Now S/P tube graft ascending aorta repair. Hemodynamically stable. Overnight required minimal amounts of norepinephrine.   --Monitor and optimize hemodynamics  --Goals SBP< 120 mmHg, MAP> 65  --Would consider diuresis today given +1.5L        Mild intermittent asthma  Assessment & Plan  Not in acute exacerbation  --Albuterol PRN    Encounter for weaning from ventilator (HCC)  Assessment & Plan  Extubated. Will need ongoing work with IS.   --IS  --Titrate supplemental O2 as able    Obesity (BMI 30.0-34.9)- (present on admission)  Assessment & Plan  Weight loss counseling when appropriate    Dyslipidemia- (present on admission)  Assessment & Plan  Continue statin    Essential hypertension- (present on admission)  Assessment & Plan  Takes Metop XL 12.5mg at home.  --Will reintroduce BB when appropriate  --Will likely need additional antihypertensive prior to discharge         VTE:  Contraindicated  Ulcer: Not Indicated  Lines: Central Line  Ongoing indication addressed    I have performed a physical exam and reviewed and updated ROS and Plan today (9/13/2024). In review of yesterday's note (9/12/2024), there are no changes except as documented above.     Discussed patient condition and risk of morbidity and/or mortality with Family  The patient remains critically ill.  Critical care time = 35 minutes in directly providing and coordinating critical care and extensive data review.  No time overlap and excludes procedures.

## 2024-09-13 NOTE — DISCHARGE PLANNING
Discharge Appointments/outpatient referrals/ and  set up:    Cardiac surgery follow up appointment made for 4-5 weeks out    Hospital discharge team/schedulers called for cardiology f/u appointment for MD or APRN within 3-4 weeks if possible.    Aortic surveillance program/vascular medicine referral needed, orders not placed, as she was previously referred and has a f/u appointment already.    Anticoagulation referral/ coumadin clinic referral not needed and orders not placed     INR draws are not indicated for AAA repair procedure and 81 mg ASA.    Will await PT/OT notes and medical progression to determine further discharge needs.

## 2024-09-13 NOTE — CARE PLAN
The patient is Stable - Low risk of patient condition declining or worsening    Shift Goals  Clinical Goals: SBP , UO 30ml/hr, pain control, sit edge of bed before midnight, up to chair in morning  Patient Goals: pain control  Family Goals: rest    Progress made toward(s) clinical / shift goals:  all goals met    Patient is not progressing towards the following goals: NA      Problem: Pain - Standard  Goal: Alleviation of pain or a reduction in pain to the patient’s comfort goal  Outcome: Progressing  Note: Oxycodone 10mg provided Q3 hours overnight, one dose of IV fentanyl for breakthrough pain given.      Problem: Day of surgery post CABG/Heart valve replacement  Goal: Stabilization in immediate post op period  Outcome: Progressing  Intervention: VS q 15 min x 4 hours, then q 1 hour. Include temperature immediately upon arrival. Check CO/CI q 2-4 hours and PRN  Note: Vitals q15 via arterial line  Intervention: If radial artery used, elevate arm, no BP checks or needle sticks from affected arm, monitor ulnar pulse and capillary refill  Note: NA  Intervention: First post op hour labs and EKG per order  Note: completed  Intervention: Serum K q 6 hours x 24 hours.  ABG and CBC prn.  Note: completed  Intervention: Initiate post cardiac insulin infusion protocol orders for FSBS greater than 140 and check frequency per protocol  Note: Insulin infusion turned off at 2000, BG between 127-149  Intervention: FSBS frequency as per Cardiac Surgery Insulin Drip Protocol  Note: BG checked q2 hour once within range  Intervention: For patients on Beta Blockers: verify dose given prior to surgery or within 6 hours after arrival to the unit  Note: 12.5mg metoprolol given in pre op  Intervention: Chest tube to 20 cm suction, record CT drainage with VS, and check for air leak  Note: Air leak absent, maintained suction  Intervention: For CT drainage >300 mL in first hour post op and/or 150 mL in subsequent hours: Stat platelets,  PT, INR, TEG, iSTAT, and H&H per order  Note: NA  Intervention: Titrate and wean off vasoactive drips per patient's condition and per MD order while maintaining SBP  mmHg per MD order  Note: Levophed turned off for majority of shift  Intervention: VAP protocol in place  Note: Brushed teeth before bed  Intervention: Wean from Vent per protocol (see protocol), extubation goal within 6 hours post op  Note: Extubated 9/12 @ 1447  Intervention: IS q 1 hour while awake post extubation  Note: Best 1000  Intervention: Bedrest until extubated and groin lines out  Note: Sat edge of bed at start of night shift  Intervention: Dangle within 4 hours post extubation  Note: completed  Intervention: Up in chair 4 hours, day of extubation  Note: completed  Intervention: Maintain all original surgical dressings per provider orders and specifications  Note: Island dressing removed this morning with bath  Intervention: Clear liquids post extubation, order carbohydrate free (post cardiac surgery) diet, advance as tolerated  Note: Tolerated clears overnight  Intervention: Discontinue [place] slava and arterial line 12-18 hours post op if hemodynamically stable and off vasoactive drips  Note: Arterial line removed 0500 this morning  Intervention: A-Fib and DVT prophylaxis per MD order or contraindications documented (refer to DVT/VTE problem on Care Plan)  Note: SCDs worn overnight  Intervention: Amiodarone protocol per MD order  Note: NA

## 2024-09-13 NOTE — PROGRESS NOTES
4 Eyes Skin Assessment Completed by Jane RN and PASTORA Fisher.    Head WDL  Ears WDL  Nose WDL  Mouth WDL  Neck WDL  Breast/Chest Incision  Shoulder Blades WDL  Spine WDL  (R) Arm/Elbow/Hand Edema  (L) Arm/Elbow/Hand WDL  Abdomen WDL  Groin WDL  Scrotum/Coccyx/Buttocks WDL  (R) Leg WDL  (L) Leg WDL  (R) Heel/Foot/Toe WDL  (L) Heel/Foot/Toe WDL          Devices In Places ECG, Blood Pressure Cuff, Pulse Ox, Flowers, and SCD's      Interventions In Place NC W/Ear Foams, Waffle Overlay, and Low Air Loss Mattress    Possible Skin Injury No    Pictures Uploaded Into Epic N/A  Wound Consult Placed N/A  RN Wound Prevention Protocol Ordered Yes

## 2024-09-13 NOTE — PROGRESS NOTES
Raquel BEST notified of patients uncontrolled pain after giving all available PRN medications, one time dose of Dilaudid ordered.

## 2024-09-14 LAB
ANION GAP SERPL CALC-SCNC: 10 MMOL/L (ref 7–16)
BUN SERPL-MCNC: 12 MG/DL (ref 8–22)
CALCIUM SERPL-MCNC: 8.4 MG/DL (ref 8.5–10.5)
CHLORIDE SERPL-SCNC: 102 MMOL/L (ref 96–112)
CO2 SERPL-SCNC: 22 MMOL/L (ref 20–33)
CREAT SERPL-MCNC: 0.5 MG/DL (ref 0.5–1.4)
ERYTHROCYTE [DISTWIDTH] IN BLOOD BY AUTOMATED COUNT: 45 FL (ref 35.9–50)
GFR SERPLBLD CREATININE-BSD FMLA CKD-EPI: 118 ML/MIN/1.73 M 2
GLUCOSE BLD STRIP.AUTO-MCNC: 128 MG/DL (ref 65–99)
GLUCOSE SERPL-MCNC: 125 MG/DL (ref 65–99)
HCT VFR BLD AUTO: 31.1 % (ref 37–47)
HGB BLD-MCNC: 10.4 G/DL (ref 12–16)
MCH RBC QN AUTO: 31.8 PG (ref 27–33)
MCHC RBC AUTO-ENTMCNC: 33.4 G/DL (ref 32.2–35.5)
MCV RBC AUTO: 95.1 FL (ref 81.4–97.8)
PLATELET # BLD AUTO: 150 K/UL (ref 164–446)
PMV BLD AUTO: 9.4 FL (ref 9–12.9)
POTASSIUM SERPL-SCNC: 4.1 MMOL/L (ref 3.6–5.5)
RBC # BLD AUTO: 3.27 M/UL (ref 4.2–5.4)
SODIUM SERPL-SCNC: 134 MMOL/L (ref 135–145)
WBC # BLD AUTO: 17.5 K/UL (ref 4.8–10.8)

## 2024-09-14 PROCEDURE — 82962 GLUCOSE BLOOD TEST: CPT

## 2024-09-14 PROCEDURE — 700102 HCHG RX REV CODE 250 W/ 637 OVERRIDE(OP): Performed by: NURSE PRACTITIONER

## 2024-09-14 PROCEDURE — 97530 THERAPEUTIC ACTIVITIES: CPT

## 2024-09-14 PROCEDURE — 97162 PT EVAL MOD COMPLEX 30 MIN: CPT

## 2024-09-14 PROCEDURE — 770020 HCHG ROOM/CARE - TELE (206)

## 2024-09-14 PROCEDURE — 94669 MECHANICAL CHEST WALL OSCILL: CPT

## 2024-09-14 PROCEDURE — 99233 SBSQ HOSP IP/OBS HIGH 50: CPT | Performed by: EMERGENCY MEDICINE

## 2024-09-14 PROCEDURE — 700111 HCHG RX REV CODE 636 W/ 250 OVERRIDE (IP): Performed by: NURSE PRACTITIONER

## 2024-09-14 PROCEDURE — A9270 NON-COVERED ITEM OR SERVICE: HCPCS | Performed by: NURSE PRACTITIONER

## 2024-09-14 PROCEDURE — 99024 POSTOP FOLLOW-UP VISIT: CPT | Performed by: NURSE PRACTITIONER

## 2024-09-14 PROCEDURE — 85027 COMPLETE CBC AUTOMATED: CPT

## 2024-09-14 PROCEDURE — 80048 BASIC METABOLIC PNL TOTAL CA: CPT

## 2024-09-14 RX ORDER — FUROSEMIDE 10 MG/ML
40 INJECTION INTRAMUSCULAR; INTRAVENOUS 2 TIMES DAILY
Status: DISCONTINUED | OUTPATIENT
Start: 2024-09-14 | End: 2024-09-16 | Stop reason: HOSPADM

## 2024-09-14 RX ORDER — POTASSIUM CHLORIDE 1500 MG/1
20 TABLET, EXTENDED RELEASE ORAL 2 TIMES DAILY
Status: DISCONTINUED | OUTPATIENT
Start: 2024-09-14 | End: 2024-09-16 | Stop reason: HOSPADM

## 2024-09-14 RX ADMIN — SENNOSIDES AND DOCUSATE SODIUM 2 TABLET: 50; 8.6 TABLET ORAL at 06:28

## 2024-09-14 RX ADMIN — POTASSIUM CHLORIDE 20 MEQ: 1500 TABLET, EXTENDED RELEASE ORAL at 16:47

## 2024-09-14 RX ADMIN — ALUMINUM HYDROXIDE, MAGNESIUM HYDROXIDE,SIMETHICONE 30 ML: 400; 400; 40 LIQUID ORAL at 09:43

## 2024-09-14 RX ADMIN — ACETAMINOPHEN 1000 MG: 500 TABLET ORAL at 11:35

## 2024-09-14 RX ADMIN — ASPIRIN 81 MG: 81 TABLET, COATED ORAL at 06:28

## 2024-09-14 RX ADMIN — Medication 1 APPLICATOR: at 21:00

## 2024-09-14 RX ADMIN — Medication 1 APPLICATOR: at 09:00

## 2024-09-14 RX ADMIN — MAGNESIUM SULFATE IN DEXTROSE 1 G: 10 INJECTION, SOLUTION INTRAVENOUS at 06:32

## 2024-09-14 RX ADMIN — SENNOSIDES AND DOCUSATE SODIUM 2 TABLET: 50; 8.6 TABLET ORAL at 16:47

## 2024-09-14 RX ADMIN — ACETAMINOPHEN 1000 MG: 500 TABLET ORAL at 06:26

## 2024-09-14 RX ADMIN — OMEPRAZOLE 20 MG: 20 CAPSULE, DELAYED RELEASE ORAL at 06:25

## 2024-09-14 RX ADMIN — TRAMADOL HYDROCHLORIDE 50 MG: 50 TABLET ORAL at 13:54

## 2024-09-14 RX ADMIN — METOPROLOL TARTRATE 25 MG: 25 TABLET, FILM COATED ORAL at 16:52

## 2024-09-14 RX ADMIN — OXYCODONE HYDROCHLORIDE 10 MG: 10 TABLET ORAL at 00:59

## 2024-09-14 RX ADMIN — FUROSEMIDE 40 MG: 10 INJECTION INTRAMUSCULAR; INTRAVENOUS at 08:43

## 2024-09-14 RX ADMIN — TRAMADOL HYDROCHLORIDE 50 MG: 50 TABLET ORAL at 19:51

## 2024-09-14 RX ADMIN — ACETAMINOPHEN 1000 MG: 500 TABLET ORAL at 16:42

## 2024-09-14 RX ADMIN — METOPROLOL TARTRATE 25 MG: 25 TABLET, FILM COATED ORAL at 06:28

## 2024-09-14 RX ADMIN — ONDANSETRON 8 MG: 2 INJECTION INTRAMUSCULAR; INTRAVENOUS at 10:09

## 2024-09-14 RX ADMIN — FUROSEMIDE 40 MG: 10 INJECTION INTRAMUSCULAR; INTRAVENOUS at 16:54

## 2024-09-14 RX ADMIN — OXYCODONE HYDROCHLORIDE 10 MG: 10 TABLET ORAL at 10:13

## 2024-09-14 RX ADMIN — SUMATRIPTAN SUCCINATE 25 MG: 50 TABLET ORAL at 15:44

## 2024-09-14 RX ADMIN — SUMATRIPTAN SUCCINATE 25 MG: 50 TABLET ORAL at 04:43

## 2024-09-14 RX ADMIN — ENOXAPARIN SODIUM 40 MG: 100 INJECTION SUBCUTANEOUS at 16:53

## 2024-09-14 RX ADMIN — OXYCODONE HYDROCHLORIDE 10 MG: 10 TABLET ORAL at 21:00

## 2024-09-14 RX ADMIN — POTASSIUM CHLORIDE 20 MEQ: 1500 TABLET, EXTENDED RELEASE ORAL at 08:43

## 2024-09-14 ASSESSMENT — FIBROSIS 4 INDEX
FIB4 SCORE: 1.24
FIB4 SCORE: 1.24

## 2024-09-14 ASSESSMENT — COGNITIVE AND FUNCTIONAL STATUS - GENERAL
MOVING TO AND FROM BED TO CHAIR: A LITTLE
CLIMB 3 TO 5 STEPS WITH RAILING: A LITTLE
SUGGESTED CMS G CODE MODIFIER MOBILITY: CK
TURNING FROM BACK TO SIDE WHILE IN FLAT BAD: A LITTLE
WALKING IN HOSPITAL ROOM: A LITTLE
STANDING UP FROM CHAIR USING ARMS: A LITTLE
MOVING FROM LYING ON BACK TO SITTING ON SIDE OF FLAT BED: A LITTLE
MOBILITY SCORE: 18

## 2024-09-14 ASSESSMENT — GAIT ASSESSMENTS
DEVIATION: DECREASED HEEL STRIKE;DECREASED TOE OFF;OTHER (COMMENT)
GAIT LEVEL OF ASSIST: SUPERVISED
DISTANCE (FEET): 150
ASSISTIVE DEVICE: FRONT WHEEL WALKER

## 2024-09-14 ASSESSMENT — PAIN DESCRIPTION - PAIN TYPE
TYPE: ACUTE PAIN;SURGICAL PAIN
TYPE: ACUTE PAIN;SURGICAL PAIN
TYPE: ACUTE PAIN
TYPE: SURGICAL PAIN;ACUTE PAIN
TYPE: ACUTE PAIN
TYPE: ACUTE PAIN;SURGICAL PAIN
TYPE: ACUTE PAIN

## 2024-09-14 ASSESSMENT — PATIENT HEALTH QUESTIONNAIRE - PHQ9
1. LITTLE INTEREST OR PLEASURE IN DOING THINGS: NOT AT ALL
2. FEELING DOWN, DEPRESSED, IRRITABLE, OR HOPELESS: NOT AT ALL
SUM OF ALL RESPONSES TO PHQ9 QUESTIONS 1 AND 2: 0

## 2024-09-14 NOTE — PROGRESS NOTES
Cardiovascular Surgery Progress Note    Name: Princess Machado  MRN: [id number]  : 1980  Admit Date: 2024  5:10 AM  2 Days Post-Op     Procedure:  Procedure(s) and Anesthesia Type:     * ASCENDING AORTIC ANEURYSM REPAIR, TRANSESOPHAGEAL ECHOCARDIOGRAM - General     * ECHOCARDIOGRAM, TRANSESOPHAGEAL, INTRAOPERATIVE - General    Vitals:  Vitals:    24 0400 24 0500 24 0600 24 06   BP: 102/59 101/58 116/68 109/56   Pulse: 80 79 81 78   Resp:       Temp: 37.8 °C (100 °F)      TempSrc: Bladder      SpO2: 93% 93% 93% 92%   Weight:   106 kg (232 lb 12.9 oz)    Height:          Temp (24hrs), Av.9 °C (100.3 °F), Min:37.8 °C (100 °F), Max:38.3 °C (100.9 °F)      Respiratory:    Respiration: 16, Pulse Oximetry: 92 %       Fluids:    Intake/Output Summary (Last 24 hours) at 2024 0801  Last data filed at 2024 0600  Gross per 24 hour   Intake 680 ml   Output 1300 ml   Net -620 ml     Admit weight: Weight: 104 kg (229 lb 4.5 oz)  Current weight: Weight: 106 kg (232 lb 12.9 oz) (24 0600)    Labs:  Recent Labs     24  1112 24  0304 24  0105   WBC  --  21.2* 17.5*   RBC  --  3.41* 3.27*   HEMOGLOBIN 12.4 10.9* 10.4*   HEMATOCRIT 35.6* 31.5* 31.1*   MCV  --  92.4 95.1   MCH  --  32.0 31.8   MCHC  --  34.6 33.4   RDW  --  43.1 45.0   PLATELETCT 152* 179 150*   MPV  --  9.4 9.4     Recent Labs     24  0304 24  0455 24  0105   SODIUM 136  --  134*   POTASSIUM 4.2 4.3 4.1   CHLORIDE 106  --  102   CO2 21  --  22   GLUCOSE 142*  --  125*   BUN 11  --  12   CREATININE 0.60  --  0.50   CALCIUM 7.7*  --  8.4*     Recent Labs     24  1112   APTT 26.3   INR 1.23*       HgbA1c:  5.1    Diabetic Educator Consult:  no    Medications:  Scheduled Medications   Medication Dose Frequency    furosemide  40 mg BID    potassium chloride SA  20 mEq BID    insulin regular  2-9 Units 4X/DAY ACHS    enoxaparin (LOVENOX) injection  40 mg DAILY AT 1800     Nozin nasal  swab  1 Applicator BID    metoprolol tartrate  25 mg BID    aspirin  81 mg DAILY    Pharmacy Consult Request  1 Each PHARMACY TO DOSE    acetaminophen  1,000 mg Q6HRS    senna-docusate  2 Tablet BID    And    polyethylene glycol/lytes  1 Packet DAILY    And    magnesium hydroxide  30 mL DAILY    omeprazole  20 mg DAILY        Exam:   Physical Exam  Vitals and nursing note reviewed.   Constitutional:       General: She is not in acute distress.     Appearance: She is normal weight. She is not ill-appearing.   HENT:      Head: Normocephalic and atraumatic.      Right Ear: External ear normal.      Left Ear: External ear normal.      Nose: Nose normal.      Mouth/Throat:      Mouth: Mucous membranes are moist.   Eyes:      Pupils: Pupils are equal, round, and reactive to light.   Cardiovascular:      Rate and Rhythm: Normal rate and regular rhythm.      Pulses: Normal pulses.   Pulmonary:      Effort: Pulmonary effort is normal.   Abdominal:      General: Abdomen is flat. There is no distension.      Palpations: Abdomen is soft.   Musculoskeletal:         General: Normal range of motion.      Cervical back: Normal range of motion and neck supple.   Skin:     General: Skin is warm and dry.      Capillary Refill: Capillary refill takes less than 2 seconds.      Comments: Sternum-CDI   Neurological:      General: No focal deficit present.      Mental Status: She is alert and oriented to person, place, and time. Mental status is at baseline.      Sensory: No sensory deficit.      Motor: No weakness.   Psychiatric:         Mood and Affect: Mood normal.         Behavior: Behavior normal.         Thought Content: Thought content normal.         Cardiac Medications:    ASA - Yes    Plavix - No; contraindicated because of Other Aneurysm repair    Post-operative Beta Blockers - Yes    Ace/ARB- No; contraindicated because of Normal EF    Statin - No; contraindicated because of No CAD    Aldactone- No;  contraindicated because of Normal EF    SGLT2i-  No contraindicated because of No; contraindicated because of Normal EF    Ejection Fraction:  60%    Telemetry:   9/13 SR  9/14 SR    Assessment/Plan:  POD 1 Extubated.  Off gtts.  SR. Neuro intact.  Wounds CDI.  Abdomen soft nontender.  Mild dizziness this AM.  MAP appropriate.  Cr 0.6 Hgb 10.9.  Moderate output from mediastinals overnight.  Plan: Keep mediastinals and andre today for strict I/Os.  Diurese when bp improved.  Wean oxygen.    POD 2 HDS. SR. 1LNC. Wounds CDI. Abdomen soft nontender.  Cr 0.5 Hgb 10.4. Pacer wires removed.  Plan: DC mediastinal tubes and andre.  Wean oxygen. Transfer to tele.      Disposition:  TBD

## 2024-09-14 NOTE — CARE PLAN
Problem: Pain - Standard  Goal: Alleviation of pain or a reduction in pain to the patient’s comfort goal  Outcome: Progressing  Note: Improving with both pharm and nonpharm measures      Problem: Post Op Day 1 CABG/Heart Valve Replacement  Goal: Optimal care of the post op CABG/heart valve replacement Post Op Day 1  Outcome: Progressing  Intervention: All valve patients: PT/INR daily  Note: Not a valve pt   Intervention: Ambulate in am if stable. First ambulation 25 feet. Repeat x 3 as tolerated  Note: First ambulation today. Made just past door and back to chair   Intervention: Discontinue andre catheter unless documented reason for continuation  Note: Keep in place per provider   Intervention: [facility] trained RN to remove chest tubes if ordered by provider  Note: No order to remove yet   Intervention: IS q 1 hour while awake and record best IS volume  Note: Needs encouragement   Intervention: Transfer to [facility], begin VS q 4 hours  Note: Not ready for transfer yet    The patient is Stable - Low risk of patient condition declining or worsening    Shift Goals  Clinical Goals: SBP , UO 30ml/hr, pain control, sit edge of bed before midnight, up to chair in morning  Patient Goals: pain control  Family Goals: rest    Progress made toward(s) clinical / shift goals:     Patient is not progressing towards the following goals:

## 2024-09-14 NOTE — THERAPY
Physical Therapy   Initial Evaluation     Patient Name: Princess Machado  Age:  44 y.o., Sex:  female  Medical Record #: [id number]  Today's Date: 9/14/2024     Precautions  Precautions: (P) Fall Risk;Cardiac Precautions (See Comments);Sternal Precautions (See Comments)    Assessment  Patient is 44 y.o. female with a diagnosis of ascending aortic aneurysm s/p repair 9/12/24. PMH includes HTN, DLD, asthma, migraine. Pt reports she lives with spouse and two children (Ages 9, 11), and is independent with self care and mobility at baseline sans AD. 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 Pt was responsive to education in [facility] packet, and demonstrating good attention to sternal precautions during mobility. She required min A with bed mobility, but states she does have a recliner to use for sleep if needed. She performed transfers and ambulation with FWW and supv. She will continue to benefit from skilled PT while she remains in the acute setting to address deficits with mobility, and to assess safety on stairs. Anticipate pt will be able to D/C home once she demonstrates safety with stairs and bed mobility.    Plan    Physical Therapy Initial Treatment Plan   Treatment Plan : (P) Bed Mobility, Gait Training, Neuro Re-Education / Balance, Stair Training, Therapeutic Activities, Therapeutic Exercise  Treatment Frequency: (P) 5 Times per Week  Duration: (P) Until Therapy Goals Met    DC Equipment Recommendations: (P) Front-Wheel Walker  Discharge Recommendations: (P) Anticipate that the patient will have no further physical therapy needs after discharge from the hospital       Subjective    Pt stating she is feeling better today.     Objective       09/14/24 4539   Precautions   Precautions Fall Risk;Cardiac Precautions (See Comments);Sternal Precautions (See Comments)   Pain 0 - 10 Group   Location Sternum   Therapist Pain Assessment 3;Post Activity Pain Same as Prior to Activity;Nurse Notified   Prior  Living Situation   Prior Services Home-Independent   Housing / Facility 1 Story House   Steps Into Home 2   Bathroom Set up Walk In Shower;Built-In Shower Chair;Grab Bars   Equipment Owned Grab Bar(s) In Tub / Shower   Lives with - Patient's Self Care Capacity Spouse;Child Less than 18 Years of Age   Comments children ages 9,11   Prior Level of Functional Mobility   Bed Mobility Independent   Transfer Status Independent   Ambulation Independent   Ambulation Distance community   Assistive Devices Used None   Stairs Independent   Comments working running her business for construction/manufacturing   History of Falls   History of Falls No   Cognition    Cognition / Consciousness WDL   Comments pleasant, cooperative   Strength Lower Body   Comments WFL for mobility   Sensation Lower Body   Comments no N/T reported   Other Treatments   Other Treatments Provided activity level, RPE education   Balance Assessment   Sitting Balance (Static) Fair +   Sitting Balance (Dynamic) Fair +   Standing Balance (Static) Fair +   Standing Balance (Dynamic) Fair +   Weight Shift Sitting Good   Weight Shift Standing Fair   Comments with FWW   Bed Mobility    Supine to Sit Minimal Assist   Scooting Minimal Assist   Rolling Minimum Assist to Lt.   Comments nearly flat bed   Gait Analysis   Gait Level Of Assist Supervised   Assistive Device Front Wheel Walker   Distance (Feet) 150   # of Times Distance was Traveled 1   Deviation Decreased Heel Strike;Decreased Toe Off;Other (Comment)  (slow pace)   # of Stairs Climbed 0   Weight Bearing Status no restriction   Comments no LOB   Functional Mobility   Sit to Stand Supervised   Bed, Chair, Wheelchair Transfer Supervised   Transfer Method Stand Step   Mobility supine->sit EOB->stand->amb->chair   Comments no LOB   6 Clicks Assessment - How much HELP from from another person do you currently need... (If the patient hasn't done an activity recently, how much help from another person do you think  he/she would need if he/she tried?)   Turning from your back to your side while in a flat bed without using bedrails? 3   Moving from lying on your back to sitting on the side of a flat bed without using bedrails? 3   Moving to and from a bed to a chair (including a wheelchair)? 3   Standing up from a chair using your arms (e.g., wheelchair, or bedside chair)? 3   Walking in hospital room? 3   Climbing 3-5 steps with a railing? 3   6 clicks Mobility Score 18   Activity Tolerance   Sitting in Chair left up in chair   Sitting Edge of Bed 3 min approx   Standing 5 min approx   Comments tolerated well   Short Term Goals    Short Term Goal # 1 Pt will perform supine<->sit with supv within  6 visits in order to return home   Short Term Goal # 2 Pt will transfer bed<->chair with supv within 6 visits in order to return home   Short Term Goal # 3 Pt will amb 300' with FWW or no AD with supv within 6 visits in order to return home   Short Term Goal # 4 pt will perform 2 steps with SBA or supv within 6 visits as required to enter/exit home   Education Group   Education Provided Cardiac Precautions;Sternal Precautions;Role of Physical Therapist   Cardiac Precautions Patient Response Patient;Acceptance;Explanation;Verbal Demonstration   Sternal Precautions Patient Response Patient;Acceptance;Explanation;Handout;Verbal Demonstration;Action Demonstration   Role of Physical Therapist Patient Response Patient;Acceptance;Explanation;Verbal Demonstration   Additional Comments [facility] packet   Physical Therapy Initial Treatment Plan    Treatment Plan  Bed Mobility;Gait Training;Neuro Re-Education / Balance;Stair Training;Therapeutic Activities;Therapeutic Exercise   Treatment Frequency 5 Times per Week   Duration Until Therapy Goals Met   Problem List    Problems Pain;Impaired Bed Mobility;Impaired Transfers;Impaired Ambulation;Impaired Balance;Decreased Activity Tolerance   Anticipated Discharge Equipment and Recommendations   DC  Equipment Recommendations Front-Wheel Walker   Discharge Recommendations Anticipate that the patient will have no further physical therapy needs after discharge from the hospital

## 2024-09-14 NOTE — PROGRESS NOTES
4 Eyes Skin Assessment Completed by PASTORA Kang and PASTORA Brooks.    Head WDL  Ears Redness and Blanching  Nose WDL  Mouth WDL  Neck WDL  Breast/Chest Redness, Blanching, and Incision  Shoulder Blades WDL  Spine WDL  (R) Arm/Elbow/Hand WDL  (L) Arm/Elbow/Hand Redness and Blanching  Abdomen Redness, Blanching, and Incision, chest tube sites   Groin WDL  Scrotum/Coccyx/Buttocks WDL  (R) Leg Swelling and Edema  (L) Leg Swelling and Edema  (R) Heel/Foot/Toe Redness and Blanching  (L) Heel/Foot/Toe Redness and Blanching          Devices In Places Tele Box, Blood Pressure Cuff, Pulse Ox, Flowers, Central Line, Nasal Cannula, and SE's      Interventions In Place Gray Ear Foams, NC W/Ear Foams, Heel Mepilex, Sacral Mepilex, Chair Waffle, Pillows, Elbow Mepilex, Q2 Turns, Low Air Loss Mattress, and Heels Loaded W/Pillows    Possible Skin Injury No    Pictures Uploaded Into Epic N/A  Wound Consult Placed N/A  RN Wound Prevention Protocol Ordered Yes

## 2024-09-14 NOTE — PROGRESS NOTES
Critical Care Progress Note    Date of admission  9/12/2024    Chief Complaint  44 y.o. female with PMH of DLD, HTM, ascending aortic aneurysm, and aortic insufficiency who presented 9/12/2024 s/p ascending aorta repair.     Hospital Course  9/13: POD #1: extubated and off drips. On minimal oxygen.    Interval Problem Update  Reviewed last 24 hour events:  Some elevated temps overnight, feels flushed    HR 70s-80s  -110  No vasoactives  1L NC  90-94%  I/O: -480    CT output 210  Tmax 38.2  WBC 17  BG WTR, ISS PRN    Review of Systems  Review of Systems   All other systems reviewed and are negative.       Vital Signs for last 24 hours   Temp:  [37.8 °C (100 °F)-38.3 °C (100.9 °F)] 38 °C (100.4 °F)  Pulse:  [68-91] 68  Resp:  [9-23] 16  BP: ()/(52-68) 98/56  SpO2:  [88 %-96 %] 95 %    Hemodynamic parameters for last 24 hours       Respiratory Information for the last 24 hours       Physical Exam   Physical Exam  Vitals and nursing note reviewed.   Constitutional:       General: She is not in acute distress.     Appearance: Normal appearance.   HENT:      Head: Normocephalic and atraumatic.      Mouth/Throat:      Mouth: Mucous membranes are moist.   Eyes:      Pupils: Pupils are equal, round, and reactive to light.   Cardiovascular:      Rate and Rhythm: Normal rate and regular rhythm.   Pulmonary:      Effort: Pulmonary effort is normal.      Breath sounds: Normal breath sounds.   Abdominal:      General: Abdomen is flat. Bowel sounds are normal.   Musculoskeletal:      Right lower leg: No edema.      Left lower leg: No edema.   Skin:     General: Skin is warm.      Capillary Refill: Capillary refill takes less than 2 seconds.      Comments: Sternotomy incision with some mild erythema at superior margin, non tender. No drainage   Neurological:      General: No focal deficit present.      Mental Status: She is alert and oriented to person, place, and time.         Medications  Current  Facility-Administered Medications   Medication Dose Route Frequency Provider Last Rate Last Admin    furosemide (Lasix) injection 40 mg  40 mg Intravenous BID Sebastian Brizuela, A.P.R.N.        potassium chloride SA (Kdur) tablet 20 mEq  20 mEq Oral BID Sebastian Brizuela, A.P.R.N.        insulin regular (HumuLIN R,NovoLIN R) injection  2-9 Units Subcutaneous 4X/DAY ACHHEIDY Lerma M.D.        And    dextrose 50% (D50W) injection 25 g  25 g Intravenous Q15 MIN PRN Mago Lerma M.D.        SUMAtriptan (Imitrex) tablet 25 mg  25 mg Oral Q2HRS PRN Sebastian Brizuela, A.P.R.N.   25 mg at 09/14/24 0443    Respiratory Therapy Consult   Nebulization Continuous RT Sebastian Brizuela, A.P.R.N.        NS infusion   Intravenous Continuous Sebastian Brizuela, A.P.R.N. 10 mL/hr at 09/12/24 1142 New Bag at 09/12/24 1142    NS infusion   Intravenous Continuous Sebastian Brizuela, A.P.R.N. 30 mL/hr at 09/12/24 1141 New Bag at 09/12/24 1141    electrolyte-A (Plasmalyte-A) infusion   Intravenous PRN Sebastian Brizuela, A.P.R.N.   Stopped at 09/13/24 0310    enoxaparin (Lovenox) inj 40 mg  40 mg Subcutaneous DAILY AT 1800 Sebastian Brizuela, A.P.R.N.   40 mg at 09/13/24 1800    Nozin nasal  swab  1 Applicator Each Nostril BID Sebastian Brizuela, A.P.R.N.   1 Applicator at 09/13/24 2046    metoprolol tartrate (Lopressor) tablet 25 mg  25 mg Oral BID Sebastian Brizuela, A.P.R.N.   25 mg at 09/14/24 0628    aspirin EC tablet 81 mg  81 mg Oral DAILY Sebastian Brizuela, A.P.R.N.   81 mg at 09/14/24 0628    clevidipine (Cleviprex) IV emulsion  0-21 mg/hr Intravenous Continuous Sebastian Brizuela, A.P.R.N.   Stopped at 09/12/24 1230    nitroglycerin 50 mg in D5W 250 ml infusion  0-100 mcg/min Intravenous Continuous RAYSA Frazier        Pharmacy Consult Request ...Pain Management Review 1 Each  1 Each Other PHARMACY TO DOSE RAYSA Frazier        acetaminophen (Tylenol) tablet 1,000 mg  1,000 mg Oral Q6HRS RAYSA Frazier   1,000 mg at 09/14/24 0626     Followed by    [START ON 9/22/2024] acetaminophen (Tylenol) tablet 1,000 mg  1,000 mg Oral Q6HRS PRN Sebastian Brizuela, A.P.R.N.        oxyCODONE immediate-release (Roxicodone) tablet 5 mg  5 mg Oral Q3HRS PRN Sebastian Brizuela, A.P.R.N.   5 mg at 09/12/24 2200    Or    oxyCODONE immediate release (Roxicodone) tablet 10 mg  10 mg Oral Q3HRS PRN Sebastian Brizuela, A.P.R.N.   10 mg at 09/14/24 0059    Or    fentaNYL (Sublimaze) injection 50 mcg  50 mcg Intravenous Q3HRS PRN Sebastian Brizuela, A.P.R.N.   50 mcg at 09/13/24 1051    traMADol (Ultram) 50 MG tablet 50 mg  50 mg Oral Q4HRS PRN Sebastian Brizuela, A.P.R.N.   50 mg at 09/13/24 1553    dexmedetomidine (Precedex) 400 mcg/100mL infusion  0-1.5 mcg/kg/hr (Ideal) Intravenous Continuous Sebastian Brizuela, A.P.R.N.   Stopped at 09/13/24 0407    sodium bicarbonate 8.4 % injection 50 mEq  50 mEq Intravenous Q HOUR PRN Sebastian Brizuela, A.P.R.N.        ondansetron (Zofran) syringe/vial injection 8 mg  8 mg Intravenous Q6HRS PRN Sebastian Brizuela, A.P.R.N.   8 mg at 09/13/24 1514    Or    prochlorperazine (Compazine) injection 10 mg  10 mg Intravenous Q6HRS PRN Sebastian Brizuela, A.P.R.N.   10 mg at 09/12/24 1816    acetaminophen (Tylenol) tablet 650 mg  650 mg Oral Q4HRS PRN Sebastian Brizuela, A.P.R.N.   650 mg at 09/13/24 2312    Or    acetaminophen (Tylenol) suppository 650 mg  650 mg Rectal Q4HRS PRN Sebastian Brizuela, A.P.R.N.        senna-docusate (Pericolace Or Senokot S) 8.6-50 MG per tablet 2 Tablet  2 Tablet Oral BID Sebastian Brizuela, A.P.R.N.   2 Tablet at 09/14/24 0628    And    polyethylene glycol/lytes (Miralax) Packet 1 Packet  1 Packet Oral DAILY MARTY FrazierP.R.N.   1 Packet at 09/13/24 0600    And    magnesium hydroxide (Milk Of Magnesia) suspension 30 mL  30 mL Oral DAILY AZRA Frazier.P.R.N.        And    bisacodyl (Dulcolax) suppository 10 mg  10 mg Rectal QDAY PRN AZRA Frazier.P.R.N.        omeprazole (PriLOSEC) capsule 20 mg  20 mg Oral DAILY AZRA Frazier.P.R.N.   20 mg at  09/14/24 0625    mag hydrox-al hydrox-simeth (Maalox Plus Es Or Mylanta Ds) suspension 30 mL  30 mL Oral Q4HRS PRN RAYSA Frazier        norepinephrine (Levophed) 8 mg in 250 mL NS infusion (premix)  0-1 mcg/kg/min (Ideal) Intravenous Continuous RAYSA Frazier   Stopped at 09/12/24 5162    MD Alert...Pharmacy to initiate transition from insulin infusion to subcutaneous insulin for cardiothoracic surgery 1 Each  1 Each Other Continuous RAYSA Frazier        albuterol (Proventil) 2.5mg/0.5ml nebulizer solution 2.5 mg  2.5 mg Nebulization Q4H PRN (RT) JUAN ANTONIO [email or web address]           Fluids    Intake/Output Summary (Last 24 hours) at 9/14/2024 0837  Last data filed at 9/14/2024 0800  Gross per 24 hour   Intake 600 ml   Output 1300 ml   Net -700 ml       Laboratory  Recent Labs     09/12/24  1112 09/12/24  1203 09/12/24  1412   ISTATAPH 7.327* 7.338* 7.346*   ISTATAPCO2 41.0* 36.5 36.3   ISTATAPO2 322* 158* 130*   ISTATATCO2 23 21 21   DOXCWHP4KBQ 100* 99 99   ISTATARTHCO3 21.5 19.6 19.9   ISTATARTBE -4 -6* -5*   ISTATTEMP 36.9 C 36.9 C 37.3 C   ISTATFIO2 100 70 40   ISTATSPEC Arterial Arterial Arterial   ISTATAPHTC 7.328* 7.340* 7.342*   ARYLONSK2TX 321* 158* 132*         Recent Labs     09/12/24  1112 09/12/24  1719 09/13/24  0304 09/13/24  0455 09/14/24  0105   SODIUM  --   --  136  --  134*   POTASSIUM 5.0   < > 4.2 4.3 4.1   CHLORIDE  --   --  106  --  102   CO2  --   --  21  --  22   BUN  --   --  11  --  12   CREATININE  --   --  0.60  --  0.50   MAGNESIUM 2.9*  --   --   --   --    CALCIUM  --   --  7.7*  --  8.4*    < > = values in this interval not displayed.     Recent Labs     09/13/24 0304 09/14/24 0105   GLUCOSE 142* 125*     Recent Labs     09/13/24 0304 09/14/24 0105   WBC 21.2* 17.5*     Recent Labs     09/12/24  1112 09/13/24 0304 09/14/24 0105   RBC  --  3.41* 3.27*   HEMOGLOBIN 12.4 10.9* 10.4*   HEMATOCRIT 35.6* 31.5* 31.1*   PLATELETCT 152* 179 150*   PROTHROMBTM 15.6*   --   --    APTT 26.3  --   --    INR 1.23*  --   --        Imaging  X-Ray:  I have personally reviewed the images and compared with prior images.    Assessment/Plan    CCM will sign off for now, please reach out if condition changes or if there is any questions we can assist with.      * Ascending aorta dilation (HCC)- (present on admission)  Assessment & Plan  S/P tube graft ascending aorta repair. Hemodynamically stable.     Off vasoactives, well supported, electrically stable    --Monitor and optimize hemodynamics  --Goals SBP< 120 mmHg, MAP> 65          Mild intermittent asthma  Assessment & Plan  Not in acute exacerbation  --Albuterol PRN    Encounter for weaning from ventilator (HCC)  Assessment & Plan  Extubated on track    -Mobility  -IS  -Titrate supplemental O2 as able, on minimal 02 at this point    Obesity (BMI 30.0-34.9)- (present on admission)  Assessment & Plan  Weight loss counseling when appropriate    Dyslipidemia- (present on admission)  Assessment & Plan  Continue statin    Essential hypertension- (present on admission)  Assessment & Plan  Takes Metop XL 12.5mg at home.  --Will reintroduce BB when appropriate  --Will likely need additional antihypertensive prior to discharge         VTE:  Lovenox  Ulcer: PPI  Lines: Central Line  Ongoing indication addressed and Flowers Catheter  Ongoing indication addressed    I have performed a physical exam and reviewed and updated ROS and Plan today (9/14/2024). In review of yesterday's note (9/13/2024), there are no changes except as documented above.     Discussed patient condition and risk of morbidity and/or mortality with Family, RN, RT, Therapies, Pharmacy, Patient, and [facility]

## 2024-09-14 NOTE — CARE PLAN
The patient is Watcher - Medium risk of patient condition declining or worsening    Shift Goals  Clinical Goals: pain control, hemodynamic stability, mobility, temp control  Patient Goals: pain control  Family Goals: pain control and sleep and updates    Progress made toward(s) clinical / shift goals:    Problem: Pain - Standard  Goal: Alleviation of pain or a reduction in pain to the patient’s comfort goal  Outcome: Progressing  Note: Pain assessed using the 0-10 pain scale. Pt experiencing acute and surgical pain in head and chest. Pt educated on non-pharmacological interventions including repositioning, distraction and heat/cold packs.      Problem: Post Op Day 1 CABG/Heart Valve Replacement  Goal: Optimal care of the post op CABG/heart valve replacement Post Op Day 1  Outcome: Progressing  Intervention: All valve patients: PT/INR daily  Note: PT/INR bandar  Intervention: Discontinue andre catheter unless documented reason for continuation  Note: Andre in, poor UO, provider aware  Intervention: [facility] trained RN to remove chest tubes if ordered by provider  Note: CT in place, small amount of drainage, CT stripped.

## 2024-09-15 ENCOUNTER — APPOINTMENT (OUTPATIENT)
Dept: RADIOLOGY | Facility: MEDICAL CENTER | Age: 44
DRG: 220 | End: 2024-09-15
Attending: THORACIC SURGERY (CARDIOTHORACIC VASCULAR SURGERY)
Payer: COMMERCIAL

## 2024-09-15 LAB
ANION GAP SERPL CALC-SCNC: 12 MMOL/L (ref 7–16)
BUN SERPL-MCNC: 10 MG/DL (ref 8–22)
CALCIUM SERPL-MCNC: 8.8 MG/DL (ref 8.5–10.5)
CHLORIDE SERPL-SCNC: 98 MMOL/L (ref 96–112)
CO2 SERPL-SCNC: 25 MMOL/L (ref 20–33)
CREAT SERPL-MCNC: 0.5 MG/DL (ref 0.5–1.4)
ERYTHROCYTE [DISTWIDTH] IN BLOOD BY AUTOMATED COUNT: 43.7 FL (ref 35.9–50)
GFR SERPLBLD CREATININE-BSD FMLA CKD-EPI: 118 ML/MIN/1.73 M 2
GLUCOSE SERPL-MCNC: 133 MG/DL (ref 65–99)
HCT VFR BLD AUTO: 32.4 % (ref 37–47)
HGB BLD-MCNC: 11 G/DL (ref 12–16)
MCH RBC QN AUTO: 31.4 PG (ref 27–33)
MCHC RBC AUTO-ENTMCNC: 34 G/DL (ref 32.2–35.5)
MCV RBC AUTO: 92.6 FL (ref 81.4–97.8)
PLATELET # BLD AUTO: 163 K/UL (ref 164–446)
PMV BLD AUTO: 9.4 FL (ref 9–12.9)
POTASSIUM SERPL-SCNC: 3.9 MMOL/L (ref 3.6–5.5)
RBC # BLD AUTO: 3.5 M/UL (ref 4.2–5.4)
SODIUM SERPL-SCNC: 135 MMOL/L (ref 135–145)
WBC # BLD AUTO: 13.1 K/UL (ref 4.8–10.8)

## 2024-09-15 PROCEDURE — 770020 HCHG ROOM/CARE - TELE (206)

## 2024-09-15 PROCEDURE — 97535 SELF CARE MNGMENT TRAINING: CPT

## 2024-09-15 PROCEDURE — A9270 NON-COVERED ITEM OR SERVICE: HCPCS | Performed by: NURSE PRACTITIONER

## 2024-09-15 PROCEDURE — 99024 POSTOP FOLLOW-UP VISIT: CPT | Performed by: NURSE PRACTITIONER

## 2024-09-15 PROCEDURE — 85027 COMPLETE CBC AUTOMATED: CPT

## 2024-09-15 PROCEDURE — 700102 HCHG RX REV CODE 250 W/ 637 OVERRIDE(OP): Performed by: NURSE PRACTITIONER

## 2024-09-15 PROCEDURE — 80048 BASIC METABOLIC PNL TOTAL CA: CPT

## 2024-09-15 PROCEDURE — 71045 X-RAY EXAM CHEST 1 VIEW: CPT

## 2024-09-15 PROCEDURE — 700111 HCHG RX REV CODE 636 W/ 250 OVERRIDE (IP): Mod: JZ | Performed by: NURSE PRACTITIONER

## 2024-09-15 PROCEDURE — 97165 OT EVAL LOW COMPLEX 30 MIN: CPT

## 2024-09-15 PROCEDURE — 94669 MECHANICAL CHEST WALL OSCILL: CPT

## 2024-09-15 RX ADMIN — ACETAMINOPHEN 1000 MG: 500 TABLET ORAL at 05:14

## 2024-09-15 RX ADMIN — MAGNESIUM HYDROXIDE 30 ML: 1200 LIQUID ORAL at 12:09

## 2024-09-15 RX ADMIN — METOPROLOL TARTRATE 25 MG: 25 TABLET, FILM COATED ORAL at 17:45

## 2024-09-15 RX ADMIN — Medication 1 APPLICATOR: at 09:12

## 2024-09-15 RX ADMIN — METOPROLOL TARTRATE 25 MG: 25 TABLET, FILM COATED ORAL at 05:15

## 2024-09-15 RX ADMIN — ASPIRIN 81 MG: 81 TABLET, COATED ORAL at 05:14

## 2024-09-15 RX ADMIN — POLYETHYLENE GLYCOL 3350 1 PACKET: 17 POWDER, FOR SOLUTION ORAL at 05:14

## 2024-09-15 RX ADMIN — ACETAMINOPHEN 1000 MG: 500 TABLET ORAL at 11:34

## 2024-09-15 RX ADMIN — OMEPRAZOLE 20 MG: 20 CAPSULE, DELAYED RELEASE ORAL at 05:14

## 2024-09-15 RX ADMIN — FUROSEMIDE 40 MG: 10 INJECTION INTRAMUSCULAR; INTRAVENOUS at 05:22

## 2024-09-15 RX ADMIN — TRAMADOL HYDROCHLORIDE 50 MG: 50 TABLET ORAL at 20:51

## 2024-09-15 RX ADMIN — POTASSIUM CHLORIDE 20 MEQ: 1500 TABLET, EXTENDED RELEASE ORAL at 05:14

## 2024-09-15 RX ADMIN — POTASSIUM CHLORIDE 20 MEQ: 1500 TABLET, EXTENDED RELEASE ORAL at 17:45

## 2024-09-15 RX ADMIN — ENOXAPARIN SODIUM 40 MG: 100 INJECTION SUBCUTANEOUS at 17:45

## 2024-09-15 RX ADMIN — TRAMADOL HYDROCHLORIDE 50 MG: 50 TABLET ORAL at 15:07

## 2024-09-15 RX ADMIN — OXYCODONE HYDROCHLORIDE 10 MG: 10 TABLET ORAL at 06:03

## 2024-09-15 RX ADMIN — Medication 1 APPLICATOR: at 20:51

## 2024-09-15 RX ADMIN — SENNOSIDES AND DOCUSATE SODIUM 2 TABLET: 50; 8.6 TABLET ORAL at 17:45

## 2024-09-15 RX ADMIN — ACETAMINOPHEN 1000 MG: 500 TABLET ORAL at 17:45

## 2024-09-15 RX ADMIN — SENNOSIDES AND DOCUSATE SODIUM 2 TABLET: 50; 8.6 TABLET ORAL at 05:14

## 2024-09-15 RX ADMIN — FUROSEMIDE 40 MG: 10 INJECTION INTRAMUSCULAR; INTRAVENOUS at 17:45

## 2024-09-15 ASSESSMENT — ACTIVITIES OF DAILY LIVING (ADL): TOILETING: INDEPENDENT

## 2024-09-15 ASSESSMENT — FIBROSIS 4 INDEX: FIB4 SCORE: 1.24

## 2024-09-15 ASSESSMENT — COGNITIVE AND FUNCTIONAL STATUS - GENERAL
SUGGESTED CMS G CODE MODIFIER DAILY ACTIVITY: CH
DAILY ACTIVITIY SCORE: 24

## 2024-09-15 ASSESSMENT — PAIN DESCRIPTION - PAIN TYPE
TYPE: ACUTE PAIN

## 2024-09-15 NOTE — PROGRESS NOTES
Monitor summary:        Rhythm: SR   Rate: 70-72  Ectopy: n/a  Measurements: 13./.07/.42        12hr chart check

## 2024-09-15 NOTE — CARE PLAN
The patient is Stable - Low risk of patient condition declining or worsening    Shift Goals  Clinical Goals: POD 3 care, hemodynamic stability  Patient Goals: Pain control  Family Goals: Updates    Progress made toward(s) clinical / shift goals:    Problem: Knowledge Deficit - Standard  Goal: Patient and family/care givers will demonstrate understanding of plan of care, disease process/condition, diagnostic tests and medications  Outcome: Progressing     Problem: Skin Integrity  Goal: Skin integrity is maintained or improved  Note: Patient had shower and skin care done. Patient tolerated it well.     Problem: Post Op Day 3 CABG/Heart Valve replacement  Goal: Optimal care of the post op CABG/Heart Valve replacement post op day 3  Intervention: Ambulate 4 times daily, increasing the distance each time  Note: Patient ambulated 1,000 ft two times today, using a FWW. Patient tolerated well.  Intervention: IS q 1 hour while awake and record best IS volume  Note: Best IS 1000  Intervention: Consider removal of andre, chest tube and pacer wires if not already done  Note: No andre, chest tubes or pacer wires in place.       Patient is not progressing towards the following goals:

## 2024-09-15 NOTE — CARE PLAN
The patient is Stable - Low risk of patient condition declining or worsening    Shift Goals  Clinical Goals: ambulate, IS, up to chair, pain management  Patient Goals: pain management  Family Goals: pain control and sleep and updates    Progress made toward(s) clinical / shift goals:    Problem: Post op day 2 CABG/Heart Valve Replacement  Goal: Optimal care of the post op CABG/heart valve replacement post op day 2  Outcome: Progressing  FSBS: when 2 consecutive BS < 130 after post op day 2, discontinue FSBS unless patient is insulin dependent diabetic  -FSBS discontinued  Daily weights in the morning  Pt is up in chair for all meals  Ambulate 4 times daily, increasing the distance each time  -pt ambulated 2 times after arriving to  unit, both 500ft.   Stand at sink and wash up with assistance. Clean incisions twice daily with soap and water.  -incisions care done during shower  IS q 1 hour done while awake and record best IS volume: 1000.   Consider pacer wire removal by MD  -pacer wires, andre and chest tube,  removed prior pt transferring to           Patient is not progressing towards the following goals:

## 2024-09-15 NOTE — PROGRESS NOTES
4 Eyes Skin Assessment Completed by PASTORA Daugherty and PASTORA Cedeno.    Head WDL  Ears WDL  Nose WDL  Mouth WDL  Neck Incision CVC Triple Lumen Internal jugular   Breast/Chest Redness, Bruising, Scab, and Incision  Shoulder Blades WDL  Spine WDL  (R) Arm/Elbow/Hand WDL  (L) Arm/Elbow/Hand WDL  Abdomen Redness, Blanching, and Incision chest tube sites   Groin WDL  Scrotum/Coccyx/Buttocks WDL  (R) Leg Edema  (L) Leg Edema  (R) Heel/Foot/Toe WDL  (L) Heel/Foot/Toe WDL          Devices In Places Tele Box, Blood Pressure Cuff, and Pulse Ox      Interventions In Place Pillows and Pressure Redistribution Mattress    Possible Skin Injury No    Pictures Uploaded Into Epic No, needs to be completed  Wound Consult Placed N/A  RN Wound Prevention Protocol Ordered No

## 2024-09-15 NOTE — THERAPY
"Occupational Therapy   Initial Evaluation     Patient Name: Princess Machado  Age:  44 y.o., Sex:  female  Medical Record #: [id number]  Today's Date: 9/15/2024     Precautions: Fall Risk, Cardiac Precautions, Sternal Precautions     Assessment  Patient is 44 y.o. female dx w/AAA and aortic insufficiency admitted for repair. Pt is POD#3 from AAA report w/CAR pt is doing well w/no ADL deficits. Pt has good awareness and understanding of post op cardiac and sternal prec and good support at dc. Anticipate no further acute OT needs.     Plan  Occupational Therapy Initial Treatment Plan   Duration: Evaluation only    DC Equipment Recommendations: None  Discharge Recommendations: Anticipate that the patient will have no further occupational therapy needs after discharge from the hospital     Subjective  \"I am excited to go home\"      Objective     09/15/24 1101   Time In/Time Out   Therapy Start Time 1035   Therapy End Time 1101   Total Therapy Time 26   Charge Group   OT Evaluation OT Evaluation Low   OT Self Care / ADL (Units) 1   Total Time Spent   OT Time Spent Yes   OT Self Care / ADL (Minutes) 15   OT Evaluation (Minutes) 11   OT Total Time Spent (Calculated) 26   Initial Contact Note    Initial Contact Note Order Received and Verified, Evaluation Only - Patient Does Not Require Further Acute Occupational Therapy at this Time.  However, May Benefit from Post Acute Therapy for Higher Level Functional Deficits.   Prior Living Situation   Prior Services Home-Independent   Housing / Facility 1 Story House   Steps Into Home 2   Bathroom Set up Walk In Shower;Shower Chair   Equipment Owned None   Lives with - Patient's Self Care Capacity Spouse;Child Less than 18 Years of Age   Comments SO is stay at home father able to assist as needed   Prior Level of ADL Function   Self Feeding Independent   Grooming / Hygiene Independent   Bathing Independent   Dressing Independent   Toileting Independent   Prior Level of IADL " Function   Medication Management Independent   Laundry Independent   Kitchen Mobility Independent   Finances Independent   Home Management Independent   Shopping Independent   Prior Level Of Mobility Independent Without Device in Community   Driving / Transportation Driving Independent   Occupation (Pre-Hospital Vocational) Employed Full Time   History of Falls   History of Falls No   Precautions   Precautions Fall Risk;Cardiac Precautions (See Comments);Sternal Precautions (See Comments)   Pain 0 - 10 Group   Therapist Pain Assessment 0;Nurse Notified   Cognition    Cognition / Consciousness WDL   Level of Consciousness Alert   Passive ROM Upper Body   Passive ROM Upper Body WDL   Active ROM Upper Body   Active ROM Upper Body  WDL   Strength Upper Body   Upper Body Strength  WDL   Sensation Upper Body   Upper Extremity Sensation  WDL   Upper Body Muscle Tone   Upper Body Muscle Tone  WDL   Coordination Upper Body   Coordination WDL   Balance Assessment   Sitting Balance (Static) Good   Sitting Balance (Dynamic) Fair +   Standing Balance (Static) Good   Standing Balance (Dynamic) Fair +   Weight Shift Sitting Good   Weight Shift Standing Fair   Comments w/fww   Bed Mobility    Supine to Sit Modified Independent   Comments up self in room ok'd by RN   ADL Assessment   Grooming Supervision;Standing   Upper Body Dressing Supervision   Lower Body Dressing Supervision   Toileting Supervision   Comments pt reports completing shower yesterday w/o issue. reviewed all prec and application to ADL's as well as home safety and return to work   How much help from another person does the patient currently need...   Putting on and taking off regular lower body clothing? 4   Bathing (including washing, rinsing, and drying)? 4   Toileting, which includes using a toilet, bedpan, or urinal? 4   Putting on and taking off regular upper body clothing? 4   Taking care of personal grooming such as brushing teeth? 4   Eating meals? 4   6  Clicks Daily Activity Score 24   Functional Mobility   Sit to Stand Supervised   Bed, Chair, Wheelchair Transfer Supervised   Toilet Transfers Supervised   Mobility walking room no AD hallway full lap w/fww   Activity Tolerance   Comments no c/o pain or fatigue   Patient / Family Goals   Patient / Family Goal #1 to go home   Short Term Goals   Short Term Goal # 1 pt will have no further acute OT needs   Education Group   Education Provided Cardiac Precautions;Sternal Precautions;Home Safety   Role of Occupational Therapist Patient Response Patient;Significant Other;Acceptance;Explanation;Demonstration   Cardiac Precautions Patient Response Patient;Acceptance;Significant Other;Explanation;Demonstration   Sternal Precautions Patient Response Patient;Significant Other;Acceptance;Explanation;Demonstration   Home Safety Patient Response Patient;Significant Other;Acceptance;Explanation;Demonstration   Occupational Therapy Initial Treatment Plan    Duration Evaluation only   Problem List   Problem List None   Anticipated Discharge Equipment and Recommendations   DC Equipment Recommendations None   Discharge Recommendations Anticipate that the patient will have no further occupational therapy needs after discharge from the hospital   Interdisciplinary Plan of Care Collaboration   IDT Collaboration with  Nursing;Family / Caregiver   Patient Position at End of Therapy Other (Comments)  (up self in room)   Collaboration Comments RN aware of session and pts efforts   Session Information   Date / Session Number  9/15 #1 eval only

## 2024-09-15 NOTE — PROGRESS NOTES
Bedside report received, Pt care assumed. Tele box on. VSS. Pt assessment complete. Pt AOX4, no signs of distress noted at this time.  Pt c/o of 8/10 pain. Pt denies any additional needs at this time. Bed in lowest position, ambulates with no assistance. POC discussed with Pt/family, verbalizes understanding, no questions at this time. Pt educated on fall risk and verbalizes understanding, call light within reach

## 2024-09-15 NOTE — CARE PLAN
The patient is Stable - Low risk of patient condition declining or worsening    Shift Goals  Clinical Goals: ambulate, IS, up to chair for meals, pain management, monitor sites  Patient Goals: comfort, rest, sleep  Family Goals: pain control, sleep, updates    Progress made toward(s) clinical / shift goals:    Problem: Knowledge Deficit - Standard  Goal: Patient and family/care givers will demonstrate understanding of plan of care, disease process/condition, diagnostic tests and medications  Outcome: Progressing     Problem: Pain - Standard  Goal: Alleviation of pain or a reduction in pain to the patient’s comfort goal  Outcome: Progressing     Problem: Skin Integrity  Goal: Skin integrity is maintained or improved  Outcome: Progressing     Problem: Fall Risk  Goal: Patient will remain free from falls  Outcome: Progressing     Problem: Post op day 2 CABG/Heart Valve Replacement  Goal: Optimal care of the post op CABG/heart valve replacement post op day 2  Outcome: Progressing    Patient ambulated 250 feet before bed. Incentive spirometer use encouraged, max 1000 cc.  Intervention: FSBS: when 2 consecutive BS < 130 after post op day 2, discontinue FSBS unless patient is insulin dependent diabetic  Note: FSBS discontinued due to 2 consecutive BS less than 130  Intervention: Daily weights in the morning  Note: Daily weight taken in morning  Intervention: Up in chair for all meals  Note: Patient up to chair for all meals       Patient is not progressing towards the following goals:

## 2024-09-15 NOTE — PROGRESS NOTES
Cardiovascular Surgery Progress Note    Name: Princess Machado  MRN: [id number]  : 1980  Admit Date: 2024  5:10 AM  3 Days Post-Op     Procedure:  Procedure(s) and Anesthesia Type:     * ASCENDING AORTIC ANEURYSM REPAIR, TRANSESOPHAGEAL ECHOCARDIOGRAM - General     * ECHOCARDIOGRAM, TRANSESOPHAGEAL, INTRAOPERATIVE - General    Vitals:  Vitals:    09/15/24 0515 09/15/24 0552 09/15/24 0600 09/15/24 0746   BP: 124/76  124/76 101/64   Pulse: 77   64   Resp: 19   18   Temp: 36.7 °C (98.1 °F)   36 °C (96.8 °F)   TempSrc: Temporal   Temporal   SpO2: 94%  89% 90%   Weight:  104 kg (229 lb 0.9 oz)     Height:          Temp (24hrs), Av.4 °C (97.5 °F), Min:36 °C (96.8 °F), Max:36.7 °C (98.1 °F)      Respiratory:    Respiration: 18, Pulse Oximetry: 90 %       Fluids:    Intake/Output Summary (Last 24 hours) at 9/15/2024 0809  Last data filed at 9/15/2024 0750  Gross per 24 hour   Intake 290 ml   Output 1400 ml   Net -1110 ml     Admit weight: Weight: 104 kg (229 lb 4.5 oz)  Current weight: Weight: 104 kg (229 lb 0.9 oz) (09/15/24 0552)    Labs:  Recent Labs     24  0304 24  0105 09/15/24  0540   WBC 21.2* 17.5* 13.1*   RBC 3.41* 3.27* 3.50*   HEMOGLOBIN 10.9* 10.4* 11.0*   HEMATOCRIT 31.5* 31.1* 32.4*   MCV 92.4 95.1 92.6   MCH 32.0 31.8 31.4   MCHC 34.6 33.4 34.0   RDW 43.1 45.0 43.7   PLATELETCT 179 150* 163*   MPV 9.4 9.4 9.4     Recent Labs     24  0304 24  0455 24  0105 09/15/24  0540   SODIUM 136  --  134* 135   POTASSIUM 4.2 4.3 4.1 3.9   CHLORIDE 106  --  102 98   CO2 21  --  22 25   GLUCOSE 142*  --  125* 133*   BUN 11  --  12 10   CREATININE 0.60  --  0.50 0.50   CALCIUM 7.7*  --  8.4* 8.8     Recent Labs     24  1112   APTT 26.3   INR 1.23*       HgbA1c:  5.1    Diabetic Educator Consult:  no    Medications:  Scheduled Medications   Medication Dose Frequency    furosemide  40 mg BID    potassium chloride SA  20 mEq BID    enoxaparin (LOVENOX) injection  40 mg  DAILY AT 1800    Nozin nasal  swab  1 Applicator BID    metoprolol tartrate  25 mg BID    aspirin  81 mg DAILY    Pharmacy Consult Request  1 Each PHARMACY TO DOSE    acetaminophen  1,000 mg Q6HRS    senna-docusate  2 Tablet BID    And    polyethylene glycol/lytes  1 Packet DAILY    And    magnesium hydroxide  30 mL DAILY    omeprazole  20 mg DAILY        Exam:   Physical Exam  Vitals and nursing note reviewed.   Constitutional:       General: She is not in acute distress.     Appearance: She is normal weight. She is not ill-appearing.   HENT:      Head: Normocephalic and atraumatic.      Right Ear: External ear normal.      Left Ear: External ear normal.      Nose: Nose normal.      Mouth/Throat:      Mouth: Mucous membranes are moist.   Eyes:      Pupils: Pupils are equal, round, and reactive to light.   Cardiovascular:      Rate and Rhythm: Normal rate and regular rhythm.      Pulses: Normal pulses.   Pulmonary:      Effort: Pulmonary effort is normal.   Abdominal:      General: Abdomen is flat. There is no distension.      Palpations: Abdomen is soft.   Musculoskeletal:         General: Normal range of motion.      Cervical back: Normal range of motion and neck supple.   Skin:     General: Skin is warm and dry.      Capillary Refill: Capillary refill takes less than 2 seconds.      Comments: Sternum-CDI   Neurological:      General: No focal deficit present.      Mental Status: She is alert and oriented to person, place, and time. Mental status is at baseline.      Sensory: No sensory deficit.      Motor: No weakness.   Psychiatric:         Mood and Affect: Mood normal.         Behavior: Behavior normal.         Thought Content: Thought content normal.         Cardiac Medications:    ASA - Yes    Plavix - No; contraindicated because of Other Aneurysm repair    Post-operative Beta Blockers - Yes    Ace/ARB- No; contraindicated because of Normal EF    Statin - No; contraindicated because of No  CAD    Aldactone- No; contraindicated because of Normal EF    SGLT2i-  No contraindicated because of No; contraindicated because of Normal EF    Ejection Fraction:  60%    Telemetry:   9/13 SR  9/14 SR  9/15 SR    Assessment/Plan:  POD 1 Extubated.  Off gtts.  SR. Neuro intact.  Wounds CDI.  Abdomen soft nontender.  Mild dizziness this AM.  MAP appropriate.  Cr 0.6 Hgb 10.9.  Moderate output from mediastinals overnight.  Plan: Keep mediastinals and andre today for strict I/Os.  Diurese when bp improved.  Wean oxygen.    POD 2 HDS. SR. 1LNC. Wounds CDI. Abdomen soft nontender.  Cr 0.5 Hgb 10.4. Pacer wires removed.  Plan: DC mediastinal tubes and andre.  Wean oxygen. Transfer to tele.      POD 3 HDS. SR. RA. Wounds CDI.  Abdomen soft nontender -BM.  Cr 0.5 Hgb 11.  Plan: Continue diuresis.  Escalate bowel therapy.  Home probable tomorrow.      Disposition:  PT OT no needs

## 2024-09-16 VITALS
WEIGHT: 228.4 LBS | SYSTOLIC BLOOD PRESSURE: 108 MMHG | HEART RATE: 66 BPM | RESPIRATION RATE: 15 BRPM | BODY MASS INDEX: 34.62 KG/M2 | DIASTOLIC BLOOD PRESSURE: 69 MMHG | HEIGHT: 68 IN | TEMPERATURE: 97.5 F | OXYGEN SATURATION: 94 %

## 2024-09-16 LAB
ANION GAP SERPL CALC-SCNC: 11 MMOL/L (ref 7–16)
BUN SERPL-MCNC: 12 MG/DL (ref 8–22)
CALCIUM SERPL-MCNC: 8.6 MG/DL (ref 8.5–10.5)
CHLORIDE SERPL-SCNC: 99 MMOL/L (ref 96–112)
CO2 SERPL-SCNC: 24 MMOL/L (ref 20–33)
COMPONENT CELLULAR 8504CLL: NORMAL
CREAT SERPL-MCNC: 0.53 MG/DL (ref 0.5–1.4)
ERYTHROCYTE [DISTWIDTH] IN BLOOD BY AUTOMATED COUNT: 42.4 FL (ref 35.9–50)
GFR SERPLBLD CREATININE-BSD FMLA CKD-EPI: 116 ML/MIN/1.73 M 2
GLUCOSE SERPL-MCNC: 104 MG/DL (ref 65–99)
HCT VFR BLD AUTO: 31.4 % (ref 37–47)
HGB BLD-MCNC: 10.6 G/DL (ref 12–16)
MCH RBC QN AUTO: 31.1 PG (ref 27–33)
MCHC RBC AUTO-ENTMCNC: 33.8 G/DL (ref 32.2–35.5)
MCV RBC AUTO: 92.1 FL (ref 81.4–97.8)
PLATELET # BLD AUTO: 208 K/UL (ref 164–446)
PMV BLD AUTO: 9.7 FL (ref 9–12.9)
POTASSIUM SERPL-SCNC: 4 MMOL/L (ref 3.6–5.5)
RBC # BLD AUTO: 3.41 M/UL (ref 4.2–5.4)
SODIUM SERPL-SCNC: 134 MMOL/L (ref 135–145)
WBC # BLD AUTO: 10.8 K/UL (ref 4.8–10.8)

## 2024-09-16 PROCEDURE — 700102 HCHG RX REV CODE 250 W/ 637 OVERRIDE(OP): Performed by: NURSE PRACTITIONER

## 2024-09-16 PROCEDURE — 80048 BASIC METABOLIC PNL TOTAL CA: CPT

## 2024-09-16 PROCEDURE — A9270 NON-COVERED ITEM OR SERVICE: HCPCS | Performed by: NURSE PRACTITIONER

## 2024-09-16 PROCEDURE — 85027 COMPLETE CBC AUTOMATED: CPT

## 2024-09-16 PROCEDURE — 700111 HCHG RX REV CODE 636 W/ 250 OVERRIDE (IP): Mod: JZ | Performed by: NURSE PRACTITIONER

## 2024-09-16 RX ORDER — TRAMADOL HYDROCHLORIDE 50 MG/1
50 TABLET ORAL EVERY 6 HOURS PRN
Qty: 56 TABLET | Refills: 0 | Status: SHIPPED | OUTPATIENT
Start: 2024-09-16 | End: 2024-09-30

## 2024-09-16 RX ORDER — FUROSEMIDE 20 MG
20 TABLET ORAL DAILY
Qty: 30 TABLET | Refills: 0 | Status: SHIPPED | OUTPATIENT
Start: 2024-09-16

## 2024-09-16 RX ORDER — ACETAMINOPHEN 500 MG
1000 TABLET ORAL EVERY 6 HOURS PRN
COMMUNITY
Start: 2024-09-16

## 2024-09-16 RX ORDER — ASPIRIN 81 MG/1
81 TABLET ORAL DAILY
Qty: 100 TABLET | Refills: 2 | Status: SHIPPED | OUTPATIENT
Start: 2024-09-17

## 2024-09-16 RX ORDER — POTASSIUM CHLORIDE 1500 MG/1
20 TABLET, EXTENDED RELEASE ORAL DAILY
Qty: 30 TABLET | Refills: 0 | Status: SHIPPED | OUTPATIENT
Start: 2024-09-16

## 2024-09-16 RX ADMIN — OMEPRAZOLE 20 MG: 20 CAPSULE, DELAYED RELEASE ORAL at 05:49

## 2024-09-16 RX ADMIN — ASPIRIN 81 MG: 81 TABLET, COATED ORAL at 05:49

## 2024-09-16 RX ADMIN — METOPROLOL TARTRATE 25 MG: 25 TABLET, FILM COATED ORAL at 05:49

## 2024-09-16 RX ADMIN — SENNOSIDES AND DOCUSATE SODIUM 2 TABLET: 50; 8.6 TABLET ORAL at 05:48

## 2024-09-16 RX ADMIN — ACETAMINOPHEN 1000 MG: 500 TABLET ORAL at 00:00

## 2024-09-16 RX ADMIN — ACETAMINOPHEN 1000 MG: 500 TABLET ORAL at 05:49

## 2024-09-16 RX ADMIN — POTASSIUM CHLORIDE 20 MEQ: 1500 TABLET, EXTENDED RELEASE ORAL at 05:49

## 2024-09-16 RX ADMIN — Medication 1 APPLICATOR: at 07:36

## 2024-09-16 RX ADMIN — TRAMADOL HYDROCHLORIDE 50 MG: 50 TABLET ORAL at 05:48

## 2024-09-16 RX ADMIN — TRAMADOL HYDROCHLORIDE 50 MG: 50 TABLET ORAL at 10:25

## 2024-09-16 RX ADMIN — FUROSEMIDE 40 MG: 10 INJECTION INTRAMUSCULAR; INTRAVENOUS at 05:49

## 2024-09-16 ASSESSMENT — FIBROSIS 4 INDEX: FIB4 SCORE: 1.14

## 2024-09-16 ASSESSMENT — PAIN DESCRIPTION - PAIN TYPE
TYPE: ACUTE PAIN
TYPE: ACUTE PAIN

## 2024-09-16 NOTE — DISCHARGE INSTRUCTIONS
DIVISION OF CARDIAC SURGERY   DISCHARGE INSTRUCTIONS    Activity:    NO driving for 4 weeks after surgery. You may ride as a passenger.  NO lifting, pushing, or pulling more than 10 pounds for 6 weeks.  For the next 6 weeks, keep your elbows close to your body and move within a pain-free motion when lifting, pushing or pulling.  Do not stretch both arms backwards at the same time.    Walk at least 4 times per day, there is no maximum. The goal is to increase your distance over time.  Continue using incentive spirometer for 2 weeks or until your baseline volume is reached.  If you are going home on oxygen and you were not on oxygen prior to surgery, keep using until you are oxygen free.  Weigh yourself daily.  Call your Cardiologist for a weight gain of 3 or more pounds in 1 day or more than 5 pounds in 7 days.  Take all of your medications as prescribed. Do not use a pill box for the first month at home. If you have questions, please call your nurse navigator at [phone].  Continue to wear the SE (compression) stockings for 2-4 weeks or until all swelling is gone. You may take them off when you are in bed or when your legs are elevated.    Incision Care:    Make sure to clean your incision(s) TWICE DAILY.  Once by showering AND once using the no rinse Foam cleanser provided in the hospital.  During the shower, cleanse the incision(s) with a perfume and dye free soap (Dial, Dove, [place])  Use gentle pressure and rub up and down over incision with your hands or a washcloth. Rinse off and pat incision(s) dry with clean towel.  Keep the incision open to air. No creams or lotions on your incision(s). No baths.  If there is any increased redness or swelling, separation of the incision line, or thick drainage from any of your incisions, call the Cardiac Surgeons ([phone]).      General Instructions:    You have been referred to Cardiac Rehab.  You can start Cardiac Rehab 30 days after surgery.  If you do  not have an appointment at the time of discharge call [phone] to schedule an appointment.  Your Primary Care Doctor typically handles home oxygen. Oxygen may be stopped when your oxygen level is consistently greater than 90.  Check with your Primary Care Doctor if you are unsure.  Take all of your medications (including pain medications) as prescribed.  Taking medications other than prescribed can result in serious injury.    For Patients Discharged with Narcotic Pain Medication:     If a refill is needed, understand that only 1 refill will be provided and you must come to the Cardiac Surgeons’ office for an appointment (72 hours’ notice is required to schedule and there are no weekend appointments).  If the pain medications you are discharged on are not working, you will need to bring your remaining prescription into the office in order to receive a new prescription.  If you were taking narcotics prior to your heart surgery, the Cardiac Surgeons will provide you with one prescription and additional medications will need to be provided by your pain management doctor.  Do not drink alcohol while taking narcotics.  Lost or stolen medications will not be refilled.  If medications are stolen, report to law enforcement.    Contact Cardiac Surgery at [phone] if you have any questions.

## 2024-09-16 NOTE — DISCHARGE SUMMARY
DISCHARGE SUMMARY    ADMISSION DATE: 9/12/2024    DISCHARGE DATE: 9/16/24    ADMITTING DIAGNOSES: Ascending aortic aneurysm (4.9 cm), mild aortic regurgitation, asthma, hypertension, dyslipidemia     DISCHARGE DIAGNOSES: Ascending aortic aneurysm (4.9 cm), mild aortic regurgitation, asthma, hypertension, dyslipidemia     PROCEDURES PERFORMED:   9/12/24 Dr. Lerma  ASCENDING AORTIC ANEURYSM REPAIR (30 mm Hemashield tube graft, noncoronary sinus plication, distal ascending aortic wrap) and intraoperative transesophageal echocardiography     HISTORY OF PRESENT ILLNESS:  The patient is a 44 y.o. female with past medical history dyslipidemia, hypertension, asthma, mild aortic insufficiency and ascending aortic aneurysm. Today, she states she is her usual state of health. She denies chest pain, shortness of breath, orthopnea, PND, dizziness, and syncope. She has a significant family history of ascending aortic aneurysm in her mom, brother, uncle and aunt. None have had surgical repair but are in surveilance. 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 She is not aware of any sudden cardiac deaths in her family. 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 She has two sons for 7 and  9.     HOSPITAL COURSE:   POD 1 Extubated.  Off gtts.  SR. Neuro intact.  Wounds CDI.  Abdomen soft nontender.  Mild dizziness this AM.  MAP appropriate.  Cr 0.6 Hgb 10.9.  Moderate output from mediastinals overnight.  Plan: Keep mediastinals and andre today for strict I/Os.  Diurese when bp improved.  Wean oxygen.     POD 2 HDS. SR. 1LNC. Wounds CDI. Abdomen soft nontender.  Cr 0.5 Hgb 10.4. Pacer wires removed.  Plan: DC mediastinal tubes and andre.  Wean oxygen. Transfer to tele.       POD 3 HDS. SR. RA. Wounds CDI.  Abdomen soft nontender -BM.  Cr 0.5 Hgb 11.  Plan: Continue diuresis.  Escalate bowel therapy.  Home probable tomorrow.      POD 4  HDS, SR, neuro intact, wounds intact, abdomen soft +BM, fluid  balance negative, wt stable, room air.  Plan:  Discharge home today. Patient to follow up in clinic in one month.    TELEMETRY:  9/13 SR  9/14 SR  9/15 SR  9/16 SR    RECENT LABS:     Lab Results   Component Value Date/Time    SODIUM 134 (L) 09/16/2024 05:51 AM    POTASSIUM 4.0 09/16/2024 05:51 AM    CHLORIDE 99 09/16/2024 05:51 AM    CO2 24 09/16/2024 05:51 AM    GLUCOSE 104 (H) 09/16/2024 05:51 AM    BUN 12 09/16/2024 05:51 AM    CREATININE 0.53 09/16/2024 05:51 AM    BUNCREATRAT 16 06/05/2020 05:20 AM      Lab Results   Component Value Date/Time    WBC 10.8 09/16/2024 05:51 AM    RBC 3.41 (L) 09/16/2024 05:51 AM    HEMOGLOBIN 10.6 (L) 09/16/2024 05:51 AM    HEMATOCRIT 31.4 (L) 09/16/2024 05:51 AM    MCV 92.1 09/16/2024 05:51 AM    MCH 31.1 09/16/2024 05:51 AM    MCHC 33.8 09/16/2024 05:51 AM    MPV 9.7 09/16/2024 05:51 AM    NEUTSPOLYS 58.50 09/09/2024 10:15 AM    LYMPHOCYTES 31.00 09/09/2024 10:15 AM    MONOCYTES 7.80 09/09/2024 10:15 AM    EOSINOPHILS 1.60 09/09/2024 10:15 AM    BASOPHILS 0.70 09/09/2024 10:15 AM      Lab Results   Component Value Date/Time    PROTHROMBTM 15.6 (H) 09/12/2024 11:12 AM    INR 1.23 (H) 09/12/2024 11:12 AM        Fluids:    Intake/Output Summary (Last 24 hours) at 9/16/2024 1051  Last data filed at 9/15/2024 1400  Gross per 24 hour   Intake --   Output 300 ml   Net -300 ml     Admit weight: Weight: 104 kg (229 lb 4.5 oz)  Current weight: Weight: 104 kg (228 lb 6.3 oz) (09/16/24 0600)    ALLERGIES:     Patient has no known allergies.    EJECTION FRACTION:  60%    CARDIAC MEDICATIONS:    ASA - Yes    Plavix - No; contraindicated because of Other AAA repair    Post-operative Beta Blockers - Yes    Ace/ARB- No; contraindicated because of Normal EF    Statin - Yes    Aldactone- No; contraindicated because of Normal EF    SGLT2i-  No contraindicated because of No; contraindicated because of Normal EF    DISCHARGE MEDICATIONS:      Medication List        START taking these medications         Instructions   acetaminophen 500 MG Tabs  Commonly known as: Tylenol   Take 2 Tablets by mouth every 6 hours as needed for Moderate Pain or Mild Pain.  Dose: 1,000 mg     aspirin 81 MG EC tablet  Start taking on: September 17, 2024   Take 1 Tablet by mouth every day.  Dose: 81 mg     furosemide 20 MG Tabs  Commonly known as: Lasix   Take 1 Tablet by mouth every day.  Dose: 20 mg     potassium chloride SA 20 MEQ Tbcr  Commonly known as: Kdur   Take 1 Tablet by mouth every day.  Dose: 20 mEq     traMADol 50 MG Tabs  Commonly known as: Ultram   Take 1 Tablet by mouth every 6 hours as needed for Moderate Pain or Severe Pain for up to 14 days.  Dose: 50 mg            CHANGE how you take these medications        Instructions   eletriptan 20 MG Tabs  What changed:   how much to take  how to take this  when to take this  reasons to take this  additional instructions  Commonly known as: Relpax   TK 1 T PO AOS OF MIGRAINE AS NEEDED. MAY REPEAT IN 2 H. DO NOT EXCEED 2 T IN 24 H     metoprolol SR 25 MG Tb24  What changed: See the new instructions.  Commonly known as: Toprol XL   TAKE ONE-HALF (1/2) TABLET DAILY (CALL TO SCHEDULE FOLLOW UP APPOINTMENT FOR FURTHER REFILLS, [phone])            CONTINUE taking these medications        Instructions   albuterol 108 (90 Base) MCG/ACT Aers inhalation aerosol   Inhale 2 Puffs every 6 hours as needed for Shortness of Breath.  Dose: 2 Puff     B-12 PO   Take 1,000 mcg by mouth every day.  Dose: 1,000 mcg     B-2-400 PO   Take 1 Tablet by mouth every day.  Dose: 1 Tablet     Biotin 1000 MCG Chew   Chew 1,000 mcg every day.  Dose: 1,000 mcg     Botox 200 units Solr injection  Generic drug: onabotulinum toxin A   Inject 200 Units into the shoulder, thigh, or buttocks every 3 months. For migraines  Dose: 200 Units     cetirizine 10 MG Tabs  Commonly known as: ZyrTEC   Take 10 mg by mouth every evening.  Dose: 10 mg     Magnesium Glycinate 120 MG Caps   Take 120 mg by mouth every  day.  Dose: 120 mg     Melatonin 10 MG Caps   Take 10 mg by mouth at bedtime.  Dose: 10 mg     metFORMIN  MG Tb24  Commonly known as: Glucophage XR   Take 1,000 mg by mouth every day.  Dose: 1,000 mg     ondansetron 4 MG Tbdp  Commonly known as: Zofran ODT   Take 1 Tablet by mouth every 8 hours as needed.  Dose: 4 mg     Qulipta 30 MG Tabs  Generic drug: Atogepant   Take 1 Tablet by mouth every evening.  Dose: 1 Tablet     rosuvastatin 20 MG Tabs  Commonly known as: Crestor   Take 20 mg by mouth every evening.  Dose: 20 mg     vitamin D3 125 MCG (5000 UT) Caps   Take 5,000 Units by mouth every evening.  Dose: 5,000 Units              NARCOTIC PAIN MEDICATIONS:   In prescribing controlled substances to this patient, I certify that I have obtained and reviewed their medical history. I have also made a good roc effort to obtain applicable records from other providers who have treated the patient .    I have conducted a physical exam and documented it. I have reviewed the patient's prescription history as maintained by the [place] Prescription Monitoring Program.     I have assessed the patient’s risk for abuse, dependency, and addiction using the validated Opioid Risk Tool.    Given the above, I believe the benefits of controlled substance therapy outweigh the risks. The reasons for prescribing controlled substances include non-narcotic, oral analgesic alternatives have been inadequate for pain control. Accordingly, I have discussed the risk and benefits, treatment plan, and alternative therapies with the patient.     Pt understands this prescription is a controlled substance which is potentially habit-forming and its use is regulated by the [facility]. It must be submitted to the pharmacy within 5 days of the date written and can not be called in or faxed to the pharmacy. Refills are subject to terms of a medicine agreement. Any refill requires a new prescription that must be obtained from this office during regular  office hours Monday through Thursday 7 am to 4 pm. We ask for 72 hours notice to get an appointment for a narcotic pain medication refill. This medicine can cause nausea, significant constipation, sedation, confusion.     DIET:   Cardiac diet    DISCHARGE INSTRUCTIONS DISCUSSED WITH THE PATIENT:      1. NO driving for 4 weeks after surgery. You may ride as a passenger.  2. NO lifting of any item over 10 lbs (e.g. gallon of milk) for 6 weeks after surgery.  3. DO walk as much as possible! Walk a minimum of once a day. Depending on your fatigue and comfort level, you may walk as much as you wish. There is no maximum.  4. Other physical activities (sex, housework, gardening, etc.) are OK after 4 weeks   5. Continue using incentive spirometer for 2 weeks, especially if going home on oxygen.    Incision Care:  1. SHOWER ONLY - no baths. Clean incision daily with plain Ivory ® soap or any other dye or perfume free soap. Then pat incision dry with clean towel. Avoid creams or lotions on the incision(s).  a. If there is any increase in redness or swelling, or separation of the incision line, or thick drainage* from any of the incisions, call right away  * Clear, thin drainage is not abnormal especially from the leg incision and/or                         chest tube sites.  2. Continue to wear your SE Stockings for 4 weeks. You may take off the stockings when in bed or when the legs are elevated.    Patient instructed to call [facility] at [phone]  if any increased shortness of breath, uncontrolled pain, weight gain greater than 3 pounds in 1 day or 5 pounds in 1 week, SBP >140, HR <60 or redness swelling or drainage of incisions.      FOLLOW-UP:   Future Appointments   Date Time Provider Department Center   10/4/2024 12:45 PM RAYSA Lyle [facility] None   10/14/2024 12:30 PM CT RESOURCE PROVIDER CT None   11/26/2024 11:00 AM Michael J Bloch, M.D. [facility] None

## 2024-09-16 NOTE — CARE PLAN
The patient is Stable - Low risk of patient condition declining or worsening    Shift Goals  Clinical Goals: mobility, D/C  Patient Goals: home and comfort  Family Goals: home    Progress made toward(s) clinical / shift goals:    Problem: Knowledge Deficit - Standard  Goal: Patient and family/care givers will demonstrate understanding of plan of care, disease process/condition, diagnostic tests and medications  Outcome: Met     Problem: Pain - Standard  Goal: Alleviation of pain or a reduction in pain to the patient’s comfort goal  Outcome: Met     Problem: Skin Integrity  Goal: Skin integrity is maintained or improved  Outcome: Met     Problem: Fall Risk  Goal: Patient will remain free from falls  Outcome: Met     Problem: Day of surgery post CABG/Heart valve replacement  Goal: Stabilization in immediate post op period  Outcome: Met     Problem: Post Op Day 1 CABG/Heart Valve Replacement  Goal: Optimal care of the post op CABG/heart valve replacement Post Op Day 1  Outcome: Met     Problem: Post op day 2 CABG/Heart Valve Replacement  Goal: Optimal care of the post op CABG/heart valve replacement post op day 2  Outcome: Met     Problem: Post Op Day 3 CABG/Heart Valve replacement  Goal: Optimal care of the post op CABG/Heart Valve replacement post op day 3  Outcome: Met     Problem: Post Op Day 4 CABG/Heart Valve Replacement  Goal: Optimal care of the Post Op CABG/Heart Valve replacement Post Op Day 4  Outcome: Met     Problem: Post Op Day 5 CABG/Heart Valve Replacement  Goal: Optimal care of the Post Op CABG/Heart Valve replacement Post Op Day 5  Outcome: Met       Patient is not progressing towards the following goals:

## 2024-09-16 NOTE — PROGRESS NOTES
Bedside report received, Pt care assumed. Tele box on. VSS. Pt assessment complete. Pt AOX4, no signs of distress noted at this time.  Pt c/o of 7/10 pain. Pt denies any additional needs at this time. Bed in lowest position, ambulates with standby assistance and front wheel walker. POC discussed with Pt/family, verbalizes understanding, no questions at this time. Pt educated on fall risk and verbalizes understanding, call light within reach

## 2024-09-16 NOTE — CARE PLAN
The patient is Stable - Low risk of patient condition declining or worsening    Shift Goals  Clinical Goals: mobility, D/C  Patient Goals: home and comfort  Family Goals: home    Progress made toward(s) clinical / shift goals:    Problem: Post Op Day 4 CABG/Heart Valve Replacement  Goal: Optimal care of the Post Op CABG/Heart Valve replacement Post Op Day 4  Note: Pt up to bed for meals and ambulated >2 times throughout shift. Pt demonstrated proper use of incentive spirometer and pulled 1500 mL. Daily weight completed. Education provided regarding incision care, and pt verbalizes understanding and able to perform teach back of instructions. Discharge instructions reviewed with pt and education provided regarding medications, daily activity, daily weights, etc.

## 2024-09-16 NOTE — CARE PLAN
The patient is Stable - Low risk of patient condition declining or worsening    Shift Goals  Clinical Goals: hemodynamic stability, mobility, comfort  Patient Goals: comfort, rest, go home  Family Goals: updates, discharge    Progress made toward(s) clinical / shift goals:    Problem: Knowledge Deficit - Standard  Goal: Patient and family/care givers will demonstrate understanding of plan of care, disease process/condition, diagnostic tests and medications  Outcome: Progressing     Problem: Pain - Standard  Goal: Alleviation of pain or a reduction in pain to the patient’s comfort goal  Outcome: Progressing     Problem: Skin Integrity  Goal: Skin integrity is maintained or improved  Outcome: Progressing     Problem: Fall Risk  Goal: Patient will remain free from falls  Outcome: Progressing     Problem: Post Op Day 3 CABG/Heart Valve replacement  Goal: Optimal care of the post op CABG/Heart Valve replacement post op day 3  Outcome: Progressing  Intervention: Ambulate 4 times daily, increasing the distance each time  Note: Patient ambulated 500 feet before bed. Patient verbalized she has walked 5 times today.  Intervention: IS q 1 hour while awake and record best IS volume  Note: Max IS 1500, baseline 2500. Incentive spirometer encouraged.  Intervention: Consider removal of andre, chest tube and pacer wires if not already done  Note: Andre, chest tube, and pacer wires all removed. Site care in place.   Intervention: Assess need for case management and  for discharge planning  Note: Need assessed.   Intervention: Discharge planning (See discharge barriers/planning problem on care plan)  Note: Discharge planning discussed. Spouse available for transport, patient and spouse educated and verbalize understanding of plan of care after discharge.        Patient is not progressing towards the following goals:

## 2024-09-17 LAB — LV EJECT FRACT  99904: 55

## 2024-09-17 NOTE — ANESTHESIA POSTPROCEDURE EVALUATION
Patient: Princess Machado    Procedure Summary       Date: 09/12/24 Room / Location: [facility] 02 / SURGERY [facility]    Anesthesia Start: 0733 Anesthesia Stop: 1115    Procedures:       ASCENDING AORTIC ANEURYSM REPAIR, TRANSESOPHAGEAL ECHOCARDIOGRAM (Chest)      ECHOCARDIOGRAM, TRANSESOPHAGEAL, INTRAOPERATIVE (Esophagus) Diagnosis: (ASCENDING AORTIC ANEURYSM)    Surgeons: Mago Lerma M.D. Responsible Provider: Basim Delgado M.D.    Anesthesia Type: general ASA Status: 4            Final Anesthesia Type: general  Last vitals  BP   Blood Pressure: 108/69    Temp   36.4 °C (97.5 °F)    Pulse   66   Resp   15    SpO2   94 %      Anesthesia Post Evaluation    Patient location during evaluation: PACU  Patient participation: complete - patient participated  Level of consciousness: awake and alert    Airway patency: patent  Anesthetic complications: no  Cardiovascular status: hemodynamically stable  Respiratory status: face mask    PONV: none          No notable events documented.     Nurse Pain Score: 3 (NPRS)

## 2024-09-18 ENCOUNTER — TELEPHONE (OUTPATIENT)
Dept: HEALTH INFORMATION MANAGEMENT | Facility: OTHER | Age: 44
End: 2024-09-18
Payer: COMMERCIAL

## 2024-09-24 ENCOUNTER — TELEPHONE (OUTPATIENT)
Dept: CARDIOTHORACIC SURGERY | Facility: MEDICAL CENTER | Age: 44
End: 2024-09-24
Payer: COMMERCIAL

## 2024-09-24 NOTE — TELEPHONE ENCOUNTER
Hospital follow up call    she states that the post op pain is well controlled.  Narcotics are no longer being utilized. Tylenol is being utilized.    she is showering everyday or every other day.    she states that all incisions are healing well and approximated with no s/s of infection (redness, puss, fever, purulent drainage, or tenderness).    she is using the IS; volume is improved.    she is walking everyday, distance is increased from the hospital.    she has had a bowel movement since discharge.    she is obtaining daily weights. Current weight is 220 lbs which has stabilized over the last few days. she is no longer wearing the compression/SE hose. she denies LE edema.    she is taking all prescribed meds as directed.  she has no medication questions.    she is taking vitals (HR and BP).    BP's: 119/82  122/78  109/82  HR's: 70's to 80's    she has no additional questions at this time. Follow up education was not needed on anything. Follow up appointments were reviewed and I ensured they have my number if issues or concerns arise.      Follow up call time was 16 minutes.

## 2024-10-03 ENCOUNTER — TELEPHONE (OUTPATIENT)
Dept: VASCULAR LAB | Facility: MEDICAL CENTER | Age: 44
End: 2024-10-03
Payer: COMMERCIAL

## 2024-10-04 ENCOUNTER — OFFICE VISIT (OUTPATIENT)
Dept: CARDIOLOGY | Facility: MEDICAL CENTER | Age: 44
End: 2024-10-04
Payer: COMMERCIAL

## 2024-10-04 VITALS
HEIGHT: 68 IN | HEART RATE: 70 BPM | BODY MASS INDEX: 34.56 KG/M2 | WEIGHT: 228 LBS | OXYGEN SATURATION: 99 % | RESPIRATION RATE: 16 BRPM | DIASTOLIC BLOOD PRESSURE: 72 MMHG | SYSTOLIC BLOOD PRESSURE: 114 MMHG

## 2024-10-04 DIAGNOSIS — E78.5 DYSLIPIDEMIA: ICD-10-CM

## 2024-10-04 DIAGNOSIS — I71.21 ANEURYSM OF ASCENDING AORTA WITHOUT RUPTURE (HCC): ICD-10-CM

## 2024-10-04 DIAGNOSIS — Z98.890 S/P AORTIC ANEURYSM REPAIR: ICD-10-CM

## 2024-10-04 DIAGNOSIS — I77.810 ASCENDING AORTA DILATION (HCC): ICD-10-CM

## 2024-10-04 DIAGNOSIS — I10 ESSENTIAL HYPERTENSION: ICD-10-CM

## 2024-10-04 DIAGNOSIS — Z86.79 S/P AORTIC ANEURYSM REPAIR: ICD-10-CM

## 2024-10-04 PROCEDURE — 99213 OFFICE O/P EST LOW 20 MIN: CPT

## 2024-10-04 PROCEDURE — 99024 POSTOP FOLLOW-UP VISIT: CPT

## 2024-10-04 PROCEDURE — 3078F DIAST BP <80 MM HG: CPT

## 2024-10-04 PROCEDURE — 3074F SYST BP LT 130 MM HG: CPT

## 2024-10-04 RX ORDER — METOPROLOL SUCCINATE 25 MG/1
12.5 TABLET, EXTENDED RELEASE ORAL DAILY
Qty: 45 TABLET | Refills: 3 | Status: SHIPPED | OUTPATIENT
Start: 2024-10-04

## 2024-10-04 ASSESSMENT — ENCOUNTER SYMPTOMS
DYSPNEA ON EXERTION: 0
SHORTNESS OF BREATH: 0
DIZZINESS: 0
SYNCOPE: 0
HEADACHES: 0
PALPITATIONS: 0
ORTHOPNEA: 0
LIGHT-HEADEDNESS: 0
PND: 0
NEAR-SYNCOPE: 0

## 2024-10-04 ASSESSMENT — FIBROSIS 4 INDEX: FIB4 SCORE: 0.9

## 2024-10-14 ENCOUNTER — OFFICE VISIT (OUTPATIENT)
Dept: CARDIOTHORACIC SURGERY | Facility: MEDICAL CENTER | Age: 44
End: 2024-10-14
Payer: COMMERCIAL

## 2024-10-14 VITALS
HEIGHT: 68 IN | HEART RATE: 71 BPM | OXYGEN SATURATION: 96 % | TEMPERATURE: 96.9 F | DIASTOLIC BLOOD PRESSURE: 74 MMHG | WEIGHT: 229.9 LBS | SYSTOLIC BLOOD PRESSURE: 112 MMHG | BODY MASS INDEX: 34.84 KG/M2

## 2024-10-14 DIAGNOSIS — Z09 POSTOP CHECK: ICD-10-CM

## 2024-10-14 PROCEDURE — 99024 POSTOP FOLLOW-UP VISIT: CPT | Performed by: NURSE PRACTITIONER

## 2024-10-14 PROCEDURE — 3078F DIAST BP <80 MM HG: CPT | Performed by: NURSE PRACTITIONER

## 2024-10-14 PROCEDURE — 3074F SYST BP LT 130 MM HG: CPT | Performed by: NURSE PRACTITIONER

## 2024-10-14 ASSESSMENT — FIBROSIS 4 INDEX: FIB4 SCORE: 0.9

## 2024-10-18 ENCOUNTER — APPOINTMENT (RX ONLY)
Dept: URBAN - METROPOLITAN AREA CLINIC 36 | Facility: CLINIC | Age: 44
Setting detail: DERMATOLOGY
End: 2024-10-18

## 2024-10-18 DIAGNOSIS — Z41.9 ENCOUNTER FOR PROCEDURE FOR PURPOSES OTHER THAN REMEDYING HEALTH STATE, UNSPECIFIED: ICD-10-CM

## 2024-10-18 PROCEDURE — ? FRAXEL

## 2024-10-18 ASSESSMENT — LOCATION DETAILED DESCRIPTION DERM
LOCATION DETAILED: LEFT MEDIAL BREAST 9-10:00 REGION
LOCATION DETAILED: MIDDLE STERNUM
LOCATION DETAILED: STERNAL NOTCH
LOCATION DETAILED: EPIGASTRIC SKIN

## 2024-10-18 ASSESSMENT — LOCATION SIMPLE DESCRIPTION DERM
LOCATION SIMPLE: CHEST
LOCATION SIMPLE: LEFT BREAST
LOCATION SIMPLE: ABDOMEN

## 2024-10-18 ASSESSMENT — LOCATION ZONE DERM: LOCATION ZONE: TRUNK

## 2024-10-18 NOTE — PROCEDURE: FRAXEL
Wavelength: 1550nm
Small Metal Eye Shield Text: The ocular mucosa was anesthetized with tetracaine. Once adequate anesthesia was optained, small metal eye shields were inserted and remained in place until the procedure was completed.
Large Plastic Eye Shield Text: The ocular mucosa was anesthetized with tetracaine. Once adequate anesthesia was optained, large plastic eye shields were inserted and remained in place until the procedure was completed.
Treatment Level: 1
Indication: dyschromia
Anesthesia Type: 1% lidocaine with epinephrine
Medium Metal Eye Shield Text: The ocular mucosa was anesthetized with tetracaine. Once adequate anesthesia was optained, medium metal eye shields were inserted and remained in place until the procedure was completed.
Energy(Mj/Cm2): 70
Large Metal Eye Shield Text: The ocular mucosa was anesthetized with tetracaine. Once adequate anesthesia was optained, large metal eye shields were inserted and remained in place until the procedure was completed.
Treatment Level: 5
Number Of Passes: 4
Location: decolletage of the chest
Small Plastic Eye Shield Text: The ocular mucosa was anesthetized with tetracaine. Once adequate anesthesia was optained, small plastic eye shields were inserted and remained in place until the procedure was completed.
Consent: Written consent obtained, risks reviewed including but not limited to pain and incomplete improvement.
Add Post-Care Below To The Note: No
Medium Plastic Eye Shield Text: The ocular mucosa was anesthetized with tetracaine. Once adequate anesthesia was optained, medium plastic eye shields were inserted and remained in place until the procedure was completed.
Was An Eye Shield Used?: Yes - Small (Metal)
Post-Care Instructions: I reviewed with the patient in detail post-care instructions. Patient should avoid sun until area fully healed.
Detail Level: Zone

## 2024-10-21 LAB — COMPONENT P 8504P: NORMAL

## 2024-10-30 ENCOUNTER — TELEMEDICINE (OUTPATIENT)
Dept: VASCULAR LAB | Facility: MEDICAL CENTER | Age: 44
End: 2024-10-30
Attending: INTERNAL MEDICINE
Payer: COMMERCIAL

## 2024-10-30 DIAGNOSIS — I77.810 ASCENDING AORTA DILATION (HCC): ICD-10-CM

## 2024-10-30 DIAGNOSIS — E78.5 DYSLIPIDEMIA: ICD-10-CM

## 2024-10-30 DIAGNOSIS — I35.1 AORTIC VALVE INSUFFICIENCY, ETIOLOGY OF CARDIAC VALVE DISEASE UNSPECIFIED: ICD-10-CM

## 2024-10-30 DIAGNOSIS — I10 ESSENTIAL HYPERTENSION: ICD-10-CM

## 2024-11-13 ENCOUNTER — APPOINTMENT (RX ONLY)
Dept: URBAN - METROPOLITAN AREA CLINIC 36 | Facility: CLINIC | Age: 44
Setting detail: DERMATOLOGY
End: 2024-11-13

## 2024-11-13 ENCOUNTER — APPOINTMENT (RX ONLY)
Dept: URBAN - METROPOLITAN AREA CLINIC 6 | Facility: CLINIC | Age: 44
Setting detail: DERMATOLOGY
End: 2024-11-13

## 2024-11-13 DIAGNOSIS — L91.0 HYPERTROPHIC SCAR: ICD-10-CM

## 2024-11-13 DIAGNOSIS — L91.8 OTHER HYPERTROPHIC DISORDERS OF THE SKIN: ICD-10-CM

## 2024-11-13 DIAGNOSIS — Z41.9 ENCOUNTER FOR PROCEDURE FOR PURPOSES OTHER THAN REMEDYING HEALTH STATE, UNSPECIFIED: ICD-10-CM

## 2024-11-13 DIAGNOSIS — L70.0 ACNE VULGARIS: ICD-10-CM

## 2024-11-13 PROCEDURE — 99214 OFFICE O/P EST MOD 30 MIN: CPT | Mod: 25

## 2024-11-13 PROCEDURE — ? COUNSELING

## 2024-11-13 PROCEDURE — ? PRESCRIPTION MEDICATION MANAGEMENT

## 2024-11-13 PROCEDURE — ? BENIGN DESTRUCTION COSMETIC

## 2024-11-13 PROCEDURE — ? INTRALESIONAL KENALOG

## 2024-11-13 PROCEDURE — 11900 INJECT SKIN LESIONS </W 7: CPT

## 2024-11-13 PROCEDURE — ? PRESCRIPTION

## 2024-11-13 PROCEDURE — ? FRAXEL

## 2024-11-13 RX ORDER — TRETIONIN 0.5 MG/G
1 CREAM TOPICAL QHS
Qty: 45 | Refills: 6 | Status: ERX | COMMUNITY
Start: 2024-11-13

## 2024-11-13 RX ADMIN — TRETIONIN 1: 0.5 CREAM TOPICAL at 00:00

## 2024-11-13 ASSESSMENT — LOCATION DETAILED DESCRIPTION DERM
LOCATION DETAILED: EPIGASTRIC SKIN
LOCATION DETAILED: MIDDLE STERNUM
LOCATION DETAILED: LOWER STERNUM
LOCATION DETAILED: RIGHT LATERAL ABDOMEN
LOCATION DETAILED: LEFT MEDIAL BREAST 9-10:00 REGION
LOCATION DETAILED: MIDDLE STERNUM
LOCATION DETAILED: RIGHT INFERIOR CENTRAL BUCCAL CHEEK
LOCATION DETAILED: STERNAL NOTCH

## 2024-11-13 ASSESSMENT — LOCATION ZONE DERM
LOCATION ZONE: TRUNK
LOCATION ZONE: FACE
LOCATION ZONE: TRUNK

## 2024-11-13 ASSESSMENT — LOCATION SIMPLE DESCRIPTION DERM
LOCATION SIMPLE: ABDOMEN
LOCATION SIMPLE: ABDOMEN
LOCATION SIMPLE: CHEST
LOCATION SIMPLE: CHEST
LOCATION SIMPLE: LEFT BREAST
LOCATION SIMPLE: RIGHT CHEEK

## 2024-11-13 NOTE — PROCEDURE: BENIGN DESTRUCTION COSMETIC
0
Anesthesia Volume In Cc: 0.5
Post-Care Instructions: I reviewed with the patient in detail post-care instructions. Patient is to wear sunprotection, and avoid picking at any of the treated lesions. Pt may apply Vaseline to crusted or scabbing areas.
Consent: The patient's verbal consent was obtained including but not limited to risks of crusting, scabbing, blistering, scarring, darker or lighter pigmentary change, recurrence, incomplete removal and infection.
Detail Level: Detailed
Price (Use Numbers Only, No Special Characters Or $): 0

## 2024-11-13 NOTE — PROCEDURE: COUNSELING
Azelaic Acid Pregnancy And Lactation Text: This medication is considered safe during pregnancy and breast feeding.
Spironolactone Counseling: Patient advised regarding risks of diarrhea, abdominal pain, hyperkalemia, birth defects (for female patients), liver toxicity and renal toxicity. The patient may need blood work to monitor liver and kidney function and potassium levels while on therapy. The patient verbalized understanding of the proper use and possible adverse effects of spironolactone.  All of the patient's questions and concerns were addressed.
Topical Clindamycin Counseling: Patient counseled that this medication may cause skin irritation or allergic reactions.  In the event of skin irritation, the patient was advised to reduce the amount of the drug applied or use it less frequently.   The patient verbalized understanding of the proper use and possible adverse effects of clindamycin.  All of the patient's questions and concerns were addressed.
Isotretinoin Pregnancy And Lactation Text: This medication is Pregnancy Category X and is considered extremely dangerous during pregnancy. It is unknown if it is excreted in breast milk.
Dapsone Counseling: I discussed with the patient the risks of dapsone including but not limited to hemolytic anemia, agranulocytosis, rashes, methemoglobinemia, kidney failure, peripheral neuropathy, headaches, GI upset, and liver toxicity.  Patients who start dapsone require monitoring including baseline LFTs and weekly CBCs for the first month, then every month thereafter.  The patient verbalized understanding of the proper use and possible adverse effects of dapsone.  All of the patient's questions and concerns were addressed.
Use Enhanced Medication Counseling?: No
Azithromycin Pregnancy And Lactation Text: This medication is considered safe during pregnancy and is also secreted in breast milk.
Benzoyl Peroxide Pregnancy And Lactation Text: This medication is Pregnancy Category C. It is unknown if benzoyl peroxide is excreted in breast milk.
Doxycycline Counseling:  Patient counseled regarding possible photosensitivity and increased risk for sunburn.  Patient instructed to avoid sunlight, if possible.  When exposed to sunlight, patients should wear protective clothing, sunglasses, and sunscreen.  The patient was instructed to call the office immediately if the following severe adverse effects occur:  hearing changes, easy bruising/bleeding, severe headache, or vision changes.  The patient verbalized understanding of the proper use and possible adverse effects of doxycycline.  All of the patient's questions and concerns were addressed.
Tetracycline Counseling: Patient counseled regarding possible photosensitivity and increased risk for sunburn.  Patient instructed to avoid sunlight, if possible.  When exposed to sunlight, patients should wear protective clothing, sunglasses, and sunscreen.  The patient was instructed to call the office immediately if the following severe adverse effects occur:  hearing changes, easy bruising/bleeding, severe headache, or vision changes.  The patient verbalized understanding of the proper use and possible adverse effects of tetracycline.  All of the patient's questions and concerns were addressed. Patient understands to avoid pregnancy while on therapy due to potential birth defects.
Topical Sulfur Applications Counseling: Topical Sulfur Counseling: Patient counseled that this medication may cause skin irritation or allergic reactions.  In the event of skin irritation, the patient was advised to reduce the amount of the drug applied or use it less frequently.   The patient verbalized understanding of the proper use and possible adverse effects of topical sulfur application.  All of the patient's questions and concerns were addressed.
Detail Level: Zone
High Dose Vitamin A Pregnancy And Lactation Text: High dose vitamin A therapy is contraindicated during pregnancy and breast feeding.
Tetracycline Pregnancy And Lactation Text: This medication is Pregnancy Category D and not consider safe during pregnancy. It is also excreted in breast milk.
Topical Retinoid counseling:  Patient advised to apply a pea-sized amount only at bedtime and wait 30 minutes after washing their face before applying.  If too drying, patient may add a non-comedogenic moisturizer. The patient verbalized understanding of the proper use and possible adverse effects of retinoids.  All of the patient's questions and concerns were addressed.
Minocycline Counseling: Patient advised regarding possible photosensitivity and discoloration of the teeth, skin, lips, tongue and gums.  Patient instructed to avoid sunlight, if possible.  When exposed to sunlight, patients should wear protective clothing, sunglasses, and sunscreen.  The patient was instructed to call the office immediately if the following severe adverse effects occur:  hearing changes, easy bruising/bleeding, severe headache, or vision changes.  The patient verbalized understanding of the proper use and possible adverse effects of minocycline.  All of the patient's questions and concerns were addressed.
Erythromycin Pregnancy And Lactation Text: This medication is Pregnancy Category B and is considered safe during pregnancy. It is also excreted in breast milk.
Topical Sulfur Applications Pregnancy And Lactation Text: This medication is Pregnancy Category C and has an unknown safety profile during pregnancy. It is unknown if this topical medication is excreted in breast milk.
Birth Control Pills Counseling: Birth Control Pill Counseling: I discussed with the patient the potential side effects of OCPs including but not limited to increased risk of stroke, heart attack, thrombophlebitis, deep venous thrombosis, hepatic adenomas, breast changes, GI upset, headaches, and depression.  The patient verbalized understanding of the proper use and possible adverse effects of OCPs. All of the patient's questions and concerns were addressed.
Aklief Pregnancy And Lactation Text: It is unknown if this medication is safe to use during pregnancy.  It is unknown if this medication is excreted in breast milk.  Breastfeeding women should use the topical cream on the smallest area of the skin for the shortest time needed while breastfeeding.  Do not apply to nipple and areola.
Sarecycline Counseling: Patient advised regarding possible photosensitivity and discoloration of the teeth, skin, lips, tongue and gums.  Patient instructed to avoid sunlight, if possible.  When exposed to sunlight, patients should wear protective clothing, sunglasses, and sunscreen.  The patient was instructed to call the office immediately if the following severe adverse effects occur:  hearing changes, easy bruising/bleeding, severe headache, or vision changes.  The patient verbalized understanding of the proper use and possible adverse effects of sarecycline.  All of the patient's questions and concerns were addressed.
Winlevi Pregnancy And Lactation Text: This medication is considered safe during pregnancy and breastfeeding.
Tazorac Counseling:  Patient advised that medication is irritating and drying.  Patient may need to apply sparingly and wash off after an hour before eventually leaving it on overnight.  The patient verbalized understanding of the proper use and possible adverse effects of tazorac.  All of the patient's questions and concerns were addressed.
Azithromycin Counseling:  I discussed with the patient the risks of azithromycin including but not limited to GI upset, allergic reaction, drug rash, diarrhea, and yeast infections.
Dapsone Pregnancy And Lactation Text: This medication is Pregnancy Category C and is not considered safe during pregnancy or breast feeding.
Topical Clindamycin Pregnancy And Lactation Text: This medication is Pregnancy Category B and is considered safe during pregnancy. It is unknown if it is excreted in breast milk.
Spironolactone Pregnancy And Lactation Text: This medication can cause feminization of the male fetus and should be avoided during pregnancy. The active metabolite is also found in breast milk.
Benzoyl Peroxide Counseling: Patient counseled that medicine may cause skin irritation and bleach clothing.  In the event of skin irritation, the patient was advised to reduce the amount of the drug applied or use it less frequently.   The patient verbalized understanding of the proper use and possible adverse effects of benzoyl peroxide.  All of the patient's questions and concerns were addressed.
High Dose Vitamin A Counseling: Side effects reviewed, pt to contact office should one occur.
Doxycycline Pregnancy And Lactation Text: This medication is Pregnancy Category D and not consider safe during pregnancy. It is also excreted in breast milk but is considered safe for shorter treatment courses.
Bactrim Counseling:  I discussed with the patient the risks of sulfa antibiotics including but not limited to GI upset, allergic reaction, drug rash, diarrhea, dizziness, photosensitivity, and yeast infections.  Rarely, more serious reactions can occur including but not limited to aplastic anemia, agranulocytosis, methemoglobinemia, blood dyscrasias, liver or kidney failure, lung infiltrates or desquamative/blistering drug rashes.
Erythromycin Counseling:  I discussed with the patient the risks of erythromycin including but not limited to GI upset, allergic reaction, drug rash, diarrhea, increase in liver enzymes, and yeast infections.
Bactrim Pregnancy And Lactation Text: This medication is Pregnancy Category D and is known to cause fetal risk.  It is also excreted in breast milk.
Topical Retinoid Pregnancy And Lactation Text: This medication is Pregnancy Category C. It is unknown if this medication is excreted in breast milk.
Aklief counseling:  Patient advised to apply a pea-sized amount only at bedtime and wait 30 minutes after washing their face before applying.  If too drying, patient may add a non-comedogenic moisturizer.  The most commonly reported side effects including irritation, redness, scaling, dryness, stinging, burning, itching, and increased risk of sunburn.  The patient verbalized understanding of the proper use and possible adverse effects of retinoids.  All of the patient's questions and concerns were addressed.
Winlevi Counseling:  I discussed with the patient the risks of topical clascoterone including but not limited to erythema, scaling, itching, and stinging. Patient voiced their understanding.
Tazorac Pregnancy And Lactation Text: This medication is not safe during pregnancy. It is unknown if this medication is excreted in breast milk.
Birth Control Pills Pregnancy And Lactation Text: This medication should be avoided if pregnant and for the first 30 days post-partum.
Isotretinoin Counseling: Patient should get monthly blood tests, not donate blood, not drive at night if vision affected, not share medication, and not undergo elective surgery for 6 months after tx completed. Side effects reviewed, pt to contact office should one occur.
Azelaic Acid Counseling: Patient counseled that medicine may cause skin irritation and to avoid applying near the eyes.  In the event of skin irritation, the patient was advised to reduce the amount of the drug applied or use it less frequently.   The patient verbalized understanding of the proper use and possible adverse effects of azelaic acid.  All of the patient's questions and concerns were addressed.
Detail Level: Simple
Detail Level: Detailed

## 2024-11-13 NOTE — PROCEDURE: PRESCRIPTION MEDICATION MANAGEMENT
Plan: Discussed Spironolactone, directed pt to speak to cardiologist first
Render In Strict Bullet Format?: No
Initiate Treatment: Tretinoin 0.05 % topical cream Qhs
Detail Level: Zone

## 2024-11-13 NOTE — PROCEDURE: INTRALESIONAL KENALOG
How Many Mls Were Removed From The 40 Mg/Ml (5ml) Vial When Preparing The Injectable Solution?: 0
Kenalog Preparation: Kenalog
Bill For Wasted Drug (Kenalog)?: no
Validate Note Data When Using Inventory: Yes
Total Volume (Ccs): 0.3
Which Kenalog Vial Was Used?: Kenalog 40 mg/ml (5 ml vial)
Treatment Number (Optional): 1
Medical Necessity Clause: This procedure was medically necessary because the lesions that were treated were:
Lot # For Kenalog (Optional): [id number]
Administered By (Optional): Dr. Moriah Velez
Kenalog Type Of Vial: Multiple Dose
Consent: The risks of atrophy were reviewed with the patient.
Concentration Of Kenalog Solution Injected (Mg/Ml): 20.0
Expiration Date For Kenalog (Optional): February 2026
Ndc# For Kenalog Only: 0120-3456-42
Detail Level: Detailed

## 2024-11-15 ENCOUNTER — TELEPHONE (OUTPATIENT)
Dept: VASCULAR LAB | Facility: MEDICAL CENTER | Age: 44
End: 2024-11-15
Payer: COMMERCIAL

## 2024-11-15 NOTE — TELEPHONE ENCOUNTER
Called and left message to reschedule April 10 f/u appt with Dr Bloch.  Please reschedule any other open slots in April.    Davida Pugh, Med Ass't  [facility]  Ph. [phone]  Fx. [phone]

## 2024-12-03 ENCOUNTER — HOSPITAL ENCOUNTER (OUTPATIENT)
Facility: MEDICAL CENTER | Age: 44
End: 2024-12-03
Attending: NURSE PRACTITIONER
Payer: COMMERCIAL

## 2024-12-03 LAB
ERYTHROCYTE [DISTWIDTH] IN BLOOD BY AUTOMATED COUNT: 39.9 FL (ref 35.9–50)
FERRITIN SERPL-MCNC: 29.4 NG/ML (ref 10–291)
HCT VFR BLD AUTO: 42.5 % (ref 37–47)
HGB BLD-MCNC: 14.2 G/DL (ref 12–16)
IRON SATN MFR SERPL: 14 % (ref 15–55)
IRON SERPL-MCNC: 55 UG/DL (ref 40–170)
MCH RBC QN AUTO: 29 PG (ref 27–33)
MCHC RBC AUTO-ENTMCNC: 33.4 G/DL (ref 32.2–35.5)
MCV RBC AUTO: 86.7 FL (ref 81.4–97.8)
PLATELET # BLD AUTO: 308 K/UL (ref 164–446)
PMV BLD AUTO: 9.8 FL (ref 9–12.9)
RBC # BLD AUTO: 4.9 M/UL (ref 4.2–5.4)
T4 FREE SERPL-MCNC: 1.28 NG/DL (ref 0.93–1.7)
TIBC SERPL-MCNC: 390 UG/DL (ref 250–450)
TSH SERPL-ACNC: 0.45 UIU/ML (ref 0.35–5.5)
UIBC SERPL-MCNC: 335 UG/DL (ref 110–370)
WBC # BLD AUTO: 9.4 K/UL (ref 4.8–10.8)

## 2024-12-03 PROCEDURE — 83550 IRON BINDING TEST: CPT

## 2024-12-03 PROCEDURE — 85027 COMPLETE CBC AUTOMATED: CPT

## 2024-12-03 PROCEDURE — 36415 COLL VENOUS BLD VENIPUNCTURE: CPT

## 2024-12-03 PROCEDURE — 83540 ASSAY OF IRON: CPT

## 2024-12-03 PROCEDURE — 84443 ASSAY THYROID STIM HORMONE: CPT

## 2024-12-03 PROCEDURE — 82728 ASSAY OF FERRITIN: CPT

## 2024-12-03 PROCEDURE — 84439 ASSAY OF FREE THYROXINE: CPT

## 2024-12-11 ENCOUNTER — APPOINTMENT (OUTPATIENT)
Dept: URBAN - METROPOLITAN AREA CLINIC 36 | Facility: CLINIC | Age: 44
Setting detail: DERMATOLOGY
End: 2024-12-11

## 2024-12-11 DIAGNOSIS — L90.5 SCAR CONDITIONS AND FIBROSIS OF SKIN: ICD-10-CM

## 2024-12-11 PROCEDURE — ? PULSED-DYE LASER

## 2024-12-11 ASSESSMENT — LOCATION ZONE DERM: LOCATION ZONE: TRUNK

## 2024-12-11 ASSESSMENT — LOCATION SIMPLE DESCRIPTION DERM
LOCATION SIMPLE: CHEST
LOCATION SIMPLE: ABDOMEN
LOCATION SIMPLE: LEFT BREAST

## 2024-12-11 ASSESSMENT — LOCATION DETAILED DESCRIPTION DERM
LOCATION DETAILED: LEFT MEDIAL SUPERIOR CHEST
LOCATION DETAILED: EPIGASTRIC SKIN
LOCATION DETAILED: LEFT MEDIAL BREAST 9-10:00 REGION

## 2024-12-11 NOTE — PROCEDURE: PULSED-DYE LASER
Spot Size: 7 mm
Consent: Written consent obtained, risks reviewed including but not limited to crusting, scabbing, blistering, scarring, darker or lighter pigmentary change, incidental hair removal, bruising, and/or incomplete removal.
Pulse Duration: 10 ms
Delay Time (Ms): 20
Post-Care Instructions: I reviewed with the patient in detail post-care instructions. Patient should stay away from the sun and wear sun protection until treated areas are fully healed.
Laser Type: Vbeam 595nm
Immediate Endpoint: erythema
Cryogen Time (Ms): 30
Pulse Duration: 1.5 ms
Post-Procedure Care: Vaseline and ice applied. Post care reviewed with patient.
Detail Level: Zone
Fluence In J/Cm2 (Optional): 8.00

## 2024-12-11 NOTE — HPI: SCAR
Is This A New Presentation, Or A Follow-Up?: Scar
How Severe Is Your Scar?: mild
Additional History: Pt will rt in 6 wks if scar is still bothering her

## 2025-01-09 ENCOUNTER — HOSPITAL ENCOUNTER (OUTPATIENT)
Dept: RADIOLOGY | Facility: MEDICAL CENTER | Age: 45
End: 2025-01-09
Attending: INTERNAL MEDICINE
Payer: COMMERCIAL

## 2025-01-09 ENCOUNTER — HOSPITAL ENCOUNTER (OUTPATIENT)
Dept: LAB | Facility: MEDICAL CENTER | Age: 45
End: 2025-01-09
Attending: OBSTETRICS & GYNECOLOGY
Payer: COMMERCIAL

## 2025-01-09 DIAGNOSIS — R22.1 LOCALIZED SWELLING, MASS AND LUMP, NECK: ICD-10-CM

## 2025-01-09 LAB
BASOPHILS # BLD AUTO: 0.8 % (ref 0–1.8)
BASOPHILS # BLD: 0.1 K/UL (ref 0–0.12)
EOSINOPHIL # BLD AUTO: 0.14 K/UL (ref 0–0.51)
EOSINOPHIL NFR BLD: 1.1 % (ref 0–6.9)
ERYTHROCYTE [DISTWIDTH] IN BLOOD BY AUTOMATED COUNT: 44.2 FL (ref 35.9–50)
FERRITIN SERPL-MCNC: 32.8 NG/ML (ref 10–291)
HCT VFR BLD AUTO: 41.7 % (ref 37–47)
HGB BLD-MCNC: 13.9 G/DL (ref 12–16)
IMM GRANULOCYTES # BLD AUTO: 0.03 K/UL (ref 0–0.11)
IMM GRANULOCYTES NFR BLD AUTO: 0.2 % (ref 0–0.9)
LYMPHOCYTES # BLD AUTO: 3.81 K/UL (ref 1–4.8)
LYMPHOCYTES NFR BLD: 30.5 % (ref 22–41)
MCH RBC QN AUTO: 29.6 PG (ref 27–33)
MCHC RBC AUTO-ENTMCNC: 33.3 G/DL (ref 32.2–35.5)
MCV RBC AUTO: 88.9 FL (ref 81.4–97.8)
MONOCYTES # BLD AUTO: 0.78 K/UL (ref 0–0.85)
MONOCYTES NFR BLD AUTO: 6.3 % (ref 0–13.4)
NEUTROPHILS # BLD AUTO: 7.62 K/UL (ref 1.82–7.42)
NEUTROPHILS NFR BLD: 61.1 % (ref 44–72)
NRBC # BLD AUTO: 0 K/UL
NRBC BLD-RTO: 0 /100 WBC (ref 0–0.2)
PLATELET # BLD AUTO: 245 K/UL (ref 164–446)
PMV BLD AUTO: 9.6 FL (ref 9–12.9)
RBC # BLD AUTO: 4.69 M/UL (ref 4.2–5.4)
WBC # BLD AUTO: 12.5 K/UL (ref 4.8–10.8)

## 2025-01-09 PROCEDURE — 70491 CT SOFT TISSUE NECK W/DYE: CPT

## 2025-01-09 PROCEDURE — 85025 COMPLETE CBC W/AUTO DIFF WBC: CPT

## 2025-01-09 PROCEDURE — 700117 HCHG RX CONTRAST REV CODE 255: Performed by: INTERNAL MEDICINE

## 2025-01-09 PROCEDURE — 82728 ASSAY OF FERRITIN: CPT

## 2025-01-09 PROCEDURE — 36415 COLL VENOUS BLD VENIPUNCTURE: CPT

## 2025-01-09 RX ADMIN — IOHEXOL 80 ML: 350 INJECTION, SOLUTION INTRAVENOUS at 16:15

## 2025-01-13 ENCOUNTER — HOSPITAL ENCOUNTER (OUTPATIENT)
Dept: RADIOLOGY | Facility: MEDICAL CENTER | Age: 45
End: 2025-01-13
Attending: OBSTETRICS & GYNECOLOGY
Payer: COMMERCIAL

## 2025-01-13 DIAGNOSIS — N83.291 COMPLEX CYST OF RIGHT OVARY: ICD-10-CM

## 2025-01-13 PROCEDURE — 76830 TRANSVAGINAL US NON-OB: CPT

## 2025-01-20 PROBLEM — I71.21 ANEURYSM OF ASCENDING AORTA WITHOUT RUPTURE (HCC): Status: ACTIVE | Noted: 2024-05-30

## 2025-01-20 PROBLEM — E78.2 MIXED HYPERLIPIDEMIA: Status: ACTIVE | Noted: 2025-01-20

## 2025-01-20 PROBLEM — Z98.890 S/P AORTIC ANEURYSM REPAIR: Status: ACTIVE | Noted: 2025-01-20

## 2025-01-20 PROBLEM — Z86.79 S/P AORTIC ANEURYSM REPAIR: Status: ACTIVE | Noted: 2025-01-20

## 2025-01-21 ENCOUNTER — APPOINTMENT (OUTPATIENT)
Dept: RADIOLOGY | Facility: MEDICAL CENTER | Age: 45
End: 2025-01-21
Attending: OBSTETRICS & GYNECOLOGY
Payer: COMMERCIAL

## 2025-01-21 NOTE — PROGRESS NOTES
Medical Decision Making:  Today's Assessment / Status / Plan:   -Has undergone, TAA repair, doing well.  -Will remain on ASA, BB, statin.  -CT and echo 6 months, already ordered.   -No CAD, great.  -LDL looks great.  It is reasonable to consider cutting rosuvastatin in half at some point but we will leave it as is for now.  -Blood pressure to go to goal, tolerating beta-blocker  -Family members have been screened.  -Doing well on metoprolol despite asthma.  -Vasovagal syncope, has happened before, father prone to same  -Her 2 boys are 9 and 11, eventually they will get start with an echocardiogram.  -Not high risk for hysterectomy, will have been 6 months out, can hold her aspirin.  Letter today.  -RTC 1 year    Assessment:     1. Aneurysm of ascending aorta without rupture (HCC)        2. S/P aortic aneurysm repair        3. Essential hypertension        4. Aortic valve insufficiency, etiology of cardiac valve disease unspecified        5. Obesity (BMI 30.0-34.9)        6. Mild intermittent asthma without complication        7. Mixed hyperlipidemia        8. Preop cardiovascular exam          Chief Complaint:   Chief Complaint   Patient presents with    Follow-Up     Surgical Clearance         Hypertension     Essential hypertension       Hyperlipidemia     Mixed hyperlipidemia     Princess Machado is a 44 y.o. 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    Relocated to [place] 2009, was seeing another PCP who discovered enlarged aorta.    Over time thoracic aortic aneurysm increase in size.  Underwent repair with Dr. Mago Lerma 9/12/2024 with 30 mm Hemashield tube graft, noncoronary sinus excision  Aortic valve leaflets were noted to have normal appearance.  1 x 3 cm piece of felt was placed below the RCA to support the right coronary sinus.  Residual AI was  present.  Established with Dr. Michael Bloch, vascular medicine, CTA at 6 months, echo at 6 months, further surveillance based on those results.    Lipo a pending.  On secondary prevention rosuvastatin, BB and ASA.  Was instructed by CT surgery to stay on aspirin indefinitely   I reviewed the note from Raquel Benoit, CT surgery APN dated 10/14/2024, documented there was a discussion about antibiotics prior to dental work but no specific details.  Estefania does not recall having this discussion.  To the best my knowledge, there are no guidelines for taking antibiotics after this particular surgery.  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    Genetic negative but multiple family members affected, instructions given for next generation testing.  Mother and brother are positive.     Hyperlipidemia, LDL is 147.  HDL low, triglycerides elevated.  Probably from her mother.  Got her LDL down to 45. Consider cutting in half.    LHC prior to left heart catheterization without significant coronary disease.    Has hypertension, BP at goal, tolerating very low dose metoprolol, does not bother her asthma.  Blood pressures at home typically are in the 120s, rarely the 130s and sometimes the 110s, over 70-80s.  [facility] August 2024 with LVEDP of 15, aortic pressure of 138/89.    Was seen in the ED 11/8/2021 for flulike symptoms and syncope.  Troponins normal.  Was on toilet, started sweating, fell off of the toilet, out about 30 seconds.  The flu shot the day before.  Vasovagal syncope.    Has migraine headaches, better after weight loss.    Had incidental pulmonary nodules, going to see Pulmonary.  Asthma.    Had Covid 2021, PNA, recovered but over time.    Had two babies with vaginal delivery without complications.    Mother found to have ascending aorta, had  heart cath after abnormal stress but no coronary disease.    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  Working on germ cleaning for park bathrooms.  Father passed away, lots to take care of.    Kids are 6 and 8.   stays at home with kids.  2 boys, 9 and 11.     She is not limited by chest pain, pressure or tightness.   No significant dyspnea on exertion, orthopnea or lower extremity swelling.   No significant palpitations, dizziness, or presyncope/syncope.   No symptoms of leg claudication.   No stroke/TIA like symptoms.    DATA REVIEWED by me:  ECG reviewed by me personally:   9/13/2024 sinus, 69, borderline inferior ST elevation  10/1/2019 Sinus, rate 64, nonspecific T wave changes    Echo   9/17/2024 CAR  Intraoperative CAR during ascending aortic aneurysm repair. Pre-CPB   image show normal biventricular function with EF = 55%. Dilated   Ascending aorta to 4.5 cm at the level of the right pulmonary artery.   Structurally normal aortic valve with trace-mild regurgitation.  Post   CPB images show preserved function.  The ascending aorta has been   replaced with a tube graft and there is no residual aortic   insufficiency.  Findings communicated at the time of exam.   6/27/2024  Normal left ventricular systolic function. The left ventricular   ejection fraction is visually estimated to be 60%.   Mild concentric left ventricular hypertrophy.  Normal right ventricular size. Normal right ventricular systolic   function.  Mild aortic insufficiency.  Estimated right ventricular systolic pressure is 20 mmHg (normal).  The ascending aorta is dilated with a diameter of 4.3 cm.  No recent study is available on file for comparison.  1/11/2022  1. Left ventricular size and systolic function are normal with an ejection fraction of 60-65% by Lizama's biplane. The left ventricular diastolic function is normal. There is normal LV segmental wall motion.   2. Mild aortic  regurgitation. Trace to mild pulmonic regurgitation.   3. The right ventricular systolic function is normal with a TAPSE of 22 mm and a S' of 13 cm/sec.  The given right atrial pressure is 8 mmHg. Right ventricle is mildly dilated with a RVIDd of 4.4 cm. Right atrium is mildly dilated at 19 cm2.   4. Aortic root is mildly dilated at 3.9 with the ST junction measuring 3.7 cm. The ascending aorta is mildly dilated at 4.1 cm.   5. Previous echocardiograms done at [facility].   4/19/2018  Normal left ventricular size and systolic function.  Left ventricular ejection fraction is visually estimated to be 65%.  Normal diastolic function.  Normal inferior vena cava size and inspiratory collapse.  Normal aortic root for body surface area. aortic root measuring 3.8 cm,   ascending aorta diameter is  4.0 cm.    [facility]  8/5/2024  HEMODYNAMICS:  Aortic pressure: 138 /89 mmHg  LVEDP: 15 mmHg  No significant aortic gradient on pullback  CORONARY ANGIOGRAPHY:  The left main coronary artery : Normal-appearing large caliber vessel that bifurcates to LAD and left circumflex  The left anterior descending coronary artery : Large-caliber transapical vessel with small distal/apical LAD in diameter.  Gives rise to small-medium caliber diagonal branches  The left circumflex coronary artery : Normal-appearing large-caliber vessel that gives rise to a large bifurcating OM and true circumflex tapers into the AV groove  The right coronary artery  : Normal-appearing large caliber dominant essel  Supravalvular aortography:  Aortic root 3.8 cm  Mid thoracic ascending aorta 4.3 cm  No AI  IMPRESSION:  1.  Normal-appearing epicardial coronary arteries  2.  Dominant RCA  3.  Minimally elevated resting LVEDP 15 mmHg with no significant transaortic gradient on pullback  3.  Dilated ascending aorta (angiographically, mid thoracic ascending aorta 4.3 cm)       CT [facility] 1- Requested    CT scan 12-1-2020  Stable aneurysmal dilatation of the ascending  aorta to 4.5 cm.  Stable pulmonary nodules measuring up to 5 mm.    CT scan 10/29/2019  Dilatation of the ascending aorta up to 4.5 cm.  No aortic dissection. No mediastinal hematoma.  Multiple pulmonary nodules measuring up to 5.1 mm in diameter.  No airspace consolidation or pleural effusions.     Open heart surgery  9/12/2024 Dr. Mago Lerma  Procedure(s):  ASCENDING AORTIC ANEURYSM REPAIR (30 mm Hemashield tube graft, noncoronary sinus plication, distal ascending aortic wrap) and intraoperative transesophageal echocardiography  The ascending aortic aneurysm visually appeared to be approximately 4.5 cm.  It was excised from the sinotubular junction to just below the aortic cross-clamp.  The aortic valve leaflets had normal appearance.  A small V shaped excision of the noncoronary sinus was performed and the edges were reapproximated using #5-0 Prolene sutures.  A 1 x 3 cm piece of Felt was placed below the right coronary artery to support the right coronary sinus.  A proximal anastomosis was performed between the 30 mm Hemashield tube graft and the aorta at the sinotubular junction using a #5-0 Prolene suture in a running fashion.     Most recent labs:     Lab Results   Component Value Date/Time    HEMOGLOBIN 13.9 01/09/2025 04:48 PM    HEMATOCRIT 41.7 01/09/2025 04:48 PM    MCV 88.9 01/09/2025 04:48 PM    INR 1.23 (H) 09/12/2024 11:12 AM      Lab Results   Component Value Date/Time    SODIUM 134 (L) 09/16/2024 05:51 AM    POTASSIUM 4.0 09/16/2024 05:51 AM    CHLORIDE 99 09/16/2024 05:51 AM    CO2 24 09/16/2024 05:51 AM    GLUCOSE 104 (H) 09/16/2024 05:51 AM    BUN 12 09/16/2024 05:51 AM    CREATININE 0.53 09/16/2024 05:51 AM      Lab Results   Component Value Date/Time    ASTSGOT 19 09/09/2024 10:15 AM    ALTSGPT 21 09/09/2024 10:15 AM    ALBUMIN 4.5 09/09/2024 10:15 AM      Lab Results   Component Value Date/Time    CHOLSTRLTOT 118 06/28/2024 08:10 AM    LDL 45 06/28/2024 08:10 AM    HDL 44 06/28/2024 08:10  AM    TRIGLYCERIDE 143 06/28/2024 08:10 AM       11-7-2020 na 138, k 4.3, cr, 0.74, gl 99    3/7/2019 sodium 139, potassium 4.8, creatinine 0.87, LFTs normal, , HDL 48, triglycerides 148, total cholesterol 241    Past Medical History:   Diagnosis Date    Abnormal Pap smear of cervix     Anesthesia     pt's mom had severe ponv    ASTHMA     seasonal allergy related; has albuterol inhaler prn    High cholesterol 2009    medicated    HPV in female     Hypertension     medicated    Migraines     Pneumonia 2021    Got it when I had COVID     Past Surgical History:   Procedure Laterality Date    AORTIC ASCENDING DISSECTION  9/12/2024    Procedure: ASCENDING AORTIC ANEURYSM REPAIR, TRANSESOPHAGEAL ECHOCARDIOGRAM;  Surgeon: Mago Lerma M.D.;  Location: SURGERY [facility];  Service: Cardiothoracic    ECHOCARDIOGRAM, TRANSESOPHAGEAL, INTRAOPERATIVE  9/12/2024    Procedure: ECHOCARDIOGRAM, TRANSESOPHAGEAL, INTRAOPERATIVE;  Surgeon: Mago Lerma M.D.;  Location: SURGERY [facility];  Service: Cardiothoracic    DILATION AND CURETTAGE       Family History   Problem Relation Age of Onset    Hypertension Mother     Heart Disease Mother         Dilated ascending aorta    Heart Disease Father     Hypertension Father     Hyperlipidemia Father     Other Father         Amyloidosis, mostly GI and muscle, 78    Heart Disease Maternal Grandmother      Social History     Socioeconomic History    Marital status:      Spouse name: Not on file    Number of children: Not on file    Years of education: Not on file    Highest education level: Not on file   Occupational History    Not on file   Tobacco Use    Smoking status: Never     Passive exposure: Never    Smokeless tobacco: Never   Vaping Use    Vaping status: Never Used   Substance and Sexual Activity    Alcohol use: Yes     Comment: very little    Drug use: No    Sexual activity: Not on file   Other Topics Concern    Not on file   Social History Narrative    Two sons-  9&11     Social Drivers of Health     Financial Resource Strain: Not on file   Food Insecurity: Not on file   Transportation Needs: Not on file   Physical Activity: Not on file   Stress: Not on file   Social Connections: Not on file   Intimate Partner Violence: Not on file   Housing Stability: Not on file     No Known Allergies    Current Outpatient Medications   Medication Sig Dispense Refill    UBRELVY 100 MG Tab TAKE 1 TABLET BY MOUTH AT ONSET OF MIGRAINE. MAY REPEAT 1 TABLET IN 2 HOURS. MAX 2 TABLETS IN 24 HOURS      norethindrone (MICRONOR) 0.35 MG tablet Take 1 Tablet by mouth every day.      azithromycin (ZITHROMAX) 250 MG Tab TAKE 2 TABLETS BY MOUTH DAY 1 THEN 1 DAILY ON DAYS 2 TO 4      Tranexamic Acid 650 MG Tab       metoprolol SR (TOPROL XL) 25 MG TABLET SR 24 HR Take 0.5 Tablets by mouth every day. 45 Tablet 3    aspirin 81 MG EC tablet Take 1 Tablet by mouth every day. 100 Tablet 2    Riboflavin (B-2-400 PO) Take 1 Tablet by mouth every day.      Melatonin 10 MG Cap Take 10 mg by mouth at bedtime.      Biotin 1000 MCG Chew Tab Chew 1,000 mcg every day.      cetirizine (ZYRTEC) 10 MG Tab Take 10 mg by mouth every evening.      rosuvastatin (CRESTOR) 20 MG Tab Take 20 mg by mouth every evening.      ondansetron (ZOFRAN ODT) 4 MG TABLET DISPERSIBLE Take 1 Tablet by mouth every 8 hours as needed. 20 Tablet 0    albuterol 108 (90 Base) MCG/ACT Aero Soln inhalation aerosol Inhale 2 Puffs every 6 hours as needed for Shortness of Breath. 8.5 g 3    eletriptan (RELPAX) 20 MG Tab TK 1 T PO AOS OF MIGRAINE AS NEEDED. MAY REPEAT IN 2 H. DO NOT EXCEED 2 T IN 24 H 10 Tab 2    BOTOX 200 units Recon Soln Inject 200 Units into the shoulder, thigh, or buttocks every 3 months. For migraines      Cholecalciferol (VITAMIN D3) 5000 units Cap Take 5,000 Units by mouth every evening.       No current facility-administered medications for this visit.     All others systems reviewed and negative.     Objective:     /72  "(BP Location: Left arm, Patient Position: Sitting, BP Cuff Size: Adult)   Pulse 86   Resp 14   Ht 1.727 m (5' 8\")   Wt 107 kg (236 lb 9.6 oz)   SpO2 96%  Body mass index is 35.97 kg/m².    Physical Exam   General: No acute distress. Well nourished.  HEENT: EOM grossly intact, no scleral icterus, no pharyngeal erythema.   Neck:  No JVD, no bruits, trachea midline  CVS: RRR. Normal S1, S2. No M/R/G. No LE edema.  2+ radial pulses, 2+ PT pulses, well healed midline incision  Resp: CTAB. No wheezing or crackles/rhonchi. Normal respiratory effort.  Abdomen: Soft, NT, no kandis hepatomegaly.  MSK/Ext: No clubbing or cyanosis.  Skin: Warm and dry, no rashes.  Neurological: CN III-XII grossly intact. No focal deficits.   Psych: A&O x 3, appropriate affect, good judgement    Physical Exam listed below was completed in entrety today and unchanged from 1/17/2022 except where noted.  Some elements were copied from my note of that same day, which have been updated where appropriate and all reflect current meedical decision making from today.  I have confirmed and edited as necessary, the PFSH and ROS obtained by others.    Written instructions given today:      Return in about 1 year (around 1/24/2026).    It is my pleasure to participate in the care of Ms. Machado.  Please do not hesitate to contact me with questions or concerns.    Meghann Lockhart MD, [facility]  Cardiologist [facility]    Please note that this dictation was created using voice recognition software. I have made every reasonable attempt to correct obvious errors, but it is possible there are errors of grammar and possibly content that I did not discover before finalizing the note.  "

## 2025-01-23 ENCOUNTER — APPOINTMENT (OUTPATIENT)
Dept: URBAN - METROPOLITAN AREA CLINIC 36 | Facility: CLINIC | Age: 45
Setting detail: DERMATOLOGY
End: 2025-01-23

## 2025-01-23 DIAGNOSIS — L90.5 SCAR CONDITIONS AND FIBROSIS OF SKIN: ICD-10-CM

## 2025-01-23 PROCEDURE — ? FRAXEL

## 2025-01-23 ASSESSMENT — LOCATION DETAILED DESCRIPTION DERM
LOCATION DETAILED: LOWER STERNUM
LOCATION DETAILED: PERIUMBILICAL SKIN
LOCATION DETAILED: EPIGASTRIC SKIN

## 2025-01-23 ASSESSMENT — LOCATION SIMPLE DESCRIPTION DERM
LOCATION SIMPLE: ABDOMEN
LOCATION SIMPLE: CHEST

## 2025-01-23 ASSESSMENT — LOCATION ZONE DERM: LOCATION ZONE: TRUNK

## 2025-01-23 NOTE — PROCEDURE: FRAXEL
Treatment Level: 5
Large Metal Eye Shield Text: The ocular mucosa was anesthetized with tetracaine. Once adequate anesthesia was optained, large metal eye shields were inserted and remained in place until the procedure was completed.
Energy(Mj/Cm2): 1
Number Of Passes: 4
Small Plastic Eye Shield Text: The ocular mucosa was anesthetized with tetracaine. Once adequate anesthesia was optained, small plastic eye shields were inserted and remained in place until the procedure was completed.
Consent: Written consent obtained, risks reviewed including but not limited to pain and incomplete improvement.
Add Post-Care Below To The Note: No
Was An Eye Shield Used?: Yes - Small (Metal)
Medium Plastic Eye Shield Text: The ocular mucosa was anesthetized with tetracaine. Once adequate anesthesia was optained, medium plastic eye shields were inserted and remained in place until the procedure was completed.
Post-Care Instructions: I reviewed with the patient in detail post-care instructions. Patient should avoid sun until area fully healed.
Detail Level: Zone
Large Plastic Eye Shield Text: The ocular mucosa was anesthetized with tetracaine. Once adequate anesthesia was optained, large plastic eye shields were inserted and remained in place until the procedure was completed.
Small Metal Eye Shield Text: The ocular mucosa was anesthetized with tetracaine. Once adequate anesthesia was optained, small metal eye shields were inserted and remained in place until the procedure was completed.
Wavelength: 1550nm
Medium Metal Eye Shield Text: The ocular mucosa was anesthetized with tetracaine. Once adequate anesthesia was optained, medium metal eye shields were inserted and remained in place until the procedure was completed.
Indication: dyschromia
Anesthesia Type: 1% lidocaine with epinephrine
Energy(Mj/Cm2): 70

## 2025-01-24 ENCOUNTER — OFFICE VISIT (OUTPATIENT)
Dept: CARDIOLOGY | Facility: MEDICAL CENTER | Age: 45
End: 2025-01-24
Attending: INTERNAL MEDICINE
Payer: COMMERCIAL

## 2025-01-24 ENCOUNTER — APPOINTMENT (OUTPATIENT)
Dept: RADIOLOGY | Facility: MEDICAL CENTER | Age: 45
End: 2025-01-24
Attending: OBSTETRICS & GYNECOLOGY
Payer: COMMERCIAL

## 2025-01-24 VITALS
HEART RATE: 86 BPM | SYSTOLIC BLOOD PRESSURE: 126 MMHG | WEIGHT: 236.6 LBS | BODY MASS INDEX: 35.86 KG/M2 | HEIGHT: 68 IN | RESPIRATION RATE: 14 BRPM | OXYGEN SATURATION: 96 % | DIASTOLIC BLOOD PRESSURE: 72 MMHG

## 2025-01-24 DIAGNOSIS — E66.811 OBESITY (BMI 30.0-34.9): ICD-10-CM

## 2025-01-24 DIAGNOSIS — Z98.890 S/P AORTIC ANEURYSM REPAIR: ICD-10-CM

## 2025-01-24 DIAGNOSIS — Z01.810 PREOP CARDIOVASCULAR EXAM: ICD-10-CM

## 2025-01-24 DIAGNOSIS — I71.21 ANEURYSM OF ASCENDING AORTA WITHOUT RUPTURE (HCC): ICD-10-CM

## 2025-01-24 DIAGNOSIS — I10 ESSENTIAL HYPERTENSION: ICD-10-CM

## 2025-01-24 DIAGNOSIS — I35.1 AORTIC VALVE INSUFFICIENCY, ETIOLOGY OF CARDIAC VALVE DISEASE UNSPECIFIED: ICD-10-CM

## 2025-01-24 DIAGNOSIS — J45.20 MILD INTERMITTENT ASTHMA WITHOUT COMPLICATION: ICD-10-CM

## 2025-01-24 DIAGNOSIS — Z86.79 S/P AORTIC ANEURYSM REPAIR: ICD-10-CM

## 2025-01-24 DIAGNOSIS — E78.2 MIXED HYPERLIPIDEMIA: ICD-10-CM

## 2025-01-24 PROCEDURE — 99213 OFFICE O/P EST LOW 20 MIN: CPT | Performed by: INTERNAL MEDICINE

## 2025-01-24 PROCEDURE — 3074F SYST BP LT 130 MM HG: CPT | Performed by: INTERNAL MEDICINE

## 2025-01-24 PROCEDURE — 99214 OFFICE O/P EST MOD 30 MIN: CPT | Performed by: INTERNAL MEDICINE

## 2025-01-24 PROCEDURE — 3078F DIAST BP <80 MM HG: CPT | Performed by: INTERNAL MEDICINE

## 2025-01-24 RX ORDER — TRANEXAMIC ACID 650 MG/1
TABLET ORAL
COMMUNITY
Start: 2025-01-07

## 2025-01-24 RX ORDER — UBROGEPANT 100 MG/1
TABLET ORAL
COMMUNITY
Start: 2024-11-06

## 2025-01-24 RX ORDER — AZITHROMYCIN 250 MG/1
TABLET, FILM COATED ORAL
COMMUNITY
Start: 2024-12-24

## 2025-01-24 RX ORDER — ACETAMINOPHEN AND CODEINE PHOSPHATE 120; 12 MG/5ML; MG/5ML
1 SOLUTION ORAL
COMMUNITY
Start: 2024-11-27

## 2025-01-24 ASSESSMENT — FIBROSIS 4 INDEX: FIB4 SCORE: 0.76

## 2025-01-24 NOTE — LETTER
[facility]   [address]   [address]  [place] [address]  Phone: [phone]  Fax: [phone]              Princess Machado  1980    Encounter Date: 1/24/2025    Meghann Lockhart M.D.          PROGRESS NOTE:      Medical Decision Making:  Today's Assessment / Status / Plan:   -Has undergone, TAA repair, doing well.  -Will remain on ASA, BB, statin.  -CT and echo 6 months, already ordered.   -No CAD, great.  -LDL looks great.  It is reasonable to consider cutting rosuvastatin in half at some point but we will leave it as is for now.  -Blood pressure to go to goal, tolerating beta-blocker  -Family members have been screened.  -Doing well on metoprolol despite asthma.  -Vasovagal syncope, has happened before, father prone to same  -Her 2 boys are 9 and 11, eventually they will get start with an echocardiogram.  -Not high risk for hysterectomy, will have been 6 months out, can hold her aspirin.  Letter today.  -RTC 1 year    Assessment:     1. Aneurysm of ascending aorta without rupture (HCC)        2. S/P aortic aneurysm repair        3. Essential hypertension        4. Aortic valve insufficiency, etiology of cardiac valve disease unspecified        5. Obesity (BMI 30.0-34.9)        6. Mild intermittent asthma without complication        7. Mixed hyperlipidemia        8. Preop cardiovascular exam          Chief Complaint:   Chief Complaint   Patient presents with   • Follow-Up     Surgical Clearance        • Hypertension     Essential hypertension      • Hyperlipidemia     Mixed hyperlipidemia     Princess Machado is a 44 y.o. 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    Relocated to [place] 2009, was seeing another PCP who discovered enlarged aorta.    Over  time thoracic aortic aneurysm increase in size.  Underwent repair with Dr. Mago Lerma 9/12/2024 with 30 mm Hemashield tube graft, noncoronary sinus excision  Aortic valve leaflets were noted to have normal appearance.  1 x 3 cm piece of felt was placed below the RCA to support the right coronary sinus.  Residual AI was present.  Established with Dr. Michael Bloch, vascular medicine, CTA at 6 months, echo at 6 months, further surveillance based on those results.    Lipo a pending.  On secondary prevention rosuvastatin, BB and ASA.  Was instructed by CT surgery to stay on aspirin indefinitely   I reviewed the note from Raquel Benoit, CT surgery APN dated 10/14/2024, documented there was a discussion about antibiotics prior to dental work but no specific details.  Estefania does not recall having this discussion.  To the best my knowledge, there are no guidelines for taking antibiotics after this particular surgery.  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    Genetic negative but multiple family members affected, instructions given for next generation testing.  Mother and brother are positive.     Hyperlipidemia, LDL is 147.  HDL low, triglycerides elevated.  Probably from her mother.  Got her LDL down to 45. Consider cutting in half.    LHC prior to left heart catheterization without significant coronary disease.    Has hypertension, BP at goal, tolerating very low dose metoprolol, does not bother her asthma.  Blood pressures at home typically are in the 120s, rarely the 130s and sometimes the 110s, over 70-80s.  [facility] August 2024 with LVEDP of 15, aortic pressure of 138/89.    Was seen in the ED 11/8/2021 for flulike symptoms and syncope.  Troponins normal.  Was on toilet, started sweating, fell off of the toilet, out about 30 seconds.  The flu  shot the day before.  Vasovagal syncope.    Has migraine headaches, better after weight loss.    Had incidental pulmonary nodules, going to see Pulmonary.  Asthma.    Had Covid 2021, PNA, recovered but over time.    Had two babies with vaginal delivery without complications.    Mother found to have ascending aorta, had heart cath after abnormal stress but no coronary disease.    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  Working on germ cleaning for park bathrooms.  Father passed away, lots to take care of.    Kids are 6 and 8.   stays at home with kids.  2 boys, 9 and 11.     She is not limited by chest pain, pressure or tightness.   No significant dyspnea on exertion, orthopnea or lower extremity swelling.   No significant palpitations, dizziness, or presyncope/syncope.   No symptoms of leg claudication.   No stroke/TIA like symptoms.    DATA REVIEWED by me:  ECG reviewed by me personally:   9/13/2024 sinus, 69, borderline inferior ST elevation  10/1/2019 Sinus, rate 64, nonspecific T wave changes    Echo   9/17/2024 CAR  Intraoperative CAR during ascending aortic aneurysm repair. Pre-CPB   image show normal biventricular function with EF = 55%. Dilated   Ascending aorta to 4.5 cm at the level of the right pulmonary artery.   Structurally normal aortic valve with trace-mild regurgitation.  Post   CPB images show preserved function.  The ascending aorta has been   replaced with a tube graft and there is no residual aortic   insufficiency.  Findings communicated at the time of exam.   6/27/2024  Normal left ventricular systolic function. The left ventricular   ejection fraction is visually estimated to be 60%.   Mild concentric left ventricular hypertrophy.  Normal right ventricular size. Normal right ventricular systolic   function.  Mild aortic insufficiency.  Estimated right ventricular systolic pressure is 20 mmHg (normal).  The ascending aorta is dilated with a  diameter of 4.3 cm.  No recent study is available on file for comparison.  1/11/2022  1. Left ventricular size and systolic function are normal with an ejection fraction of 60-65% by Lizama's biplane. The left ventricular diastolic function is normal. There is normal LV segmental wall motion.   2. Mild aortic regurgitation. Trace to mild pulmonic regurgitation.   3. The right ventricular systolic function is normal with a TAPSE of 22 mm and a S' of 13 cm/sec.  The given right atrial pressure is 8 mmHg. Right ventricle is mildly dilated with a RVIDd of 4.4 cm. Right atrium is mildly dilated at 19 cm2.   4. Aortic root is mildly dilated at 3.9 with the ST junction measuring 3.7 cm. The ascending aorta is mildly dilated at 4.1 cm.   5. Previous echocardiograms done at [facility].   4/19/2018  Normal left ventricular size and systolic function.  Left ventricular ejection fraction is visually estimated to be 65%.  Normal diastolic function.  Normal inferior vena cava size and inspiratory collapse.  Normal aortic root for body surface area. aortic root measuring 3.8 cm,   ascending aorta diameter is  4.0 cm.    [facility]  8/5/2024  HEMODYNAMICS:  Aortic pressure: 138 /89 mmHg  LVEDP: 15 mmHg  No significant aortic gradient on pullback  CORONARY ANGIOGRAPHY:  The left main coronary artery : Normal-appearing large caliber vessel that bifurcates to LAD and left circumflex  The left anterior descending coronary artery : Large-caliber transapical vessel with small distal/apical LAD in diameter.  Gives rise to small-medium caliber diagonal branches  The left circumflex coronary artery : Normal-appearing large-caliber vessel that gives rise to a large bifurcating OM and true circumflex tapers into the AV groove  The right coronary artery  : Normal-appearing large caliber dominant essel  Supravalvular aortography:  Aortic root 3.8 cm  Mid thoracic ascending aorta 4.3 cm  No AI  IMPRESSION:  1.  Normal-appearing epicardial coronary  arteries  2.  Dominant RCA  3.  Minimally elevated resting LVEDP 15 mmHg with no significant transaortic gradient on pullback  3.  Dilated ascending aorta (angiographically, mid thoracic ascending aorta 4.3 cm)       CT [facility] 1- Requested    CT scan 12-1-2020  Stable aneurysmal dilatation of the ascending aorta to 4.5 cm.  Stable pulmonary nodules measuring up to 5 mm.    CT scan 10/29/2019  Dilatation of the ascending aorta up to 4.5 cm.  No aortic dissection. No mediastinal hematoma.  Multiple pulmonary nodules measuring up to 5.1 mm in diameter.  No airspace consolidation or pleural effusions.     Open heart surgery  9/12/2024 Dr. Mago Lerma  Procedure(s):  ASCENDING AORTIC ANEURYSM REPAIR (30 mm Hemashield tube graft, noncoronary sinus plication, distal ascending aortic wrap) and intraoperative transesophageal echocardiography  The ascending aortic aneurysm visually appeared to be approximately 4.5 cm.  It was excised from the sinotubular junction to just below the aortic cross-clamp.  The aortic valve leaflets had normal appearance.  A small V shaped excision of the noncoronary sinus was performed and the edges were reapproximated using #5-0 Prolene sutures.  A 1 x 3 cm piece of Felt was placed below the right coronary artery to support the right coronary sinus.  A proximal anastomosis was performed between the 30 mm Hemashield tube graft and the aorta at the sinotubular junction using a #5-0 Prolene suture in a running fashion.     Most recent labs:     Lab Results   Component Value Date/Time    HEMOGLOBIN 13.9 01/09/2025 04:48 PM    HEMATOCRIT 41.7 01/09/2025 04:48 PM    MCV 88.9 01/09/2025 04:48 PM    INR 1.23 (H) 09/12/2024 11:12 AM      Lab Results   Component Value Date/Time    SODIUM 134 (L) 09/16/2024 05:51 AM    POTASSIUM 4.0 09/16/2024 05:51 AM    CHLORIDE 99 09/16/2024 05:51 AM    CO2 24 09/16/2024 05:51 AM    GLUCOSE 104 (H) 09/16/2024 05:51 AM    BUN 12 09/16/2024 05:51 AM     CREATININE 0.53 09/16/2024 05:51 AM      Lab Results   Component Value Date/Time    ASTSGOT 19 09/09/2024 10:15 AM    ALTSGPT 21 09/09/2024 10:15 AM    ALBUMIN 4.5 09/09/2024 10:15 AM      Lab Results   Component Value Date/Time    CHOLSTRLTOT 118 06/28/2024 08:10 AM    LDL 45 06/28/2024 08:10 AM    HDL 44 06/28/2024 08:10 AM    TRIGLYCERIDE 143 06/28/2024 08:10 AM       11-7-2020 na 138, k 4.3, cr, 0.74, gl 99    3/7/2019 sodium 139, potassium 4.8, creatinine 0.87, LFTs normal, , HDL 48, triglycerides 148, total cholesterol 241    Past Medical History:   Diagnosis Date   • Abnormal Pap smear of cervix    • Anesthesia     pt's mom had severe ponv   • ASTHMA     seasonal allergy related; has albuterol inhaler prn   • High cholesterol 2009    medicated   • HPV in female    • Hypertension     medicated   • Migraines    • Pneumonia 2021    Got it when I had COVID     Past Surgical History:   Procedure Laterality Date   • AORTIC ASCENDING DISSECTION  9/12/2024    Procedure: ASCENDING AORTIC ANEURYSM REPAIR, TRANSESOPHAGEAL ECHOCARDIOGRAM;  Surgeon: Mago Lerma M.D.;  Location: SURGERY [facility];  Service: Cardiothoracic   • ECHOCARDIOGRAM, TRANSESOPHAGEAL, INTRAOPERATIVE  9/12/2024    Procedure: ECHOCARDIOGRAM, TRANSESOPHAGEAL, INTRAOPERATIVE;  Surgeon: Mago Lerma M.D.;  Location: SURGERY [facility];  Service: Cardiothoracic   • DILATION AND CURETTAGE       Family History   Problem Relation Age of Onset   • Hypertension Mother    • Heart Disease Mother         Dilated ascending aorta   • Heart Disease Father    • Hypertension Father    • Hyperlipidemia Father    • Other Father         Amyloidosis, mostly GI and muscle, 78   • Heart Disease Maternal Grandmother      Social History     Socioeconomic History   • Marital status:      Spouse name: Not on file   • Number of children: Not on file   • Years of education: Not on file   • Highest education level: Not on file   Occupational History   • Not  on file   Tobacco Use   • Smoking status: Never     Passive exposure: Never   • Smokeless tobacco: Never   Vaping Use   • Vaping status: Never Used   Substance and Sexual Activity   • Alcohol use: Yes     Comment: very little   • Drug use: No   • Sexual activity: Not on file   Other Topics Concern   • Not on file   Social History Narrative    Two sons- 9&11     Social Drivers of Health     Financial Resource Strain: Not on file   Food Insecurity: Not on file   Transportation Needs: Not on file   Physical Activity: Not on file   Stress: Not on file   Social Connections: Not on file   Intimate Partner Violence: Not on file   Housing Stability: Not on file     No Known Allergies    Current Outpatient Medications   Medication Sig Dispense Refill   • UBRELVY 100 MG Tab TAKE 1 TABLET BY MOUTH AT ONSET OF MIGRAINE. MAY REPEAT 1 TABLET IN 2 HOURS. MAX 2 TABLETS IN 24 HOURS     • norethindrone (MICRONOR) 0.35 MG tablet Take 1 Tablet by mouth every day.     • azithromycin (ZITHROMAX) 250 MG Tab TAKE 2 TABLETS BY MOUTH DAY 1 THEN 1 DAILY ON DAYS 2 TO 4     • Tranexamic Acid 650 MG Tab      • metoprolol SR (TOPROL XL) 25 MG TABLET SR 24 HR Take 0.5 Tablets by mouth every day. 45 Tablet 3   • aspirin 81 MG EC tablet Take 1 Tablet by mouth every day. 100 Tablet 2   • Riboflavin (B-2-400 PO) Take 1 Tablet by mouth every day.     • Melatonin 10 MG Cap Take 10 mg by mouth at bedtime.     • Biotin 1000 MCG Chew Tab Chew 1,000 mcg every day.     • cetirizine (ZYRTEC) 10 MG Tab Take 10 mg by mouth every evening.     • rosuvastatin (CRESTOR) 20 MG Tab Take 20 mg by mouth every evening.     • ondansetron (ZOFRAN ODT) 4 MG TABLET DISPERSIBLE Take 1 Tablet by mouth every 8 hours as needed. 20 Tablet 0   • albuterol 108 (90 Base) MCG/ACT Aero Soln inhalation aerosol Inhale 2 Puffs every 6 hours as needed for Shortness of Breath. 8.5 g 3   • eletriptan (RELPAX) 20 MG Tab TK 1 T PO AOS OF MIGRAINE AS NEEDED. MAY REPEAT IN 2 H. DO NOT EXCEED 2  "T IN 24 H 10 Tab 2   • BOTOX 200 units Recon Soln Inject 200 Units into the shoulder, thigh, or buttocks every 3 months. For migraines     • Cholecalciferol (VITAMIN D3) 5000 units Cap Take 5,000 Units by mouth every evening.       No current facility-administered medications for this visit.     All others systems reviewed and negative.     Objective:     /72 (BP Location: Left arm, Patient Position: Sitting, BP Cuff Size: Adult)   Pulse 86   Resp 14   Ht 1.727 m (5' 8\")   Wt 107 kg (236 lb 9.6 oz)   SpO2 96%  Body mass index is 35.97 kg/m².    Physical Exam   General: No acute distress. Well nourished.  HEENT: EOM grossly intact, no scleral icterus, no pharyngeal erythema.   Neck:  No JVD, no bruits, trachea midline  CVS: RRR. Normal S1, S2. No M/R/G. No LE edema.  2+ radial pulses, 2+ PT pulses, well healed midline incision  Resp: CTAB. No wheezing or crackles/rhonchi. Normal respiratory effort.  Abdomen: Soft, NT, no kandis hepatomegaly.  MSK/Ext: No clubbing or cyanosis.  Skin: Warm and dry, no rashes.  Neurological: CN III-XII grossly intact. No focal deficits.   Psych: A&O x 3, appropriate affect, good judgement    Physical Exam listed below was completed in entrety today and unchanged from 1/17/2022 except where noted.  Some elements were copied from my note of that same day, which have been updated where appropriate and all reflect current meedical decision making from today.  I have confirmed and edited as necessary, the PFSH and ROS obtained by others.    Written instructions given today:      Return in about 1 year (around 1/24/2026).    It is my pleasure to participate in the care of Ms. Machado.  Please do not hesitate to contact me with questions or concerns.    Meghann Lockhart MD, [facility]  Cardiologist [facility]    Please note that this dictation was created using voice recognition software. I have made every reasonable attempt to correct obvious errors, but " it is possible there are errors of grammar and possibly content that I did not discover before finalizing the note.      Dennise Bustamante M.D.  [address] [facility]  [place] [address]  Via Fax: [phone]    Marcy Jimenez M.D.  [address] [place] [address]  Via Fax: [phone]

## 2025-01-24 NOTE — LETTER
Princess Machado  1980 1/24/2025  1:27 PM    Dear Dr. Marcy Busby,    Patient is not high risk for: Hysterectomy with general anesthesia.    Recommendations:  No testing prior.  Okay to stop aspirin prior, 5 to 7 days.    Please contact my office with questions.    Sincerely,    Meghann Lockhart MD, [facility]  [facility]  [phone]  (electronically signed to expedite care)

## 2025-01-24 NOTE — PATIENT INSTRUCTIONS
-It is fine to stop your aspirin per instructions from your surgeon, typically 5 to 7 days prior.

## 2025-02-27 ENCOUNTER — OFFICE VISIT (OUTPATIENT)
Dept: URGENT CARE | Facility: CLINIC | Age: 45
End: 2025-02-27
Payer: COMMERCIAL

## 2025-02-27 ENCOUNTER — RESULTS FOLLOW-UP (OUTPATIENT)
Dept: URGENT CARE | Facility: CLINIC | Age: 45
End: 2025-02-27

## 2025-02-27 ENCOUNTER — APPOINTMENT (OUTPATIENT)
Dept: RADIOLOGY | Facility: MEDICAL CENTER | Age: 45
End: 2025-02-27
Attending: OBSTETRICS & GYNECOLOGY
Payer: COMMERCIAL

## 2025-02-27 VITALS
SYSTOLIC BLOOD PRESSURE: 128 MMHG | HEART RATE: 78 BPM | TEMPERATURE: 97.8 F | WEIGHT: 230 LBS | RESPIRATION RATE: 18 BRPM | HEIGHT: 68 IN | BODY MASS INDEX: 34.86 KG/M2 | OXYGEN SATURATION: 96 % | DIASTOLIC BLOOD PRESSURE: 84 MMHG

## 2025-02-27 DIAGNOSIS — Z20.818 EXPOSURE TO STREP THROAT: ICD-10-CM

## 2025-02-27 DIAGNOSIS — J02.9 PHARYNGITIS, UNSPECIFIED ETIOLOGY: ICD-10-CM

## 2025-02-27 LAB — S PYO DNA SPEC NAA+PROBE: NOT DETECTED

## 2025-02-27 RX ORDER — AMOXICILLIN 500 MG/1
500 CAPSULE ORAL 2 TIMES DAILY
Qty: 20 CAPSULE | Refills: 0 | Status: SHIPPED | OUTPATIENT
Start: 2025-02-27 | End: 2025-03-09

## 2025-02-27 RX ORDER — METHYLPREDNISOLONE 4 MG/1
TABLET ORAL
Qty: 21 TABLET | Refills: 0 | Status: SHIPPED | OUTPATIENT
Start: 2025-02-27

## 2025-02-27 RX ORDER — LIDOCAINE HYDROCHLORIDE 20 MG/ML
SOLUTION OROPHARYNGEAL
Qty: 100 ML | Refills: 0 | Status: SHIPPED | OUTPATIENT
Start: 2025-02-27

## 2025-02-27 RX ORDER — ACETAMINOPHEN 500 MG
1000 TABLET ORAL EVERY 6 HOURS PRN
COMMUNITY

## 2025-02-27 ASSESSMENT — ENCOUNTER SYMPTOMS
EYE PAIN: 0
FEVER: 0
COUGH: 0
SINUS PAIN: 0
MYALGIAS: 1
EYE DISCHARGE: 0
SORE THROAT: 1
BACK PAIN: 0
WEIGHT LOSS: 0
CHILLS: 1

## 2025-02-27 ASSESSMENT — FIBROSIS 4 INDEX: FIB4 SCORE: 0.76

## 2025-02-27 NOTE — PROGRESS NOTES
Subjective:   Princess Machado is a 44 y.o. female who presents for Sore Throat (Strep exposure, x 1 day, body aches, chills, painful to swallow, Lt side of neck pain more than Rt side )      Patient presents with complaints of sore throat, pain with swallowing, chills and bodyaches for the last day.  States that her son tested positive for strep last week.  She denies any cough difficulty breathing, shortness of breath, wheezing or stridor        Review of Systems   Constitutional:  Positive for chills. Negative for fever, malaise/fatigue and weight loss.   HENT:  Positive for congestion and sore throat. Negative for sinus pain.    Eyes:  Negative for pain and discharge.   Respiratory:  Negative for cough.    Cardiovascular:  Negative for chest pain.   Musculoskeletal:  Positive for myalgias. Negative for back pain and joint pain.   Skin:  Negative for rash.     Refer to HPI for additional details.    During this visit, appropriate PPE was worn, and hand hygiene was performed.    PMH:  has a past medical history of Abnormal Pap smear of cervix, Anesthesia, ASTHMA, High cholesterol (2009), HPV in female, Hypertension, Migraines, and Pneumonia (2021).    MEDS:   Current Outpatient Medications:     acetaminophen (TYLENOL) 500 MG Tab, Take 1,000 mg by mouth every 6 hours as needed., Disp: , Rfl:     lidocaine (XYLOCAINE) 2 % Solution, 5mL orally, may be applied as a swish and spit up to three times daily as needed for throat pain, Disp: 100 mL, Rfl: 0    methylPREDNISolone (MEDROL DOSEPAK) 4 MG Tablet Therapy Pack, Follow schedule on package instructions., Disp: 21 Tablet, Rfl: 0    amoxicillin (AMOXIL) 500 MG Cap, Take 1 Capsule by mouth 2 times a day for 10 days., Disp: 20 Capsule, Rfl: 0    UBRELVY 100 MG Tab, TAKE 1 TABLET BY MOUTH AT ONSET OF MIGRAINE. MAY REPEAT 1 TABLET IN 2 HOURS. MAX 2 TABLETS IN 24 HOURS, Disp: , Rfl:     norethindrone (MICRONOR) 0.35 MG tablet, Take 1 Tablet by mouth every day., Disp: ,  Rfl:     metoprolol SR (TOPROL XL) 25 MG TABLET SR 24 HR, Take 0.5 Tablets by mouth every day., Disp: 45 Tablet, Rfl: 3    aspirin 81 MG EC tablet, Take 1 Tablet by mouth every day., Disp: 100 Tablet, Rfl: 2    Riboflavin (B-2-400 PO), Take 1 Tablet by mouth every day., Disp: , Rfl:     Melatonin 10 MG Cap, Take 10 mg by mouth at bedtime., Disp: , Rfl:     Biotin 1000 MCG Chew Tab, Chew 1,000 mcg every day., Disp: , Rfl:     rosuvastatin (CRESTOR) 20 MG Tab, Take 20 mg by mouth every evening., Disp: , Rfl:     ondansetron (ZOFRAN ODT) 4 MG TABLET DISPERSIBLE, Take 1 Tablet by mouth every 8 hours as needed., Disp: 20 Tablet, Rfl: 0    albuterol 108 (90 Base) MCG/ACT Aero Soln inhalation aerosol, Inhale 2 Puffs every 6 hours as needed for Shortness of Breath., Disp: 8.5 g, Rfl: 3    eletriptan (RELPAX) 20 MG Tab, TK 1 T PO AOS OF MIGRAINE AS NEEDED. MAY REPEAT IN 2 H. DO NOT EXCEED 2 T IN 24 H, Disp: 10 Tab, Rfl: 2    BOTOX 200 units Recon Soln, Inject 200 Units into the shoulder, thigh, or buttocks every 3 months. For migraines, Disp: , Rfl:     Cholecalciferol (VITAMIN D3) 5000 units Cap, Take 5,000 Units by mouth every evening., Disp: , Rfl:     azithromycin (ZITHROMAX) 250 MG Tab, TAKE 2 TABLETS BY MOUTH DAY 1 THEN 1 DAILY ON DAYS 2 TO 4, Disp: , Rfl:     Tranexamic Acid 650 MG Tab, , Disp: , Rfl:     cetirizine (ZYRTEC) 10 MG Tab, Take 10 mg by mouth every evening., Disp: , Rfl:     ALLERGIES: No Known Allergies  SURGHX:   Past Surgical History:   Procedure Laterality Date    AORTIC ASCENDING DISSECTION  9/12/2024    Procedure: ASCENDING AORTIC ANEURYSM REPAIR, TRANSESOPHAGEAL ECHOCARDIOGRAM;  Surgeon: Mago Lerma M.D.;  Location: SURGERY [facility];  Service: Cardiothoracic    ECHOCARDIOGRAM, TRANSESOPHAGEAL, INTRAOPERATIVE  9/12/2024    Procedure: ECHOCARDIOGRAM, TRANSESOPHAGEAL, INTRAOPERATIVE;  Surgeon: Mago Lerma M.D.;  Location: SURGERY [facility];  Service: Cardiothoracic    DILATION AND  "CURETTAGE       SOCHX:  reports that she has never smoked. She has never been exposed to tobacco smoke. She has never used smokeless tobacco. She reports current alcohol use. She reports that she does not use drugs.    FH: Per HPI as applicable/pertinent.    Medications, Allergies, and current problem list reviewed today in Epic.     Objective:     /84 (BP Location: Left arm, Patient Position: Sitting, BP Cuff Size: Adult)   Pulse 78   Temp 36.6 °C (97.8 °F) (Temporal)   Resp 18   Ht 1.727 m (5' 8\")   Wt 104 kg (230 lb)   SpO2 96%     Physical Exam  Vitals reviewed.   Constitutional:       General: She is not in acute distress.     Appearance: She is normal weight. She is not ill-appearing.   HENT:      Head: Normocephalic and atraumatic.      Right Ear: Tympanic membrane, ear canal and external ear normal.      Left Ear: Tympanic membrane, ear canal and external ear normal.      Nose: Congestion present. No rhinorrhea.      Mouth/Throat:      Mouth: Mucous membranes are moist.      Pharynx: Posterior oropharyngeal erythema and postnasal drip present. No pharyngeal swelling, oropharyngeal exudate or uvula swelling.        Comments: Difficult to fully assess patient's posterior oropharynx and tonsils due to tongue anatomy and inability to move tongue down.  There is generalized erythema.  No obvious airway occlusion, airways patent, no signs of PTA  Eyes:      General:         Right eye: No discharge.         Left eye: No discharge.      Pupils: Pupils are equal, round, and reactive to light.   Cardiovascular:      Rate and Rhythm: Normal rate.   Pulmonary:      Effort: Pulmonary effort is normal. No respiratory distress.      Breath sounds: No stridor. No wheezing, rhonchi or rales.   Chest:      Chest wall: No tenderness.   Abdominal:      General: Abdomen is flat.      Tenderness: There is no abdominal tenderness. There is no guarding.   Musculoskeletal:      Cervical back: Normal range of motion. " Tenderness present. No rigidity.   Lymphadenopathy:      Cervical: Cervical adenopathy present.   Neurological:      Mental Status: She is alert.         Assessment/Plan:     Diagnosis and associated orders:     1. Pharyngitis, unspecified etiology  - POCT CEPHEID GROUP A STREP - PCR  - lidocaine (XYLOCAINE) 2 % Solution; 5mL orally, may be applied as a swish and spit up to three times daily as needed for throat pain  Dispense: 100 mL; Refill: 0  - methylPREDNISolone (MEDROL DOSEPAK) 4 MG Tablet Therapy Pack; Follow schedule on package instructions.  Dispense: 21 Tablet; Refill: 0  - amoxicillin (AMOXIL) 500 MG Cap; Take 1 Capsule by mouth 2 times a day for 10 days.  Dispense: 20 Capsule; Refill: 0    2. Exposure to strep throat  - POCT CEPHEID GROUP A STREP - PCR  - amoxicillin (AMOXIL) 500 MG Cap; Take 1 Capsule by mouth 2 times a day for 10 days.  Dispense: 20 Capsule; Refill: 0    Other orders  - acetaminophen (TYLENOL) 500 MG Tab; Take 1,000 mg by mouth every 6 hours as needed.     Comments/MDM:     Patient history and physical exam consistent with acute pharyngitis.  Patient presents well with no red flag symptoms or any acute distress  Discussed HPI as well as physical exam with patient, did recommend doing strep testing given her presentation as well as positive sick contact with known strep.  In clinic strep swab negative.  Patient did have unique anatomy making difficult to visualize and also obtain sample.  Discussed treating symptomatically with plan for continued antibiotics if no improvement in the next 24 to 48 hours as patient is high risk for strep given her positive contact and symptoms  Outpatient management will consist of copious fluids and hydration, Tylenol and ibuprofen as needed for pain, lidocaine for throat pain, warm salt water gargles as needed for throat pain, short course Medrol for swelling inflammation, change toothbrush, disinfect, change and clean bedding, monitor symptoms  Follow  up in 3-5 days if no improvement in symptoms           Differential diagnosis, natural history, supportive care, and indications for immediate follow-up discussed.    Advised the patient to follow-up with the primary care physician for recheck, reevaluation, and consideration of further management.    Please note that this dictation was created using voice recognition software. I have made a reasonable attempt to correct obvious errors, but I expect that there are errors of grammar and possibly content that I did not discover before finalizing the note.    This note was electronically signed by RAYSA Meneses

## 2025-03-07 ENCOUNTER — APPOINTMENT (OUTPATIENT)
Dept: RADIOLOGY | Facility: MEDICAL CENTER | Age: 45
End: 2025-03-07
Attending: INTERNAL MEDICINE
Payer: COMMERCIAL

## 2025-03-07 ENCOUNTER — HOSPITAL ENCOUNTER (OUTPATIENT)
Dept: CARDIOLOGY | Facility: MEDICAL CENTER | Age: 45
End: 2025-03-07
Attending: INTERNAL MEDICINE
Payer: COMMERCIAL

## 2025-03-07 DIAGNOSIS — I35.1 AORTIC VALVE INSUFFICIENCY, ETIOLOGY OF CARDIAC VALVE DISEASE UNSPECIFIED: ICD-10-CM

## 2025-03-07 DIAGNOSIS — I77.810 ASCENDING AORTA DILATION (HCC): ICD-10-CM

## 2025-03-07 LAB
LV EJECT FRACT  99904: 62
LV EJECT FRACT MOD 2C 99903: 60.4
LV EJECT FRACT MOD 4C 99902: 63.4
LV EJECT FRACT MOD BP 99901: 62.52

## 2025-03-07 PROCEDURE — 93306 TTE W/DOPPLER COMPLETE: CPT

## 2025-03-07 PROCEDURE — 93306 TTE W/DOPPLER COMPLETE: CPT | Mod: 26 | Performed by: INTERNAL MEDICINE

## 2025-03-13 ENCOUNTER — HOSPITAL ENCOUNTER (OUTPATIENT)
Dept: RADIOLOGY | Facility: MEDICAL CENTER | Age: 45
End: 2025-03-13
Attending: INTERNAL MEDICINE
Payer: COMMERCIAL

## 2025-03-13 ENCOUNTER — DOCUMENTATION (OUTPATIENT)
Dept: VASCULAR LAB | Facility: MEDICAL CENTER | Age: 45
End: 2025-03-13
Payer: COMMERCIAL

## 2025-03-13 DIAGNOSIS — I77.810 ASCENDING AORTA DILATION (HCC): ICD-10-CM

## 2025-03-13 PROCEDURE — 700117 HCHG RX CONTRAST REV CODE 255: Performed by: INTERNAL MEDICINE

## 2025-03-13 PROCEDURE — 71275 CT ANGIOGRAPHY CHEST: CPT

## 2025-03-13 RX ADMIN — IOHEXOL 80 ML: 350 INJECTION, SOLUTION INTRAVENOUS at 12:15

## 2025-03-13 NOTE — Clinical Note
Pls give patient a call and let her know about incidental findings and have her f/u with pcp asap. Thx

## 2025-03-14 NOTE — PROGRESS NOTES
CTA and echo demonstrate stable repair and no significant valvular disease at this time  Would repeat echo in 2 years - march 2027 - pending interval imaging or other recs from cards.    There is incidental finding of pleural based nodule and lesion in the right lobe of the liver that has modestly increased in size from previous - will ask RN to update patient and defer to pcp for further w/u     Michael Bloch, MD  [facility]    Cc: JORDAN Bustamante

## 2025-03-18 ENCOUNTER — HOSPITAL ENCOUNTER (OUTPATIENT)
Dept: RADIOLOGY | Facility: MEDICAL CENTER | Age: 45
End: 2025-03-18
Attending: OBSTETRICS & GYNECOLOGY
Payer: COMMERCIAL

## 2025-03-18 DIAGNOSIS — N63.20 MASS OF LEFT BREAST, UNSPECIFIED QUADRANT: ICD-10-CM

## 2025-03-18 PROCEDURE — G0279 TOMOSYNTHESIS, MAMMO: HCPCS

## 2025-03-18 PROCEDURE — 76642 ULTRASOUND BREAST LIMITED: CPT | Mod: LT

## 2025-03-20 ENCOUNTER — APPOINTMENT (OUTPATIENT)
Dept: ADMISSIONS | Facility: MEDICAL CENTER | Age: 45
End: 2025-03-20
Attending: OBSTETRICS & GYNECOLOGY
Payer: COMMERCIAL

## 2025-03-31 ENCOUNTER — APPOINTMENT (OUTPATIENT)
Dept: ADMISSIONS | Facility: MEDICAL CENTER | Age: 45
End: 2025-03-31
Attending: OBSTETRICS & GYNECOLOGY
Payer: COMMERCIAL

## 2025-04-07 ENCOUNTER — PRE-ADMISSION TESTING (OUTPATIENT)
Dept: ADMISSIONS | Facility: MEDICAL CENTER | Age: 45
End: 2025-04-07
Payer: COMMERCIAL

## 2025-04-07 RX ORDER — FLUCONAZOLE 100 MG/1
TABLET ORAL
Status: ON HOLD | COMMUNITY
Start: 2025-04-06 | End: 2025-04-16

## 2025-04-07 NOTE — PREADMIT AVS NOTE
Current Medications   Medication Instructions    fluconazole (DIFLUCAN) 100 MG Tab Continue as prescribed    acetaminophen (TYLENOL) 500 MG Tab As needed medication, may take if needed, including morning of procedure     UBRELVY 100 MG Tab Hold medication day of procedure    norethindrone (MICRONOR) 0.35 MG tablet Hold medication day of procedure    metoprolol SR (TOPROL XL) 25 MG TABLET SR 24 HR Continue taking medication as prescribed, including morning of procedure     aspirin 81 MG EC tablet Follow instructions from surgeon or specialist.    Riboflavin (B-2-400 PO) Stop 7 days before surgery    Melatonin 10 MG Cap Continue as prescribed    Biotin 1000 MCG Chew Tab Stop 7 days before surgery    cetirizine (ZYRTEC) 10 MG Tab Continue taking medication as prescribed, including morning of procedure     rosuvastatin (CRESTOR) 20 MG Tab Continue as prescribed    ondansetron (ZOFRAN ODT) 4 MG TABLET DISPERSIBLE As needed medication, may take if needed, including morning of procedure     albuterol 108 (90 Base) MCG/ACT Aero Soln inhalation aerosol As needed medication, may take if needed, including morning of procedure     eletriptan (RELPAX) 20 MG Tab Hold medication day of procedure    BOTOX 200 units Recon Soln Continue as prescribed    Cholecalciferol (VITAMIN D3) 5000 units Cap Stop 7 days before surgery

## 2025-04-14 ENCOUNTER — PRE-ADMISSION TESTING (OUTPATIENT)
Dept: ADMISSIONS | Facility: MEDICAL CENTER | Age: 45
End: 2025-04-14
Attending: OBSTETRICS & GYNECOLOGY
Payer: COMMERCIAL

## 2025-04-14 DIAGNOSIS — Z01.812 PRE-OPERATIVE LABORATORY EXAMINATION: ICD-10-CM

## 2025-04-14 DIAGNOSIS — Z01.810 PRE-OPERATIVE CARDIOVASCULAR EXAMINATION: ICD-10-CM

## 2025-04-14 LAB
ANION GAP SERPL CALC-SCNC: 10 MMOL/L (ref 7–16)
BUN SERPL-MCNC: 13 MG/DL (ref 8–22)
CALCIUM SERPL-MCNC: 9.3 MG/DL (ref 8.5–10.5)
CHLORIDE SERPL-SCNC: 105 MMOL/L (ref 96–112)
CO2 SERPL-SCNC: 23 MMOL/L (ref 20–33)
CREAT SERPL-MCNC: 0.61 MG/DL (ref 0.5–1.4)
EKG IMPRESSION: NORMAL
ERYTHROCYTE [DISTWIDTH] IN BLOOD BY AUTOMATED COUNT: 43.2 FL (ref 35.9–50)
GFR SERPLBLD CREATININE-BSD FMLA CKD-EPI: 112 ML/MIN/1.73 M 2
GLUCOSE SERPL-MCNC: 85 MG/DL (ref 65–99)
HCG SERPL QL: NEGATIVE
HCT VFR BLD AUTO: 42.4 % (ref 37–47)
HGB BLD-MCNC: 14.1 G/DL (ref 12–16)
MCH RBC QN AUTO: 30.3 PG (ref 27–33)
MCHC RBC AUTO-ENTMCNC: 33.3 G/DL (ref 32.2–35.5)
MCV RBC AUTO: 91.2 FL (ref 81.4–97.8)
PLATELET # BLD AUTO: 232 K/UL (ref 164–446)
PMV BLD AUTO: 9.4 FL (ref 9–12.9)
POTASSIUM SERPL-SCNC: 4.3 MMOL/L (ref 3.6–5.5)
RBC # BLD AUTO: 4.65 M/UL (ref 4.2–5.4)
SODIUM SERPL-SCNC: 138 MMOL/L (ref 135–145)
WBC # BLD AUTO: 9.8 K/UL (ref 4.8–10.8)

## 2025-04-14 PROCEDURE — 93005 ELECTROCARDIOGRAM TRACING: CPT | Mod: TC

## 2025-04-14 PROCEDURE — 85027 COMPLETE CBC AUTOMATED: CPT

## 2025-04-14 PROCEDURE — 84703 CHORIONIC GONADOTROPIN ASSAY: CPT

## 2025-04-14 PROCEDURE — 80048 BASIC METABOLIC PNL TOTAL CA: CPT

## 2025-04-14 PROCEDURE — 36415 COLL VENOUS BLD VENIPUNCTURE: CPT

## 2025-04-14 PROCEDURE — 93010 ELECTROCARDIOGRAM REPORT: CPT | Performed by: INTERNAL MEDICINE

## 2025-04-16 ENCOUNTER — ANESTHESIA EVENT (OUTPATIENT)
Dept: SURGERY | Facility: MEDICAL CENTER | Age: 45
End: 2025-04-16
Payer: COMMERCIAL

## 2025-04-16 ENCOUNTER — HOSPITAL ENCOUNTER (OUTPATIENT)
Facility: MEDICAL CENTER | Age: 45
End: 2025-04-16
Attending: OBSTETRICS & GYNECOLOGY | Admitting: OBSTETRICS & GYNECOLOGY
Payer: COMMERCIAL

## 2025-04-16 ENCOUNTER — ANESTHESIA (OUTPATIENT)
Dept: SURGERY | Facility: MEDICAL CENTER | Age: 45
End: 2025-04-16
Payer: COMMERCIAL

## 2025-04-16 VITALS
TEMPERATURE: 96.8 F | DIASTOLIC BLOOD PRESSURE: 77 MMHG | HEIGHT: 68 IN | BODY MASS INDEX: 36.12 KG/M2 | SYSTOLIC BLOOD PRESSURE: 130 MMHG | RESPIRATION RATE: 16 BRPM | HEART RATE: 64 BPM | WEIGHT: 238.32 LBS | OXYGEN SATURATION: 96 %

## 2025-04-16 PROBLEM — R11.2 PONV (POSTOPERATIVE NAUSEA AND VOMITING): Status: ACTIVE | Noted: 2025-04-16

## 2025-04-16 PROBLEM — Z98.890 PONV (POSTOPERATIVE NAUSEA AND VOMITING): Status: ACTIVE | Noted: 2025-04-16

## 2025-04-16 LAB — PATHOLOGY CONSULT NOTE: NORMAL

## 2025-04-16 PROCEDURE — 110454 HCHG SHELL REV 250: Performed by: OBSTETRICS & GYNECOLOGY

## 2025-04-16 PROCEDURE — A9270 NON-COVERED ITEM OR SERVICE: HCPCS | Performed by: ANESTHESIOLOGY

## 2025-04-16 PROCEDURE — 700101 HCHG RX REV CODE 250: Performed by: ANESTHESIOLOGY

## 2025-04-16 PROCEDURE — 88307 TISSUE EXAM BY PATHOLOGIST: CPT | Mod: 26 | Performed by: PATHOLOGY

## 2025-04-16 PROCEDURE — 502714 HCHG ROBOTIC SURGERY SERVICES: Performed by: OBSTETRICS & GYNECOLOGY

## 2025-04-16 PROCEDURE — 88307 TISSUE EXAM BY PATHOLOGIST: CPT | Performed by: PATHOLOGY

## 2025-04-16 PROCEDURE — 110371 HCHG SHELL REV 272: Performed by: OBSTETRICS & GYNECOLOGY

## 2025-04-16 PROCEDURE — 700111 HCHG RX REV CODE 636 W/ 250 OVERRIDE (IP): Mod: JZ | Performed by: ANESTHESIOLOGY

## 2025-04-16 PROCEDURE — 160031 HCHG SURGERY MINUTES - 1ST 30 MINS LEVEL 5: Performed by: OBSTETRICS & GYNECOLOGY

## 2025-04-16 PROCEDURE — 160009 HCHG ANES TIME/MIN: Performed by: OBSTETRICS & GYNECOLOGY

## 2025-04-16 PROCEDURE — 700102 HCHG RX REV CODE 250 W/ 637 OVERRIDE(OP): Performed by: ANESTHESIOLOGY

## 2025-04-16 PROCEDURE — 700104 HCHG RX REV CODE 254: Performed by: ANESTHESIOLOGY

## 2025-04-16 PROCEDURE — 700105 HCHG RX REV CODE 258: Performed by: OBSTETRICS & GYNECOLOGY

## 2025-04-16 PROCEDURE — 160025 RECOVERY II MINUTES (STATS): Performed by: OBSTETRICS & GYNECOLOGY

## 2025-04-16 PROCEDURE — 160046 HCHG PACU - 1ST 60 MINS PHASE II: Performed by: OBSTETRICS & GYNECOLOGY

## 2025-04-16 PROCEDURE — 700101 HCHG RX REV CODE 250: Performed by: OBSTETRICS & GYNECOLOGY

## 2025-04-16 PROCEDURE — 160047 HCHG PACU  - EA ADDL 30 MINS PHASE II: Performed by: OBSTETRICS & GYNECOLOGY

## 2025-04-16 PROCEDURE — 160048 HCHG OR STATISTICAL LEVEL 1-5: Performed by: OBSTETRICS & GYNECOLOGY

## 2025-04-16 PROCEDURE — 160015 HCHG STAT PREOP MINUTES: Performed by: OBSTETRICS & GYNECOLOGY

## 2025-04-16 PROCEDURE — 160035 HCHG PACU - 1ST 60 MINS PHASE I: Performed by: OBSTETRICS & GYNECOLOGY

## 2025-04-16 PROCEDURE — 88305 TISSUE EXAM BY PATHOLOGIST: CPT | Performed by: PATHOLOGY

## 2025-04-16 PROCEDURE — 88305 TISSUE EXAM BY PATHOLOGIST: CPT | Mod: 26 | Performed by: PATHOLOGY

## 2025-04-16 PROCEDURE — C1771 REP DEV, URINARY, W/SLING: HCPCS | Performed by: OBSTETRICS & GYNECOLOGY

## 2025-04-16 PROCEDURE — 160042 HCHG SURGERY MINUTES - EA ADDL 1 MIN LEVEL 5: Performed by: OBSTETRICS & GYNECOLOGY

## 2025-04-16 PROCEDURE — 160002 HCHG RECOVERY MINUTES (STAT): Performed by: OBSTETRICS & GYNECOLOGY

## 2025-04-16 PROCEDURE — 700111 HCHG RX REV CODE 636 W/ 250 OVERRIDE (IP): Performed by: OBSTETRICS & GYNECOLOGY

## 2025-04-16 PROCEDURE — 700111 HCHG RX REV CODE 636 W/ 250 OVERRIDE (IP): Performed by: ANESTHESIOLOGY

## 2025-04-16 DEVICE — SLING TRANSOBTURATOR CURVED SYSTEM: Type: IMPLANTABLE DEVICE | Site: BLADDER | Status: FUNCTIONAL

## 2025-04-16 RX ORDER — MIDAZOLAM HYDROCHLORIDE 1 MG/ML
INJECTION INTRAMUSCULAR; INTRAVENOUS PRN
Status: DISCONTINUED | OUTPATIENT
Start: 2025-04-16 | End: 2025-04-16 | Stop reason: SURG

## 2025-04-16 RX ORDER — METHOCARBAMOL 500 MG/1
1000 TABLET, FILM COATED ORAL ONCE
Status: COMPLETED | OUTPATIENT
Start: 2025-04-16 | End: 2025-04-16

## 2025-04-16 RX ORDER — OXYCODONE HCL 5 MG/5 ML
10 SOLUTION, ORAL ORAL
Status: COMPLETED | OUTPATIENT
Start: 2025-04-16 | End: 2025-04-16

## 2025-04-16 RX ORDER — HYDROMORPHONE HYDROCHLORIDE 1 MG/ML
0.1 INJECTION, SOLUTION INTRAMUSCULAR; INTRAVENOUS; SUBCUTANEOUS
Status: DISCONTINUED | OUTPATIENT
Start: 2025-04-16 | End: 2025-04-16 | Stop reason: HOSPADM

## 2025-04-16 RX ORDER — ONDANSETRON 2 MG/ML
4 INJECTION INTRAMUSCULAR; INTRAVENOUS
Status: COMPLETED | OUTPATIENT
Start: 2025-04-16 | End: 2025-04-16

## 2025-04-16 RX ORDER — MEPERIDINE HYDROCHLORIDE 25 MG/ML
12.5 INJECTION INTRAMUSCULAR; INTRAVENOUS; SUBCUTANEOUS
Status: DISCONTINUED | OUTPATIENT
Start: 2025-04-16 | End: 2025-04-16 | Stop reason: HOSPADM

## 2025-04-16 RX ORDER — OXYCODONE HCL 5 MG/5 ML
5 SOLUTION, ORAL ORAL
Status: COMPLETED | OUTPATIENT
Start: 2025-04-16 | End: 2025-04-16

## 2025-04-16 RX ORDER — BUPIVACAINE HYDROCHLORIDE AND EPINEPHRINE 2.5; 5 MG/ML; UG/ML
INJECTION, SOLUTION EPIDURAL; INFILTRATION; INTRACAUDAL; PERINEURAL
Status: DISCONTINUED | OUTPATIENT
Start: 2025-04-16 | End: 2025-04-16 | Stop reason: HOSPADM

## 2025-04-16 RX ORDER — FLUCONAZOLE 100 MG/1
100 TABLET ORAL DAILY
COMMUNITY

## 2025-04-16 RX ORDER — KETOROLAC TROMETHAMINE 15 MG/ML
INJECTION, SOLUTION INTRAMUSCULAR; INTRAVENOUS PRN
Status: DISCONTINUED | OUTPATIENT
Start: 2025-04-16 | End: 2025-04-16 | Stop reason: SURG

## 2025-04-16 RX ORDER — DIPHENHYDRAMINE HYDROCHLORIDE 50 MG/ML
12.5 INJECTION, SOLUTION INTRAMUSCULAR; INTRAVENOUS
Status: DISCONTINUED | OUTPATIENT
Start: 2025-04-16 | End: 2025-04-16 | Stop reason: HOSPADM

## 2025-04-16 RX ORDER — SODIUM CHLORIDE, SODIUM LACTATE, POTASSIUM CHLORIDE, CALCIUM CHLORIDE 600; 310; 30; 20 MG/100ML; MG/100ML; MG/100ML; MG/100ML
INJECTION, SOLUTION INTRAVENOUS CONTINUOUS
Status: ACTIVE | OUTPATIENT
Start: 2025-04-16 | End: 2025-04-16

## 2025-04-16 RX ORDER — SCOPOLAMINE 1 MG/3D
1 PATCH, EXTENDED RELEASE TRANSDERMAL
Status: DISCONTINUED | OUTPATIENT
Start: 2025-04-16 | End: 2025-04-16 | Stop reason: HOSPADM

## 2025-04-16 RX ORDER — METRONIDAZOLE 500 MG/100ML
INJECTION, SOLUTION INTRAVENOUS PRN
Status: DISCONTINUED | OUTPATIENT
Start: 2025-04-16 | End: 2025-04-16 | Stop reason: SURG

## 2025-04-16 RX ORDER — ACETAMINOPHEN 500 MG
1000 TABLET ORAL ONCE
Status: COMPLETED | OUTPATIENT
Start: 2025-04-16 | End: 2025-04-16

## 2025-04-16 RX ORDER — PROMETHAZINE HYDROCHLORIDE 25 MG/1
25 SUPPOSITORY RECTAL EVERY 4 HOURS PRN
Status: CANCELLED | OUTPATIENT
Start: 2025-04-16

## 2025-04-16 RX ORDER — ONDANSETRON 2 MG/ML
INJECTION INTRAMUSCULAR; INTRAVENOUS PRN
Status: DISCONTINUED | OUTPATIENT
Start: 2025-04-16 | End: 2025-04-16 | Stop reason: SURG

## 2025-04-16 RX ORDER — HYDROMORPHONE HYDROCHLORIDE 2 MG/ML
INJECTION, SOLUTION INTRAMUSCULAR; INTRAVENOUS; SUBCUTANEOUS PRN
Status: DISCONTINUED | OUTPATIENT
Start: 2025-04-16 | End: 2025-04-16 | Stop reason: SURG

## 2025-04-16 RX ORDER — HYDROMORPHONE HYDROCHLORIDE 1 MG/ML
0.2 INJECTION, SOLUTION INTRAMUSCULAR; INTRAVENOUS; SUBCUTANEOUS
Status: DISCONTINUED | OUTPATIENT
Start: 2025-04-16 | End: 2025-04-16 | Stop reason: HOSPADM

## 2025-04-16 RX ORDER — HYDRALAZINE HYDROCHLORIDE 20 MG/ML
5 INJECTION INTRAMUSCULAR; INTRAVENOUS
Status: DISCONTINUED | OUTPATIENT
Start: 2025-04-16 | End: 2025-04-16 | Stop reason: HOSPADM

## 2025-04-16 RX ORDER — HALOPERIDOL 5 MG/ML
1 INJECTION INTRAMUSCULAR
Status: DISCONTINUED | OUTPATIENT
Start: 2025-04-16 | End: 2025-04-16 | Stop reason: HOSPADM

## 2025-04-16 RX ORDER — SODIUM CHLORIDE, SODIUM LACTATE, POTASSIUM CHLORIDE, CALCIUM CHLORIDE 600; 310; 30; 20 MG/100ML; MG/100ML; MG/100ML; MG/100ML
INJECTION, SOLUTION INTRAVENOUS CONTINUOUS
Status: DISCONTINUED | OUTPATIENT
Start: 2025-04-16 | End: 2025-04-16 | Stop reason: HOSPADM

## 2025-04-16 RX ORDER — ACETAMINOPHEN 500 MG
1000 TABLET ORAL ONCE
Status: DISCONTINUED | OUTPATIENT
Start: 2025-04-16 | End: 2025-04-16 | Stop reason: HOSPADM

## 2025-04-16 RX ORDER — VANCOMYCIN HYDROCHLORIDE 500 MG/10ML
INJECTION, POWDER, LYOPHILIZED, FOR SOLUTION INTRAVENOUS
Status: COMPLETED | OUTPATIENT
Start: 2025-04-16 | End: 2025-04-16

## 2025-04-16 RX ORDER — DEXMEDETOMIDINE HYDROCHLORIDE 100 UG/ML
INJECTION, SOLUTION INTRAVENOUS PRN
Status: DISCONTINUED | OUTPATIENT
Start: 2025-04-16 | End: 2025-04-16 | Stop reason: SURG

## 2025-04-16 RX ORDER — CEFAZOLIN SODIUM 1 G/3ML
INJECTION, POWDER, FOR SOLUTION INTRAMUSCULAR; INTRAVENOUS PRN
Status: DISCONTINUED | OUTPATIENT
Start: 2025-04-16 | End: 2025-04-16 | Stop reason: SURG

## 2025-04-16 RX ORDER — LIDOCAINE HYDROCHLORIDE 20 MG/ML
INJECTION, SOLUTION EPIDURAL; INFILTRATION; INTRACAUDAL; PERINEURAL PRN
Status: DISCONTINUED | OUTPATIENT
Start: 2025-04-16 | End: 2025-04-16 | Stop reason: SURG

## 2025-04-16 RX ORDER — DEXAMETHASONE SODIUM PHOSPHATE 4 MG/ML
INJECTION, SOLUTION INTRA-ARTICULAR; INTRALESIONAL; INTRAMUSCULAR; INTRAVENOUS; SOFT TISSUE PRN
Status: DISCONTINUED | OUTPATIENT
Start: 2025-04-16 | End: 2025-04-16 | Stop reason: SURG

## 2025-04-16 RX ORDER — LIDOCAINE HYDROCHLORIDE 40 MG/ML
SOLUTION TOPICAL PRN
Status: DISCONTINUED | OUTPATIENT
Start: 2025-04-16 | End: 2025-04-16 | Stop reason: SURG

## 2025-04-16 RX ORDER — LABETALOL HYDROCHLORIDE 5 MG/ML
5 INJECTION, SOLUTION INTRAVENOUS
Status: DISCONTINUED | OUTPATIENT
Start: 2025-04-16 | End: 2025-04-16 | Stop reason: HOSPADM

## 2025-04-16 RX ORDER — GENTAMICIN 40 MG/ML
INJECTION, SOLUTION INTRAMUSCULAR; INTRAVENOUS
Status: DISCONTINUED | OUTPATIENT
Start: 2025-04-16 | End: 2025-04-16 | Stop reason: HOSPADM

## 2025-04-16 RX ORDER — HYDROMORPHONE HYDROCHLORIDE 1 MG/ML
0.4 INJECTION, SOLUTION INTRAMUSCULAR; INTRAVENOUS; SUBCUTANEOUS
Status: DISCONTINUED | OUTPATIENT
Start: 2025-04-16 | End: 2025-04-16 | Stop reason: HOSPADM

## 2025-04-16 RX ADMIN — LIDOCAINE HYDROCHLORIDE 4 ML: 40 SOLUTION TOPICAL at 09:56

## 2025-04-16 RX ADMIN — DEXAMETHASONE SODIUM PHOSPHATE 8 MG: 4 INJECTION INTRA-ARTICULAR; INTRALESIONAL; INTRAMUSCULAR; INTRAVENOUS; SOFT TISSUE at 10:01

## 2025-04-16 RX ADMIN — KETOROLAC TROMETHAMINE 15 MG: 15 INJECTION, SOLUTION INTRAMUSCULAR; INTRAVENOUS at 11:03

## 2025-04-16 RX ADMIN — FENTANYL CITRATE 100 MCG: 50 INJECTION, SOLUTION INTRAMUSCULAR; INTRAVENOUS at 09:53

## 2025-04-16 RX ADMIN — CEFAZOLIN 2 G: 1 INJECTION, POWDER, FOR SOLUTION INTRAMUSCULAR; INTRAVENOUS at 09:52

## 2025-04-16 RX ADMIN — PROPOFOL 150 MG: 10 INJECTION, EMULSION INTRAVENOUS at 09:53

## 2025-04-16 RX ADMIN — HYDROMORPHONE HYDROCHLORIDE 0.5 MG: 2 INJECTION INTRAMUSCULAR; INTRAVENOUS; SUBCUTANEOUS at 11:12

## 2025-04-16 RX ADMIN — ROCURONIUM BROMIDE 50 MG: 10 INJECTION INTRAVENOUS at 09:53

## 2025-04-16 RX ADMIN — ACETAMINOPHEN 1000 MG: 500 TABLET ORAL at 13:30

## 2025-04-16 RX ADMIN — SUGAMMADEX 200 MG: 100 INJECTION, SOLUTION INTRAVENOUS at 11:03

## 2025-04-16 RX ADMIN — METHOCARBAMOL 1000 MG: 500 TABLET ORAL at 09:28

## 2025-04-16 RX ADMIN — SODIUM CHLORIDE, POTASSIUM CHLORIDE, SODIUM LACTATE AND CALCIUM CHLORIDE: 600; 310; 30; 20 INJECTION, SOLUTION INTRAVENOUS at 09:48

## 2025-04-16 RX ADMIN — ONDANSETRON 4 MG: 2 INJECTION INTRAMUSCULAR; INTRAVENOUS at 11:03

## 2025-04-16 RX ADMIN — DEXMEDETOMIDINE 35 MCG: 100 INJECTION, SOLUTION INTRAVENOUS at 10:01

## 2025-04-16 RX ADMIN — ONDANSETRON 4 MG: 2 INJECTION INTRAMUSCULAR; INTRAVENOUS at 12:22

## 2025-04-16 RX ADMIN — OXYCODONE HYDROCHLORIDE 5 MG: 5 SOLUTION ORAL at 11:30

## 2025-04-16 RX ADMIN — INDIGOTINDISULFONATE SODIUM 5 ML: 8 INJECTION INTRAVENOUS at 10:32

## 2025-04-16 RX ADMIN — SCOPOLAMINE 1 PATCH: 1.5 PATCH, EXTENDED RELEASE TRANSDERMAL at 09:29

## 2025-04-16 RX ADMIN — LIDOCAINE HYDROCHLORIDE 80 MG: 20 INJECTION, SOLUTION EPIDURAL; INFILTRATION; INTRACAUDAL; PERINEURAL at 09:53

## 2025-04-16 RX ADMIN — MIDAZOLAM HYDROCHLORIDE 2 MG: 1 INJECTION, SOLUTION INTRAMUSCULAR; INTRAVENOUS at 09:48

## 2025-04-16 RX ADMIN — METRONIDAZOLE 500 MG: 5 INJECTION, SOLUTION INTRAVENOUS at 09:53

## 2025-04-16 RX ADMIN — FENTANYL CITRATE 25 MCG: 50 INJECTION, SOLUTION INTRAMUSCULAR; INTRAVENOUS at 11:30

## 2025-04-16 RX ADMIN — HYDROMORPHONE HYDROCHLORIDE 0.5 MG: 2 INJECTION INTRAMUSCULAR; INTRAVENOUS; SUBCUTANEOUS at 10:49

## 2025-04-16 ASSESSMENT — FIBROSIS 4 INDEX: FIB4 SCORE: 0.8

## 2025-04-16 ASSESSMENT — PAIN DESCRIPTION - PAIN TYPE
TYPE: ACUTE PAIN;SURGICAL PAIN
TYPE: SURGICAL PAIN

## 2025-04-16 ASSESSMENT — PAIN SCALES - GENERAL: PAIN_LEVEL: 1

## 2025-04-16 NOTE — PROGRESS NOTES
Pt arrives from OR to PACU at 1112. Pt identification verified by team, pt placed on all monitors with alarms audible, report and care of pt received from Anesthesiologist and RN. Assessment completed, pt changed into hospital gown and provided with warm blankets.    1143-family updated  1207- report given to [facility] RN  1210-Patient to phase two with RN in stable condition. VSS. Surgical dressings clean dry intact. Aox4 and on room air. No further needs.

## 2025-04-16 NOTE — OR NURSING
Arrived from PACU AXO, Pt's VSS; denies N/V; states pain is at tolerable level. Dressing CDI to abdomen.     1315: 300 mL backfill completed as per orders.  1325: pt able to ambulate and void 400mL as shown on hat. Bladder scan showed 5mL.    D/c orders received. IV dc'd. Pt changed into clothing with assistance. Discharge reviewed, Pt and family verbalized understanding and questions answered. Patient states ready to d/c home. Pt dc'd in w/c with .

## 2025-04-16 NOTE — ANESTHESIA PROCEDURE NOTES
Airway    Date/Time: 4/16/2025 9:56 AM    Performed by: Skye German M.D.  Authorized by: Skye German M.D.    Location:  OR  Urgency:  Elective  Indications for Airway Management:  Anesthesia      Spontaneous Ventilation: absent    Sedation Level:  Deep  Preoxygenated: Yes    Patient Position:  Sniffing  Mask Difficulty Assessment:  1 - vent by mask  Final Airway Type:  Endotracheal airway  Final Endotracheal Airway:  ETT  Cuffed: Yes    Technique Used for Successful ETT Placement:  Direct laryngoscopy  Devices/Methods Used in Placement:  Intubating stylet    Insertion Site:  Oral  Blade Type:  Felicita  Laryngoscope Blade/Videolaryngoscope Blade Size:  3  ETT Size (mm):  7.0  Measured from:  Teeth  ETT to Teeth (cm):  21  Placement Verified by: capnometry and palpation of cuff    Cormack-Lehane Classification:  Grade IIa - partial view of glottis  Number of Attempts at Approach:  1

## 2025-04-16 NOTE — ANESTHESIA PREPROCEDURE EVALUATION
Case: [id number] Anesthesia Start Date/Time: 04/16/25 0948    Procedures:       ROBOTIC ASSISTED LAPAROSCOPIC TOTAL HYSTERECTOMY, BILATERAL SALPINGECTOMY      TRANS OBTURATOR TAPE SLING      CYSTOSCOPY    Pre-op diagnosis: MENORRHAGIA, RIGHT OVARIAN CYST, STRESS URINARY INCONTINENCE, MIXED INCONTINENCE    Location: [facility] OR [facility] / SURGERY [facility]    Surgeons: Marcy Jimenez M.D.          45 yo w/menorrhagia, stress urinary incontinence and right ovarian cyst    Relevant Problems   ANESTHESIA   (positive) PONV (postoperative nausea and vomiting)      PULMONARY   (positive) Mild intermittent asthma      NEURO   (positive) Chronic migraine      CARDIAC   (positive) Aneurysm of ascending aorta without rupture (HCC)   (positive) Aortic regurgitation   (positive) Ascending aorta enlargement (HCC)   (positive) Chronic migraine   (positive) Essential hypertension      Other   (positive) Obesity (BMI 30.0-34.9)     S/p ascending aortic aneurysm repair  Rare GERD    Denies CP/SOB, DM, LUNG DZ, URI    Physical Exam    Airway   Mallampati: II  TM distance: >3 FB  Neck ROM: full       Cardiovascular   Rhythm: regular  Rate: normal  (-) murmur     Dental - normal exam           Pulmonary   Breath sounds clear to auscultation     Abdominal    Neurological - normal exam                   Anesthesia Plan    ASA 2       Plan - general       Airway plan will be ETT          Induction: intravenous    Postoperative Plan: Postoperative administration of opioids is intended.    Pertinent diagnostic labs and testing reviewed    Informed Consent:    Anesthetic plan and risks discussed with patient.    Use of blood products discussed with: patient whom consented to blood products.

## 2025-04-16 NOTE — PROGRESS NOTES
Medication history reviewed with pt. Med rec is complete.  Allergies reviewed, per pt  Interviewed pt with  at bedside with permission from pt.    Pt reports that she stopped her GALLIFREY 5MG AND TRANEXAMIC 650MG about a month ago or longer.    Patient has not had any outpatient antibiotics in the last 30 days.    Pt is not on any anticoagulants      Dispense history available in EPIC? YES

## 2025-04-16 NOTE — ANESTHESIA POSTPROCEDURE EVALUATION
Patient: Princess Machado    Procedure Summary       Date: 04/16/25 Room / Location: [place] [id number] / SURGERY [facility]    Anesthesia Start: 0948 Anesthesia Stop: 1114    Procedures:       ROBOTIC ASSISTED LAPAROSCOPIC TOTAL HYSTERECTOMY, BILATERAL SALPINGECTOMY, RIGHT OVARIAN CYSTECTOMY (Bilateral: Abdomen)      TRANS OBTURATOR TAPE SLING (Bladder)      CYSTOSCOPY (Bladder) Diagnosis: (MENORRHAGIA, RIGHT OVARIAN CYST, STRESS URINARY INCONTINENCE, MIXED INCONTINENCE)    Surgeons: Marcy Jimenez M.D. Responsible Provider: Skye German M.D.    Anesthesia Type: general ASA Status: 2            Final Anesthesia Type: general  Last vitals  BP   Blood Pressure: 101/60    Temp   36.7 °C (98 °F)    Pulse   67   Resp   (!) 22    SpO2   93 %      Anesthesia Post Evaluation    Patient location during evaluation: PACU  Patient participation: complete - patient participated  Level of consciousness: awake  Pain score: 1    Airway patency: patent  Anesthetic complications: no  Cardiovascular status: hemodynamically stable  Respiratory status: acceptable  Hydration status: acceptable    PONV: none          No notable events documented.     Nurse Pain Score: 0 (NPRS)

## 2025-04-16 NOTE — OR SURGEON
OP Note    PreOp Diagnosis: Menorrhagia, dysmenorrhea, stress urinary incontinence, right complex ovarian cyst, history of thoracic aortic aneurysm      PostOp Diagnosis: Same      Procedure(s):  ROBOTIC ASSISTED LAPAROSCOPIC TOTAL HYSTERECTOMY, BILATERAL SALPINGECTOMY, RIGHT OVARIAN CYSTECTOMY - Wound Class: Clean Contaminated  TRANS OBTURATOR TAPE SLING - Wound Class: Clean Contaminated  CYSTOSCOPY - Wound Class: None    Surgeon(s):  Marcy Jimenez M.D.    Assistant: PENNY Nelson    Anesthesiologist/Type of Anesthesia:  Anesthesiologist: Skye German M.D./General    Surgical Staff:  Assistant: Polo Licona R.N.  Circulator: Natacha Jalloh R.N.  Relief Circulator: Johana Reis R.N.  Scrub Person: Kori Lee; Ariana Camarillo    Specimens removed if any:  ID Type Source Tests Collected by Time Destination   A : Uterus, cervix, bilateral fallopian tubes Tissue Uterus PATHOLOGY SPECIMEN Marcy Jimenez M.D. 4/16/2025 10:25 AM    B : right ovarian cyst Tissue Ovary PATHOLOGY SPECIMEN Marcy Jimenez M.D. 4/16/2025 10:44 AM        Estimated Blood Loss: 20cc    Findings: Exam under anesthesia reveals an enlarged approximately 10-week size uterus which is globular in contour and there are no adnexal masses appreciated, laparoscopic findings show the uterus to be generous in size with probable adenomyosis, the fallopian tubes appeared normal bilaterally, the right ovary did have a large bilobed cyst which appeared benign but was removed, the sling was placed tension free underlying the mid urethra, cystoscopic examination postprocedure revealed a normal bladder with good E flux of indigocarmine through the ureteral os bilaterally    Complications: None    Da Vinchi: DV5    Technique: The patient was taken to the operating room where general anesthesia was placed.  She was then prepped and draped in the normal sterile fashion in the lithotomy position.  Attention was then  turned to placement of the FaceRig uterine manipulator.  After change of gloves attention was then turned to the umbilical incision.  After quarter percent Marcaine with epinephrine was then injected and 8 mm incision was made with a scalpel and the Veress needle placed pneumoperitoneum created with CO2 gas.  The 8 mm trocar was placed without difficulty.  The above findings were noted with the scope.  An 8 mm incision was made 10 cm to the right and 10 and 20 cm to the left of the umbilicus and quarter percent Marcaine with epinephrine was then injected into these areas and the trocar placed under direct visualization without difficulty.  The da Gino was then brought to the patient's left side and side docking took place without difficulty the scissors with coag were placed in the right arm the bipolar cautery was placed in the left arm and a straight scope was used for this procedure.  Once instruments were in the appropriate location I went to the [employer] Gino console.      The patient had elected to maintain her ovaries if possible and I saw no reason not to.  There was a large right ovarian cyst and this was removed using the bipolar cautery and scissors I then cauterized the cyst bed in the cyst was sent to pathology.  The fallopian tubes were excised without difficulty and brought out through the assistant port.  The utero-ovarian ligaments were then clamped desiccated and cut.  The round ligament was clamped desiccated and cut.  Intervening tissue clamped desiccated and cut.  The anterior leaf of the broad ligament was incised posterior leaf incised uterine vessels were then clamped desiccated and cut about the level of the cardinal ligament complex.  The same was done on the other side.   The colpotomy incision was made with the monopolar cautery.  The uterosacral ligaments were preserved.  0 Vicryl was used to reapproximate the vaginal mucosa in a running fashion excellent reapproximation was achieved.  I then  used a barbed Monocryl to reapproximate the pubocervical fascia incorporating the uterosacral ligaments for excellent support.  Excellent reapproximation was achieved.  The pedicles were gone over with the bipolar cautery and excellent hemostasis was achieved.      The instruments were removed from the abdomen.  The incisions were closed with Dermabond superficially.  Excellent reapproximation was achieved.  Cystoscopic examination of bladder was then performed after administering IV indigocarmine and there was excellent E flux of indigocarmine through the ureteral os bilaterally.    A transobturator tape urethral sling was then performed.  An Allis clamp was used to grasp the vaginal mucosa underlying the mid urethra.  A 1 cm incision was made with a scalpel.  Metzenbaum scissors then used to dissect out laterally on each side allowing entry my pinky finger to just behind the pubic ramus on each side.  The patient's legs were put in the lower lithotomy position.  The abductor insertion was palpated and a luis angel was made 1 cm below on each side.  Quarter percent Marcaine with epinephrine was then injected into this area and a stab incision was made with a scalpel.  A gaff needle was then placed through this lateral incision around the pubic ramus touching my pinky finger and brought through periurethrally on each side.  The graft was attached to the inna needles on each side and brought out through the lateral incisions.  This created a hammock effect below the mid urethra.  An 8 mm dilator was then placed above the graft and below the urethra to ensure tension-free positioning.   irrigation was then performed surgi-vidhya was placed into the dissected area and 3-0 Vicryl was used to close the vaginal mucosa in a running fashion.  Excellent reapproximation was achieved.  Cystoscopic examination of the bladder revealed a normal bladder with good eflux of indigo carmine through the ureteral os bilaterally.     The  patient tolerated the procedure well was brought recovery in stable condition.        4/16/2025 11:12 AM Marcy Jimenez M.D.

## 2025-04-16 NOTE — ANESTHESIA TIME REPORT
Anesthesia Start and Stop Event Times       Date Time Event    4/16/2025 0939 Ready for Procedure     0948 Anesthesia Start     1114 Anesthesia Stop          Responsible Staff  04/16/25      Name Role Begin End    Skye German M.D. Anesth 0948 1114          Overtime Reason:  no overtime (within assigned shift)    Comments:

## 2025-04-16 NOTE — DISCHARGE INSTRUCTIONS
HOME CARE INSTRUCTIONS    ACTIVITY: Rest and take it easy for the first 24 hours.  A responsible adult is recommended to remain with you during that time.  It is normal to feel sleepy.  We encourage you to not do anything that requires balance, judgment or coordination.    FOR 24 HOURS DO NOT:  Drive, operate machinery or run household appliances.  Drink beer or alcoholic beverages.  Make important decisions or sign legal documents.    DIET: To avoid nausea, slowly advance diet as tolerated, avoiding spicy or greasy foods for the first day.  Add more substantial food to your diet according to your physician's instructions.  INCREASE FLUIDS AND FIBER TO AVOID CONSTIPATION.    MEDICATIONS: Resume taking daily medication.  Take prescribed pain medication with food.  If no medication is prescribed, you may take non-aspirin pain medication if needed.  PAIN MEDICATION CAN BE VERY CONSTIPATING.  Take a stool softener or laxative such as senokot, pericolace, or milk of magnesia if needed.    Last pain medication given at 11:30am (5mg oxycodone).    A follow-up appointment should be arranged with your doctor; call to schedule.    You should CALL YOUR PHYSICIAN if you develop:  Fever greater than 101 degrees F.  Pain not relieved by medication, or persistent nausea or vomiting.  Excessive bleeding (blood soaking through dressing) or unexpected drainage from the wound.  Extreme redness or swelling around the incision site, drainage of pus or foul smelling drainage.  Inability to urinate or empty your bladder within 8 hours.  Problems with breathing or chest pain.    You should call 911 if you develop problems with breathing or chest pain.  If you are unable to contact your doctor or surgical center, you should go to the nearest emergency room or urgent care center.  Physician's telephone #: [phone]    MILD FLU-LIKE SYMPTOMS ARE NORMAL.  YOU MAY EXPERIENCE GENERALIZED MUSCLE ACHES, THROAT IRRITATION, HEADACHE AND/OR SOME  NAUSEA.    If any questions arise, call your doctor.  If your doctor is not available, please feel free to call the Surgical Center at [phone].  The Center is open Monday through Friday from 7AM to 7PM.      A registered nurse may call you a few days after your surgery to see how you are doing after your procedure.    You may also receive a survey in the mail within the next two weeks and we ask that you take a few moments to complete the survey and return it to us.  Our goal is to provide you with very good care and we value your comments.     Depression / Suicide Risk    As you are discharged from this [facility] facility, it is important to learn how to keep safe from harming yourself.    Recognize the warning signs:  Abrupt changes in personality, positive or negative- including increase in energy   Giving away possessions  Change in eating patterns- significant weight changes-  positive or negative  Change in sleeping patterns- unable to sleep or sleeping all the time   Unwillingness or inability to communicate  Depression  Unusual sadness, discouragement and loneliness  Talk of wanting to die  Neglect of personal appearance   Rebelliousness- reckless behavior  Withdrawal from people/activities they love  Confusion- inability to concentrate     If you or a loved one observes any of these behaviors or has concerns about self-harm, here's what you can do:  Talk about it- your feelings and reasons for harming yourself  Remove any means that you might use to hurt yourself (examples: pills, rope, extension cords, firearm)  Get professional help from the community (Mental Health, Substance Abuse, psychological counseling)  Do not be alone:Call your Safe Contact- someone whom you trust who will be there for you.  Call your local CRISIS HOTLINE 764-9197 or 516-979-0261  Call your local Children's Mobile Crisis Response Team [place] (428) 319-6543 or www.[employer]  Call the toll free National  Suicide Prevention Hotlines   National Suicide Prevention Lifeline 836-992-GKOB (8725)  [facility] 800-SUICIDE (839-5766)    I acknowledge receipt and understanding of these Home Care instructions.

## 2025-05-01 ENCOUNTER — TELEPHONE (OUTPATIENT)
Dept: VASCULAR LAB | Facility: MEDICAL CENTER | Age: 45
End: 2025-05-01
Payer: COMMERCIAL

## 2025-05-01 NOTE — TELEPHONE ENCOUNTER
Established patient  Chart prep for upcoming appointment.    Any pending/incomplete orders from last visit? Yes Labs  Was patient called and reminded to complete pending orders? Yes  Were any records requested?  No    Referral up to date? Yes  Referral attached to appointment (renewals and New patients only)? N/A (established with up-to-date referral)  Virtual appointment? No    Davida Pugh, Med Ass't  [facility]  Ph. [phone]  Fx. [phone]

## 2025-05-06 ENCOUNTER — TELEPHONE (OUTPATIENT)
Dept: VASCULAR LAB | Facility: MEDICAL CENTER | Age: 45
End: 2025-05-06
Payer: COMMERCIAL

## 2025-05-06 ENCOUNTER — PATIENT MESSAGE (OUTPATIENT)
Dept: VASCULAR LAB | Facility: MEDICAL CENTER | Age: 45
End: 2025-05-06
Payer: COMMERCIAL

## 2025-05-06 ENCOUNTER — HOSPITAL ENCOUNTER (OUTPATIENT)
Dept: LAB | Facility: MEDICAL CENTER | Age: 45
End: 2025-05-06
Attending: INTERNAL MEDICINE
Payer: COMMERCIAL

## 2025-05-06 DIAGNOSIS — E78.5 DYSLIPIDEMIA: ICD-10-CM

## 2025-05-06 DIAGNOSIS — Z00.00 HEALTH CARE MAINTENANCE: ICD-10-CM

## 2025-05-06 DIAGNOSIS — I10 ESSENTIAL HYPERTENSION: ICD-10-CM

## 2025-05-06 DIAGNOSIS — I77.89 ASCENDING AORTA ENLARGEMENT (HCC): ICD-10-CM

## 2025-05-06 DIAGNOSIS — I77.810 ASCENDING AORTA DILATION (HCC): ICD-10-CM

## 2025-05-06 LAB
ALBUMIN SERPL BCP-MCNC: 4.2 G/DL (ref 3.2–4.9)
ALBUMIN/GLOB SERPL: 1.3 G/DL
ALP SERPL-CCNC: 62 U/L (ref 30–99)
ALT SERPL-CCNC: 36 U/L (ref 2–50)
ANION GAP SERPL CALC-SCNC: 10 MMOL/L (ref 7–16)
AST SERPL-CCNC: 30 U/L (ref 12–45)
BILIRUB SERPL-MCNC: 0.4 MG/DL (ref 0.1–1.5)
BUN SERPL-MCNC: 16 MG/DL (ref 8–22)
CALCIUM ALBUM COR SERPL-MCNC: 9.5 MG/DL (ref 8.5–10.5)
CALCIUM SERPL-MCNC: 9.7 MG/DL (ref 8.5–10.5)
CHLORIDE SERPL-SCNC: 106 MMOL/L (ref 96–112)
CHOLEST SERPL-MCNC: 188 MG/DL (ref 100–199)
CO2 SERPL-SCNC: 22 MMOL/L (ref 20–33)
CREAT SERPL-MCNC: 0.76 MG/DL (ref 0.5–1.4)
ERYTHROCYTE [DISTWIDTH] IN BLOOD BY AUTOMATED COUNT: 41.1 FL (ref 35.9–50)
GFR SERPLBLD CREATININE-BSD FMLA CKD-EPI: 98 ML/MIN/1.73 M 2
GLOBULIN SER CALC-MCNC: 3.2 G/DL (ref 1.9–3.5)
GLUCOSE SERPL-MCNC: 93 MG/DL (ref 65–99)
HCT VFR BLD AUTO: 43.3 % (ref 37–47)
HDLC SERPL-MCNC: 39 MG/DL
HGB BLD-MCNC: 14.2 G/DL (ref 12–16)
LDLC SERPL CALC-MCNC: 93 MG/DL
MCH RBC QN AUTO: 30.1 PG (ref 27–33)
MCHC RBC AUTO-ENTMCNC: 32.8 G/DL (ref 32.2–35.5)
MCV RBC AUTO: 91.9 FL (ref 81.4–97.8)
PLATELET # BLD AUTO: 268 K/UL (ref 164–446)
PMV BLD AUTO: 9.7 FL (ref 9–12.9)
POTASSIUM SERPL-SCNC: 4.2 MMOL/L (ref 3.6–5.5)
PROT SERPL-MCNC: 7.4 G/DL (ref 6–8.2)
RBC # BLD AUTO: 4.71 M/UL (ref 4.2–5.4)
SODIUM SERPL-SCNC: 138 MMOL/L (ref 135–145)
TRIGL SERPL-MCNC: 280 MG/DL (ref 0–149)
WBC # BLD AUTO: 7 K/UL (ref 4.8–10.8)

## 2025-05-06 PROCEDURE — 83695 ASSAY OF LIPOPROTEIN(A): CPT

## 2025-05-06 PROCEDURE — 36415 COLL VENOUS BLD VENIPUNCTURE: CPT

## 2025-05-06 PROCEDURE — 82172 ASSAY OF APOLIPOPROTEIN: CPT

## 2025-05-06 PROCEDURE — 80053 COMPREHEN METABOLIC PANEL: CPT

## 2025-05-06 PROCEDURE — 85027 COMPLETE CBC AUTOMATED: CPT

## 2025-05-06 PROCEDURE — 80061 LIPID PANEL: CPT

## 2025-05-06 NOTE — TELEPHONE ENCOUNTER
Called [facility] and s/w .  Notified of updated [facility] w/ order to run labs.    Davida Pugh, Med Ass't  [facility]  Ph. [phone]  Fx. [phone]

## 2025-05-06 NOTE — TELEPHONE ENCOUNTER
----- Message from Nurse Lias COKER R.N. sent at 2025 11:54 AM PDT -----  Ordered, please update lab  ----- Message -----  From: Davida Pugh, Med Ass't  Sent: 2025   8:34 AM PDT  To: Lisa Stevens R.N.    CBC W/OUT  in chart.  Patient went to lab today.  Can you place order?

## 2025-05-06 NOTE — TELEPHONE ENCOUNTER
----- Message from Nurse Lisa COKER R.N. sent at 2025 11:54 AM PDT -----  Ordered, please update lab  ----- Message -----  From: Davida Pugh, Med Ass't  Sent: 2025   8:34 AM PDT  To: Lisa Stevens R.N.    CBC W/OUT  in chart.  Patient went to lab today.  Can you place order?

## 2025-05-07 LAB — LPA SERPL-MCNC: <6 MG/DL

## 2025-05-08 ENCOUNTER — OFFICE VISIT (OUTPATIENT)
Dept: CARDIOLOGY | Facility: MEDICAL CENTER | Age: 45
End: 2025-05-08
Attending: INTERNAL MEDICINE
Payer: COMMERCIAL

## 2025-05-08 ENCOUNTER — DOCUMENTATION (OUTPATIENT)
Dept: CARDIOLOGY | Facility: MEDICAL CENTER | Age: 45
End: 2025-05-08

## 2025-05-08 VITALS
DIASTOLIC BLOOD PRESSURE: 87 MMHG | BODY MASS INDEX: 36.22 KG/M2 | HEIGHT: 68 IN | SYSTOLIC BLOOD PRESSURE: 123 MMHG | WEIGHT: 239 LBS | HEART RATE: 79 BPM

## 2025-05-08 DIAGNOSIS — I10 ESSENTIAL HYPERTENSION: ICD-10-CM

## 2025-05-08 DIAGNOSIS — E78.5 DYSLIPIDEMIA: ICD-10-CM

## 2025-05-08 DIAGNOSIS — E78.2 MIXED HYPERLIPIDEMIA: ICD-10-CM

## 2025-05-08 DIAGNOSIS — I77.810 ASCENDING AORTA DILATION (HCC): ICD-10-CM

## 2025-05-08 DIAGNOSIS — I35.1 AORTIC VALVE INSUFFICIENCY, ETIOLOGY OF CARDIAC VALVE DISEASE UNSPECIFIED: ICD-10-CM

## 2025-05-08 LAB — APO B100 SERPL-MCNC: 97 MG/DL (ref 60–117)

## 2025-05-08 PROCEDURE — 99214 OFFICE O/P EST MOD 30 MIN: CPT | Performed by: INTERNAL MEDICINE

## 2025-05-08 PROCEDURE — 99212 OFFICE O/P EST SF 10 MIN: CPT

## 2025-05-08 RX ORDER — LOSARTAN POTASSIUM 25 MG/1
25 TABLET ORAL DAILY
Qty: 100 TABLET | Refills: 3 | Status: SHIPPED | OUTPATIENT
Start: 2025-05-08 | End: 2026-06-12

## 2025-05-08 RX ORDER — METOPROLOL SUCCINATE 25 MG/1
25 TABLET, EXTENDED RELEASE ORAL DAILY
Qty: 45 TABLET | Refills: 3 | Status: SHIPPED | OUTPATIENT
Start: 2025-05-08

## 2025-05-08 ASSESSMENT — FIBROSIS 4 INDEX: FIB4 SCORE: 0.86

## 2025-05-08 NOTE — PROGRESS NOTES
"VASCULAR MEDICINE CLINIC - Follow up VISIT  05/08/25    Princess Machado is a 44 y.o. female    Referring provider: Merari Yang A.P.*     Subjective      HPI:   Here for f/u e/m of ascending aortic aneurysm s/p repair  Has known family history of aneurysm in multiple members of her mom's family including her brother.   She was in surveillance for some time, but had progression of her aneurysm size earlier this year.   Had surgery uneventfully in september  Has been feeling well  Improving cardiopulmonary fitness  Did not qualify for ICR per her insurance, but she has been walking every day.  No afib or HF  No tia or cva.   She states that she had genetic testing previously that showed no abnormality  No other family members have had genetic testing.   No musculoskeletal issues known.   Off diuretics.     Interval history  No cp or sob  Improving ex jessica  Had gyn surgery without complication  Remains on statin without myalgias  Has gained quite a bit of weight  Less exercise   ON asa  Has been on metoprolol for some time  Home bps have gone up with increased weight  Denies interfering substances  No smoking or stumulants    Social History     Tobacco Use    Smoking status: Never     Passive exposure: Never    Smokeless tobacco: Never   Vaping Use    Vaping status: Never Used   Substance Use Topics    Alcohol use: Yes     Alcohol/week: 1.2 oz     Types: 2 Glasses of wine per week     Comment: very little    Drug use: No     DIET AND EXERCISE:  Weight Change:lost some weight after surgery but gained it back and more  Diet: relatively HH but less so  Exercise: more limited      Objective    Vitals:    05/08/25 0958 05/08/25 1002   BP: 124/89 123/87   BP Location: Left arm Left arm   Patient Position: Sitting Sitting   BP Cuff Size: Large adult Large adult   Pulse: 76 79   Weight: 108 kg (239 lb)    Height: 1.727 m (5' 8\")       BP Readings from Last 4 Encounters:   05/08/25 123/87   04/16/25 130/77   02/27/25 " 128/84   01/24/25 126/72      Body mass index is 36.34 kg/m².   Wt Readings from Last 4 Encounters:   05/08/25 108 kg (239 lb)   04/16/25 108 kg (238 lb 5.1 oz)   02/27/25 104 kg (230 lb)   01/24/25 107 kg (236 lb 9.6 oz)      Physical Exam  Vitals reviewed.   Constitutional:       General: She is not in acute distress.     Appearance: She is not diaphoretic.   HENT:      Head: Normocephalic and atraumatic.   Eyes:      General: No scleral icterus.     Conjunctiva/sclera: Conjunctivae normal.   Neck:      Vascular: No carotid bruit.   Cardiovascular:      Rate and Rhythm: Normal rate and regular rhythm.      Heart sounds: Murmur heard.   Pulmonary:      Effort: Pulmonary effort is normal. No respiratory distress.      Breath sounds: Normal breath sounds. No wheezing or rales.   Musculoskeletal:      Right lower leg: No edema.      Left lower leg: No edema.   Skin:     Coloration: Skin is not pale.   Neurological:      General: No focal deficit present.      Mental Status: She is alert and oriented to person, place, and time.      Cranial Nerves: No cranial nerve deficit.      Coordination: Coordination normal.      Gait: Gait is intact. Gait normal.   Psychiatric:         Mood and Affect: Mood and affect normal.         Behavior: Behavior normal.          DATA REVIEW    Lab Results   Component Value Date/Time    CHOLSTRLTOT 188 05/06/2025 07:17 AM    LDL 93 05/06/2025 07:17 AM    HDL 39 (A) 05/06/2025 07:17 AM    TRIGLYCERIDE 280 (H) 05/06/2025 07:17 AM       Lab Results   Component Value Date/Time    LDL 93 05/06/2025 07:17 AM    LDL 45 06/28/2024 08:10 AM     (H) 03/14/2024 07:54 AM    LDL see below 02/17/2022 08:08 AM     (H) 01/20/2022 07:53 AM     Lab Results   Component Value Date/Time    LIPOPROTA <6 05/06/2025 07:17 AM     Lab Results   Component Value Date/Time    APOB 97 05/06/2025 07:17 AM     Lab Results   Component Value Date/Time    SODIUM 138 05/06/2025 07:17 AM    POTASSIUM 4.2  "05/06/2025 07:17 AM    CHLORIDE 106 05/06/2025 07:17 AM    CO2 22 05/06/2025 07:17 AM    GLUCOSE 93 05/06/2025 07:17 AM    BUN 16 05/06/2025 07:17 AM    CREATININE 0.76 05/06/2025 07:17 AM    BUNCREATRAT 16 06/05/2020 05:20 AM     Lab Results   Component Value Date/Time    ALKPHOSPHAT 62 05/06/2025 07:17 AM    ASTSGOT 30 05/06/2025 07:17 AM    ALTSGPT 36 05/06/2025 07:17 AM    TBILIRUBIN 0.4 05/06/2025 07:17 AM       Lab Results   Component Value Date/Time    HBA1C 5.1 09/09/2024 10:15 AM       No results found for: \"MICROALBCALC\", \"MALBCRT\", \"MALBEXCR\", \"OKNOXB76\", \"MICROALBUR\", \"MICRALB\", \"UMICROALBUM\", \"MICROALBTIM\"      Cardiovascular Imaging:    CTA chest march 2025  1.  Interval repair of ascending thoracic aortic aneurysm. The ascending thoracic aorta currently measures 3.3 cm in greatest diameter.  2.  Stable 5 mm pleural-based nodule arising peripherally in the lingula.  3.  Slight interval increase in size of 3.1 x 2.3 cm enhancing lesion in right lobe of liver. Consider follow-up MRI scan of the liver with gadolinium enhancement with her evaluation.    Echocardiogram march 2025  Compared to the prior CAR study on 6/27/24: There has been interval   ascending aortic repair with a prosthetic graft.  The ascending aorta diameter is 3.1 cm. Prosthetic graft in the   ascending aorta.  Normal LV size, thickness and systolic function with normal LVEF 60 to   65%  Normal RV size and systolic function  Normal LV diastolic function  No significant valvular abnormalities  Normal IVC size  No pericardial effusion    Previous Cardiovascular Procedures of Note:    September 2024 - ascending aneurysm repair        Medical Decision Making:  Today's Assessment / Status / Plan:     1. Ascending aorta dilation (HCC)        2. Aortic valve insufficiency, etiology of cardiac valve disease unspecified        3. Essential hypertension  metoprolol SR (TOPROL XL) 25 MG TABLET SR 24 HR    losartan (COZAAR) 25 MG Tab      4. Mixed " hyperlipidemia        5. Dyslipidemia           Etiology of Established CVD if Present:     1) familial aortopathy s/p ascending repair sep 2024. Doing well post op. No complications. Has multiple effected family members and apparent positive genetic testing. Had some AI pre-op but none on CAR intra op after repair.   States that her genetic testing through genetic counselor was negative  Repair is stable on cta and echo  - recheck cta chest approx 4-5 years post op to ensure no distal dilation  - repeat echo per cards   - bring in results of genetic testing for our review  - recommended cascade screening echos and genetic testing for all adult first degree relatives- agressive surveillance for any aneurysm  - adolescent children should have genetic testing  - defer to  for further recs  - medical management and lifestyle mod per below    LIPID MANAGEMENT  No obvious athero on pre-op assessment  Lp(a) normal  Evidence of mixed dyslipidemia   Borderline control of LDL and apoB  Plan  - continue rosuva 20 mg daily for now  - intensify TLC   - repeat lipids and apoB in 6-12 mo    BLOOD PRESSURE MANAGEMENT  BP Goal ACC/AHA (2017) goal <130/80  Home BP at goal:  no  Office BP at goal:  no  24h ABPM:  not ordered to date   RDN candidate? NO  Contributing factors: ascending aortic aneurysm repair   BP increases with weight  Plan:   - continue home BP monitoring, reviewed correct technique, provide BP log and instructions  - increase metoprolol er to 25 mg daily  - start losartan 25 mg daily - increase as needed    GLYCEMIC MANAGEMENT Normal  Has met syn with 4/5 components, but sugars normal to date  - continue tlc  - recheck fasting glucose with next blood work    ANTITHROMBOTIC THERAPY: continue indefinite low dose asa    LIFESTYLE INTERVENTIONS:    Tobacco Use:  reports that she has never smoked. She has never been exposed to tobacco smoke. She has never used smokeless tobacco.   - continued complete avoidance  of all tobacco products     Physical Activity: stressed importance of training for heart remodel. did not qualify for ICR per insurance. Restart daily walking. Begin to add in some low impact resistance training perhaps at next visit. No heavy lifting    Weight Management and/or Nutrition: has had quite a bit of weight gain with metabolic consequences. Restart low simple carb and intermittent fasting. No objection to GLP-1 6-12 mo after surgery if she and her pcp decide to go that route    Studies to Be Obtained: No  Labs to Be Obtained: none    Follow up in: 6 weeks to assess bp control    Michael J Bloch, M.D.   [facility]  [facility]  [phone]    Cc:  JORDAN Bustamante,

## 2025-05-09 NOTE — PROGRESS NOTES
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 Bloch, MD  [facility]

## 2025-06-19 ENCOUNTER — TELEPHONE (OUTPATIENT)
Dept: VASCULAR LAB | Facility: MEDICAL CENTER | Age: 45
End: 2025-06-19
Payer: COMMERCIAL

## 2025-06-19 DIAGNOSIS — I10 ESSENTIAL HYPERTENSION: ICD-10-CM

## 2025-06-19 NOTE — TELEPHONE ENCOUNTER
Established patient  Chart prep for upcoming appointment.    Any pending/incomplete orders from last visit? No, all orders completed.  Was patient called and reminded to complete pending orders? N/A orders complete  Were any records requested?  No    Referral up to date? Yes  Referral attached to appointment (renewals and New patients only)? N/A (established with up-to-date referral)  Virtual appointment? No    Davida Pugh, Med Ass't  [facility]  Ph. [phone]  Fx. [phone]

## 2025-06-20 RX ORDER — METOPROLOL SUCCINATE 25 MG/1
TABLET, EXTENDED RELEASE ORAL
Qty: 45 TABLET | Refills: 2 | OUTPATIENT
Start: 2025-06-20

## 2025-06-20 NOTE — TELEPHONE ENCOUNTER
Is the patient due for a refill? No, requesting rx to be sent to different pharmacy    Was the patient seen the last 15 months? Yes    Date of last office visit: 1/24/25    Does the patient have an upcoming appointment?  No    Provider to refill:LS    Does the patient have intermediate Plus and need 100-day supply? (This applies to ALL medications) Patient does not have SCP

## 2025-06-23 ENCOUNTER — TELEPHONE (OUTPATIENT)
Dept: VASCULAR LAB | Facility: MEDICAL CENTER | Age: 45
End: 2025-06-23
Payer: COMMERCIAL

## 2025-06-23 DIAGNOSIS — I10 ESSENTIAL HYPERTENSION: ICD-10-CM

## 2025-06-23 RX ORDER — METOPROLOL SUCCINATE 25 MG/1
25 TABLET, EXTENDED RELEASE ORAL DAILY
Qty: 90 TABLET | Refills: 3 | Status: SHIPPED | OUTPATIENT
Start: 2025-06-23 | End: 2025-06-24 | Stop reason: SDUPTHER

## 2025-06-23 NOTE — TELEPHONE ENCOUNTER
MICHAEL BLOCH    Vascular RX Refill Request    PT Name: Princess Machado    Medication:     metoprolol SR (TOPROL XL) 25 MG TABLET SR 24 HR     [facility] - [facility], [place] - [address] [place] [address] [[id number]]      Any special instructions: Transfer to different pharmacy for insurance purposes. Request came from:    Reny Tejeda [facility]  [phone]   Ref: [id number]

## 2025-06-24 DIAGNOSIS — I10 ESSENTIAL HYPERTENSION: ICD-10-CM

## 2025-06-24 NOTE — TELEPHONE ENCOUNTER
Received request via: Patient    Was the patient seen in the last year in this department? Yes    Does the patient have an active prescription (recently filled or refills available) for medication(s) requested? Yes. Refill request has been refused in Epic. Contacted pharmacy and called in most recent prescription.    Pharmacy Name: [facility]     Does the patient have long term Plus and need 100-day supply? (This applies to ALL medications) Patient does not have SCP

## 2025-06-26 ENCOUNTER — OFFICE VISIT (OUTPATIENT)
Facility: MEDICAL CENTER | Age: 45
End: 2025-06-26
Attending: INTERNAL MEDICINE
Payer: COMMERCIAL

## 2025-06-26 VITALS
HEIGHT: 68 IN | SYSTOLIC BLOOD PRESSURE: 125 MMHG | WEIGHT: 235 LBS | BODY MASS INDEX: 35.61 KG/M2 | DIASTOLIC BLOOD PRESSURE: 72 MMHG | HEART RATE: 58 BPM

## 2025-06-26 DIAGNOSIS — I77.810 ASCENDING AORTA DILATION (HCC): ICD-10-CM

## 2025-06-26 DIAGNOSIS — E66.811 OBESITY (BMI 30.0-34.9): ICD-10-CM

## 2025-06-26 DIAGNOSIS — J45.20 MILD INTERMITTENT ASTHMA WITHOUT COMPLICATION: ICD-10-CM

## 2025-06-26 DIAGNOSIS — I35.1 AORTIC VALVE INSUFFICIENCY, ETIOLOGY OF CARDIAC VALVE DISEASE UNSPECIFIED: ICD-10-CM

## 2025-06-26 DIAGNOSIS — G89.29 CHRONIC NONINTRACTABLE HEADACHE, UNSPECIFIED HEADACHE TYPE: ICD-10-CM

## 2025-06-26 DIAGNOSIS — R51.9 CHRONIC NONINTRACTABLE HEADACHE, UNSPECIFIED HEADACHE TYPE: ICD-10-CM

## 2025-06-26 DIAGNOSIS — E78.5 DYSLIPIDEMIA: ICD-10-CM

## 2025-06-26 DIAGNOSIS — I10 ESSENTIAL HYPERTENSION: Primary | ICD-10-CM

## 2025-06-26 DIAGNOSIS — E78.2 MIXED HYPERLIPIDEMIA: ICD-10-CM

## 2025-06-26 PROCEDURE — 99212 OFFICE O/P EST SF 10 MIN: CPT

## 2025-06-26 RX ORDER — METOPROLOL SUCCINATE 25 MG/1
25 TABLET, EXTENDED RELEASE ORAL DAILY
Qty: 90 TABLET | Refills: 3 | Status: SHIPPED | OUTPATIENT
Start: 2025-06-26

## 2025-06-26 ASSESSMENT — FIBROSIS 4 INDEX: FIB4 SCORE: 0.86

## 2025-06-26 NOTE — PROGRESS NOTES
"[facility] - Follow up VISIT  06/26/25    Princses Machado is a 44 y.o. female    Referring provider: Merari Yang A.P.*     Subjective      HPI:   Here for f/u e/m of ascending aortic aneurysm s/p repair  Has known family history of aneurysm in multiple members of her mom's family including her brother.   She was in surveillance for some time, but had progression of her aneurysm size earlier this year.   Had surgery uneventfully in september  Has been feeling well  Improving cardiopulmonary fitness  Did not qualify for ICR per her insurance, but she has been walking every day.  No afib or HF  No tia or cva.   She states that she had genetic testing previously that showed no abnormality  No other family members have had genetic testing.   No musculoskeletal issues known.   Off diuretics.     Interval history  No cp or sob  Improving ex jessica  Remains on statin without myalgias  Started on tirzepatide  Has had some nausea but starting to see some weight loss  Increase metoprolol  Started losartan  Limited home blood pressures  Denies interfering substances  No smoking or stumulants  Remains on aspirin  Has been having a lot of headaches  Going to be starting on Emgality    Social History     Tobacco Use    Smoking status: Never     Passive exposure: Never    Smokeless tobacco: Never   Vaping Use    Vaping status: Never Used   Substance Use Topics    Alcohol use: Yes     Alcohol/week: 1.2 oz     Types: 2 Glasses of wine per week     Comment: very little    Drug use: No     DIET AND EXERCISE:  Weight Change: down 6 pounds  Diet: eating less - better quality - good amounts of protein  Exercise: walking nearly every day      Objective    Vitals:    06/26/25 1354   BP: 125/72   BP Location: Left arm   Patient Position: Sitting   BP Cuff Size: Large adult   Pulse: (!) 58   Weight: 107 kg (235 lb)   Height: 1.727 m (5' 8\")        BP Readings from Last 4 Encounters:   06/26/25 125/72   05/08/25 123/87 "   04/16/25 130/77   02/27/25 128/84      Body mass index is 35.73 kg/m².   Wt Readings from Last 4 Encounters:   06/26/25 107 kg (235 lb)   05/08/25 108 kg (239 lb)   04/16/25 108 kg (238 lb 5.1 oz)   02/27/25 104 kg (230 lb)      Physical Exam  Vitals reviewed.   Constitutional:       General: She is not in acute distress.     Appearance: She is not diaphoretic.   HENT:      Head: Normocephalic and atraumatic.   Eyes:      General: No scleral icterus.     Conjunctiva/sclera: Conjunctivae normal.   Neck:      Vascular: No carotid bruit.   Cardiovascular:      Rate and Rhythm: Normal rate and regular rhythm.      Heart sounds: Murmur heard.   Pulmonary:      Effort: Pulmonary effort is normal. No respiratory distress.      Breath sounds: Normal breath sounds. No wheezing or rales.   Musculoskeletal:      Right lower leg: No edema.      Left lower leg: No edema.   Skin:     Coloration: Skin is not pale.   Neurological:      General: No focal deficit present.      Mental Status: She is alert and oriented to person, place, and time.      Cranial Nerves: No cranial nerve deficit.      Coordination: Coordination normal.      Gait: Gait is intact. Gait normal.   Psychiatric:         Mood and Affect: Mood and affect normal.         Behavior: Behavior normal.        DATA REVIEW    Lab Results   Component Value Date/Time    CHOLSTRLTOT 188 05/06/2025 07:17 AM    LDL 93 05/06/2025 07:17 AM    HDL 39 (A) 05/06/2025 07:17 AM    TRIGLYCERIDE 280 (H) 05/06/2025 07:17 AM       Lab Results   Component Value Date/Time    LDL 93 05/06/2025 07:17 AM    LDL 45 06/28/2024 08:10 AM     (H) 03/14/2024 07:54 AM    LDL see below 02/17/2022 08:08 AM     (H) 01/20/2022 07:53 AM     Lab Results   Component Value Date/Time    LIPOPROTA <6 05/06/2025 07:17 AM     Lab Results   Component Value Date/Time    APOB 97 05/06/2025 07:17 AM     Lab Results   Component Value Date/Time    SODIUM 138 05/06/2025 07:17 AM    POTASSIUM 4.2  "05/06/2025 07:17 AM    CHLORIDE 106 05/06/2025 07:17 AM    CO2 22 05/06/2025 07:17 AM    GLUCOSE 93 05/06/2025 07:17 AM    BUN 16 05/06/2025 07:17 AM    CREATININE 0.76 05/06/2025 07:17 AM    BUNCREATRAT 16 06/05/2020 05:20 AM     Lab Results   Component Value Date/Time    ALKPHOSPHAT 62 05/06/2025 07:17 AM    ASTSGOT 30 05/06/2025 07:17 AM    ALTSGPT 36 05/06/2025 07:17 AM    TBILIRUBIN 0.4 05/06/2025 07:17 AM       Lab Results   Component Value Date/Time    HBA1C 5.1 09/09/2024 10:15 AM       No results found for: \"MICROALBCALC\", \"MALBCRT\", \"MALBEXCR\", \"MUNOUH02\", \"MICROALBUR\", \"MICRALB\", \"UMICROALBUM\", \"MICROALBTIM\"      Genetic testing July 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  Interval repair of ascending thoracic aortic aneurysm. The ascending thoracic aorta currently measures 3.3 cm in greatest diameter.  2.  Stable 5 mm pleural-based nodule arising peripherally in the lingula.  3.  Slight interval increase in size of 3.1 x 2.3 cm enhancing lesion in right lobe of liver. Consider follow-up MRI scan of the liver with gadolinium enhancement with her evaluation.    Echocardiogram march 2025  Compared to the prior CAR study on 6/27/24: There has been interval   ascending aortic repair with a prosthetic graft.  The ascending aorta diameter is 3.1 cm. Prosthetic graft in the   ascending aorta.  Normal LV size, thickness and systolic function with normal LVEF 60 to   65%  Normal RV size and systolic function  Normal LV diastolic function  No significant valvular abnormalities  Normal IVC size  No pericardial effusion    Previous Cardiovascular Procedures of Note:    September 2024 - ascending aneurysm repair        Medical Decision Making:  Today's Assessment / Status / Plan:     1. Essential hypertension        2. Ascending aorta dilation (HCC)        3. Aortic valve insufficiency, etiology of cardiac valve disease unspecified   "      4. Mild intermittent asthma without complication        5. Mixed hyperlipidemia        6. Dyslipidemia        7. Obesity (BMI 30.0-34.9)           Etiology of Established CVD if Present:     1) familial aortopathy s/p ascending repair sep 2024. Doing well post op. No complications. Has multiple effected family members and apparent positive genetic testing. Had some AI pre-op but none on CAR intra op after repair.   Genetic testing notable for a variant of uncertain significance  Repair is stable on cta and echo  - recheck cta chest approx 4-5 years post op to ensure no distal dilation  - repeat echo per cards   - recommended cascade screening echos and genetic testing for all adult first degree relatives- agressive surveillance for any aneurysm  - adolescent children should have genetic testing  - defer to  for further recs  - medical management and lifestyle mod per below    LIPID MANAGEMENT  Goal LDL less than 100 in April be less than 90  No obvious athero on pre-op assessment  Lp(a) normal  Evidence of mixed dyslipidemia   Borderline control of LDL and apoB  Plan  - continue rosuva 20 mg daily for now  - intensify TLC   - repeat lipids and apoB in 6-12 mo    BLOOD PRESSURE MANAGEMENT  BP Goal ACC/AHA (2017) goal <130/80  Home BP at goal: Unknown  Office BP at goal: Yes  24h ABPM:  not ordered to date   RDN candidate? NO  Contributing factors: ascending aortic aneurysm repair   BP increases with weight  Plan:   - more attention to home monitoring.  Call if consistently greater than 130/80  - Continue metoprolol er 25 mg daily  - Continue losartan 25 mg daily - increase as needed    GLYCEMIC MANAGEMENT Normal  Has met syn with 4/5 components, but sugars normal to date  - continue tlc  - recheck fasting glucose with next blood work    ANTITHROMBOTIC THERAPY:   -continue indefinite low dose asa    LIFESTYLE INTERVENTIONS:    Tobacco Use:  reports that she has never smoked. She has never been exposed  to tobacco smoke. She has never used smokeless tobacco.   - continued complete avoidance of all tobacco products     Physical Activity: stressed importance of training for heart remodel. did not qualify for ICR per insurance.  Continue daily walking.  Add some low impact resistance training    Weight Management and/or Nutrition: has had quite a bit of weight gain with metabolic consequences.  Started on tirzepatide.  Having some nausea.  Defer dose changes to PCP.  Encouraged to continue heart healthy macronutrients.  Make sure she is getting enough protein    Other    # Chronic headaches -longstanding and appear migrainous.  No objection to Ubrelvy but she will need to watch her blood pressure carefully.  Given the potential association of thoracic and cerebral aneurysms I do think we should pursue neuroimaging.  She is too claustrophobic to do a head MRI  - Check CTA head    Studies to Be Obtained: CTA head (not on calendar  Labs to Be Obtained: none currently    Follow up in: 3 months    Michael J Bloch, M.D.   [facility]  [facility]  [phone]    Cc:  JORDAN Bustamante

## 2025-07-10 ENCOUNTER — RESULTS FOLLOW-UP (OUTPATIENT)
Dept: CARDIOLOGY | Facility: MEDICAL CENTER | Age: 45
End: 2025-07-10

## 2025-07-10 ENCOUNTER — HOSPITAL ENCOUNTER (OUTPATIENT)
Dept: RADIOLOGY | Facility: MEDICAL CENTER | Age: 45
End: 2025-07-10
Attending: INTERNAL MEDICINE
Payer: COMMERCIAL

## 2025-07-10 DIAGNOSIS — I77.810 ASCENDING AORTA DILATION (HCC): ICD-10-CM

## 2025-07-10 DIAGNOSIS — G89.29 CHRONIC NONINTRACTABLE HEADACHE, UNSPECIFIED HEADACHE TYPE: ICD-10-CM

## 2025-07-10 DIAGNOSIS — R51.9 CHRONIC NONINTRACTABLE HEADACHE, UNSPECIFIED HEADACHE TYPE: ICD-10-CM

## 2025-07-10 PROCEDURE — 70496 CT ANGIOGRAPHY HEAD: CPT

## 2025-07-10 PROCEDURE — 700117 HCHG RX CONTRAST REV CODE 255: Performed by: INTERNAL MEDICINE

## 2025-07-10 RX ADMIN — IOHEXOL 80 ML: 350 INJECTION, SOLUTION INTRAVENOUS at 09:30

## 2025-07-24 ENCOUNTER — HOSPITAL ENCOUNTER (OUTPATIENT)
Facility: MEDICAL CENTER | Age: 45
End: 2025-07-24
Attending: INTERNAL MEDICINE
Payer: COMMERCIAL

## 2025-07-24 LAB
T3 SERPL-MCNC: 125 NG/DL (ref 60–181)
T4 FREE SERPL-MCNC: 1.27 NG/DL (ref 0.93–1.7)
THYROPEROXIDASE AB SERPL-ACNC: <9 IU/ML (ref 0–9)
TSH SERPL DL<=0.005 MIU/L-ACNC: 0.67 UIU/ML (ref 0.38–5.33)

## 2025-07-24 PROCEDURE — 83520 IMMUNOASSAY QUANT NOS NONAB: CPT

## 2025-07-24 PROCEDURE — 84445 ASSAY OF TSI GLOBULIN: CPT

## 2025-07-24 PROCEDURE — 84439 ASSAY OF FREE THYROXINE: CPT

## 2025-07-24 PROCEDURE — 84480 ASSAY TRIIODOTHYRONINE (T3): CPT

## 2025-07-24 PROCEDURE — 36415 COLL VENOUS BLD VENIPUNCTURE: CPT

## 2025-07-24 PROCEDURE — 86376 MICROSOMAL ANTIBODY EACH: CPT

## 2025-07-24 PROCEDURE — 84443 ASSAY THYROID STIM HORMONE: CPT

## 2025-07-26 LAB — TSH RECEP AB SER-ACNC: <1.1 IU/L

## 2025-07-27 LAB — TSI SER-ACNC: <0.1 IU/L

## 2025-08-26 ENCOUNTER — APPOINTMENT (OUTPATIENT)
Dept: URBAN - METROPOLITAN AREA CLINIC 36 | Facility: CLINIC | Age: 45
Setting detail: DERMATOLOGY
End: 2025-08-26

## (undated) DEVICE — SET LEADWIRE 5 LEAD BEDSIDE DISPOSABLE ECG (1SET OF 5/EA)

## (undated) DEVICE — Device

## (undated) DEVICE — GOWN WARMING STANDARD FLEX - (30/CA)

## (undated) DEVICE — TOWELS CLOTH SURGICAL - (4/PK 20PK/CA)

## (undated) DEVICE — STOPCOCK MALE 4-WAY - (50/CA)

## (undated) DEVICE — FELT CARDIOVASCULAR PTFE 1.65MM THICK L2 IN X W2 IN PATCH STERILE LATEX FREE (10EA/CA)

## (undated) DEVICE — PAD LAP STERILE 18 X 18 - (5/PK 40PK/CA)

## (undated) DEVICE — GOWN SURGEONS X-LARGE - DISP. (30/CA)

## (undated) DEVICE — PACK GYN DAVINCI (1EA/CA)

## (undated) DEVICE — BANDAGE ELASTIC 4 IN X 5 YDS - LATEX FREE(10/BX 5BX/CA)

## (undated) DEVICE — TUBING CLEARLINK DUO-VENT - C-FLO (48EA/CA)

## (undated) DEVICE — SENSOR OXIMETER ADULT SPO2 RD SET (20EA/BX)

## (undated) DEVICE — TRAY CATHETER FOLEY URINE METER W/STATLOCK 350ML (10EA/CA)

## (undated) DEVICE — LACTATED RINGERS INJ 1000 ML - (14EA/CA 60CA/PF)

## (undated) DEVICE — OBTURATOR BLADELESS STANDARD 8MM (6EA/BX)

## (undated) DEVICE — SET BIFURCATED BLOOD - (48EA/CS)

## (undated) DEVICE — SUTURE 4-0 30CM STRATAFIX SPIRAL PS-2 (12EA/BX)

## (undated) DEVICE — SODIUM CHL IRRIGATION 0.9% 1000ML (12EA/CA)

## (undated) DEVICE — SET TUBING PNEUMOCLEAR HIGH FLOW SMOKE EVACUATION (10EA/BX)

## (undated) DEVICE — SENSOR CEREBRAL AND SOMATIC MONITORING (20/CA)

## (undated) DEVICE — WIRE STEEL 5-0 B&S 20 OHS - 5/PK 12PK/BX ITEM. D5329 OR D6625 CAN BE USED AS A SUB

## (undated) DEVICE — SET EXTENSION WITH 2 PORTS (48EA/CA) ***PART #2C8610 IS A SUBSTITUTE*****

## (undated) DEVICE — UTERINE MANIP V-CARE STANDARD DAVINCI (8EA/CA)

## (undated) DEVICE — MICRODRIP PRIMARY VENTED 60 (48EA/CA) THIS WAS PART #2C8428 WHICH WAS DISCONTINUED

## (undated) DEVICE — SPATULA PERMANENT CAUTERY DA VINCI 10X'S REUSABLE

## (undated) DEVICE — COVER LIGHT HANDLE ALC PLUS DISP (18EA/BX)

## (undated) DEVICE — SUTURE 4-0 PROLENE RB-1 D/A 36 (36PK/BX)"

## (undated) DEVICE — DRAPE COLUMN BOX OF 20

## (undated) DEVICE — SOD. CHL. INJ. 0.9% 1000 ML - (14EA/CA 60CA/PF)

## (undated) DEVICE — LEAD PACING TEMP MYO - (12/BX)

## (undated) DEVICE — PAD SANITARY 11IN MAXI IND WRAPPED (12EA/PK 24PK/CA)

## (undated) DEVICE — D-5-W INJ. 500 ML - (24EA/CA)

## (undated) DEVICE — SUTURE OHS

## (undated) DEVICE — GLOVE BIOGEL PI INDICATOR SZ 6.5 SURGICAL PF LF - (50/BX 4BX/CA)

## (undated) DEVICE — GAUZE FLUFF STERILE 2-PLY 36 X 36 (100EA/CA)

## (undated) DEVICE — GLOVE BIOGEL SZ 8 SURGICAL PF LTX - (50PR/BX 4BX/CA)

## (undated) DEVICE — SUTURE 2-0 ETHIBOND V-5 30 (12EA/BX)"

## (undated) DEVICE — TRAY MULTI-LUMEN 7FR PRESSURE W/MAX BARRIER AND BIOPATCH - (5/CA)

## (undated) DEVICE — SUTURE 2-0 VICRYL PLUS CT-1 36 (36PK/BX)"

## (undated) DEVICE — BLADE STERNUM SAW SURGICAL 32.0 X 6.4 MM STERILE (1/EA)

## (undated) DEVICE — SUTURE 5-0 PROLENE C-1 D/A 24 (36PK/BX)"

## (undated) DEVICE — SLEEVE, VASO, THIGH, MED

## (undated) DEVICE — KIT SURGIFLO W/OUT THROMBIN - (6EA/BX)

## (undated) DEVICE — ELECTRODE RADIOLUCNT SOLID GEL DEFIB PADS (12EA/CA)

## (undated) DEVICE — TUBE NG SALEM SUMP 16FR (50EA/CA)

## (undated) DEVICE — GLOVE BIOGEL INDICATOR SZ 7.5 SURGICAL PF LTX - (50PR/BX 4BX/CA)

## (undated) DEVICE — ELECTRODE DUAL RETURN W/ CORD - (50/PK)

## (undated) DEVICE — SUCTION INSTRUMENT YANKAUER BULBOUS TIP W/O VENT (50EA/CA)

## (undated) DEVICE — DEVICE KIT COR-KNOT COMBO2 DEVICES & 12 KNOTS PER KIT (6KT/CA)

## (undated) DEVICE — ARMREST CRADLE FOAM - (2PR/PK 12PR/CA)

## (undated) DEVICE — SUTURE 0 VICRYL PLUS CT-2 - 27 INCH (36/BX)

## (undated) DEVICE — SEAL UNIVERSAL 5MM-12MM (10EA/BX)

## (undated) DEVICE — SODIUM CHL. INJ. 0.9% 500ML (24EA/CA 50CA/PF)

## (undated) DEVICE — SUTURE 3-0 MONOCRYL SH (36PK/BX)

## (undated) DEVICE — SUTURE GENERAL

## (undated) DEVICE — BRIEF STRETCH MATERNITY M/L - FITS 20-60IN (5EA/BG 20BG/CA)

## (undated) DEVICE — QUICKLOADS COR-KNOT TITANIUM - (6EA/PK 12PK/BX)

## (undated) DEVICE — TUBE CHEST 32FR. RIGHT ANGLED (10EA/CA)

## (undated) DEVICE — TRAY SURESTEP FOLEY TEMP SENSING 16FR SNAP SECURE(10EA/CA) ORDER #18764 FOR TEMP FOLEY ONLY

## (undated) DEVICE — BLADE SURGICAL #15 - (50/BX 3BX/CA)

## (undated) DEVICE — PACK CV DRAPING/BASIN 2PART - (1/CA)

## (undated) DEVICE — KIT RADIAL ARTERY 20GA W/MAX BARRIER AND BIOPATCH (5EA/CA) #10740 IS FOR THE SET RADIAL ARTERIAL

## (undated) DEVICE — ADHESIVE DERMABOND HVD MINI (12EA/BX)

## (undated) DEVICE — SUTURE 2-0 ETHIBOND SH D/A 36 (36PK/BX)"

## (undated) DEVICE — BAG RESUSCITATION DISPOSABLE - WITH MASK (10 EA/CA)

## (undated) DEVICE — SUTURE 2-0 20CM STRATAFIX SPIRAL SH NEEDLE (12/BX)

## (undated) DEVICE — SET FLUID WARMING STANDARD FLOW - (10/CA)

## (undated) DEVICE — SET IRRIGATION CYSTOSCOPY TUBE L80 IN (20EA/CA)

## (undated) DEVICE — DRAPE ARM BOX OF 20

## (undated) DEVICE — DRAPE VAGINAL BIB W/ POUCH (10EA/CA)

## (undated) DEVICE — GLOVE BIOGEL INDICATOR SZ 8 SURGICAL PF LTX - (50/BX 4BX/CA)

## (undated) DEVICE — TRANSDUCER BIFURCATED MONITORING KIT (10EA/CA)

## (undated) DEVICE — SUTURE 2-0 ETHIBOND V-5 - (6/BX)

## (undated) DEVICE — DERMABOND ADVANCED - (12EA/BX)

## (undated) DEVICE — SUTURE 3-0 VICRYL PLUS CT-1 - 36 INCH (36/BX)

## (undated) DEVICE — GLOVE BIOGEL SZ 7 SURGICAL PF LTX - (50PR/BX 4BX/CA)

## (undated) DEVICE — BAG SPONGE COUNT 10.25 X 32 - BLUE (250/CA)

## (undated) DEVICE — CHLORAPREP 26 ML APPLICATOR - ORANGE TINT(25/CA)

## (undated) DEVICE — DRESSING NON ADHERENT 3 X 4 - STERILE (100/BX 12BX/CA)

## (undated) DEVICE — GOWN SURGEONS LARGE - (32/CA)

## (undated) DEVICE — POLY UMBILICAL TAPE 1/8X30 - (36/BX)

## (undated) DEVICE — CANISTER SUCTION 3000ML MECHANICAL FILTER AUTO SHUTOFF MEDI-VAC NONSTERILE LF DISP (40EA/CA)

## (undated) DEVICE — GLOVE SIZE 6.5 SURGEON ACCELERATOR FREE GREEN (50PR/BX)

## (undated) DEVICE — PAD OR TABLE DA VINCI 2IN X 20IN X 72IN - (12EA/CA)

## (undated) DEVICE — INSERT STEALTH #3 - (10/BX)

## (undated) DEVICE — SUTURE 3-0 SILK SH C/R 18 IN - (12/BX)

## (undated) DEVICE — GLOVE BIOGEL PI ORTHO SZ 8.5 PF LF (40/BX)

## (undated) DEVICE — SLEEVE VASO DVT COMPRESSION CALF MED - (10PR/CA)

## (undated) DEVICE — SYSTEM CHEST DRAIN ADULT/PEDS W/AUTO TRANSFUSION CAPABILITY SAHARA (6EA/CA)

## (undated) DEVICE — SET SUCTION/IRRIGATION WITH DISPOSABLE TIP (6/CA )PART #0250-070-520 IS A SUB

## (undated) DEVICE — SYS DLV COST CLS RM TEMP - INJECTATE (CO-SET II) (10EA/CA)

## (undated) DEVICE — INSERT STEALTH #5 - (10/BX)

## (undated) DEVICE — NEEDLE INSFL 120MM 14GA VRRS - (20/BX)

## (undated) DEVICE — FIBRILLAR SURGICEL 4X4 - 10/CA

## (undated) DEVICE — DRESSING TRANSPARENT FILM TEGADERM 2.375 X 2.75" (100EA/BX)"

## (undated) DEVICE — DA VINCI INSUFFLATOR TUBE SET - SMOKE EVACUATION (6EA/BX)

## (undated) DEVICE — ARMBOARD SMALL IV 9 INLONG - (25EA/CA)

## (undated) DEVICE — WATER IRRIGATION STERILE 1000ML (12EA/CA)

## (undated) DEVICE — SUTURE 5-0 PROLENE RB-1 D/A 36 (36PK/BX)"

## (undated) DEVICE — DRIVER LARGE SUTURECUT NEEDLE (15UN/EA)

## (undated) DEVICE — PTFE PLED STER - (250/CA)

## (undated) DEVICE — ORGANIZER SUTURE GABBAY-FRAT - ER STERILE (3/SET 4ST/BX)

## (undated) DEVICE — FORCEPS MARYLAND BIPOLAR (14UN/EA)

## (undated) DEVICE — GLOVE BIOGEL SZ 7.5 SURGICAL PF LTX - (50PR/BX 4BX/CA)

## (undated) DEVICE — TUBE CHEST 32FR. STRAIGHT - (10EA/CA)

## (undated) DEVICE — PACK TRENGUARD 450 PROCEDURE (12EA/CA)